# Patient Record
Sex: FEMALE | Race: WHITE | Employment: UNEMPLOYED | URBAN - METROPOLITAN AREA
[De-identification: names, ages, dates, MRNs, and addresses within clinical notes are randomized per-mention and may not be internally consistent; named-entity substitution may affect disease eponyms.]

---

## 2017-11-01 ENCOUNTER — TELEPHONE (OUTPATIENT)
Dept: ORTHOPEDIC SURGERY | Age: 52
End: 2017-11-01

## 2017-11-01 NOTE — TELEPHONE ENCOUNTER
Pt called stating that they were seen in the ED today      X rays were obtained:  Nondisplaced, slightly angulated fracture of the neck of the 5th metacarpal   of the right hand.          Please review and advise:      Phone #:[phone]

## 2017-11-06 ENCOUNTER — OFFICE VISIT (OUTPATIENT)
Dept: ORTHOPEDIC SURGERY | Age: 52
End: 2017-11-06

## 2017-11-06 VITALS — HEIGHT: 67 IN | WEIGHT: 145 LBS | BODY MASS INDEX: 22.76 KG/M2 | RESPIRATION RATE: 16 BRPM

## 2017-11-06 DIAGNOSIS — S62.339A: Primary | ICD-10-CM

## 2017-11-06 DIAGNOSIS — R52 PAIN: ICD-10-CM

## 2017-11-06 PROCEDURE — 99213 OFFICE O/P EST LOW 20 MIN: CPT | Performed by: ORTHOPAEDIC SURGERY

## 2017-11-06 PROCEDURE — 26600 TREAT METACARPAL FRACTURE: CPT | Performed by: ORTHOPAEDIC SURGERY

## 2017-11-06 PROCEDURE — 3014F SCREEN MAMMO DOC REV: CPT | Performed by: ORTHOPAEDIC SURGERY

## 2017-11-06 PROCEDURE — G8420 CALC BMI NORM PARAMETERS: HCPCS | Performed by: ORTHOPAEDIC SURGERY

## 2017-11-06 PROCEDURE — 4004F PT TOBACCO SCREEN RCVD TLK: CPT | Performed by: ORTHOPAEDIC SURGERY

## 2017-11-06 PROCEDURE — 73130 X-RAY EXAM OF HAND: CPT | Performed by: ORTHOPAEDIC SURGERY

## 2017-11-06 PROCEDURE — 3017F COLORECTAL CA SCREEN DOC REV: CPT | Performed by: ORTHOPAEDIC SURGERY

## 2017-11-06 PROCEDURE — G8428 CUR MEDS NOT DOCUMENT: HCPCS | Performed by: ORTHOPAEDIC SURGERY

## 2017-11-06 PROCEDURE — G8484 FLU IMMUNIZE NO ADMIN: HCPCS | Performed by: ORTHOPAEDIC SURGERY

## 2017-11-06 ASSESSMENT — ENCOUNTER SYMPTOMS
RESPIRATORY NEGATIVE: 1
GASTROINTESTINAL NEGATIVE: 1
EYES NEGATIVE: 1

## 2017-11-06 NOTE — PROGRESS NOTES
Negative. Genitourinary: Negative. Musculoskeletal: Positive for joint pain and myalgias. Skin: Negative. Neurological: Negative. Endo/Heme/Allergies: Negative. Psychiatric/Behavioral: Negative. PAST HISTORY:   Unless otherwise specified in this note, the past history is reviewed today with the patient and is as follows-    Allergies   Allergen Reactions    Adderall [Amphetamine-Dextroamphetamine] Itching    Morphine And Related     Tramadol     Ultram [Tramadol Hcl] Itching    Codeine Itching    Pcn [Penicillins] Dermatitis       Current Outpatient Prescriptions   Medication Sig Dispense Refill    naproxen (NAPROSYN) 500 MG tablet Take 1 tablet by mouth 2 times daily 15 tablet 0    HYDROcodone-acetaminophen (NORCO) 5-325 MG per tablet Take 1 tablet by mouth every 6 hours as needed for Pain . 12 tablet 0    Misc. Devices ([address]) MISC 1 each by Does not apply route once for 1 dose 1 each 0     No current facility-administered medications for this visit.         Past Medical History:   Diagnosis Date    Anxiety     Environmental allergies     Non-healing surgical wound     RLS (restless legs syndrome)        Past Surgical History:   Procedure Laterality Date    APPENDECTOMY       SECTION      CHOLECYSTECTOMY      KNEE SURGERY         Family History   Problem Relation Age of Onset    Depression Other        Social History     Social History    Marital status:      Spouse name: N/A    Number of children: N/A    Years of education: N/A     Social History Main Topics    Smoking status: Current Every Day Smoker     Packs/day: 1.50     Types: Cigarettes    Smokeless tobacco: Never Used    Alcohol use No    Drug use: No    Sexual activity: Not Asked     Other Topics Concern    None     Social History Narrative    None         ORTHOPEDIC CONSTITUTION EXAM:     Vital Signs:  Resp 16   Ht 5' 7\" (1.702 m)   Wt 145 lb (65.8 kg)   BMI 22.71 kg/m² Constitution:  Generally, the patient is [x] alert, [x] appears stated age, and [x] in no distress. General body habitus is:   []Cachectic  []Thin  [x]Normal  []Overweight  []Obese  []Morbidly Obese  Disheveled and unkempt appearance. Psychiatric:   Judgement and insight is:  [x]Good    []Poor  Oriented to:  [x]person, [x]place, [x]time. Mood for circumstances is:  [x]Appropriate   []Inappropriate    Gait and Station:   Gait is:  [x]Normal  []Antalgic   []Trendelenburg   []Wide   []Unsteady   []Other:  Assistive Device:  []Cane    []Crutches    []Walker    []Wheelchair    []Gurney  Weight bearing on injured extremity:  [x]Is able   []Is not able    ORTHOPEDIC HAND EXAM:     HAND EXAM:  [x]Right []Left  The patient is:  [x]Right Handed    []Left Handed    Circulation:  [x] The limb is warm and well perfused. [x] Capillary refill is intact. [] Edema:    Inspection:  [x] Skin intact without abrasion or lacerations. [x] Normal hand alignment:  [] Abnormal alignment:  [] Ecchymosis:  [] Abrasion:  [] Laceration:   [] Scar / Surgical incision:  [x] Orthopedic appliance: ED splint     Range of Motion:  [x] Normal Hand AROM     [x] Normal Hand PROM  [x]Can make a fist    [x]Thumb tip can touch pinky tip  [] Deferred due to fracture  Active Wrist Flexion:  []AROM = PROM   Active Wrist Extension:  []AROM = PROM   Active Radial Deviation:  []AROM = PROM   Active Ulnar Deviation:  []AROM = PROM   Passive Wrist Flexion:  []AROM = PROM   Passive Wrist Extension:  []AROM = PROM   Passive Radial Deviation:  []AROM = PROM   Passive Ulnar Deviation:  []AROM = PROM   Finger AROM:  []AROM = PROM   Finger PROM:  []AROM = PROM     Motor Function:   [x] No gross motor weakness of wrist  [x] No gross motor weakness of hand  [x]Thumbs Up    [x]Pointer finger    [x]OK sign    [x]Cross fingers    [x]Number 5  [] Motor weakness:     Neurologic:  [x] Sensation to light touch intact.   [] Impaired:  [] Decreased sensation to light touch over median nerve distribution. [] Decreased sensation to light touch over ulnar nerve distribution. [x] Deep tendon reflexes intact. [] Impaired:  [x] Coordination / proprioception intact. [] Impaired:     Palpation: (Not tested if not marked)     Normal Tenderness Swelling Crepitation Calor   Thumb: [x] [] [] [] []   Index: [x] [] [] [] []   Middle: [x] [] [] [] []   Ring: [x] [] [] [] []   Small: [x] [] [] [] []   A-1 Pulley: [x] [] [] [] []   MCP Joint: 5th [] [x] [] [] []   Metacarpal: 5th [] [x] [] [] []   Palmar Hand: [x] [] [] [] []   Snuff Box: [x] [] [] [] []   Other: [] [] [] [] []     Comment: Minimal swelling and no erythema or ecchymosis at fracture site. Provocative Tests: (Not tested if not marked)   Negative Positive Positive Findings   Finger instability: [x] []    Thumb instability: [x] []    Triggering: [x] []    Thumb CMC Grind Test: [] []    Finklestein's test: [] []    Froment's sign: [] []    Other: [] []        MEDICAL DATA:   Outside record review:  ER records    Imaging:  Narrative   EXAMINATION:   3 VIEWS OF THE RIGHT HAND       11/1/2017 8:37 am     Impression   Nondisplaced, slightly angulated fracture of the neck of the 5th metacarpal   of the right hand. Hand x-ray for Boxer's Fracture:  3 view(s) of the right hand were obtained and reviewed and show Boxer's fracture of the 5th metacarpal in a  skeletally mature patient. The fracture is 5 degree angulated and 0% displaced. This fracture is in acceptable alignment. (Acceptable alignment range is up to 30-40 degrees of volar angulation)        ASSESSMENT:     1. Fx boxers, closed, initial encounter      right   2. Pain  XR HAND RIGHT (MIN 3 VIEWS)         PLAN:   · Diagnostic and treatment options discussed. Diagnosis explained. Natural history discussed. Patient's questions answered. · Patient asked for narcotic pain medication. Narcotics not indicated at this time for this injury.   · Advised her to use OTC

## 2019-05-09 ENCOUNTER — APPOINTMENT (OUTPATIENT)
Dept: GENERAL RADIOLOGY | Age: 54
End: 2019-05-09
Payer: COMMERCIAL

## 2019-05-09 ENCOUNTER — HOSPITAL ENCOUNTER (EMERGENCY)
Age: 54
Discharge: HOME | End: 2019-05-09
Payer: COMMERCIAL

## 2019-05-09 VITALS
TEMPERATURE: 98.2 F | BODY MASS INDEX: 25.11 KG/M2 | WEIGHT: 160 LBS | HEART RATE: 92 BPM | SYSTOLIC BLOOD PRESSURE: 137 MMHG | RESPIRATION RATE: 18 BRPM | HEIGHT: 67 IN | OXYGEN SATURATION: 97 % | DIASTOLIC BLOOD PRESSURE: 85 MMHG

## 2019-05-09 DIAGNOSIS — M25.531 RIGHT WRIST PAIN: ICD-10-CM

## 2019-05-09 DIAGNOSIS — M79.641 PAIN OF RIGHT HAND: Primary | ICD-10-CM

## 2019-05-09 PROCEDURE — 73110 X-RAY EXAM OF WRIST: CPT

## 2019-05-09 PROCEDURE — 6370000000 HC RX 637 (ALT 250 FOR IP): Performed by: PHYSICIAN ASSISTANT

## 2019-05-09 PROCEDURE — 73130 X-RAY EXAM OF HAND: CPT

## 2019-05-09 PROCEDURE — 99283 EMERGENCY DEPT VISIT LOW MDM: CPT

## 2019-05-09 RX ORDER — NAPROXEN 500 MG/1
500 TABLET ORAL 2 TIMES DAILY
Qty: 60 TABLET | Refills: 0 | Status: ON HOLD | OUTPATIENT
Start: 2019-05-09 | End: 2020-12-18 | Stop reason: HOSPADM

## 2019-05-09 RX ORDER — HYDROCODONE BITARTRATE AND ACETAMINOPHEN 5; 325 MG/1; MG/1
1 TABLET ORAL ONCE
Status: COMPLETED | OUTPATIENT
Start: 2019-05-09 | End: 2019-05-09

## 2019-05-09 RX ADMIN — HYDROCODONE BITARTRATE AND ACETAMINOPHEN 1 TABLET: 5; 325 TABLET ORAL at 01:51

## 2019-05-09 ASSESSMENT — PAIN DESCRIPTION - LOCATION: LOCATION: HAND

## 2019-05-09 ASSESSMENT — PAIN DESCRIPTION - ORIENTATION: ORIENTATION: RIGHT

## 2019-05-09 ASSESSMENT — PAIN SCALES - GENERAL: PAINLEVEL_OUTOF10: 6

## 2019-05-09 ASSESSMENT — PAIN DESCRIPTION - PAIN TYPE: TYPE: ACUTE PAIN

## 2019-05-09 NOTE — ED TRIAGE NOTES
Patient to the ED with cc right hand injury that occurred after she fell and tried to stop her fall with the right hand

## 2019-05-09 NOTE — ED PROVIDER NOTES
eMERGENCY dEPARTMENT eNCOUnter      PCP: Arya Miner MD    [address] [facility]    Chief Complaint   Patient presents with    Hand Injury       HPI    Gina Gee is a 53 y.o. female who presents with Right wrist pain. Onset was yesterday. The context is patient tripped when her grandson was in front of her and suddenly stopped. She states that she fell down at least that she jammed her right middle finger. Since that time she's been having pain in the finger and she describes a burning pain in the ulnar aspect of her right wrist.  No head injury loss of consciousness or any other injuries. The pain is aggravated by wrist movement and direct palpation as well as movement and palpation of the middle finger. No other injuries. No distal numbness, tingling, weakness. REVIEW OF SYSTEMS    General: Denies fever or chills  Cardiac: Denies chest pain or chestwall pain. Pulmonary: Denies shortness of breath  GI: Denies abdominal pain, vomiting, or diarrhea  : No dysuria or hematuria    Denies any other muscles skeletal injuries, including elbow, shoulder,chest wall and back. All other review of systems are negative  See HPI and nursing notes for additional information     PAST MEDICAL & SURGICAL HISTORY    Past Medical History:   Diagnosis Date    Anxiety     Environmental allergies     Non-healing surgical wound     RLS (restless legs syndrome)      Past Surgical History:   Procedure Laterality Date    APPENDECTOMY       SECTION      CHOLECYSTECTOMY      KNEE SURGERY         CURRENT MEDICATIONS    Current Outpatient Rx   Medication Sig Dispense Refill    ibuprofen (ADVIL;MOTRIN) 800 MG tablet Take 800 mg by mouth every 6 hours as needed for Pain      naproxen (NAPROSYN) 500 MG tablet Take 1 tablet by mouth 2 times daily 15 tablet 0    Misc.  Devices ([address]) MISC 1 each by Does not apply route once for 1 dose 1 each 0       ALLERGIES    Allergies   Allergen Reactions    No snuffbox tenderness. Range of motion limited due to pain. Distal sensation and capillary refill intact. Reproducible tenderness along the shaft of the middle finger with mild swelling around the PIP joint full flexion. Mildly limited extension of the middle finger  Elbow, including radial head is non-tender. No swelling, discoloration, or tenderness to palpation of the ipsilateral elbow and hand/fingers. Distal motor, sensation, capillary refill intact. Integument:  Well hydrated, no rash. skin intact  Vascular: affected extremity distally neurovascularly intact - sensation and capillary refill intact. Neurologic:  Awake alert, normal flow ofspeech   Psychiatric: Cooperative, pleasant affect    RADIOLOGY/PROCEDURES    Right Wrist  Xrays:       XR HAND RIGHT (MIN 3 VIEWS) (Preliminary result)   Result time 05/09/19 01:26:16   Preliminary result by Zeyad Matthews MD (05/09/19 01:26:16)                Impression:    1. No acute osseous or soft tissue abnormality of the right hand and wrist.                      XR WRIST RIGHT (MIN 3 VIEWS) (Preliminary result)   Result time 05/09/19 01:26:16   Preliminary result by Zeyad Matthews MD (05/09/19 01:26:16)                Impression:    1. No acute osseous or soft tissue abnormality of the right hand and wrist.                      PROCEDURES   SPLINT PLACEMENT     A velcro wrist splint was applied by the emergency department technician. The splint is in good position. The patient's extremity is neurovascularly intact after the splint placement. ED COURSE & MEDICAL DECISION MAKING       Vital signs and nursing notes reviewed during ED course. I have independently evaluated this patient . Supervising MD present in the Emergency Department, available for consultation, throughout entirety of  patient care. All pertinent Lab data and radiographic results reviewed with patient at bedside.        The patient and/or the family were informed of the results of any tests/labs/imaging, the treatment plan, and time was allotted to answer questions. Differential diagnosis: includes but not limited to ligamentous injury, tendon injury, soft tissue contusion/hematoma, fracture, dislocation, Infection, Neurologic Deficit/Injury. Clinical  IMPRESSION    1. Pain of right hand    2. Right wrist pain           Patient was placed in a wrist  splint today. Pain medication provided. Followup with orthopedist-information provided today. Diagnosis and plan discussed in detail with patient who understands and agrees. Patient agrees to return emergency department if symptoms worsen or any new symptoms develop. Comment: Please note this report has been produced using speech recognition software and may contain errors related to that system including errors in grammar, punctuation, and spelling, as well as words and phrases that may be inappropriate. If there are any questions or concerns please feel free to contact the dictating provider for clarification.       Rosie Simeon PA-C  05/09/19 [id number]

## 2020-10-30 ENCOUNTER — HOSPITAL ENCOUNTER (EMERGENCY)
Age: 55
Discharge: HOME | End: 2020-10-30
Payer: COMMERCIAL

## 2020-10-30 VITALS
WEIGHT: 186 LBS | SYSTOLIC BLOOD PRESSURE: 137 MMHG | DIASTOLIC BLOOD PRESSURE: 97 MMHG | BODY MASS INDEX: 29.19 KG/M2 | RESPIRATION RATE: 16 BRPM | OXYGEN SATURATION: 97 % | TEMPERATURE: 98.1 F | HEART RATE: 86 BPM | HEIGHT: 67 IN

## 2020-10-30 LAB
BACTERIA: ABNORMAL /HPF
BILIRUBIN URINE: NEGATIVE MG/DL
BLOOD, URINE: ABNORMAL
CLARITY: CLEAR
COLOR: ABNORMAL
GLUCOSE, URINE: NEGATIVE MG/DL
KETONES, URINE: NEGATIVE MG/DL
LEUKOCYTE ESTERASE, URINE: ABNORMAL
MUCUS: ABNORMAL HPF
NITRITE URINE, QUANTITATIVE: NEGATIVE
PH, URINE: 6 (ref 5–8)
PROTEIN UA: NEGATIVE MG/DL
RBC URINE: 1 /HPF (ref 0–6)
SPECIFIC GRAVITY UA: 1.01 (ref 1–1.03)
SQUAMOUS EPITHELIAL: <1 /HPF
TRANSITIONAL EPITHELIAL: <1 /HPF
TRICHOMONAS: ABNORMAL /HPF
UROBILINOGEN, URINE: 1 MG/DL (ref 0.2–1)
WBC UA: 2 /HPF (ref 0–5)

## 2020-10-30 PROCEDURE — 99283 EMERGENCY DEPT VISIT LOW MDM: CPT

## 2020-10-30 PROCEDURE — 6370000000 HC RX 637 (ALT 250 FOR IP): Performed by: PHYSICIAN ASSISTANT

## 2020-10-30 PROCEDURE — 81001 URINALYSIS AUTO W/SCOPE: CPT

## 2020-10-30 RX ORDER — PHENAZOPYRIDINE HYDROCHLORIDE 100 MG/1
200 TABLET, FILM COATED ORAL ONCE
Status: COMPLETED | OUTPATIENT
Start: 2020-10-30 | End: 2020-10-30

## 2020-10-30 RX ORDER — CEPHALEXIN 500 MG/1
500 CAPSULE ORAL 2 TIMES DAILY
Qty: 14 CAPSULE | Refills: 0 | Status: SHIPPED | OUTPATIENT
Start: 2020-10-30 | End: 2020-11-06

## 2020-10-30 RX ORDER — IBUPROFEN 600 MG/1
600 TABLET ORAL EVERY 6 HOURS PRN
Qty: 20 TABLET | Refills: 0 | Status: ON HOLD | OUTPATIENT
Start: 2020-10-30 | End: 2020-12-18 | Stop reason: HOSPADM

## 2020-10-30 RX ORDER — PHENAZOPYRIDINE HYDROCHLORIDE 100 MG/1
100 TABLET, FILM COATED ORAL 3 TIMES DAILY PRN
Qty: 10 TABLET | Refills: 0 | Status: SHIPPED | OUTPATIENT
Start: 2020-10-30 | End: 2020-11-02

## 2020-10-30 RX ORDER — IBUPROFEN 600 MG/1
600 TABLET ORAL ONCE
Status: COMPLETED | OUTPATIENT
Start: 2020-10-30 | End: 2020-10-30

## 2020-10-30 RX ADMIN — IBUPROFEN 600 MG: 600 TABLET, FILM COATED ORAL at 06:34

## 2020-10-30 RX ADMIN — PHENAZOPYRIDINE 200 MG: 100 TABLET ORAL at 05:13

## 2020-10-30 ASSESSMENT — PAIN DESCRIPTION - PROGRESSION: CLINICAL_PROGRESSION: NOT CHANGED

## 2020-10-30 ASSESSMENT — PAIN SCALES - GENERAL
PAINLEVEL_OUTOF10: 8
PAINLEVEL_OUTOF10: 10

## 2020-10-30 ASSESSMENT — PAIN DESCRIPTION - ONSET: ONSET: ON-GOING

## 2020-10-30 ASSESSMENT — PAIN DESCRIPTION - DESCRIPTORS: DESCRIPTORS: ACHING

## 2020-10-30 ASSESSMENT — PAIN DESCRIPTION - PAIN TYPE: TYPE: ACUTE PAIN

## 2020-10-30 ASSESSMENT — PAIN DESCRIPTION - LOCATION: LOCATION: BACK

## 2020-10-30 ASSESSMENT — PAIN DESCRIPTION - FREQUENCY: FREQUENCY: CONTINUOUS

## 2020-10-30 NOTE — ED NOTES
Please call sister Yolanda Joel with updates at [phone]     Nehemias Shannon, MINE  10/30/20 7262

## 2020-10-30 NOTE — ED PROVIDER NOTES
EMERGENCY DEPARTMENT ENCOUNTER      PCP: Alec Jones MD    CHIEF COMPLAINT    Chief Complaint   Patient presents with    Urinary Retention     thinks she has a UTI     Dysuria    Flank Pain     left x4 days         This patient was not evaluated by the attending physician. I have independently evaluated this patient . HPI    Romaine Nam is a 63 y.o. female who presents with urinary discomfort, urgency, hesitancy. Has had mild left lower back pain as well, states it feels similar to her UTIs in the past.  Denies fevers or chills. No incontinence. No vaginal symptoms. REVIEW OF SYSTEMS    Constitutional:  Denies fever, chills, weight loss or weakness   HENT:  Denies sore throat or ear pain   Respiratory:  Denies cough or shortness of breath   Cardiovascular:  Denies chest pain, palpitations or swelling   GI:  See HPI. Denies abdominal pain, nausea, vomiting, or diarrhea   :  See HPI.   Musculoskeletal:  Denies pain or swelling of extremities  Skin:  Denies rash   Neurologic:  Denies headache, focal weakness or sensory changes     See HPI and nursing notes additional information  All other review of systems negative at this time      PAST MEDICAL HISTORY/SURGICAL HISTORY    Past Medical History:   Diagnosis Date    Anxiety     Environmental allergies     Non-healing surgical wound     RLS (restless legs syndrome)      Past Surgical History:   Procedure Laterality Date    APPENDECTOMY       SECTION      CHOLECYSTECTOMY      KNEE SURGERY         CURRENT MEDICATIONS    Current Outpatient Rx   Medication Sig Dispense Refill    ibuprofen (IBU) 600 MG tablet Take 1 tablet by mouth every 6 hours as needed for Pain 20 tablet 0    phenazopyridine (PYRIDIUM) 100 MG tablet Take 1 tablet by mouth 3 times daily as needed for Pain (dysuria) 10 tablet 0    cephALEXin (KEFLEX) 500 MG capsule Take 1 capsule by mouth 2 times daily for 7 days 14 capsule 0    naproxen (NAPROSYN) 500 MG tablet Take 1 tablet by mouth 2 times daily 60 tablet 0    Misc.  Devices ([address]) MISC 1 each by Does not apply route once for 1 dose 1 each 0       ALLERGIES    Allergies   Allergen Reactions    Adderall [Amphetamine-Dextroamphetamine] Itching    Morphine And Related     Tramadol     Ultram [Tramadol Hcl] Itching    Codeine Itching    Pcn [Penicillins] Swelling and Dermatitis       FAMILY HISTORY/SOCIAL HISTORY  Family History   Problem Relation Age of Onset    Depression Other      Social History     Socioeconomic History    Marital status:      Spouse name: Not on file    Number of children: Not on file    Years of education: Not on file    Highest education level: Not on file   Occupational History    Not on file   Social Needs    Financial resource strain: Not on file    Food insecurity     Worry: Not on file     Inability: Not on file    Transportation needs     Medical: Not on file     Non-medical: Not on file   Tobacco Use    Smoking status: Current Every Day Smoker     Packs/day: 0.50     Types: Cigarettes    Smokeless tobacco: Never Used   Substance and Sexual Activity    Alcohol use: No    Drug use: No    Sexual activity: Not on file   Lifestyle    Physical activity     Days per week: Not on file     Minutes per session: Not on file    Stress: Not on file   Relationships    Social connections     Talks on phone: Not on file     Gets together: Not on file     Attends Buddhist service: Not on file     Active member of club or organization: Not on file     Attends meetings of clubs or organizations: Not on file     Relationship status: Not on file    Intimate partner violence     Fear of current or ex partner: Not on file     Emotionally abused: Not on file     Physically abused: Not on file     Forced sexual activity: Not on file   Other Topics Concern    Not on file   Social History Narrative    Not on file       PHYSICAL EXAM    VITAL SIGNS: BP (!) 137/97   Pulse 86 Temp 98.1 °F (36.7 °C)   Resp 16   Ht 5' 7\" (1.702 m)   Wt 186 lb (84.4 kg)   SpO2 97%   BMI 29.13 kg/m²    Constitutional:  Well-developed, well-nourished, appears comfortable  Eyes:  Non-icteric sclera, no conjunctival injection   HENT:  Atraumatic, PERRL. NECK: Supple, no JVD   Respiratory:  No respiratory distress - nonlabored breathing  GI:  Abdomen soft, non-distended, no abdominal tenderness  :  No CVA tenderness  Integument:  Well hydrated,Nondiaphoretic Skin, no obvious rashes,  Lymphatics:  No swollen inguinal nodes. No streaks. LABS:  Results for orders placed or performed during the hospital encounter of 10/30/20   Urinalysis Reflex to Culture    Specimen: Urine   Result Value Ref Range    Color, UA ALEX (A) YELLOW    Clarity, UA CLEAR CLEAR    Glucose, Urine NEGATIVE NEGATIVE MG/DL    Bilirubin Urine NEGATIVE NEGATIVE MG/DL    Ketones, Urine NEGATIVE NEGATIVE MG/DL    Specific Gravity, UA 1.009 1.001 - 1.035    Blood, Urine SMALL (A) NEGATIVE    pH, Urine 6.0 5.0 - 8.0    Protein, UA NEGATIVE NEGATIVE MG/DL    Urobilinogen, Urine 1 0.2 - 1.0 MG/DL    Nitrite Urine, Quantitative NEGATIVE NEGATIVE    Leukocyte Esterase, Urine TRACE (A) NEGATIVE    RBC, UA 1 0 - 6 /HPF    WBC, UA 2 0 - 5 /HPF    Bacteria, UA RARE (A) NEGATIVE /HPF    Squam Epithel, UA <1 /HPF    Trans Epithel, UA <1 /HPF    Mucus, UA RARE (A) NEGATIVE HPF    Trichomonas, UA NONE SEEN NONE SEEN /HPF     ED COURSE & MEDICAL DECISION MAKING      History and exam is consistent with urinary tract infection. Patient did want to leave prior to urine because she had to take her children to school. Secondary her symptoms, we will treat her resumptive [place] Mckenna for a urinary tract infection. She did have a rare amount of bacteria in her urine. We we will treat her with Keflex and Pyridium.   There was no clinical evidence of pyelonephritis, kidney stone (addition, patient does not have history of kidney stones), urosepsis, or appendicitis. Patient agrees to return emergency department if symptoms worsen or any new symptoms develop. Clinical  IMPRESSION    1. Dysuria      Comment: Please note this report has been produced using speech recognition software and may contain errors related to that system including errors in grammar, punctuation, and spelling, as well as words and phrases that may be inappropriate. If there are any questions or concerns please feel free to contact the dictating provider for clarification.       GENO Montenegro  10/30/20 [id number]

## 2020-10-30 NOTE — ED TRIAGE NOTES
Patient to the ED complaining of urinary retention, left flank pain, and dysuria x4 days. Patient reports trying to manage symptoms at home, but they became unbearable this am. Patient rates pain 9/10. Denies hx of kidney stones.

## 2020-10-30 NOTE — ED NOTES
Reviewed discharge instructions and prescriptions. Pt V/U Pt left ED and currently waiting outside for ride.      Max Collet, RN  10/30/20 [id number]

## 2020-10-30 NOTE — ED PROVIDER NOTES
eMERGENCY dEPARTMENT eNCOUnter        [facility]    Chief Complaint   Patient presents with    Urinary Retention     thinks she has a UTI     Dysuria    Flank Pain     left x4 days       HPI    Jovany Conklin is a 63 y.o. female who presents with concern for a urinary tract infection. Symptoms began 4 days ago. Constant, worsening just this morning. She reports burning pain with urination, decreased urine output, and suprapubic/lower back pain. Denies any fever or chills. Had an episode of vomiting this morning, which is what triggered her to seek medical attention. She has been trying to increase her water intake at home without relief. REVIEW OF SYSTEMS    See HPI for further details. Review of systems otherwise negative. Constitutional:  Denies fever. Respiratory:  Denies any shortness of breath. Cardiovascular:  Denies chest pain. GI:  + suprapubic abdominal pain, + nausea, + vomiting, denies diarrhea. :  + dysuria, + frequency, + urgency, denies hematuria, + flank pain. Integument:  Denies skin changes. PAST MEDICAL HISTORY    Past Medical History:   Diagnosis Date    Anxiety     Environmental allergies     Non-healing surgical wound     RLS (restless legs syndrome)        SURGICAL HISTORY    Past Surgical History:   Procedure Laterality Date    APPENDECTOMY       SECTION      CHOLECYSTECTOMY      KNEE SURGERY         CURRENT MEDICATIONS    Current Outpatient Rx   Medication Sig Dispense Refill    naproxen (NAPROSYN) 500 MG tablet Take 1 tablet by mouth 2 times daily 60 tablet 0    ibuprofen (ADVIL;MOTRIN) 800 MG tablet Take 800 mg by mouth every 6 hours as needed for Pain      Misc.  Devices ([email or web address]) MISC 1 each by Does not apply route once for 1 dose 1 each 0       ALLERGIES    Allergies   Allergen Reactions    Adderall [Amphetamine-Dextroamphetamine] Itching    Morphine And Related     Tramadol     Ultram [Tramadol Hcl] Itching    Codeine Itching    Pcn [Penicillins] Swelling and Dermatitis       FAMILY HISTORY    Family History   Problem Relation Age of Onset    Depression Other        SOCIAL HISTORY    Social History     Socioeconomic History    Marital status:      Spouse name: Not on file    Number of children: Not on file    Years of education: Not on file    Highest education level: Not on file   Occupational History    Not on file   Social Needs    Financial resource strain: Not on file    Food insecurity     Worry: Not on file     Inability: Not on file    Transportation needs     Medical: Not on file     Non-medical: Not on file   Tobacco Use    Smoking status: Current Every Day Smoker     Packs/day: 0.50     Types: Cigarettes    Smokeless tobacco: Never Used   Substance and Sexual Activity    Alcohol use: No    Drug use: No    Sexual activity: Not on file   Lifestyle    Physical activity     Days per week: Not on file     Minutes per session: Not on file    Stress: Not on file   Relationships    Social connections     Talks on phone: Not on file     Gets together: Not on file     Attends Gnosticism service: Not on file     Active member of club or organization: Not on file     Attends meetings of clubs or organizations: Not on file     Relationship status: Not on file    Intimate partner violence     Fear of current or ex partner: Not on file     Emotionally abused: Not on file     Physically abused: Not on file     Forced sexual activity: Not on file   Other Topics Concern    Not on file   Social History Narrative    Not on file       PHYSICAL EXAM    VITAL SIGNS: BP (!) 137/97   Pulse 86   Temp 98.1 °F (36.7 °C)   Resp 16   Ht 5' 7\" (1.702 m)   Wt 186 lb (84.4 kg)   SpO2 97%   BMI 29.13 kg/m²   Constitutional:  Well developed, well nourished, appears uncomfortable but non-toxic  Eyes:  PERRL, EOMI. Conjunctiva normal.  HENT:  NC/AT. Ears, nose, mouth normal.  Respiratory:  Lungs CTAB. Cardiovascular:  RRR. GI:  Abdomen soft, non-distended, suprapubic tenderness. :  Left lower back tenderness, no CVA tenderness. Musculoskeletal:  No edema. Integument:  Well hydrated. RADIOLOGY/PROCEDURES    Labs Reviewed   URINE RT REFLEX TO CULTURE - Abnormal; Notable for the following components:       Result Value    Color, UA ALEX (*)     Blood, Urine SMALL (*)     Leukocyte Esterase, Urine TRACE (*)     Bacteria, UA RARE (*)     Mucus, UA RARE (*)     All other components within normal limits     ED COURSE & MEDICAL DECISION MAKING    Pertinent Labs & Imaging studies reviewed. (See chart for details)  -  Patient seen and evaluated in the emergency department. -  Triage and nursing notes reviewed and incorporated. -  Old chart records reviewed and incorporated. -  Supervising physician was Dr. Sandra Quezada. Patient was seen independently. -  Differential diagnosis includes: UTI, pyelonephritis, nephrolithiasis, STI, and others  -  Patient treated with Pyridium  -  Initial urine specimen was not large enough to run specimen. Nursing to straight cath to obtain a new sample. I signed out to Westley Bazan PA-C. Please see his note for further details. In light of current events, I did utilize appropriate PPE (including surgical face mask, safety glasses, and gloves, as recommended by the health facility/national standard best practice, during my bedside interactions with the patient. FINAL IMPRESSION    1.  Dysuria                 Kalee Price PA-C  10/30/20 [id number]

## 2020-11-26 ENCOUNTER — APPOINTMENT (OUTPATIENT)
Dept: ULTRASOUND IMAGING | Age: 55
DRG: 192 | End: 2020-11-26
Payer: COMMERCIAL

## 2020-11-26 ENCOUNTER — HOSPITAL ENCOUNTER (INPATIENT)
Age: 55
LOS: 2 days | Discharge: LEFT BEFORE DISCHARGE | DRG: 192 | End: 2020-11-28
Attending: EMERGENCY MEDICINE | Admitting: HOSPITALIST
Payer: COMMERCIAL

## 2020-11-26 ENCOUNTER — APPOINTMENT (OUTPATIENT)
Dept: GENERAL RADIOLOGY | Age: 55
DRG: 192 | End: 2020-11-26
Payer: COMMERCIAL

## 2020-11-26 PROBLEM — I50.9 NEW ONSET OF CONGESTIVE HEART FAILURE (HCC): Status: ACTIVE | Noted: 2020-11-26

## 2020-11-26 LAB
ALBUMIN SERPL-MCNC: 3.4 GM/DL (ref 3.4–5)
ALP BLD-CCNC: 66 IU/L (ref 40–129)
ALT SERPL-CCNC: 27 U/L (ref 10–40)
ANION GAP SERPL CALCULATED.3IONS-SCNC: 11 MMOL/L (ref 4–16)
AST SERPL-CCNC: 23 IU/L (ref 15–37)
BASE EXCESS: 2 (ref 0–2.4)
BASOPHILS ABSOLUTE: 0 K/CU MM
BASOPHILS RELATIVE PERCENT: 0.6 % (ref 0–1)
BILIRUB SERPL-MCNC: 0.6 MG/DL (ref 0–1)
BUN BLDV-MCNC: 11 MG/DL (ref 6–23)
CALCIUM SERPL-MCNC: 8.8 MG/DL (ref 8.3–10.6)
CHLORIDE BLD-SCNC: 100 MMOL/L (ref 99–110)
CO2: 23 MMOL/L (ref 21–32)
COMMENT: ABNORMAL
CREAT SERPL-MCNC: 1.1 MG/DL (ref 0.6–1.1)
D DIMER: 257 NG/ML(DDU)
DIFFERENTIAL TYPE: ABNORMAL
EKG ATRIAL RATE: 113 BPM
EKG ATRIAL RATE: 115 BPM
EKG ATRIAL RATE: 116 BPM
EKG DIAGNOSIS: NORMAL
EKG P AXIS: 72 DEGREES
EKG P AXIS: 73 DEGREES
EKG P AXIS: 77 DEGREES
EKG P-R INTERVAL: 158 MS
EKG P-R INTERVAL: 160 MS
EKG P-R INTERVAL: 170 MS
EKG Q-T INTERVAL: 322 MS
EKG Q-T INTERVAL: 352 MS
EKG Q-T INTERVAL: 358 MS
EKG QRS DURATION: 100 MS
EKG QRS DURATION: 100 MS
EKG QRS DURATION: 86 MS
EKG QTC CALCULATION (BAZETT): 447 MS
EKG QTC CALCULATION (BAZETT): 482 MS
EKG QTC CALCULATION (BAZETT): 495 MS
EKG R AXIS: 17 DEGREES
EKG R AXIS: 24 DEGREES
EKG R AXIS: 27 DEGREES
EKG T AXIS: 110 DEGREES
EKG T AXIS: 146 DEGREES
EKG T AXIS: 99 DEGREES
EKG VENTRICULAR RATE: 113 BPM
EKG VENTRICULAR RATE: 115 BPM
EKG VENTRICULAR RATE: 116 BPM
EOSINOPHILS ABSOLUTE: 0.1 K/CU MM
EOSINOPHILS RELATIVE PERCENT: 1.4 % (ref 0–3)
GFR AFRICAN AMERICAN: >60 ML/MIN/1.73M2
GFR NON-AFRICAN AMERICAN: 51 ML/MIN/1.73M2
GLUCOSE BLD-MCNC: 111 MG/DL (ref 70–99)
HCO3 VENOUS: 24.7 MMOL/L (ref 19–25)
HCT VFR BLD CALC: 42.7 % (ref 37–47)
HEMOGLOBIN: 13.1 GM/DL (ref 12.5–16)
IMMATURE NEUTROPHIL %: 0.3 % (ref 0–0.43)
LACTATE: 1.1 MMOL/L (ref 0.4–2)
LV EF: 18 %
LVEF MODALITY: NORMAL
LYMPHOCYTES ABSOLUTE: 1.5 K/CU MM
LYMPHOCYTES RELATIVE PERCENT: 23.2 % (ref 24–44)
MCH RBC QN AUTO: 29.7 PG (ref 27–31)
MCHC RBC AUTO-ENTMCNC: 30.7 % (ref 32–36)
MCV RBC AUTO: 96.8 FL (ref 78–100)
MONOCYTES ABSOLUTE: 0.5 K/CU MM
MONOCYTES RELATIVE PERCENT: 8.3 % (ref 0–4)
NUCLEATED RBC %: 0 %
O2 SAT, VEN: 87 % (ref 50–70)
PCO2, VEN: 48 MMHG (ref 38–52)
PDW BLD-RTO: 13.9 % (ref 11.7–14.9)
PH VENOUS: 7.32 (ref 7.32–7.42)
PLATELET # BLD: 311 K/CU MM (ref 140–440)
PMV BLD AUTO: 9.8 FL (ref 7.5–11.1)
PO2, VEN: 72 MMHG (ref 28–48)
POTASSIUM SERPL-SCNC: 4 MMOL/L (ref 3.5–5.1)
PRO-BNP: ABNORMAL PG/ML
RAPID INFLUENZA  B AGN: NEGATIVE
RAPID INFLUENZA A AGN: NEGATIVE
RBC # BLD: 4.41 M/CU MM (ref 4.2–5.4)
SARS-COV-2, NAAT: NOT DETECTED
SEGMENTED NEUTROPHILS ABSOLUTE COUNT: 4.3 K/CU MM
SEGMENTED NEUTROPHILS RELATIVE PERCENT: 66.2 % (ref 36–66)
SODIUM BLD-SCNC: 134 MMOL/L (ref 135–145)
SOURCE: NORMAL
TOTAL IMMATURE NEUTOROPHIL: 0.02 K/CU MM
TOTAL NUCLEATED RBC: 0 K/CU MM
TOTAL PROTEIN: 6.5 GM/DL (ref 6.4–8.2)
TROPONIN T: 0.01 NG/ML
TROPONIN T: <0.01 NG/ML
TROPONIN T: <0.01 NG/ML
TSH HIGH SENSITIVITY: 2.16 UIU/ML (ref 0.27–4.2)
WBC # BLD: 6.5 K/CU MM (ref 4–10.5)

## 2020-11-26 PROCEDURE — 99283 EMERGENCY DEPT VISIT LOW MDM: CPT

## 2020-11-26 PROCEDURE — 85379 FIBRIN DEGRADATION QUANT: CPT

## 2020-11-26 PROCEDURE — 93005 ELECTROCARDIOGRAM TRACING: CPT | Performed by: EMERGENCY MEDICINE

## 2020-11-26 PROCEDURE — 6370000000 HC RX 637 (ALT 250 FOR IP): Performed by: HOSPITALIST

## 2020-11-26 PROCEDURE — 2700000000 HC OXYGEN THERAPY PER DAY

## 2020-11-26 PROCEDURE — 71045 X-RAY EXAM CHEST 1 VIEW: CPT

## 2020-11-26 PROCEDURE — 6360000002 HC RX W HCPCS: Performed by: HOSPITALIST

## 2020-11-26 PROCEDURE — 2580000003 HC RX 258: Performed by: HOSPITALIST

## 2020-11-26 PROCEDURE — 6360000002 HC RX W HCPCS: Performed by: INTERNAL MEDICINE

## 2020-11-26 PROCEDURE — 6370000000 HC RX 637 (ALT 250 FOR IP): Performed by: EMERGENCY MEDICINE

## 2020-11-26 PROCEDURE — 83880 ASSAY OF NATRIURETIC PEPTIDE: CPT

## 2020-11-26 PROCEDURE — 93308 TTE F-UP OR LMTD: CPT

## 2020-11-26 PROCEDURE — U0002 COVID-19 LAB TEST NON-CDC: HCPCS

## 2020-11-26 PROCEDURE — 93970 EXTREMITY STUDY: CPT

## 2020-11-26 PROCEDURE — 2140000000 HC CCU INTERMEDIATE R&B

## 2020-11-26 PROCEDURE — 36415 COLL VENOUS BLD VENIPUNCTURE: CPT

## 2020-11-26 PROCEDURE — 80053 COMPREHEN METABOLIC PANEL: CPT

## 2020-11-26 PROCEDURE — 94640 AIRWAY INHALATION TREATMENT: CPT

## 2020-11-26 PROCEDURE — 99254 IP/OBS CNSLTJ NEW/EST MOD 60: CPT | Performed by: INTERNAL MEDICINE

## 2020-11-26 PROCEDURE — 6360000002 HC RX W HCPCS: Performed by: EMERGENCY MEDICINE

## 2020-11-26 PROCEDURE — 94761 N-INVAS EAR/PLS OXIMETRY MLT: CPT

## 2020-11-26 PROCEDURE — 84443 ASSAY THYROID STIM HORMONE: CPT

## 2020-11-26 PROCEDURE — 96375 TX/PRO/DX INJ NEW DRUG ADDON: CPT

## 2020-11-26 PROCEDURE — 93010 ELECTROCARDIOGRAM REPORT: CPT | Performed by: INTERNAL MEDICINE

## 2020-11-26 PROCEDURE — 96374 THER/PROPH/DIAG INJ IV PUSH: CPT

## 2020-11-26 PROCEDURE — 87804 INFLUENZA ASSAY W/OPTIC: CPT

## 2020-11-26 PROCEDURE — 83605 ASSAY OF LACTIC ACID: CPT

## 2020-11-26 PROCEDURE — 87040 BLOOD CULTURE FOR BACTERIA: CPT

## 2020-11-26 PROCEDURE — 82805 BLOOD GASES W/O2 SATURATION: CPT

## 2020-11-26 PROCEDURE — 84484 ASSAY OF TROPONIN QUANT: CPT

## 2020-11-26 PROCEDURE — 85025 COMPLETE CBC W/AUTO DIFF WBC: CPT

## 2020-11-26 PROCEDURE — 93005 ELECTROCARDIOGRAM TRACING: CPT | Performed by: INTERNAL MEDICINE

## 2020-11-26 RX ORDER — FUROSEMIDE 10 MG/ML
80 INJECTION INTRAMUSCULAR; INTRAVENOUS 2 TIMES DAILY
Status: DISCONTINUED | OUTPATIENT
Start: 2020-11-26 | End: 2020-11-26

## 2020-11-26 RX ORDER — DEXAMETHASONE SODIUM PHOSPHATE 10 MG/ML
10 INJECTION, SOLUTION INTRAMUSCULAR; INTRAVENOUS ONCE
Status: COMPLETED | OUTPATIENT
Start: 2020-11-26 | End: 2020-11-26

## 2020-11-26 RX ORDER — NITROGLYCERIN 0.4 MG/1
0.4 TABLET SUBLINGUAL EVERY 5 MIN PRN
Status: DISCONTINUED | OUTPATIENT
Start: 2020-11-26 | End: 2020-11-28 | Stop reason: HOSPADM

## 2020-11-26 RX ORDER — FUROSEMIDE 10 MG/ML
40 INJECTION INTRAMUSCULAR; INTRAVENOUS 2 TIMES DAILY
Status: DISCONTINUED | OUTPATIENT
Start: 2020-11-26 | End: 2020-11-26

## 2020-11-26 RX ORDER — POLYETHYLENE GLYCOL 3350 17 G/17G
17 POWDER, FOR SOLUTION ORAL DAILY PRN
Status: DISCONTINUED | OUTPATIENT
Start: 2020-11-26 | End: 2020-11-28 | Stop reason: HOSPADM

## 2020-11-26 RX ORDER — FUROSEMIDE 10 MG/ML
80 INJECTION INTRAMUSCULAR; INTRAVENOUS 2 TIMES DAILY
Status: DISCONTINUED | OUTPATIENT
Start: 2020-11-26 | End: 2020-11-28 | Stop reason: HOSPADM

## 2020-11-26 RX ORDER — ACETAMINOPHEN 650 MG/1
650 SUPPOSITORY RECTAL EVERY 6 HOURS PRN
Status: DISCONTINUED | OUTPATIENT
Start: 2020-11-26 | End: 2020-11-28 | Stop reason: HOSPADM

## 2020-11-26 RX ORDER — LISINOPRIL 5 MG/1
5 TABLET ORAL DAILY
Status: DISCONTINUED | OUTPATIENT
Start: 2020-11-26 | End: 2020-11-27

## 2020-11-26 RX ORDER — ASPIRIN 81 MG/1
324 TABLET, CHEWABLE ORAL ONCE
Status: COMPLETED | OUTPATIENT
Start: 2020-11-26 | End: 2020-11-26

## 2020-11-26 RX ORDER — PROMETHAZINE HYDROCHLORIDE 12.5 MG/1
12.5 TABLET ORAL EVERY 6 HOURS PRN
Status: DISCONTINUED | OUTPATIENT
Start: 2020-11-26 | End: 2020-11-28 | Stop reason: HOSPADM

## 2020-11-26 RX ORDER — SODIUM CHLORIDE 0.9 % (FLUSH) 0.9 %
10 SYRINGE (ML) INJECTION EVERY 12 HOURS SCHEDULED
Status: DISCONTINUED | OUTPATIENT
Start: 2020-11-26 | End: 2020-11-28 | Stop reason: HOSPADM

## 2020-11-26 RX ORDER — ALBUTEROL SULFATE 90 UG/1
2 AEROSOL, METERED RESPIRATORY (INHALATION) ONCE
Status: COMPLETED | OUTPATIENT
Start: 2020-11-26 | End: 2020-11-26

## 2020-11-26 RX ORDER — FUROSEMIDE 10 MG/ML
20 INJECTION INTRAMUSCULAR; INTRAVENOUS ONCE
Status: COMPLETED | OUTPATIENT
Start: 2020-11-26 | End: 2020-11-26

## 2020-11-26 RX ORDER — SODIUM CHLORIDE 0.9 % (FLUSH) 0.9 %
10 SYRINGE (ML) INJECTION PRN
Status: DISCONTINUED | OUTPATIENT
Start: 2020-11-26 | End: 2020-11-28 | Stop reason: HOSPADM

## 2020-11-26 RX ORDER — ONDANSETRON 2 MG/ML
4 INJECTION INTRAMUSCULAR; INTRAVENOUS EVERY 6 HOURS PRN
Status: DISCONTINUED | OUTPATIENT
Start: 2020-11-26 | End: 2020-11-28 | Stop reason: HOSPADM

## 2020-11-26 RX ORDER — ACETAMINOPHEN 325 MG/1
650 TABLET ORAL EVERY 6 HOURS PRN
Status: DISCONTINUED | OUTPATIENT
Start: 2020-11-26 | End: 2020-11-28 | Stop reason: HOSPADM

## 2020-11-26 RX ADMIN — FUROSEMIDE 20 MG: 10 INJECTION, SOLUTION INTRAVENOUS at 08:38

## 2020-11-26 RX ADMIN — NITROGLYCERIN 0.4 MG: 0.4 TABLET, ORALLY DISINTEGRATING SUBLINGUAL at 08:38

## 2020-11-26 RX ADMIN — ASPIRIN 81 MG CHEWABLE TABLET 324 MG: 81 TABLET CHEWABLE at 08:38

## 2020-11-26 RX ADMIN — FUROSEMIDE 80 MG: 10 INJECTION, SOLUTION INTRAMUSCULAR; INTRAVENOUS at 11:22

## 2020-11-26 RX ADMIN — ACETAMINOPHEN 650 MG: 325 TABLET ORAL at 21:07

## 2020-11-26 RX ADMIN — SODIUM CHLORIDE, PRESERVATIVE FREE 10 ML: 5 INJECTION INTRAVENOUS at 11:50

## 2020-11-26 RX ADMIN — HYDROMORPHONE HYDROCHLORIDE 0.5 MG: 1 INJECTION, SOLUTION INTRAMUSCULAR; INTRAVENOUS; SUBCUTANEOUS at 11:02

## 2020-11-26 RX ADMIN — ENOXAPARIN SODIUM 40 MG: 100 INJECTION SUBCUTANEOUS at 11:45

## 2020-11-26 RX ADMIN — LISINOPRIL 5 MG: 5 TABLET ORAL at 12:01

## 2020-11-26 RX ADMIN — SODIUM CHLORIDE, PRESERVATIVE FREE 10 ML: 5 INJECTION INTRAVENOUS at 21:07

## 2020-11-26 RX ADMIN — ALBUTEROL SULFATE 2 PUFF: 90 AEROSOL, METERED RESPIRATORY (INHALATION) at 08:06

## 2020-11-26 RX ADMIN — NITROGLYCERIN 0.4 MG: 0.4 TABLET, ORALLY DISINTEGRATING SUBLINGUAL at 10:41

## 2020-11-26 RX ADMIN — HYDROMORPHONE HYDROCHLORIDE 0.5 MG: 1 INJECTION, SOLUTION INTRAMUSCULAR; INTRAVENOUS; SUBCUTANEOUS at 15:50

## 2020-11-26 RX ADMIN — FUROSEMIDE 80 MG: 10 INJECTION, SOLUTION INTRAMUSCULAR; INTRAVENOUS at 18:27

## 2020-11-26 RX ADMIN — DEXAMETHASONE SODIUM PHOSPHATE 10 MG: 10 INJECTION, SOLUTION INTRAMUSCULAR; INTRAVENOUS at 07:15

## 2020-11-26 ASSESSMENT — PAIN DESCRIPTION - PAIN TYPE
TYPE: ACUTE PAIN
TYPE: ACUTE PAIN

## 2020-11-26 ASSESSMENT — PAIN DESCRIPTION - LOCATION
LOCATION: CHEST
LOCATION: CHEST

## 2020-11-26 ASSESSMENT — PAIN DESCRIPTION - DESCRIPTORS
DESCRIPTORS: PRESSURE
DESCRIPTORS: PRESSURE

## 2020-11-26 ASSESSMENT — PAIN SCALES - GENERAL
PAINLEVEL_OUTOF10: 8
PAINLEVEL_OUTOF10: 3
PAINLEVEL_OUTOF10: 7
PAINLEVEL_OUTOF10: 10
PAINLEVEL_OUTOF10: 6
PAINLEVEL_OUTOF10: 4

## 2020-11-26 ASSESSMENT — PAIN DESCRIPTION - PROGRESSION
CLINICAL_PROGRESSION: GRADUALLY WORSENING
CLINICAL_PROGRESSION: GRADUALLY WORSENING

## 2020-11-26 ASSESSMENT — PAIN DESCRIPTION - FREQUENCY
FREQUENCY: CONTINUOUS
FREQUENCY: CONTINUOUS

## 2020-11-26 ASSESSMENT — PAIN DESCRIPTION - ONSET
ONSET: ON-GOING
ONSET: ON-GOING

## 2020-11-26 NOTE — CONSULTS
Chart reviewed  Full note to follow  Getting echo                      Name:  Rasheeda Garsia /Age/Sex: 1965  (63 y.o. female)   MRN & CSN:  [id number] & [id number] Admission Date/Time: 2020  6:39 AM   Location:  [facility]6/4116-A PCP: Maddi Sam, [address] [place] [address] Day: 1          Referring physician:  Taylor Whittaker MD         Reason for consultation:  CP        Thanks for referral.    Information source: patient    CC;  CP      HPI:   Thank you for involving me in taking  care of Rasheeda Garsia who  is a 63 y. o.year  Old female  Presents with  No cardiac history admitted with recurrent mid sternal recurrent cp with moderate sob, getting worse, has markedly elevated BNP. Stat echo showed ef 20%. Past medical history:    has a past medical history of Anxiety, Environmental allergies, Non-healing surgical wound, and RLS (restless legs syndrome). Past surgical history:   has a past surgical history that includes Cholecystectomy; Appendectomy;  section; and knee surgery (). Social History:   reports that she has been smoking cigarettes. She has been smoking about 0.50 packs per day. She has never used smokeless tobacco. She reports that she does not drink alcohol or use drugs. Family history:  family history includes Depression in an other family member.     Allergies   Allergen Reactions    Adderall [Amphetamine-Dextroamphetamine] Itching    Morphine And Related     Tramadol     Ultram [Tramadol Hcl] Itching    Codeine Itching    Pcn [Penicillins] Swelling and Dermatitis       nitroGLYCERIN (NITROSTAT) SL tablet 0.4 mg, Q5 Min PRN  sodium chloride flush 0.9 % injection 10 mL, 2 times per day  sodium chloride flush 0.9 % injection 10 mL, PRN  acetaminophen (TYLENOL) tablet 650 mg, Q6H PRN    Or  acetaminophen (TYLENOL) suppository 650 mg, Q6H PRN  polyethylene glycol (GLYCOLAX) packet 17 g, Daily PRN  promethazine (PHENERGAN) tablet 12.5 mg, Q6H PRN    Or  ondansetron (ZOFRAN) injection 4 mg, Q6H PRN  lisinopril (PRINIVIL;ZESTRIL) tablet 5 mg, Daily  HYDROmorphone (DILAUDID) injection 0.5 mg, Q4H PRN  HYDROmorphone (DILAUDID) 1 MG/ML injection,   furosemide (LASIX) injection 80 mg, BID  enoxaparin (LOVENOX) injection 90 mg, BID      Current Facility-Administered Medications   Medication Dose Route Frequency Provider Last Rate Last Dose    nitroGLYCERIN (NITROSTAT) SL tablet 0.4 mg  0.4 mg Sublingual Q5 Min PRN Gabino Bullard MD   0.4 mg at 11/26/20 1041    sodium chloride flush 0.9 % injection 10 mL  10 mL Intravenous 2 times per day Mairo Shetty MD        sodium chloride flush 0.9 % injection 10 mL  10 mL Intravenous PRN Mario Shetty MD        acetaminophen (TYLENOL) tablet 650 mg  650 mg Oral Q6H PRN Gabino Bullard MD        Or    acetaminophen (TYLENOL) suppository 650 mg  650 mg Rectal Q6H PRN Mario Shetty MD        polyethylene glycol (GLYCOLAX) packet 17 g  17 g Oral Daily PRN Mario Shetty MD        promethazine (PHENERGAN) tablet 12.5 mg  12.5 mg Oral Q6H PRN Gabino Bullard MD        Or    ondansetron (ZOFRAN) injection 4 mg  4 mg Intravenous Q6H PRN Gabino Bullard MD        lisinopril (PRINIVIL;ZESTRIL) tablet 5 mg  5 mg Oral Daily Gabino Bullard MD        HYDROmorphone (DILAUDID) injection 0.5 mg  0.5 mg Intravenous Q4H PRN Mario Shetty MD   0.5 mg at 11/26/20 1102    HYDROmorphone (DILAUDID) 1 MG/ML injection             furosemide (LASIX) injection 80 mg  80 mg Intravenous BID Clark Salazar MD        enoxaparin (LOVENOX) injection 90 mg  1 mg/kg Subcutaneous BID Clark Salazar MD         Review of Systems:  All 14 systems reviewed, all negative except for  SOB, CP    Physical Examination:    BP (!) 131/100   Pulse 116   Temp 97.8 °F (36.6 °C) (Oral)   Resp 16   Ht 5' 7\" (1.702 m)   Wt 191 lb 2.2 oz (86.7 kg)   SpO2 97%   BMI 29.94 kg/m²      Wt Readings from Last 3 Encounters:   11/26/20 191 lb 2.2 oz (86.7 kg)   10/30/20 186 lb (84.4 kg) 05/09/19 160 lb (72.6 kg)     Body mass index is 29.94 kg/m². General Appearance:  faoir  Head: normocephalic     Eyes: normal, noninjected conjunctiva    ENT: normal mucosa, noninjected throat, normal     NECK: No JVP  No thyromegaly        Cardiovascular: No thrills palpated   Auscultation: Normal S1 and S2,  no murmur   carotid bruit no   Abdominal Aorta no bruit    Respiratory:    Breath sounds Diminshed bilaterally     Extremities:  Trace Edema clubbing ,   no cyanosis    SKIN: Warm and well perfused, no pallor or cyanosis    Vascular exam:  Pedal Pulses: palp  bilaterally        Abdomen:  No masses or tenderness. No organomegaly noted. Neurological:  Oriented to time, place, and person   No focal neurological deficit noted. Psychiatric:normal mood, no anxiety    Lab Review   Recent Labs     11/26/20  0738   WBC 6.5   HGB 13.1   HCT 42.7         Recent Labs     11/26/20  0738   *   K 4.0      CO2 23   BUN 11   CREATININE 1.1     Recent Labs     11/26/20  0738   AST 23   ALT 27   BILITOT 0.6   ALKPHOS 66     No results for input(s): TROPONINI in the last 72 hours. Lab Results   Component Value Date     (H) 08/18/2012     (H) 08/06/2012     Lab Results   Component Value Date    INR 1.05 06/12/2015    PROTIME 11.9 06/12/2015           Assessment/Recommendations:     - HFrEF  Diurese  - ACS  Asa, statin, lovenox  - risk factor modification  - ?  Saima Celeste MD, 11/26/2020 11:08 AM

## 2020-11-26 NOTE — ED NOTES
[id number] paged hospitalist     Alfonso Bray  11/26/20 0858 [phone] hospitalist returned call      Alfonso Bray  11/26/20 5834

## 2020-11-26 NOTE — ED PROVIDER NOTES
Triage Chief Complaint:   Shortness of Breath and Chest Pain      [place] IRA:  Colton Arreaga is a 63 y.o. female that presents to the emergency department with cough, shortness of breath for the last week. Patient denies being a smoker. No history of asthma or COPD. Denies any known Covid exposure. Has not been tested for Covid. She denies any leg edema. No abdominal pain. No nausea, vomiting, diarrhea. She states her lungs feel tight. States pain across chest is a 4 out of 10. Nothing makes it better or worse. No diaphoresis. No dizziness or near syncope. .    Past Medical History:   Diagnosis Date    Anxiety     Environmental allergies     Non-healing surgical wound     RLS (restless legs syndrome)      Past Surgical History:   Procedure Laterality Date    APPENDECTOMY       SECTION      CHOLECYSTECTOMY      KNEE SURGERY  2014     Family History   Problem Relation Age of Onset    Depression Other      Social History     Socioeconomic History    Marital status:      Spouse name: Not on file    Number of children: Not on file    Years of education: Not on file    Highest education level: Not on file   Occupational History    Not on file   Social Needs    Financial resource strain: Not on file    Food insecurity     Worry: Not on file     Inability: Not on file    Transportation needs     Medical: Not on file     Non-medical: Not on file   Tobacco Use    Smoking status: Current Every Day Smoker     Packs/day: 0.50     Types: Cigarettes    Smokeless tobacco: Never Used   Substance and Sexual Activity    Alcohol use: No    Drug use: No    Sexual activity: Not on file   Lifestyle    Physical activity     Days per week: Not on file     Minutes per session: Not on file    Stress: Not on file   Relationships    Social connections     Talks on phone: Not on file     Gets together: Not on file     Attends Religion service: Not on file     Active member of club or intact. Sclera anicteric. ENT: Mucous membranes are moist. Tolerates saliva. No trismus. NECK: Supple. No meningismus. Trachea midline. HEART: Sinus tachycardia in the 120s. Radial pulses 2+. LUNGS: Respirations mildly labored. Expiratory wheezing bilaterally. No stridor. ABDOMEN: Soft. Non-tender. No guarding or rebound. EXTREMITIES: No acute deformities. No pitting edema. No leg redness, swelling, bruising. No calf tenderness. SKIN: Warm and dry. NEUROLOGICAL: No gross facial drooping. Moves all 4 extremities spontaneously. PSYCHIATRIC: Normal mood.     I have reviewed and interpreted all of the currently available lab results from this visit (if applicable):  Results for orders placed or performed during the hospital encounter of 11/26/20   Rapid Flu Swab    Specimen: Nasopharyngeal   Result Value Ref Range    Rapid Influenza A Ag NEGATIVE NEGATIVE    Rapid Influenza B Ag NEGATIVE NEGATIVE   COVID-19    Specimen: Nasopharyngeal Swab   Result Value Ref Range    Source THROAT     SARS-CoV-2, NAAT NOT DETECTED    CBC with Auto Diff   Result Value Ref Range    WBC 6.5 4.0 - 10.5 K/CU MM    RBC 4.41 4.2 - 5.4 M/CU MM    Hemoglobin 13.1 12.5 - 16.0 GM/DL    Hematocrit 42.7 37 - 47 %    MCV 96.8 78 - 100 FL    MCH 29.7 27 - 31 PG    MCHC 30.7 (L) 32.0 - 36.0 %    RDW 13.9 11.7 - 14.9 %    Platelets 519 049 - 744 K/CU MM    MPV 9.8 7.5 - 11.1 FL    Differential Type AUTOMATED DIFFERENTIAL     Segs Relative 66.2 (H) 36 - 66 %    Lymphocytes % 23.2 (L) 24 - 44 %    Monocytes % 8.3 (H) 0 - 4 %    Eosinophils % 1.4 0 - 3 %    Basophils % 0.6 0 - 1 %    Segs Absolute 4.3 K/CU MM    Lymphocytes Absolute 1.5 K/CU MM    Monocytes Absolute 0.5 K/CU MM    Eosinophils Absolute 0.1 K/CU MM    Basophils Absolute 0.0 K/CU MM    Nucleated RBC % 0.0 %    Total Nucleated RBC 0.0 K/CU MM    Total Immature Neutrophil 0.02 K/CU MM    Immature Neutrophil % 0.3 0 - 0.43 %   CMP   Result Value Ref Range    Sodium 134 (L) 135 - 145 MMOL/L    Potassium 4.0 3.5 - 5.1 MMOL/L    Chloride 100 99 - 110 mMol/L    CO2 23 21 - 32 MMOL/L    BUN 11 6 - 23 MG/DL    CREATININE 1.1 0.6 - 1.1 MG/DL    Glucose 111 (H) 70 - 99 MG/DL    Calcium 8.8 8.3 - 10.6 MG/DL    Alb 3.4 3.4 - 5.0 GM/DL    Total Protein 6.5 6.4 - 8.2 GM/DL    Total Bilirubin 0.6 0.0 - 1.0 MG/DL    ALT 27 10 - 40 U/L    AST 23 15 - 37 IU/L    Alkaline Phosphatase 66 40 - 129 IU/L    GFR Non- 51 (L) >60 mL/min/1.73m2    GFR African American >60 >60 mL/min/1.73m2    Anion Gap 11 4 - 16   Brain Natriuretic Peptide   Result Value Ref Range    Pro-BNP 12,039 (H) <300 PG/ML   Troponin   Result Value Ref Range    Troponin T 0.011 (H) <0.01 NG/ML   Blood Gas, Venous   Result Value Ref Range    pH, Gurjit 7.32 7.32 - 7.42    pCO2, Gurjit 48 38 - 52 mmHG    pO2, Gurjit 72 (H) 28 - 48 mmHG    Base Excess 2 0 - 2.4    HCO3, Venous 24.7 19 - 25 MMOL/L    O2 Sat, Gurjit 87.0 (H) 50 - 70 %    Comment VG    Lactic Acid, Plasma   Result Value Ref Range    Lactate 1.1 0.4 - 2.0 mMOL/L   EKG 12 Lead   Result Value Ref Range    Ventricular Rate 113 BPM    Atrial Rate 113 BPM    P-R Interval 158 ms    QRS Duration 100 ms    Q-T Interval 352 ms    QTc Calculation (Bazett) 482 ms    P Axis 72 degrees    R Axis 17 degrees    T Axis 110 degrees    Diagnosis       Sinus tachycardia with occasional premature ventricular complexes  Possible Left atrial enlargement  Nonspecific T wave abnormality  Abnormal ECG  No previous ECGs available     EKG 12 Lead   Result Value Ref Range    Ventricular Rate 116 BPM    Atrial Rate 116 BPM    P-R Interval 160 ms    QRS Duration 86 ms    Q-T Interval 322 ms    QTc Calculation (Bazett) 447 ms    P Axis 73 degrees    R Axis 27 degrees    T Axis 99 degrees    Diagnosis       Sinus tachycardia  Possible Left atrial enlargement  Nonspecific T wave abnormality  Abnormal ECG  When compared with ECG of 26-NOV-2020 07:20,  premature ventricular complexes are no longer present Radiographs:  [] Radiologist's Wet Read Report Reviewed:      XR CHEST PORTABLE (Final result)   Result time 11/26/20 07:01:27   Final result by Brittany Martin MD (11/26/20 07:01:27)                 Impression:     Diffuse bilateral airspace opacities and suspected small left pleural   effusion.  Differential considerations include an atypical bronchopneumonia   or interstitial edema. Narrative:     EXAMINATION:   ONE XRAY VIEW OF THE CHEST     11/26/2020 6:44 am     COMPARISON:   06/12/2015     HISTORY:   ORDERING SYSTEM PROVIDED HISTORY: cough, SOB   TECHNOLOGIST PROVIDED HISTORY:   Reason for exam:->cough, SOB   Reason for Exam: cough, SOB   Acuity: Acute   Type of Exam: Initial   Additional signs and symptoms: n/a     FINDINGS:   The mediastinal and cardiac contours are stable.  There are diffuse bilateral   perihilar opacities extending into the upper and lower lobes. Dewitte Peto is a   suggestion of a small left pleural effusion. Dewitte Peto is no pneumothorax   identified. [] Discussed with Radiologist:     [] The following radiograph was interpreted by myself in the absence of a radiologist:     EKG: (All EKG's are interpreted by myself in the absence of a cardiologist)  The Ekg interpreted by me shows  sinus tachycardia, yoat=523 with a rate of 113 with PVC  Axis is   Normal  QTc is  normal  Intervals and Durations are unremarkable. ST Segments: T wave inversion in V6  No significant change from prior EKG dated 9-    Repeat EKG: The Ekg interpreted by me shows  sinus tachycardia, wgln=254 with a rate of 116  Axis is   Normal  QTc is  normal  Intervals and Durations are unremarkable. ST Segments:  T wave inversion in V6  No significant change from prior EKG         MDM:  Patient is normotensive. Afebrile. She is tachypneic in the 30s. Tachycardic in the 120s to 130s. Sats are 92 to 93% on room air. She does have expiratory wheezing.   Did order Decadron IV, albuterol inhaler treatment. EKG shows sinus tachycardia without acute changes. CXR shows diffuse bilateral airspace opacities and suspected small left pleural effusion. Differentials include bronchopneumonia or edema. . Venous blood gas shows a normal venous pH of 7.32 with a PCO2 of 48. CBC has a normal white count. Normal hemoglobin. Does have a left shift. . CMP normal.. BNP elevated at 12,000. Troponin detectable at 0.011. . Lactic acid 1.1, Covid negative. Rapid flu negative. Did order chewable aspirin, sublingual nitro and Lasix 20 mg IV. Patient placed on 2 L of oxygen for comfort. Sats did come up to 98%. Repeat EKG does not show any changes. Will call the hospitalist for admit. CRITICAL CARE NOTE:  There was a high probability of clinically significant life-threatening deterioration of the patient's condition requiring my urgent intervention due to dyspnea, CHF, chest pain. Oxygen, Decadron, albuterol treatment, Lasix, nitro, reeval, admit was performed to address this. Total critical care time is at least  35 minutes. This includes vital sign monitoring, pulse oximetry monitoring, telemetry monitoring, clinical response to the IV medications, reviewing the nursing notes, consultation time, dictation/documentation time, and interpretation of the lab work. This time excludes time spent performing procedures and separately billable procedures and family discussion time. Clinical Impression:  1. Chest pain, unspecified type    2. Elevated troponin    3. Dyspnea and respiratory abnormalities    4. Elevated brain natriuretic peptide (BNP) level    5. Acute pulmonary edema (HCC)        Disposition Vitals:  [unfilled], [unfilled], [unfilled], [unfilled]    Disposition referral (if applicable):  No follow-up provider specified.     Disposition medications (if applicable):  New Prescriptions    No medications on file         (Please note that portions of this note may have been completed with a voice recognition program. Efforts were made to edit the dictations but occasionally words are mis-transcribed.)    MD Micky Ji MD  11/26/20 0384

## 2020-11-26 NOTE — H&P
History and Physical      Name:  Colton Arreaga /Age/Sex: 1965  (63 y.o. female)   MRN & CSN:  [id number] & [id number] Admission Date/Time: 2020  6:39 AM   Location:  [facility]/ED-31 PCP: Nia Scott MD       Hospital Day: 1    Assessment and Plan:   Colton Arreaga is a 63 y.o.  female  who presents with Chest pain and dyspnea.     > Acute new Systolic CHF exacerbation  - Chest pain, Dyspnea, elevated BNP 10329, rapid covid neg  - admit , Cardiac tele, cycle troponin, IV lasix, consulted cardio   - aspirin, lisinopril,   - stat Echo showed low EF , slightly elevated D-Dimer and neg leg doppler very unlikely PE, most likely CHF and unstable angina.     > Leg pain   - check doppler r/o DVT    > Tobacco dependence  - cessation advised    Diet No diet orders on file   DVT Prophylaxis [] Lovenox   Code Status Prior   Disposition  pending clinical improvement     History of Present Illness:     Chief Complaint:  Chest pain and dyspnea. Colton Arreaga is a 63 y.o.  female  who presents with  Chest pain and dyspnea. Pt presented with 7 days h/o constant worsening across pressure like chest pain, associated with worsening dyspnea, worse with exertion, better with rest, no abd pain, no N/V, no F/C.     10-14 point ROS reviewed negative, unless as noted above     Objective:   No intake or output data in the 24 hours ending 20 0912   Vitals:   Vitals:    20 0900   BP: (!) 136/121   Pulse: 116   Resp:    Temp:    SpO2: 98%     Physical Exam:    GEN Awake female, sitting upright in bed , can not complete a sentence due to dyspnea. Appears given age. EYES Pupils are equally round. No scleral erythema, discharge, or conjunctivitis. HENT Mucous membranes are moist.   NECK No apparent thyromegaly or masses. RESP Decreased air sounds. Symmetric chest movement . CARDIO/VASC S1/S2 auscultated. tachy . No peripheral edema. GI Abdomen is soft without significant tenderness, or guarding.  Bowel organization: Not on file     Attends meetings of clubs or organizations: Not on file     Relationship status: Not on file    Intimate partner violence     Fear of current or ex partner: Not on file     Emotionally abused: Not on file     Physically abused: Not on file     Forced sexual activity: Not on file   Other Topics Concern    Not on file   Social History Narrative    Not on file       Medications:   Medications:   Prior to Admission medications    Medication Sig Start Date End Date Taking? Authorizing Provider   ibuprofen (IBU) 600 MG tablet Take 1 tablet by mouth every 6 hours as needed for Pain 10/30/20 11/29/20  GENO Mathis   naproxen (NAPROSYN) 500 MG tablet Take 1 tablet by mouth 2 times daily 5/9/19   Rometta Kocher, PA-C   Misc.  Devices ([address] [place] [address]) MISC 1 each by Does not apply route once for 1 dose 6/12/15 6/12/15  Karley Shea MD      Infusions:   PRN Meds: nitroGLYCERIN, 0.4 mg, Q5 Min PRN          Electronically signed by Elana Yang MD on 11/26/2020 at 9:12 AM

## 2020-11-26 NOTE — PLAN OF CARE
Problem: Pain:  Goal: Pain level will decrease  Description: Pain level will decrease  Outcome: Ongoing  Goal: Control of acute pain  Description: Control of acute pain  Outcome: Ongoing  Goal: Control of chronic pain  Description: Control of chronic pain  Outcome: Ongoing     Problem: Breathing Pattern - Ineffective:  Goal: Ability to achieve and maintain a regular respiratory rate will improve  Description: Ability to achieve and maintain a regular respiratory rate will improve  Outcome: Ongoing

## 2020-11-27 LAB
ANION GAP SERPL CALCULATED.3IONS-SCNC: 11 MMOL/L (ref 4–16)
BUN BLDV-MCNC: 19 MG/DL (ref 6–23)
CALCIUM SERPL-MCNC: 9.6 MG/DL (ref 8.3–10.6)
CHLORIDE BLD-SCNC: 96 MMOL/L (ref 99–110)
CHOLESTEROL: 175 MG/DL
CO2: 31 MMOL/L (ref 21–32)
CREAT SERPL-MCNC: 1.3 MG/DL (ref 0.6–1.1)
GFR AFRICAN AMERICAN: 51 ML/MIN/1.73M2
GFR NON-AFRICAN AMERICAN: 42 ML/MIN/1.73M2
GLUCOSE BLD-MCNC: 88 MG/DL (ref 70–99)
HDLC SERPL-MCNC: 41 MG/DL
LDL CHOLESTEROL DIRECT: 118 MG/DL
LV EF: 20 %
LVEF MODALITY: NORMAL
MAGNESIUM: 1.9 MG/DL (ref 1.8–2.4)
POTASSIUM SERPL-SCNC: 4.1 MMOL/L (ref 3.5–5.1)
REASON FOR REJECTION: NORMAL
REJECTED TEST: NORMAL
SODIUM BLD-SCNC: 138 MMOL/L (ref 135–145)
TRIGL SERPL-MCNC: 112 MG/DL

## 2020-11-27 PROCEDURE — 83735 ASSAY OF MAGNESIUM: CPT

## 2020-11-27 PROCEDURE — 36415 COLL VENOUS BLD VENIPUNCTURE: CPT

## 2020-11-27 PROCEDURE — 6370000000 HC RX 637 (ALT 250 FOR IP): Performed by: HOSPITALIST

## 2020-11-27 PROCEDURE — B2151ZZ FLUOROSCOPY OF LEFT HEART USING LOW OSMOLAR CONTRAST: ICD-10-PCS | Performed by: INTERNAL MEDICINE

## 2020-11-27 PROCEDURE — 2500000003 HC RX 250 WO HCPCS

## 2020-11-27 PROCEDURE — C1769 GUIDE WIRE: HCPCS

## 2020-11-27 PROCEDURE — 83721 ASSAY OF BLOOD LIPOPROTEIN: CPT

## 2020-11-27 PROCEDURE — B2111ZZ FLUOROSCOPY OF MULTIPLE CORONARY ARTERIES USING LOW OSMOLAR CONTRAST: ICD-10-PCS | Performed by: INTERNAL MEDICINE

## 2020-11-27 PROCEDURE — 6360000002 HC RX W HCPCS

## 2020-11-27 PROCEDURE — C1887 CATHETER, GUIDING: HCPCS

## 2020-11-27 PROCEDURE — C1894 INTRO/SHEATH, NON-LASER: HCPCS

## 2020-11-27 PROCEDURE — 6360000004 HC RX CONTRAST MEDICATION

## 2020-11-27 PROCEDURE — 2709999900 HC NON-CHARGEABLE SUPPLY

## 2020-11-27 PROCEDURE — 80048 BASIC METABOLIC PNL TOTAL CA: CPT

## 2020-11-27 PROCEDURE — 94761 N-INVAS EAR/PLS OXIMETRY MLT: CPT

## 2020-11-27 PROCEDURE — 6370000000 HC RX 637 (ALT 250 FOR IP): Performed by: INTERNAL MEDICINE

## 2020-11-27 PROCEDURE — 80061 LIPID PANEL: CPT

## 2020-11-27 PROCEDURE — 93458 L HRT ARTERY/VENTRICLE ANGIO: CPT

## 2020-11-27 PROCEDURE — 4A023N7 MEASUREMENT OF CARDIAC SAMPLING AND PRESSURE, LEFT HEART, PERCUTANEOUS APPROACH: ICD-10-PCS | Performed by: INTERNAL MEDICINE

## 2020-11-27 PROCEDURE — 2700000000 HC OXYGEN THERAPY PER DAY

## 2020-11-27 PROCEDURE — 93458 L HRT ARTERY/VENTRICLE ANGIO: CPT | Performed by: INTERNAL MEDICINE

## 2020-11-27 PROCEDURE — 99024 POSTOP FOLLOW-UP VISIT: CPT | Performed by: INTERNAL MEDICINE

## 2020-11-27 PROCEDURE — 6360000002 HC RX W HCPCS: Performed by: INTERNAL MEDICINE

## 2020-11-27 PROCEDURE — 2580000003 HC RX 258: Performed by: HOSPITALIST

## 2020-11-27 PROCEDURE — 2140000000 HC CCU INTERMEDIATE R&B

## 2020-11-27 RX ORDER — HYDRALAZINE HYDROCHLORIDE 20 MG/ML
10 INJECTION INTRAMUSCULAR; INTRAVENOUS EVERY 10 MIN PRN
Status: CANCELLED | OUTPATIENT
Start: 2020-11-27

## 2020-11-27 RX ORDER — LISINOPRIL 5 MG/1
2.5 TABLET ORAL DAILY
Status: DISCONTINUED | OUTPATIENT
Start: 2020-11-28 | End: 2020-11-28 | Stop reason: HOSPADM

## 2020-11-27 RX ORDER — ACETAMINOPHEN 325 MG/1
650 TABLET ORAL EVERY 4 HOURS PRN
Status: CANCELLED | OUTPATIENT
Start: 2020-11-27

## 2020-11-27 RX ORDER — SODIUM CHLORIDE 0.9 % (FLUSH) 0.9 %
10 SYRINGE (ML) INJECTION PRN
Status: CANCELLED | OUTPATIENT
Start: 2020-11-27

## 2020-11-27 RX ORDER — OXYCODONE HYDROCHLORIDE 5 MG/1
5 TABLET ORAL EVERY 6 HOURS PRN
Status: DISCONTINUED | OUTPATIENT
Start: 2020-11-27 | End: 2020-11-28 | Stop reason: HOSPADM

## 2020-11-27 RX ORDER — LIDOCAINE 4 G/G
1 PATCH TOPICAL DAILY
Status: DISCONTINUED | OUTPATIENT
Start: 2020-11-27 | End: 2020-11-28 | Stop reason: HOSPADM

## 2020-11-27 RX ORDER — ROPINIROLE 0.25 MG/1
0.5 TABLET, FILM COATED ORAL 3 TIMES DAILY
Status: DISCONTINUED | OUTPATIENT
Start: 2020-11-27 | End: 2020-11-28 | Stop reason: HOSPADM

## 2020-11-27 RX ORDER — 0.9 % SODIUM CHLORIDE 0.9 %
500 INTRAVENOUS SOLUTION INTRAVENOUS ONCE
Status: CANCELLED | OUTPATIENT
Start: 2020-11-27 | End: 2020-11-27

## 2020-11-27 RX ORDER — SODIUM CHLORIDE 0.9 % (FLUSH) 0.9 %
10 SYRINGE (ML) INJECTION EVERY 12 HOURS SCHEDULED
Status: CANCELLED | OUTPATIENT
Start: 2020-11-27

## 2020-11-27 RX ORDER — METOPROLOL SUCCINATE 25 MG/1
25 TABLET, EXTENDED RELEASE ORAL DAILY
Status: DISCONTINUED | OUTPATIENT
Start: 2020-11-27 | End: 2020-11-28 | Stop reason: HOSPADM

## 2020-11-27 RX ADMIN — OXYCODONE HYDROCHLORIDE 5 MG: 5 TABLET ORAL at 22:45

## 2020-11-27 RX ADMIN — OXYCODONE HYDROCHLORIDE 5 MG: 5 TABLET ORAL at 16:32

## 2020-11-27 RX ADMIN — SODIUM CHLORIDE, PRESERVATIVE FREE 10 ML: 5 INJECTION INTRAVENOUS at 21:17

## 2020-11-27 RX ADMIN — OXYCODONE HYDROCHLORIDE 5 MG: 5 TABLET ORAL at 10:19

## 2020-11-27 RX ADMIN — FUROSEMIDE 80 MG: 10 INJECTION, SOLUTION INTRAMUSCULAR; INTRAVENOUS at 16:32

## 2020-11-27 RX ADMIN — METOPROLOL SUCCINATE 25 MG: 25 TABLET, EXTENDED RELEASE ORAL at 10:19

## 2020-11-27 RX ADMIN — ROPINIROLE HYDROCHLORIDE 0.5 MG: 0.25 TABLET, FILM COATED ORAL at 14:54

## 2020-11-27 RX ADMIN — ROPINIROLE HYDROCHLORIDE 0.5 MG: 0.25 TABLET, FILM COATED ORAL at 21:14

## 2020-11-27 RX ADMIN — FUROSEMIDE 80 MG: 10 INJECTION, SOLUTION INTRAMUSCULAR; INTRAVENOUS at 05:46

## 2020-11-27 RX ADMIN — SODIUM CHLORIDE, PRESERVATIVE FREE 10 ML: 5 INJECTION INTRAVENOUS at 07:58

## 2020-11-27 ASSESSMENT — PAIN SCALES - GENERAL
PAINLEVEL_OUTOF10: 6
PAINLEVEL_OUTOF10: 5
PAINLEVEL_OUTOF10: 0
PAINLEVEL_OUTOF10: 6
PAINLEVEL_OUTOF10: 9
PAINLEVEL_OUTOF10: 5

## 2020-11-27 ASSESSMENT — PAIN DESCRIPTION - LOCATION: LOCATION: CHEST

## 2020-11-27 ASSESSMENT — PAIN DESCRIPTION - PAIN TYPE: TYPE: ACUTE PAIN

## 2020-11-27 NOTE — PROGRESS NOTES
Patient educate several times to leave blood pressure cuff on for monitoring after heart cath. Continuously removing blood pressure cuff despite education. Unable to get blood pressure readings every 15 minutes for the first hour per protocol. Blood pressure ok as of now. Will continue to monitor.

## 2020-11-27 NOTE — PLAN OF CARE
Patient returned to [phone] via bed from cath lab. RIght wrist assessed with TR band still in place by [facility] RN. No bleeding or hematoma noted. Report given at bedside to include 0.5mg of versed IV given for sedation.  Yobani Gomez RN 11/27/2020

## 2020-11-27 NOTE — PROGRESS NOTES
CARDIOLOGY  NOTE        Name:  Titus Oquendo /Age/Sex: 1965  (63 y.o. female)   MRN & CSN:  [id number] & [id number] Admission Date/Time: 2020  6:39 AM   Location:  [facility]/3992-K PCP: Mir Jo MD       Hospital Day: 2        PLAN FROM CARDIOLOGY FOR TODAY:   LHC done today      - cardiology consult is for:  CHF    -  Interval history:  Got LH    · ASSESSMENT/ PLAN:  1. Has NICM on [facility], medical treatment, BB , ACE I, diuresis  2. Decrease lovenox to PE prophylaxis          Subjective: Todays complain: Patient  Mild SOB    HPI:  Ashutosh Resendiz is a 63 y. o.year old who and presents with had concerns including Shortness of Breath and Chest Pain. Chief Complaint   Patient presents with    Shortness of Breath    Chest Pain           Objective: Temperature:  Current - Temp: 98.2 °F (36.8 °C); Max - Temp  Av.1 °F (36.7 °C)  Min: 97.9 °F (36.6 °C)  Max: 98.3 °F (36.8 °C)    Respiratory Rate : Resp  Av.3  Min: 14  Max: 18    Pulse Range: Pulse  Av  Min: 93  Max: 115    Blood Presuure Range:  Systolic (29KLF), TCJ:364 , Min:96 , SOB:791   ; Diastolic (88KCX), SPX:98, Min:76, Max:116      Pulse ox Range: SpO2  Av.7 %  Min: 92 %  Max: 97 %    24hr I & O:      Intake/Output Summary (Last 24 hours) at 2020 1033  Last data filed at 2020 1155  Gross per 24 hour   Intake --   Output 600 ml   Net -600 ml         BP 96/76   Pulse 98   Temp 98.2 °F (36.8 °C) (Oral)   Resp 16   Ht 5' 7\" (1.702 m)   Wt 175 lb 4.3 oz (79.5 kg)   SpO2 97%   BMI 27.45 kg/m²           Review of Systems:  All 14 systems reviewed, all negative except for  SOB      TELEMETRY: Sinus    has a past medical history of Anxiety, Environmental allergies, Non-healing surgical wound, and RLS (restless legs syndrome). has a past surgical history that includes Cholecystectomy; Appendectomy;  section; and knee surgery (2014).   Physical Exam:  General:  Awake, alert, NAD  Head:normal  Eye:normal  Neck:  No JVD   Chest:  Clear to auscultation, respiration easy  Cardiovascular:  RRR S1S2  Abdomen:   nontender  Extremities:  tr edema  Pulses; palpable  Neuro: grossly normal    Medications:    [START ON 11/28/2020] lisinopril  2.5 mg Oral Daily    metoprolol succinate  25 mg Oral Daily    lidocaine  1 patch Transdermal Daily    sodium chloride flush  10 mL Intravenous 2 times per day    furosemide  80 mg Intravenous BID    enoxaparin  40 mg Subcutaneous Daily       oxyCODONE, nitroGLYCERIN, sodium chloride flush, acetaminophen **OR** acetaminophen, polyethylene glycol, promethazine **OR** ondansetron, HYDROmorphone    Lab Data:  CBC:   Recent Labs     11/26/20  0738   WBC 6.5   HGB 13.1   HCT 42.7   MCV 96.8        BMP:   Recent Labs     11/26/20  0738   *   K 4.0      CO2 23   BUN 11   CREATININE 1.1     LIVER PROFILE:   Recent Labs     11/26/20  0738   AST 23   ALT 27   BILITOT 0.6   ALKPHOS 66     PT/INR: No results for input(s): PROTIME, INR in the last 72 hours. APTT: No results for input(s): APTT in the last 72 hours. BNP:  No results for input(s): BNP in the last 72 hours.   TROPONIN: @TROPONINI:3@  Labs, consult, tests reviewed    Assessment/ PLAN:    As above                  Faustino Hollins MD 11/27/2020 10:33 AM

## 2020-11-27 NOTE — PROCEDURES
LHC via Radial Approach      Procedure Summary   Normal Coronary Angiogram   Severe Non ischemic cardiomyopathy with LVEF 20% with global   hypokinesis   LVEDP    15 mm of Hg      Recommendations   GDMT - Heart Failure   Decrease dose for Lisinopril 2.5 mg daily   Start Toprol XL   Borderline BP   Cont Medical management   Risk Factor Modification

## 2020-11-27 NOTE — PROGRESS NOTES
Nutrition Education    Consult on admit for heart failure diet guidelines. NPO this morning for cardiac test, cardiac diet to resume. Brief discussion regarding diet. Will continue to follow up during stay to monitor diet ed needs. · Verbally reviewed information with patient  · Educated on cardiac diet. · Written educational materials provided from Nutrition Care Manual   · Contact name and number provided.     Electronically signed by Lucia Novoa RD, LD on 11/27/20 at 10:07 AM EST    Contact: [phone]

## 2020-11-27 NOTE — PLAN OF CARE
Problem: Pain:  Goal: Pain level will decrease  Description: Pain level will decrease  11/27/2020 0021 by Jose D Davis RN  Outcome: Ongoing     Problem: Pain:  Goal: Control of acute pain  Description: Control of acute pain  11/27/2020 0021 by Jose D Davis RN  Outcome: Ongoing     Problem: Pain:  Goal: Control of chronic pain  Description: Control of chronic pain  11/27/2020 0021 by Jose D Davis RN  Outcome: Ongoing     Problem: Breathing Pattern - Ineffective:  Goal: Ability to achieve and maintain a regular respiratory rate will improve  Description: Ability to achieve and maintain a regular respiratory rate will improve  11/27/2020 0021 by Jose D Davis RN  Outcome: Ongoing

## 2020-11-27 NOTE — PROGRESS NOTES
Hospitalist Progress Note      Name:  Suzan Baker /Age/Sex: 1965  (63 y.o. female)   MRN & CSN:  [id number] & [id number] Admission Date/Time: 2020  6:39 AM   Location:  Cath Lab/NONE PCP: Mardeen Litten, MD         Hospital Day: 2    Assessment and Plan:   Suzan Baker is a 63 y.o.  female  who presents with New onset of congestive heart failure ([address]    > Acute new Systolic CHF exacerbation  > non ischemic cardiomyopathy  - Chest pain, Dyspnea, elevated BNP 00085, rapid covid neg  - IV lasix, consulted cardio   - stat Echo showed low EF , slightly elevated D-Dimer and neg leg doppler very unlikely PE, most likely CHF and unstable angina.   - LHC normal coronaries but EF 20% with global hypokinesis  - Lasix, toprol, lisinopril     > Leg pain   -  doppler neg for DVT     > Tobacco dependence  - cessation advised    Diet Diet NPO Effective Now   DVT Prophylaxis [] Lovenox   Code Status Full Code   Disposition  pending clinical improvement     History of Present Illness:     Pt S&E. Improved dyspnea, reports chest pain reproducible on exam, no abd pain, no N/V, no f/C.     10-14 point ROS reviewed negative, unless as noted above    Objective: Intake/Output Summary (Last 24 hours) at 2020 0911  Last data filed at 2020 1155  Gross per 24 hour   Intake --   Output 600 ml   Net -600 ml      Vitals:   Vitals:    20 0745   BP: 96/76   Pulse: 98   Resp: 16   Temp: 98.2 °F (36.8 °C)   SpO2: 97%     Physical Exam:      GEN Awake female, cooperative,  RESP Decreased air sounds. Symmetric chest movement . CARDIO/VASC S1/S2 auscultated. Regular rate. GI Abdomen is soft without significant tenderness, Bowel sounds are normoactive. MSK No gross joint deformities. Spontaneous movement of all extremities  SKIN Normal coloration, warm, dry. NEURO normal speech, no lateralizing weakness. PSYCH Awake, alert, oriented x 4. Affect appropriate.     Medications:   Medications:   

## 2020-11-27 NOTE — CARE COORDINATION
CM called pt to discuss discharge plan. Pt is from home alone, states her mother will pick her up at discharge. Pt states she was independent PTA. Pt has no DME at home. Pt states she is currently on oxygen but does not wear O2 at home so pt will need a home O2 eval is unable to be weaned. Pt has insurance with affordable RX copays. Pt states she does not have a PCP, PCP list added to discharge instructions. Pt declined any other needs at discharge from .

## 2020-11-28 VITALS
RESPIRATION RATE: 16 BRPM | SYSTOLIC BLOOD PRESSURE: 108 MMHG | HEIGHT: 67 IN | HEART RATE: 75 BPM | DIASTOLIC BLOOD PRESSURE: 73 MMHG | WEIGHT: 175.27 LBS | TEMPERATURE: 97.8 F | OXYGEN SATURATION: 89 % | BODY MASS INDEX: 27.51 KG/M2

## 2020-11-28 LAB
ANION GAP SERPL CALCULATED.3IONS-SCNC: 9 MMOL/L (ref 4–16)
BUN BLDV-MCNC: 22 MG/DL (ref 6–23)
CALCIUM SERPL-MCNC: 9.1 MG/DL (ref 8.3–10.6)
CHLORIDE BLD-SCNC: 97 MMOL/L (ref 99–110)
CO2: 31 MMOL/L (ref 21–32)
CREAT SERPL-MCNC: 1.3 MG/DL (ref 0.6–1.1)
GFR AFRICAN AMERICAN: 51 ML/MIN/1.73M2
GFR NON-AFRICAN AMERICAN: 42 ML/MIN/1.73M2
GLUCOSE BLD-MCNC: 106 MG/DL (ref 70–99)
MAGNESIUM: 1.9 MG/DL (ref 1.8–2.4)
POTASSIUM SERPL-SCNC: 4.4 MMOL/L (ref 3.5–5.1)
SODIUM BLD-SCNC: 137 MMOL/L (ref 135–145)

## 2020-11-28 PROCEDURE — 6370000000 HC RX 637 (ALT 250 FOR IP): Performed by: HOSPITALIST

## 2020-11-28 PROCEDURE — 36415 COLL VENOUS BLD VENIPUNCTURE: CPT

## 2020-11-28 PROCEDURE — 80048 BASIC METABOLIC PNL TOTAL CA: CPT

## 2020-11-28 PROCEDURE — 83735 ASSAY OF MAGNESIUM: CPT

## 2020-11-28 PROCEDURE — 6360000002 HC RX W HCPCS: Performed by: HOSPITALIST

## 2020-11-28 PROCEDURE — 6360000002 HC RX W HCPCS: Performed by: INTERNAL MEDICINE

## 2020-11-28 RX ADMIN — OXYCODONE HYDROCHLORIDE 5 MG: 5 TABLET ORAL at 04:39

## 2020-11-28 RX ADMIN — HYDROMORPHONE HYDROCHLORIDE 0.5 MG: 1 INJECTION, SOLUTION INTRAMUSCULAR; INTRAVENOUS; SUBCUTANEOUS at 00:38

## 2020-11-28 RX ADMIN — FUROSEMIDE 80 MG: 10 INJECTION, SOLUTION INTRAMUSCULAR; INTRAVENOUS at 04:37

## 2020-11-28 ASSESSMENT — PAIN DESCRIPTION - PAIN TYPE: TYPE: ACUTE PAIN

## 2020-11-28 ASSESSMENT — PAIN SCALES - GENERAL
PAINLEVEL_OUTOF10: 10

## 2020-11-28 ASSESSMENT — PAIN DESCRIPTION - LOCATION: LOCATION: BACK

## 2020-11-28 ASSESSMENT — PAIN DESCRIPTION - FREQUENCY: FREQUENCY: CONTINUOUS

## 2020-11-28 ASSESSMENT — PAIN DESCRIPTION - ONSET: ONSET: ON-GOING

## 2020-11-28 NOTE — PLAN OF CARE
Problem: Pain:  Goal: Pain level will decrease  Description: Pain level will decrease  Outcome: Ongoing  Goal: Control of acute pain  Description: Control of acute pain  Outcome: Ongoing  Goal: Control of chronic pain  Description: Control of chronic pain  Outcome: Ongoing     Problem: Breathing Pattern - Ineffective:  Goal: Ability to achieve and maintain a regular respiratory rate will improve  Description: Ability to achieve and maintain a regular respiratory rate will improve  Outcome: Ongoing     Problem: Falls - Risk of:  Goal: Will remain free from falls  Description: Will remain free from falls  Outcome: Ongoing  Goal: Absence of physical injury  Description: Absence of physical injury  Outcome: Ongoing

## 2020-11-28 NOTE — DISCHARGE SUMMARY
Discharge Summary / Left AMA    Name:  Jovany Conklin /Age/Sex: 1965  (63 y.o. female)   MRN & CSN:  [id number] & [id number] Admission Date/Time: 2020  6:39 AM   Attending:  No att. providers found Discharging Physician: Haleigh Corcoran MD     HPI:     Pt presented with 7 days h/o constant worsening across pressure like chest pain, associated with worsening dyspnea, worse with exertion, better with rest, no abd pain, no N/V, no F/C.      Hospital Course:   Jovany Conklin is a 63 y.o.  female  who presents with New onset of congestive heart failure ([address]    > Acute new Systolic CHF exacerbation  > non ischemic cardiomyopathy  - Chest pain, Dyspnea, elevated BNP 56179, rapid covid neg  - IV lasix, consulted cardio   - stat Echo showed low EF , slightly elevated D-Dimer and neg leg doppler very unlikely PE, most likely CHF and unstable angina.   - C normal coronaries but EF 20% with global hypokinesis  - Lasix, toprol, lisinopril  -  pt left AMA before I can see or talk to her.      > Leg pain   -  doppler neg for DVT     > Tobacco dependence  - cessation advised    Consults this admission:  IP CONSULT TO HOSPITALIST  IP CONSULT TO DIETITIAN  IP CONSULT TO CARDIOLOGY    Discharge Instruction:     Left AMA    Discharge Medications:         Objective Findings at Discharge:   /73   Pulse 75   Temp 97.8 °F (36.6 °C) (Oral)   Resp 16   Ht 5' 7\" (1.702 m)   Wt 175 lb 4.3 oz (79.5 kg)   SpO2 (!) 89%   BMI 27.45 kg/m²              BMP/CBC  Recent Labs     20  0738 20  1114 20  0350   * 138 137   K 4.0 4.1  --     96* 97*   CO2 23 31 31   BUN 11 19 22   CREATININE 1.1 1.3* 1.3*   WBC 6.5  --   --    HCT 42.7  --   --      --   --        IMAGING:  Xr Chest Portable    Result Date: 2020  EXAMINATION: ONE XRAY VIEW OF THE CHEST 2020 6:44 am COMPARISON: 2015 HISTORY: ORDERING SYSTEM PROVIDED HISTORY: cough, SOB TECHNOLOGIST PROVIDED

## 2020-11-28 NOTE — PROGRESS NOTES
Pt became upset stating staff was not answering her call light. RN and NA had both responded to patient multiple times throughout the night. Throughout night patient continuously asked for pain medication. Pt's BP was consistently low. RN gave medications as ordered. Comforted patient with helping her to change positions to the chair to the couch and RN have her a K-Pad as well. RN in with other patient and NA responded to pt's call light and simultaneously pt's daughter Leonard Ashford called RN and stated her mom called her saying the staff had not been responding to her call lights and not giving her pain medication. RN explained to daughter the situation and the daughter understood and stated \"I know how difficult my mom can be\" and that she would call her mother and try calm her down. RN still on phone with daughter while pulling pain medication out for her mother and as RN walking to room, floor was called stating that a patient had left the hospital.  It turned out to be patient. Charge RN called security and house supervisor. RN phoned daughter to explain what was happening and again she said she would call her daughter and try to calm her down. RN awaiting results of this situation. RN will update daughter prior to end of shift.

## 2020-12-01 LAB
CULTURE: NORMAL
CULTURE: NORMAL
Lab: NORMAL
Lab: NORMAL
SPECIMEN: NORMAL
SPECIMEN: NORMAL

## 2020-12-02 ENCOUNTER — APPOINTMENT (OUTPATIENT)
Dept: GENERAL RADIOLOGY | Age: 55
DRG: 194 | End: 2020-12-02
Payer: COMMERCIAL

## 2020-12-02 ENCOUNTER — HOSPITAL ENCOUNTER (EMERGENCY)
Age: 55
Discharge: LEFT BEFORE DISCHARGE | DRG: 194 | End: 2020-12-02
Attending: EMERGENCY MEDICINE
Payer: COMMERCIAL

## 2020-12-02 VITALS
DIASTOLIC BLOOD PRESSURE: 73 MMHG | RESPIRATION RATE: 27 BRPM | WEIGHT: 170 LBS | TEMPERATURE: 98.1 F | HEIGHT: 67 IN | BODY MASS INDEX: 26.68 KG/M2 | SYSTOLIC BLOOD PRESSURE: 115 MMHG | HEART RATE: 96 BPM | OXYGEN SATURATION: 97 %

## 2020-12-02 LAB
ACETAMINOPHEN LEVEL: <5 UG/ML (ref 15–30)
ALBUMIN SERPL-MCNC: 3.4 GM/DL (ref 3.4–5)
ALCOHOL SCREEN SERUM: <0.01 %WT/VOL
ALP BLD-CCNC: 64 IU/L (ref 40–129)
ALT SERPL-CCNC: 17 U/L (ref 10–40)
ANION GAP SERPL CALCULATED.3IONS-SCNC: 12 MMOL/L (ref 4–16)
AST SERPL-CCNC: 17 IU/L (ref 15–37)
BASE EXCESS MIXED: 2.4 (ref 0–2.3)
BASOPHILS ABSOLUTE: 0.1 K/CU MM
BASOPHILS RELATIVE PERCENT: 0.8 % (ref 0–1)
BILIRUB SERPL-MCNC: 0.3 MG/DL (ref 0–1)
BUN BLDV-MCNC: 18 MG/DL (ref 6–23)
CALCIUM SERPL-MCNC: 8.6 MG/DL (ref 8.3–10.6)
CHLORIDE BLD-SCNC: 103 MMOL/L (ref 99–110)
CO2: 24 MMOL/L (ref 21–32)
CREAT SERPL-MCNC: 1.2 MG/DL (ref 0.6–1.1)
DIFFERENTIAL TYPE: ABNORMAL
DOSE AMOUNT: ABNORMAL
DOSE AMOUNT: ABNORMAL
DOSE TIME: ABNORMAL
DOSE TIME: ABNORMAL
EOSINOPHILS ABSOLUTE: 0.2 K/CU MM
EOSINOPHILS RELATIVE PERCENT: 3.4 % (ref 0–3)
GFR AFRICAN AMERICAN: 56 ML/MIN/1.73M2
GFR NON-AFRICAN AMERICAN: 46 ML/MIN/1.73M2
GLUCOSE BLD-MCNC: 110 MG/DL
GLUCOSE BLD-MCNC: 110 MG/DL (ref 70–99)
HCO3 VENOUS: 28.8 MMOL/L (ref 19–25)
HCT VFR BLD CALC: 43.5 % (ref 37–47)
HEMOGLOBIN: 12.9 GM/DL (ref 12.5–16)
IMMATURE NEUTROPHIL %: 0.3 % (ref 0–0.43)
LACTATE: 1.8 MMOL/L (ref 0.4–2)
LIPASE: 32 IU/L (ref 13–60)
LYMPHOCYTES ABSOLUTE: 1.6 K/CU MM
LYMPHOCYTES RELATIVE PERCENT: 27.4 % (ref 24–44)
MAGNESIUM: 2.3 MG/DL (ref 1.8–2.4)
MCH RBC QN AUTO: 28.9 PG (ref 27–31)
MCHC RBC AUTO-ENTMCNC: 29.7 % (ref 32–36)
MCV RBC AUTO: 97.5 FL (ref 78–100)
MONOCYTES ABSOLUTE: 0.7 K/CU MM
MONOCYTES RELATIVE PERCENT: 11.6 % (ref 0–4)
NUCLEATED RBC %: 0 %
O2 SAT, VEN: 89.5 % (ref 50–70)
PCO2, VEN: 51 MMHG (ref 38–52)
PDW BLD-RTO: 13.2 % (ref 11.7–14.9)
PH VENOUS: 7.36 (ref 7.32–7.42)
PLATELET # BLD: 283 K/CU MM (ref 140–440)
PMV BLD AUTO: 9 FL (ref 7.5–11.1)
PO2, VEN: 157 MMHG (ref 28–48)
POTASSIUM SERPL-SCNC: 3.9 MMOL/L (ref 3.5–5.1)
PRO-BNP: 6454 PG/ML
RBC # BLD: 4.46 M/CU MM (ref 4.2–5.4)
SALICYLATE LEVEL: <0.3 MG/DL (ref 15–30)
SARS-COV-2, NAAT: NOT DETECTED
SEGMENTED NEUTROPHILS ABSOLUTE COUNT: 3.4 K/CU MM
SEGMENTED NEUTROPHILS RELATIVE PERCENT: 56.5 % (ref 36–66)
SODIUM BLD-SCNC: 139 MMOL/L (ref 135–145)
SOURCE: NORMAL
TOTAL CK: 78 IU/L (ref 26–140)
TOTAL IMMATURE NEUTOROPHIL: 0.02 K/CU MM
TOTAL NUCLEATED RBC: 0 K/CU MM
TOTAL PROTEIN: 6 GM/DL (ref 6.4–8.2)
TROPONIN T: <0.01 NG/ML
WBC # BLD: 5.9 K/CU MM (ref 4–10.5)

## 2020-12-02 PROCEDURE — G0480 DRUG TEST DEF 1-7 CLASSES: HCPCS

## 2020-12-02 PROCEDURE — 94761 N-INVAS EAR/PLS OXIMETRY MLT: CPT

## 2020-12-02 PROCEDURE — 83735 ASSAY OF MAGNESIUM: CPT

## 2020-12-02 PROCEDURE — 82805 BLOOD GASES W/O2 SATURATION: CPT

## 2020-12-02 PROCEDURE — 2580000003 HC RX 258: Performed by: EMERGENCY MEDICINE

## 2020-12-02 PROCEDURE — 6370000000 HC RX 637 (ALT 250 FOR IP): Performed by: EMERGENCY MEDICINE

## 2020-12-02 PROCEDURE — U0002 COVID-19 LAB TEST NON-CDC: HCPCS

## 2020-12-02 PROCEDURE — 83880 ASSAY OF NATRIURETIC PEPTIDE: CPT

## 2020-12-02 PROCEDURE — 96375 TX/PRO/DX INJ NEW DRUG ADDON: CPT

## 2020-12-02 PROCEDURE — 84484 ASSAY OF TROPONIN QUANT: CPT

## 2020-12-02 PROCEDURE — 85025 COMPLETE CBC W/AUTO DIFF WBC: CPT

## 2020-12-02 PROCEDURE — 6360000002 HC RX W HCPCS: Performed by: EMERGENCY MEDICINE

## 2020-12-02 PROCEDURE — 99285 EMERGENCY DEPT VISIT HI MDM: CPT

## 2020-12-02 PROCEDURE — 36415 COLL VENOUS BLD VENIPUNCTURE: CPT

## 2020-12-02 PROCEDURE — 96365 THER/PROPH/DIAG IV INF INIT: CPT

## 2020-12-02 PROCEDURE — 83605 ASSAY OF LACTIC ACID: CPT

## 2020-12-02 PROCEDURE — 82550 ASSAY OF CK (CPK): CPT

## 2020-12-02 PROCEDURE — 71045 X-RAY EXAM CHEST 1 VIEW: CPT

## 2020-12-02 PROCEDURE — 93005 ELECTROCARDIOGRAM TRACING: CPT | Performed by: EMERGENCY MEDICINE

## 2020-12-02 PROCEDURE — 80053 COMPREHEN METABOLIC PANEL: CPT

## 2020-12-02 PROCEDURE — 83690 ASSAY OF LIPASE: CPT

## 2020-12-02 PROCEDURE — 94640 AIRWAY INHALATION TREATMENT: CPT

## 2020-12-02 RX ORDER — ALBUTEROL SULFATE 90 UG/1
2 AEROSOL, METERED RESPIRATORY (INHALATION) ONCE
Status: COMPLETED | OUTPATIENT
Start: 2020-12-02 | End: 2020-12-02

## 2020-12-02 RX ORDER — 0.9 % SODIUM CHLORIDE 0.9 %
1000 INTRAVENOUS SOLUTION INTRAVENOUS ONCE
Status: COMPLETED | OUTPATIENT
Start: 2020-12-02 | End: 2020-12-02

## 2020-12-02 RX ORDER — MAGNESIUM SULFATE 1 G/100ML
1 INJECTION INTRAVENOUS ONCE
Status: COMPLETED | OUTPATIENT
Start: 2020-12-02 | End: 2020-12-02

## 2020-12-02 RX ORDER — NALOXONE HYDROCHLORIDE 0.4 MG/ML
0.4 INJECTION, SOLUTION INTRAMUSCULAR; INTRAVENOUS; SUBCUTANEOUS ONCE
Status: COMPLETED | OUTPATIENT
Start: 2020-12-02 | End: 2020-12-02

## 2020-12-02 RX ORDER — ONDANSETRON 2 MG/ML
4 INJECTION INTRAMUSCULAR; INTRAVENOUS EVERY 30 MIN PRN
Status: DISCONTINUED | OUTPATIENT
Start: 2020-12-02 | End: 2020-12-02 | Stop reason: HOSPADM

## 2020-12-02 RX ORDER — FUROSEMIDE 10 MG/ML
20 INJECTION INTRAMUSCULAR; INTRAVENOUS ONCE
Status: COMPLETED | OUTPATIENT
Start: 2020-12-02 | End: 2020-12-02

## 2020-12-02 RX ADMIN — SODIUM CHLORIDE 1000 ML: 9 INJECTION, SOLUTION INTRAVENOUS at 14:14

## 2020-12-02 RX ADMIN — FUROSEMIDE 20 MG: 10 INJECTION, SOLUTION INTRAVENOUS at 15:02

## 2020-12-02 RX ADMIN — ALBUTEROL SULFATE 2 PUFF: 90 AEROSOL, METERED RESPIRATORY (INHALATION) at 14:24

## 2020-12-02 RX ADMIN — MAGNESIUM SULFATE HEPTAHYDRATE 1 G: 1 INJECTION, SOLUTION INTRAVENOUS at 14:52

## 2020-12-02 RX ADMIN — ONDANSETRON 4 MG: 2 INJECTION INTRAMUSCULAR; INTRAVENOUS at 15:03

## 2020-12-02 RX ADMIN — NALOXONE HYDROCHLORIDE 0.4 MG: 0.4 INJECTION, SOLUTION INTRAMUSCULAR; INTRAVENOUS; SUBCUTANEOUS at 14:14

## 2020-12-02 ASSESSMENT — ENCOUNTER SYMPTOMS
ALLERGIC/IMMUNOLOGIC NEGATIVE: 1
COUGH: 1
SHORTNESS OF BREATH: 1
EYES NEGATIVE: 1
VOMITING: 1
NAUSEA: 1

## 2020-12-02 ASSESSMENT — PAIN SCALES - GENERAL: PAINLEVEL_OUTOF10: 10

## 2020-12-02 NOTE — ED NOTES
Pt refusing to get into a gown. Pt states she is having chest pain but falls asleep. Pt refusing to cooperate as this RN attempts to get a bp and on tele. Pt continues to fall asleep. Apparently pts mom called because she was nauseous and vomiting and flailing all around at home. Pt is rude and states she wants to go home now. This RN asked pt is she wants to be seen today and she does not answer.       Ely Corcoran, MINE  12/02/20 1206
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
## 2020-12-02 NOTE — ED NOTES
Bed: ED-28  Expected date:   Expected time:   Means of arrival:   Comments:  spencer Brenner RN  12/02/20 4283

## 2020-12-02 NOTE — CARE COORDINATION
Pt identified as potential readmission. Last admission 11/26 - 11/28 for Pt presented with 7 days h/o constant worsening across pressure like chest pain, associated with worsening dyspnea, worse with exertion, better with rest, no abd pain, no N/V, no F/C.     Pt here today for complaint of continued chest pain shortness of breath nausea vomiting. Patient states she was in the hospital recently and diagnosed with new onset CHF. No Acute Findings. Readmission was avoided and pt was able to be d/c'd home.

## 2020-12-02 NOTE — PROGRESS NOTES
Medication History  [facility]    Patient Name: Lydia Guillen 1965     Medication history has been completed by: Jessica Marino CPhT    Source(s) of information:      Primary Care Physician: Broderick Spurling, MD     Pharmacy:     Allergies as of 12/02/2020 - Review Complete 12/02/2020   Allergen Reaction Noted    Adderall [amphetamine-dextroamphetamine] Itching 06/04/2013    Morphine and related  02/12/2014    Tramadol  02/12/2014    Ultram [tramadol hcl] Itching 08/31/2012    Codeine Itching 01/13/2012    Pcn [penicillins] Swelling and Dermatitis 01/13/2012        Prior to Admission medications    Medication Sig Start Date End Date Taking? Authorizing Provider   ibuprofen (IBU) 600 MG tablet Take 1 tablet by mouth every 6 hours as needed for Pain 10/30/20 11/29/20  GENO Jaimes   naproxen (NAPROSYN) 500 MG tablet Take 1 tablet by mouth 2 times daily 5/9/19   Carmen Carrillo PA-C     Medications removed from list (include reason, ex. noncompliance, medication cost, therapy complete etc.):   Pete clean up list    Comments:  Unable to assess patient.   No recent prescription fills noted     To my knowledge the above medication history is accurate as of 12/2/2020 3:11 PM.   Jessica Marino CPhT   12/2/2020 3:11 PM

## 2020-12-03 PROCEDURE — 93010 ELECTROCARDIOGRAM REPORT: CPT | Performed by: INTERNAL MEDICINE

## 2020-12-04 ENCOUNTER — HOSPITAL ENCOUNTER (INPATIENT)
Age: 55
LOS: 1 days | Discharge: LEFT BEFORE DISCHARGE | DRG: 194 | End: 2020-12-04
Attending: EMERGENCY MEDICINE | Admitting: INTERNAL MEDICINE
Payer: COMMERCIAL

## 2020-12-04 ENCOUNTER — APPOINTMENT (OUTPATIENT)
Dept: GENERAL RADIOLOGY | Age: 55
DRG: 194 | End: 2020-12-04
Payer: COMMERCIAL

## 2020-12-04 VITALS
TEMPERATURE: 98.1 F | OXYGEN SATURATION: 96 % | RESPIRATION RATE: 18 BRPM | HEIGHT: 67 IN | BODY MASS INDEX: 28.25 KG/M2 | SYSTOLIC BLOOD PRESSURE: 111 MMHG | WEIGHT: 180 LBS | DIASTOLIC BLOOD PRESSURE: 58 MMHG | HEART RATE: 89 BPM

## 2020-12-04 PROBLEM — I50.9 HEART FAILURE (HCC): Status: ACTIVE | Noted: 2020-12-04

## 2020-12-04 LAB
ALBUMIN SERPL-MCNC: 3.5 GM/DL (ref 3.4–5)
ALP BLD-CCNC: 60 IU/L (ref 40–128)
ALT SERPL-CCNC: 22 U/L (ref 10–40)
ANION GAP SERPL CALCULATED.3IONS-SCNC: 9 MMOL/L (ref 4–16)
AST SERPL-CCNC: 33 IU/L (ref 15–37)
BACTERIA: NEGATIVE /HPF
BASOPHILS ABSOLUTE: 0 K/CU MM
BASOPHILS RELATIVE PERCENT: 0.4 % (ref 0–1)
BILIRUB SERPL-MCNC: 0.2 MG/DL (ref 0–1)
BILIRUBIN URINE: NEGATIVE MG/DL
BLOOD, URINE: NEGATIVE
BUN BLDV-MCNC: 25 MG/DL (ref 6–23)
CALCIUM SERPL-MCNC: 8.9 MG/DL (ref 8.3–10.6)
CHLORIDE BLD-SCNC: 102 MMOL/L (ref 99–110)
CLARITY: ABNORMAL
CO2: 21 MMOL/L (ref 21–32)
COLOR: YELLOW
COMMENT UA: ABNORMAL
CREAT SERPL-MCNC: 1.2 MG/DL (ref 0.6–1.1)
DIFFERENTIAL TYPE: ABNORMAL
EKG ATRIAL RATE: 104 BPM
EKG ATRIAL RATE: 96 BPM
EKG DIAGNOSIS: NORMAL
EKG DIAGNOSIS: NORMAL
EKG P AXIS: 65 DEGREES
EKG P AXIS: 76 DEGREES
EKG P-R INTERVAL: 164 MS
EKG P-R INTERVAL: 168 MS
EKG Q-T INTERVAL: 368 MS
EKG Q-T INTERVAL: 418 MS
EKG QRS DURATION: 84 MS
EKG QRS DURATION: 90 MS
EKG QTC CALCULATION (BAZETT): 483 MS
EKG QTC CALCULATION (BAZETT): 528 MS
EKG R AXIS: -7 DEGREES
EKG R AXIS: 17 DEGREES
EKG T AXIS: 68 DEGREES
EKG T AXIS: 78 DEGREES
EKG VENTRICULAR RATE: 104 BPM
EKG VENTRICULAR RATE: 96 BPM
EOSINOPHILS ABSOLUTE: 0.2 K/CU MM
EOSINOPHILS RELATIVE PERCENT: 2.2 % (ref 0–3)
GFR AFRICAN AMERICAN: 56 ML/MIN/1.73M2
GFR NON-AFRICAN AMERICAN: 46 ML/MIN/1.73M2
GLUCOSE BLD-MCNC: 118 MG/DL (ref 70–99)
GLUCOSE, URINE: NEGATIVE MG/DL
HCT VFR BLD CALC: 39.7 % (ref 37–47)
HEMOGLOBIN: 12.6 GM/DL (ref 12.5–16)
IMMATURE NEUTROPHIL %: 0.3 % (ref 0–0.43)
KETONES, URINE: NEGATIVE MG/DL
LACTATE: 1.2 MMOL/L (ref 0.4–2)
LEUKOCYTE ESTERASE, URINE: NEGATIVE
LYMPHOCYTES ABSOLUTE: 1.5 K/CU MM
LYMPHOCYTES RELATIVE PERCENT: 20.2 % (ref 24–44)
MCH RBC QN AUTO: 30.6 PG (ref 27–31)
MCHC RBC AUTO-ENTMCNC: 31.7 % (ref 32–36)
MCV RBC AUTO: 96.4 FL (ref 78–100)
MONOCYTES ABSOLUTE: 0.7 K/CU MM
MONOCYTES RELATIVE PERCENT: 8.8 % (ref 0–4)
NITRITE URINE, QUANTITATIVE: NEGATIVE
NUCLEATED RBC %: 0 %
PDW BLD-RTO: 13.4 % (ref 11.7–14.9)
PH, URINE: 5 (ref 5–8)
PLATELET # BLD: 291 K/CU MM (ref 140–440)
PMV BLD AUTO: 9.3 FL (ref 7.5–11.1)
POTASSIUM SERPL-SCNC: 4.7 MMOL/L (ref 3.5–5.1)
PRO-BNP: 8725 PG/ML
PROTEIN UA: NEGATIVE MG/DL
RBC # BLD: 4.12 M/CU MM (ref 4.2–5.4)
RBC URINE: 3 /HPF (ref 0–6)
SEGMENTED NEUTROPHILS ABSOLUTE COUNT: 5.2 K/CU MM
SEGMENTED NEUTROPHILS RELATIVE PERCENT: 68.1 % (ref 36–66)
SODIUM BLD-SCNC: 132 MMOL/L (ref 135–145)
SPECIFIC GRAVITY UA: 1.02 (ref 1–1.03)
SQUAMOUS EPITHELIAL: <1 /HPF
TOTAL CK: 152 IU/L (ref 26–140)
TOTAL IMMATURE NEUTOROPHIL: 0.02 K/CU MM
TOTAL NUCLEATED RBC: 0 K/CU MM
TOTAL PROTEIN: 6.3 GM/DL (ref 6.4–8.2)
TRICHOMONAS: ABNORMAL /HPF
TROPONIN T: <0.01 NG/ML
TROPONIN T: <0.01 NG/ML
TSH HIGH SENSITIVITY: 5.48 UIU/ML (ref 0.27–4.2)
UROBILINOGEN, URINE: NORMAL MG/DL (ref 0.2–1)
WBC # BLD: 7.6 K/CU MM (ref 4–10.5)
WBC UA: <1 /HPF (ref 0–5)

## 2020-12-04 PROCEDURE — 6370000000 HC RX 637 (ALT 250 FOR IP): Performed by: INTERNAL MEDICINE

## 2020-12-04 PROCEDURE — 93010 ELECTROCARDIOGRAM REPORT: CPT | Performed by: INTERNAL MEDICINE

## 2020-12-04 PROCEDURE — 83880 ASSAY OF NATRIURETIC PEPTIDE: CPT

## 2020-12-04 PROCEDURE — 1200000000 HC SEMI PRIVATE

## 2020-12-04 PROCEDURE — 94761 N-INVAS EAR/PLS OXIMETRY MLT: CPT

## 2020-12-04 PROCEDURE — 6360000002 HC RX W HCPCS: Performed by: INTERNAL MEDICINE

## 2020-12-04 PROCEDURE — 85025 COMPLETE CBC W/AUTO DIFF WBC: CPT

## 2020-12-04 PROCEDURE — 6370000000 HC RX 637 (ALT 250 FOR IP): Performed by: EMERGENCY MEDICINE

## 2020-12-04 PROCEDURE — 81001 URINALYSIS AUTO W/SCOPE: CPT

## 2020-12-04 PROCEDURE — 93005 ELECTROCARDIOGRAM TRACING: CPT | Performed by: PHYSICIAN ASSISTANT

## 2020-12-04 PROCEDURE — 87077 CULTURE AEROBIC IDENTIFY: CPT

## 2020-12-04 PROCEDURE — 84443 ASSAY THYROID STIM HORMONE: CPT

## 2020-12-04 PROCEDURE — 99285 EMERGENCY DEPT VISIT HI MDM: CPT

## 2020-12-04 PROCEDURE — 87086 URINE CULTURE/COLONY COUNT: CPT

## 2020-12-04 PROCEDURE — 94640 AIRWAY INHALATION TREATMENT: CPT

## 2020-12-04 PROCEDURE — 96374 THER/PROPH/DIAG INJ IV PUSH: CPT

## 2020-12-04 PROCEDURE — 80053 COMPREHEN METABOLIC PANEL: CPT

## 2020-12-04 PROCEDURE — 71045 X-RAY EXAM CHEST 1 VIEW: CPT

## 2020-12-04 PROCEDURE — 36415 COLL VENOUS BLD VENIPUNCTURE: CPT

## 2020-12-04 PROCEDURE — 6360000002 HC RX W HCPCS: Performed by: EMERGENCY MEDICINE

## 2020-12-04 PROCEDURE — 2580000003 HC RX 258: Performed by: INTERNAL MEDICINE

## 2020-12-04 PROCEDURE — 82550 ASSAY OF CK (CPK): CPT

## 2020-12-04 PROCEDURE — 83605 ASSAY OF LACTIC ACID: CPT

## 2020-12-04 PROCEDURE — 84484 ASSAY OF TROPONIN QUANT: CPT

## 2020-12-04 RX ORDER — FUROSEMIDE 10 MG/ML
20 INJECTION INTRAMUSCULAR; INTRAVENOUS 2 TIMES DAILY
Status: DISCONTINUED | OUTPATIENT
Start: 2020-12-04 | End: 2020-12-05 | Stop reason: HOSPADM

## 2020-12-04 RX ORDER — ONDANSETRON 2 MG/ML
4 INJECTION INTRAMUSCULAR; INTRAVENOUS EVERY 6 HOURS PRN
Status: DISCONTINUED | OUTPATIENT
Start: 2020-12-04 | End: 2020-12-05 | Stop reason: HOSPADM

## 2020-12-04 RX ORDER — ATORVASTATIN CALCIUM 40 MG/1
40 TABLET, FILM COATED ORAL NIGHTLY
Status: DISCONTINUED | OUTPATIENT
Start: 2020-12-04 | End: 2020-12-05 | Stop reason: HOSPADM

## 2020-12-04 RX ORDER — POLYETHYLENE GLYCOL 3350 17 G/17G
17 POWDER, FOR SOLUTION ORAL DAILY PRN
Status: DISCONTINUED | OUTPATIENT
Start: 2020-12-04 | End: 2020-12-05 | Stop reason: HOSPADM

## 2020-12-04 RX ORDER — METOPROLOL SUCCINATE 25 MG/1
25 TABLET, EXTENDED RELEASE ORAL DAILY
Status: DISCONTINUED | OUTPATIENT
Start: 2020-12-04 | End: 2020-12-05 | Stop reason: HOSPADM

## 2020-12-04 RX ORDER — FUROSEMIDE 10 MG/ML
40 INJECTION INTRAMUSCULAR; INTRAVENOUS ONCE
Status: COMPLETED | OUTPATIENT
Start: 2020-12-04 | End: 2020-12-04

## 2020-12-04 RX ORDER — ACETAMINOPHEN 650 MG/1
650 SUPPOSITORY RECTAL EVERY 6 HOURS PRN
Status: DISCONTINUED | OUTPATIENT
Start: 2020-12-04 | End: 2020-12-05 | Stop reason: HOSPADM

## 2020-12-04 RX ORDER — SODIUM CHLORIDE 0.9 % (FLUSH) 0.9 %
10 SYRINGE (ML) INJECTION EVERY 12 HOURS SCHEDULED
Status: DISCONTINUED | OUTPATIENT
Start: 2020-12-04 | End: 2020-12-05 | Stop reason: HOSPADM

## 2020-12-04 RX ORDER — ALBUTEROL SULFATE 90 UG/1
2 AEROSOL, METERED RESPIRATORY (INHALATION) ONCE
Status: COMPLETED | OUTPATIENT
Start: 2020-12-04 | End: 2020-12-04

## 2020-12-04 RX ORDER — ASPIRIN 81 MG/1
81 TABLET, CHEWABLE ORAL DAILY
Status: DISCONTINUED | OUTPATIENT
Start: 2020-12-04 | End: 2020-12-05 | Stop reason: HOSPADM

## 2020-12-04 RX ORDER — ACETAMINOPHEN 325 MG/1
650 TABLET ORAL EVERY 6 HOURS PRN
Status: DISCONTINUED | OUTPATIENT
Start: 2020-12-04 | End: 2020-12-05 | Stop reason: HOSPADM

## 2020-12-04 RX ORDER — SODIUM CHLORIDE 0.9 % (FLUSH) 0.9 %
10 SYRINGE (ML) INJECTION PRN
Status: DISCONTINUED | OUTPATIENT
Start: 2020-12-04 | End: 2020-12-05 | Stop reason: HOSPADM

## 2020-12-04 RX ORDER — PROMETHAZINE HYDROCHLORIDE 25 MG/1
12.5 TABLET ORAL EVERY 6 HOURS PRN
Status: DISCONTINUED | OUTPATIENT
Start: 2020-12-04 | End: 2020-12-05 | Stop reason: HOSPADM

## 2020-12-04 RX ADMIN — FUROSEMIDE 20 MG: 10 INJECTION, SOLUTION INTRAVENOUS at 12:13

## 2020-12-04 RX ADMIN — FUROSEMIDE 40 MG: 10 INJECTION, SOLUTION INTRAVENOUS at 06:24

## 2020-12-04 RX ADMIN — ENOXAPARIN SODIUM 40 MG: 100 INJECTION SUBCUTANEOUS at 12:14

## 2020-12-04 RX ADMIN — SODIUM CHLORIDE, PRESERVATIVE FREE 10 ML: 5 INJECTION INTRAVENOUS at 12:18

## 2020-12-04 RX ADMIN — METOPROLOL SUCCINATE 25 MG: 25 TABLET, EXTENDED RELEASE ORAL at 12:13

## 2020-12-04 RX ADMIN — ASPIRIN 81 MG CHEWABLE TABLET 81 MG: 81 TABLET CHEWABLE at 12:13

## 2020-12-04 RX ADMIN — ALBUTEROL SULFATE 2 PUFF: 90 AEROSOL, METERED RESPIRATORY (INHALATION) at 06:47

## 2020-12-04 ASSESSMENT — PAIN DESCRIPTION - PAIN TYPE: TYPE: ACUTE PAIN

## 2020-12-04 ASSESSMENT — PAIN DESCRIPTION - LOCATION: LOCATION: CHEST;BACK

## 2020-12-04 ASSESSMENT — ENCOUNTER SYMPTOMS
NAUSEA: 1
SHORTNESS OF BREATH: 1
EYES NEGATIVE: 1
ALLERGIC/IMMUNOLOGIC NEGATIVE: 1

## 2020-12-04 ASSESSMENT — PAIN DESCRIPTION - FREQUENCY: FREQUENCY: CONTINUOUS

## 2020-12-04 ASSESSMENT — PAIN SCALES - GENERAL: PAINLEVEL_OUTOF10: 7

## 2020-12-04 NOTE — H&P
worsen yesterday. Difficult to lay down without becoming SOB and does endorse some chest pains. No nausea or vomiting. No fevers or chills. No sick contacts. 10 point review of systems completed and negative unless otherwise noted. In ED patient was afebrile, mildly tachycardic to 100 when seen at bedside. Patient was saturating 95-96% on room air. Labs significant for BNP of 8725, Cr 1.2, TSH 5.4. CXR demonstrating congestion. EKG with no acute ischemic changes. Objective:   No intake or output data in the 24 hours ending 20 1225   Vitals:   Vitals:    20 1059   BP: 121/84   Pulse: 106   Resp: 25   Temp:    SpO2:      Physical Exam:   GEN Awake female, sitting upright in bed in no apparent distress. Appears given age. EYES Pupils are equally round. No scleral erythema, discharge, or conjunctivitis. HENT Mucous membranes are moist.   NECK Supple, no apparent thyromegaly or masses. RESP Clear bilaterally. CARDIO/VASC S1/S2 auscultated. Regular rate without appreciable murmurs, rubs, or gallops. Mild edema bilaterallyl  GI Abdomen is soft without significant tenderness, masses, or guarding. Bowel sounds are normoactive. Rectal exam deferred.  No costovertebral angle tenderness. Normal appearing external genitalia. Lamb catheter is not present. HEME/LYMPH No palpable cervical lymphadenopathy and no hepatosplenomegaly. No petechiae or ecchymoses. MSK No gross joint deformities. SKIN Normal coloration, warm, dry. NEURO Cranial nerves appear grossly intact, normal speech, no lateralizing weakness. PSYCH Awake, alert, oriented x 4. Affect appropriate. Past Medical History:      Past Medical History:   Diagnosis Date    Anxiety     Environmental allergies     Non-healing surgical wound     RLS (restless legs syndrome)      PSHX:  has a past surgical history that includes Cholecystectomy; Appendectomy;  section; and knee surgery (). Allergies:    Allergies Allergen Reactions    Adderall [Amphetamine-Dextroamphetamine] Itching    Morphine And Related     Tramadol     Ultram [Tramadol Hcl] Itching    Codeine Itching    Pcn [Penicillins] Swelling and Dermatitis       FAM HX: family history includes Depression in an other family member.   Soc HX:   Social History     Socioeconomic History    Marital status:      Spouse name: Not on file    Number of children: Not on file    Years of education: Not on file    Highest education level: Not on file   Occupational History    Not on file   Social Needs    Financial resource strain: Not on file    Food insecurity     Worry: Not on file     Inability: Not on file    Transportation needs     Medical: Not on file     Non-medical: Not on file   Tobacco Use    Smoking status: Current Every Day Smoker     Packs/day: 0.50     Types: Cigarettes    Smokeless tobacco: Never Used   Substance and Sexual Activity    Alcohol use: No    Drug use: No    Sexual activity: Not on file   Lifestyle    Physical activity     Days per week: Not on file     Minutes per session: Not on file    Stress: Not on file   Relationships    Social connections     Talks on phone: Not on file     Gets together: Not on file     Attends Jehovah's witness service: Not on file     Active member of club or organization: Not on file     Attends meetings of clubs or organizations: Not on file     Relationship status: Not on file    Intimate partner violence     Fear of current or ex partner: Not on file     Emotionally abused: Not on file     Physically abused: Not on file     Forced sexual activity: Not on file   Other Topics Concern    Not on file   Social History Narrative    Not on file       Medications:   Medications:    sodium chloride flush  10 mL Intravenous 2 times per day    enoxaparin  40 mg Subcutaneous Daily    metoprolol succinate  25 mg Oral Daily    furosemide  20 mg Intravenous BID    aspirin  81 mg Oral Daily    atorvastatin  40 mg Oral Nightly      Infusions:   PRN Meds: sodium chloride flush, 10 mL, PRN  acetaminophen, 650 mg, Q6H PRN    Or  acetaminophen, 650 mg, Q6H PRN  polyethylene glycol, 17 g, Daily PRN  promethazine, 12.5 mg, Q6H PRN    Or  ondansetron, 4 mg, Q6H PRN          Electronically signed by Tiny Garg MD on 12/4/2020 at 12:25 PM

## 2020-12-04 NOTE — ED PROVIDER NOTES
[facility]      TRIAGE CHIEF COMPLAINT:   Shortness of Breath (Chest pain, reports CHF and needs admitted)      Robinson:  Eliz Ford is a 63 y.o. female that presents with complaint of chest pain shortness of breath nausea. Patient was here recently she was admitted she left AMA I saw her 2 days later for the same complaint she left AMA at that time as well. Patient is a known drug user apparently. She has had constant chest pain since being in the hospital she is resting comfortably in bed she is slightly tachycardic but currently sleeping is arousable she denies any fevers Covid abdominal pain no vomiting. She does have peripheral edema and shortness of breath she states she has CHF. No other questions or concerns. REVIEW OF SYSTEMS:  At least 10 systems reviewed and otherwise acutely negative except as in the [address] Review of Systems   Constitutional: Positive for fatigue. HENT: Negative. Eyes: Negative. Respiratory: Positive for shortness of breath. Cardiovascular: Positive for chest pain and leg swelling. Gastrointestinal: Positive for nausea. Endocrine: Negative. Genitourinary: Negative. Musculoskeletal: Negative. Skin: Negative. Allergic/Immunologic: Negative. Neurological: Negative. Hematological: Negative. Psychiatric/Behavioral: Negative. All other systems reviewed and are negative.       Past Medical History:   Diagnosis Date    Anxiety     Environmental allergies     Non-healing surgical wound     RLS (restless legs syndrome)      Past Surgical History:   Procedure Laterality Date    APPENDECTOMY       SECTION      CHOLECYSTECTOMY      KNEE SURGERY  2014     Family History   Problem Relation Age of Onset    Depression Other      Social History     Socioeconomic History    Marital status:      Spouse name: Not on file    Number of children: Not on file    Years of education: Not on file    Highest education level: Not on file   Occupational History    Not on file   Social Needs    Financial resource strain: Not on file    Food insecurity     Worry: Not on file     Inability: Not on file    Transportation needs     Medical: Not on file     Non-medical: Not on file   Tobacco Use    Smoking status: Current Every Day Smoker     Packs/day: 0.50     Types: Cigarettes    Smokeless tobacco: Never Used   Substance and Sexual Activity    Alcohol use: No    Drug use: No    Sexual activity: Not on file   Lifestyle    Physical activity     Days per week: Not on file     Minutes per session: Not on file    Stress: Not on file   Relationships    Social connections     Talks on phone: Not on file     Gets together: Not on file     Attends Catholic service: Not on file     Active member of club or organization: Not on file     Attends meetings of clubs or organizations: Not on file     Relationship status: Not on file    Intimate partner violence     Fear of current or ex partner: Not on file     Emotionally abused: Not on file     Physically abused: Not on file     Forced sexual activity: Not on file   Other Topics Concern    Not on file   Social History Narrative    Not on file     No current facility-administered medications for this encounter. Current Outpatient Medications   Medication Sig Dispense Refill    ibuprofen (IBU) 600 MG tablet Take 1 tablet by mouth every 6 hours as needed for Pain 20 tablet 0    naproxen (NAPROSYN) 500 MG tablet Take 1 tablet by mouth 2 times daily 60 tablet 0      Allergies   Allergen Reactions    Adderall [Amphetamine-Dextroamphetamine] Itching    Morphine And Related     Tramadol     Ultram [Tramadol Hcl] Itching    Codeine Itching    Pcn [Penicillins] Swelling and Dermatitis     No current facility-administered medications for this encounter.       Current Outpatient Medications   Medication Sig Dispense Refill    ibuprofen (IBU) 600 MG tablet Take 1 tablet by mouth every 6 hours as needed for Pain 20 tablet 0    naproxen (NAPROSYN) 500 MG tablet Take 1 tablet by mouth 2 times daily 60 tablet 0       Nursing Notes Reviewed    VITAL SIGNS:  ED Triage Vitals   Enc Vitals Group      BP 12/04/20 0453 (!) 137/101      Pulse 12/04/20 0453 108      Resp 12/04/20 0453 28      Temp 12/04/20 0453 98.2 °F (36.8 °C)      Temp Source 12/04/20 0453 Oral      SpO2 12/04/20 0453 94 %      Weight 12/04/20 0512 180 lb (81.6 kg)      Height 12/04/20 0512 5' 7\" (1.702 m)      Head Circumference --       Peak Flow --       Pain Score --       Pain Loc --       Pain Edu? --       Excl. in [address]? --        PHYSICAL EXAM:  Physical Exam  Vitals signs and nursing note reviewed. Constitutional:       General: She is not in acute distress. Appearance: Normal appearance. She is well-developed and well-groomed. She is not ill-appearing, toxic-appearing or diaphoretic. HENT:      Head: Normocephalic and atraumatic. Right Ear: External ear normal.      Left Ear: External ear normal.      Nose: Congestion present. No rhinorrhea. Eyes:      General: No scleral icterus. Right eye: No discharge. Left eye: No discharge. Extraocular Movements: Extraocular movements intact. Conjunctiva/sclera: Conjunctivae normal.   Neck:      Musculoskeletal: Full passive range of motion without pain and normal range of motion. Normal range of motion. No edema, erythema, neck rigidity, crepitus, injury, pain with movement or torticollis. Vascular: No JVD. Trachea: Phonation normal.   Cardiovascular:      Rate and Rhythm: Regular rhythm. Tachycardia present. Pulses: Normal pulses. Heart sounds: Normal heart sounds. No murmur. No friction rub. No gallop. Pulmonary:      Effort: Pulmonary effort is normal. No respiratory distress. Breath sounds: No stridor. Rales present. No wheezing or rhonchi. Chest:      Chest wall: Tenderness present.    Abdominal: General: Bowel sounds are normal. There is no distension. Palpations: Abdomen is soft. There is no mass. Tenderness: There is no abdominal tenderness. There is no guarding or rebound. Negative signs include Reynodls's sign, Rovsing's sign and McBurney's sign. Hernia: No hernia is present. Musculoskeletal: Normal range of motion. General: Swelling and tenderness present. No deformity or signs of injury. Right lower leg: Edema present. Left lower leg: Edema present. Skin:     General: Skin is warm. Coloration: Skin is not jaundiced or pale. Findings: No bruising, erythema, lesion or rash. Neurological:      General: No focal deficit present. Mental Status: She is alert and oriented to person, place, and time. GCS: GCS eye subscore is 4. GCS verbal subscore is 5. GCS motor subscore is 6. Cranial Nerves: Cranial nerves are intact. No cranial nerve deficit, dysarthria or facial asymmetry. Sensory: Sensation is intact. No sensory deficit. Motor: Motor function is intact. No weakness, tremor, atrophy, abnormal muscle tone or seizure activity. Coordination: Coordination is intact. Coordination normal.   Psychiatric:         Attention and Perception: Attention and perception normal.         Mood and Affect: Mood and affect normal.         Speech: Speech normal.         Behavior: Behavior normal. Behavior is cooperative. Thought Content:  Thought content normal.         Cognition and Memory: Cognition and memory normal.         Judgment: Judgment normal.           I have reviewed andinterpreted all of the currently available lab results from this visit (if applicable):    Results for orders placed or performed during the hospital encounter of 12/04/20   CBC Auto Differential   Result Value Ref Range    WBC 7.6 4.0 - 10.5 K/CU MM    RBC 4.12 (L) 4.2 - 5.4 M/CU MM    Hemoglobin 12.6 12.5 - 16.0 GM/DL    Hematocrit 39.7 37 - 47 %    MCV 96.4 78 - 100 FL    MCH 30.6 27 - 31 PG    MCHC 31.7 (L) 32.0 - 36.0 %    RDW 13.4 11.7 - 14.9 %    Platelets 276 156 - 228 K/CU MM    MPV 9.3 7.5 - 11.1 FL    Differential Type AUTOMATED DIFFERENTIAL     Segs Relative 68.1 (H) 36 - 66 %    Lymphocytes % 20.2 (L) 24 - 44 %    Monocytes % 8.8 (H) 0 - 4 %    Eosinophils % 2.2 0 - 3 %    Basophils % 0.4 0 - 1 %    Segs Absolute 5.2 K/CU MM    Lymphocytes Absolute 1.5 K/CU MM    Monocytes Absolute 0.7 K/CU MM    Eosinophils Absolute 0.2 K/CU MM    Basophils Absolute 0.0 K/CU MM    Nucleated RBC % 0.0 %    Total Nucleated RBC 0.0 K/CU MM    Total Immature Neutrophil 0.02 K/CU MM    Immature Neutrophil % 0.3 0 - 0.43 %   Comprehensive Metabolic Panel w/ Reflex to MG   Result Value Ref Range    Sodium 132 (L) 135 - 145 MMOL/L    Potassium 4.7 3.5 - 5.1 MMOL/L    Chloride 102 99 - 110 mMol/L    CO2 21 21 - 32 MMOL/L    BUN 25 (H) 6 - 23 MG/DL    CREATININE 1.2 (H) 0.6 - 1.1 MG/DL    Glucose 118 (H) 70 - 99 MG/DL    Calcium 8.9 8.3 - 10.6 MG/DL    Alb 3.5 3.4 - 5.0 GM/DL    Total Protein 6.3 (L) 6.4 - 8.2 GM/DL    Total Bilirubin 0.2 0.0 - 1.0 MG/DL    ALT 22 10 - 40 U/L    AST 33 15 - 37 IU/L    Alkaline Phosphatase 60 40 - 128 IU/L    GFR Non- 46 (L) >60 mL/min/1.73m2    GFR  56 (L) >60 mL/min/1.73m2    Anion Gap 9 4 - 16   Troponin   Result Value Ref Range    Troponin T <0.010 <0.01 NG/ML   Brain Natriuretic Peptide   Result Value Ref Range    Pro-BNP 8,725 (H) <300 PG/ML        Radiographs (if obtained):  [] The following radiograph was interpreted by myself in the absence of a radiologist:  [x] Radiologist's Report Reviewed:    CXR    Xr Chest Portable    Result Date: 12/2/2020  EXAMINATION: ONE XRAY VIEW OF THE CHEST 12/2/2020 12:34 pm COMPARISON: 11/26/2020 HISTORY: ORDERING SYSTEM PROVIDED HISTORY: dyspnea TECHNOLOGIST PROVIDED HISTORY: Reason for exam:->dyspnea Reason for Exam: soraya zhang Acuity: Unknown Type of Exam: Unknown FINDINGS: Cardiomegaly was noted with mild central pulmonary vascular congestion and subtle interstitial edema. Trace effusions were noted. Mild peribronchial cuffing was identified. The findings are compatible with congestive heart failure which has significantly improved from 11/26/2020. The regional skeleton was unremarkable. Congestive heart failure with significant improvement from 11/26/2020. Xr Chest Portable    Result Date: 11/26/2020  EXAMINATION: ONE XRAY VIEW OF THE CHEST 11/26/2020 6:44 am COMPARISON: 06/12/2015 HISTORY: ORDERING SYSTEM PROVIDED HISTORY: cough, SOB TECHNOLOGIST PROVIDED HISTORY: Reason for exam:->cough, SOB Reason for Exam: cough, SOB Acuity: Acute Type of Exam: Initial Additional signs and symptoms: n/a FINDINGS: The mediastinal and cardiac contours are stable. There are diffuse bilateral perihilar opacities extending into the upper and lower lobes. There is a suggestion of a small left pleural effusion. There is no pneumothorax identified. Diffuse bilateral airspace opacities and suspected small left pleural effusion. Differential considerations include an atypical bronchopneumonia or interstitial edema. Vl Dup Lower Extremity Venous Bilateral    Result Date: 11/26/2020  EXAMINATION: DUPLEX VENOUS ULTRASOUND OF THE BILATERAL LOWER EXTREMITIES, 11/26/2020 1:27 pm TECHNIQUE: Duplex ultrasound and Doppler images were obtained of the bilateral lower extremities COMPARISON: 01/11/2014 HISTORY: ORDERING SYSTEM PROVIDED HISTORY: leg pain, r/o DVT TECHNOLOGIST PROVIDED HISTORY: Reason for exam:->leg pain, r/o DVT Reason for Exam: leg pain Acuity: Acute Type of Exam: Initial FINDINGS: The visualized veins of the bilateral lower extremities are patent and free of echogenic thrombus. The veins are normally compressible and have normal phasic flow. Negative study for deep venous thrombosis in the bilateral lower extremities.        EKG (if obtained): (All EKG's are interpreted by myself in the absence of a cardiologist)    12 lead EKG per my interpretation:  Sinus Tachycardia 104  Axis is   Normal  QTc is  483  There is no specific T wave changes appreciated. There is no specific ST wave changes appreciated. Prior EKG to compare with was not available       MDM:    Patient here with shortness of breath chest pain history of CHF. Again patient was admitted here recently for CHF she left AMA she was seen by me a couple days later for the same complaint also left AMA at that time as well. Patient presents today with her suitcase and hand wants to be admitted for chest pain. She appears otherwise well she is resting comfortably in bed vital signs are stable. Will check labs imaging. Patient given Lasix EKG has slight tachycardia otherwise normal.  Patient rechecked doing well so far work-up is negative set for pulmonary edema, BNP elevation. Patient given Lasix. I did talk to hospital medicine who graciously admitted patient for observation given continued chest pain shortness of breath CHF and patient's third visit, left AMA twice. Otherwise patient stable admitted. CLINICAL IMPRESSION:  Final diagnoses:   Dyspnea, unspecified type   Congestive heart failure, unspecified HF chronicity, unspecified heart failure type (HCC)   Elevated brain natriuretic peptide (BNP) level   Chest pain, unspecified type       (Please note that portions of this note may have been completed with a voice recognition program. Efforts were made to edit the dictations but occasionally words aremis-transcribed.)    DISPOSITION REFERRAL (if applicable):  No follow-up provider specified.     DISPOSITION MEDICATIONS (if applicable):  New Prescriptions    No medications on file          Philip Pina, [facility],   12/04/20 3498

## 2020-12-04 NOTE — PROGRESS NOTES
Pt up to unit. Refusing to wear tele at this time. Lost IV access, pt hesitant to have new one placed. Education provided. Pt moving about independently in room, but very anxious, can't sit still. Education provided about falls.   Jasmine Nguyen RN   6:32 PM  12/4/2020

## 2020-12-05 LAB
CULTURE: ABNORMAL
CULTURE: ABNORMAL
Lab: ABNORMAL
SPECIMEN: ABNORMAL

## 2020-12-05 NOTE — PROGRESS NOTES
Patient found smoking in her bathroom. Flushed the cigarette and told nurse it was her last one. Supervisor [employer] notified. Patient allowed nurse to place IV. Supervisor was at bedside to discuss the patient smoking and the patient jumped out of bed which ripped the IV out that had not yet been secured. Patient states she wants to leave. House supervisor at bedside to discuss with patient as well as . Patient gathered belongings and left with security. Patient refused to sign AMA form.

## 2020-12-07 NOTE — DISCHARGE SUMMARY
Discharge Summary    Name:  Edgar Cunningham /Age/Sex:   (63 y.o. female)   MRN & CSN:  [id number] & [id number] Admission Date/Time: 2020  5:11 AM   Attending:  No att. providers found Discharging Physician: Irena Schlatter, MD     Hospital Course:   62 y/o F that presented with SOB secondary to heart failure. Patient ended up leaving AMA the night of admission. I was not present when patient left AMA at reportedly 9:45PM.  Per nursing patient refused to sign AMA forms and left AMA with security. The patient expressed appropriate understanding of and agreement with the discharge recommendations, medications, and plan. Consults this admission:  IP CONSULT TO HOSPITALIST  IP CONSULT TO HEART FAILURE NURSE/COORDINATOR  IP CONSULT TO DIETITIAN  IP CONSULT TO CARDIOLOGY    Discharge Instruction:   Patient left AMA    Discharge Medications:      Butch Calle, [address] [place] [address] Drive Medication Instructions AUDRA:[id number]    Printed on:20 9902   Medication Information                      ibuprofen (IBU) 600 MG tablet  Take 1 tablet by mouth every 6 hours as needed for Pain             naproxen (NAPROSYN) 500 MG tablet  Take 1 tablet by mouth 2 times daily                 Objective Findings at Discharge:   BP (!) 111/58   Pulse 89   Temp 98.1 °F (36.7 °C) (Oral)   Resp 18   Ht 5' 7\" (1.702 m)   Wt 180 lb (81.6 kg)   SpO2 96%   BMI 28.19 kg/m²       Physical Exam (from when seen by me on admission)         GEN    Awake female, sitting upright in bed in no apparent distress. Appears given age. EYES   Pupils are equally round. No scleral erythema, discharge, or conjunctivitis. HENT  Mucous membranes are moist.   NECK  Supple, no apparent thyromegaly or masses. RESP  Clear bilaterally. CARDIO/VASC           S1/S2 auscultated. Regular rate without appreciable murmurs, rubs, or gallops. Mild edema bilaterallyl  GI        Abdomen is soft without significant tenderness, masses, or guarding. Bowel sounds are normoactive. Rectal exam deferred.        No costovertebral angle tenderness. Normal appearing external genitalia. Lamb catheter is not present. HEME/LYMPH            No palpable cervical lymphadenopathy and no hepatosplenomegaly. No petechiae or ecchymoses. MSK    No gross joint deformities. SKIN    Normal coloration, warm, dry. NEURO           Cranial nerves appear grossly intact, normal speech, no lateralizing weakness. PSYCH            Awake, alert, oriented x 4. Affect appropriate. BMP/CBC  No results for input(s): NA, K, CL, CO2, BUN, CREATININE, WBC, HEMOGLOBIN, HCT, PLT in the last 72 hours. Invalid input(s): GLU    IMAGING:  Cardiomegaly with pulmonary vascular congestion. Patient left AMA overnight. I was not present at time patient left AMA.     Electronically signed by Roman Dennison MD on 12/7/2020 at 6:59 AM

## 2020-12-15 ENCOUNTER — APPOINTMENT (OUTPATIENT)
Dept: GENERAL RADIOLOGY | Age: 55
DRG: 194 | End: 2020-12-15
Payer: COMMERCIAL

## 2020-12-15 ENCOUNTER — HOSPITAL ENCOUNTER (INPATIENT)
Age: 55
LOS: 2 days | Discharge: HOME | DRG: 194 | End: 2020-12-18
Attending: EMERGENCY MEDICINE | Admitting: INTERNAL MEDICINE
Payer: COMMERCIAL

## 2020-12-15 ENCOUNTER — APPOINTMENT (OUTPATIENT)
Dept: CT IMAGING | Age: 55
DRG: 194 | End: 2020-12-15
Payer: COMMERCIAL

## 2020-12-15 PROBLEM — I50.23 ACUTE ON CHRONIC SYSTOLIC (CONGESTIVE) HEART FAILURE (HCC): Status: ACTIVE | Noted: 2020-12-15

## 2020-12-15 LAB
ALBUMIN SERPL-MCNC: 3.6 GM/DL (ref 3.4–5)
ALP BLD-CCNC: 72 IU/L (ref 40–129)
ALT SERPL-CCNC: 22 U/L (ref 10–40)
ANION GAP SERPL CALCULATED.3IONS-SCNC: 9 MMOL/L (ref 4–16)
AST SERPL-CCNC: 24 IU/L (ref 15–37)
BASOPHILS ABSOLUTE: 0 K/CU MM
BASOPHILS RELATIVE PERCENT: 0.5 % (ref 0–1)
BILIRUB SERPL-MCNC: 0.8 MG/DL (ref 0–1)
BUN BLDV-MCNC: 13 MG/DL (ref 6–23)
CALCIUM SERPL-MCNC: 9 MG/DL (ref 8.3–10.6)
CHLORIDE BLD-SCNC: 106 MMOL/L (ref 99–110)
CO2: 21 MMOL/L (ref 21–32)
CREAT SERPL-MCNC: 1.2 MG/DL (ref 0.6–1.1)
D DIMER: 269 NG/ML(DDU)
DIFFERENTIAL TYPE: ABNORMAL
EOSINOPHILS ABSOLUTE: 0.1 K/CU MM
EOSINOPHILS RELATIVE PERCENT: 0.9 % (ref 0–3)
GFR AFRICAN AMERICAN: 56 ML/MIN/1.73M2
GFR NON-AFRICAN AMERICAN: 46 ML/MIN/1.73M2
GLUCOSE BLD-MCNC: 104 MG/DL (ref 70–99)
GLUCOSE BLD-MCNC: 112 MG/DL (ref 70–99)
HCT VFR BLD CALC: 42.9 % (ref 37–47)
HEMOGLOBIN: 14 GM/DL (ref 12.5–16)
IMMATURE NEUTROPHIL %: 0.3 % (ref 0–0.43)
LYMPHOCYTES ABSOLUTE: 2.3 K/CU MM
LYMPHOCYTES RELATIVE PERCENT: 34.1 % (ref 24–44)
MCH RBC QN AUTO: 29.9 PG (ref 27–31)
MCHC RBC AUTO-ENTMCNC: 32.6 % (ref 32–36)
MCV RBC AUTO: 91.7 FL (ref 78–100)
MONOCYTES ABSOLUTE: 0.5 K/CU MM
MONOCYTES RELATIVE PERCENT: 7.5 % (ref 0–4)
NUCLEATED RBC %: 0 %
PDW BLD-RTO: 13.3 % (ref 11.7–14.9)
PLATELET # BLD: 342 K/CU MM (ref 140–440)
PMV BLD AUTO: 9.5 FL (ref 7.5–11.1)
POTASSIUM SERPL-SCNC: 4.2 MMOL/L (ref 3.5–5.1)
PRO-BNP: ABNORMAL PG/ML
RBC # BLD: 4.68 M/CU MM (ref 4.2–5.4)
SEGMENTED NEUTROPHILS ABSOLUTE COUNT: 3.8 K/CU MM
SEGMENTED NEUTROPHILS RELATIVE PERCENT: 56.7 % (ref 36–66)
SODIUM BLD-SCNC: 136 MMOL/L (ref 135–145)
TOTAL IMMATURE NEUTOROPHIL: 0.02 K/CU MM
TOTAL NUCLEATED RBC: 0 K/CU MM
TOTAL PROTEIN: 6.9 GM/DL (ref 6.4–8.2)
TROPONIN T: <0.01 NG/ML
WBC # BLD: 6.7 K/CU MM (ref 4–10.5)

## 2020-12-15 PROCEDURE — 85379 FIBRIN DEGRADATION QUANT: CPT

## 2020-12-15 PROCEDURE — 6360000004 HC RX CONTRAST MEDICATION: Performed by: EMERGENCY MEDICINE

## 2020-12-15 PROCEDURE — 83880 ASSAY OF NATRIURETIC PEPTIDE: CPT

## 2020-12-15 PROCEDURE — 71275 CT ANGIOGRAPHY CHEST: CPT

## 2020-12-15 PROCEDURE — 2580000003 HC RX 258: Performed by: EMERGENCY MEDICINE

## 2020-12-15 PROCEDURE — 82962 GLUCOSE BLOOD TEST: CPT

## 2020-12-15 PROCEDURE — 71046 X-RAY EXAM CHEST 2 VIEWS: CPT

## 2020-12-15 PROCEDURE — 94761 N-INVAS EAR/PLS OXIMETRY MLT: CPT

## 2020-12-15 PROCEDURE — 84484 ASSAY OF TROPONIN QUANT: CPT

## 2020-12-15 PROCEDURE — 93005 ELECTROCARDIOGRAM TRACING: CPT | Performed by: EMERGENCY MEDICINE

## 2020-12-15 PROCEDURE — 80053 COMPREHEN METABOLIC PANEL: CPT

## 2020-12-15 PROCEDURE — 99284 EMERGENCY DEPT VISIT MOD MDM: CPT

## 2020-12-15 PROCEDURE — 2140000000 HC CCU INTERMEDIATE R&B

## 2020-12-15 PROCEDURE — 85025 COMPLETE CBC W/AUTO DIFF WBC: CPT

## 2020-12-15 RX ORDER — SODIUM CHLORIDE 0.9 % (FLUSH) 0.9 %
10 SYRINGE (ML) INJECTION 2 TIMES DAILY
Status: DISCONTINUED | OUTPATIENT
Start: 2020-12-15 | End: 2020-12-18 | Stop reason: HOSPADM

## 2020-12-15 RX ADMIN — SODIUM CHLORIDE, PRESERVATIVE FREE 10 ML: 5 INJECTION INTRAVENOUS at 20:41

## 2020-12-15 RX ADMIN — IOPAMIDOL 90 ML: 755 INJECTION, SOLUTION INTRAVENOUS at 20:40

## 2020-12-15 ASSESSMENT — HEART SCORE: ECG: 1

## 2020-12-15 ASSESSMENT — PAIN DESCRIPTION - ORIENTATION: ORIENTATION: RIGHT

## 2020-12-15 ASSESSMENT — PAIN SCALES - GENERAL: PAINLEVEL_OUTOF10: 6

## 2020-12-15 ASSESSMENT — PAIN DESCRIPTION - LOCATION: LOCATION: CHEST

## 2020-12-15 NOTE — ED TRIAGE NOTES
Pt arrives via EMS with complains of chest pain. She states its been going on for awhile now. 324mg of asprin was given in the squad. Pt has a history of CHF.

## 2020-12-16 LAB
ADENOVIRUS DETECTION BY PCR: NOT DETECTED
AMPHETAMINES: NEGATIVE
ANION GAP SERPL CALCULATED.3IONS-SCNC: 9 MMOL/L (ref 4–16)
BARBITURATE SCREEN URINE: NEGATIVE
BENZODIAZEPINE SCREEN, URINE: NEGATIVE
BORDETELLA PARAPERTUSSIS BY PCR: NOT DETECTED
BORDETELLA PERTUSSIS PCR: NOT DETECTED
BUN BLDV-MCNC: 14 MG/DL (ref 6–23)
CALCIUM SERPL-MCNC: 8.7 MG/DL (ref 8.3–10.6)
CANNABINOID SCREEN URINE: NEGATIVE
CHLAMYDOPHILA PNEUMONIA PCR: NOT DETECTED
CHLORIDE BLD-SCNC: 101 MMOL/L (ref 99–110)
CHOLESTEROL: 147 MG/DL
CO2: 22 MMOL/L (ref 21–32)
COCAINE METABOLITE: ABNORMAL
CORONAVIRUS 229E PCR: NOT DETECTED
CORONAVIRUS HKU1 PCR: NOT DETECTED
CORONAVIRUS NL63 PCR: NOT DETECTED
CORONAVIRUS OC43 PCR: NOT DETECTED
CREAT SERPL-MCNC: 1.2 MG/DL (ref 0.6–1.1)
GFR AFRICAN AMERICAN: 56 ML/MIN/1.73M2
GFR NON-AFRICAN AMERICAN: 46 ML/MIN/1.73M2
GLUCOSE BLD-MCNC: 116 MG/DL (ref 70–99)
HCT VFR BLD CALC: 43.6 % (ref 37–47)
HDLC SERPL-MCNC: 36 MG/DL
HEMOGLOBIN: 14.2 GM/DL (ref 12.5–16)
HUMAN METAPNEUMOVIRUS PCR: NOT DETECTED
INFLUENZA A BY PCR: NOT DETECTED
INFLUENZA A H1 (2009) PCR: NOT DETECTED
INFLUENZA A H1 PANDEMIC PCR: NOT DETECTED
INFLUENZA A H3 PCR: NOT DETECTED
INFLUENZA B BY PCR: NOT DETECTED
LDL CHOLESTEROL DIRECT: 96 MG/DL
MAGNESIUM: 2.1 MG/DL (ref 1.8–2.4)
MCH RBC QN AUTO: 30.2 PG (ref 27–31)
MCHC RBC AUTO-ENTMCNC: 32.6 % (ref 32–36)
MCV RBC AUTO: 92.8 FL (ref 78–100)
MYCOPLASMA PNEUMONIAE PCR: NOT DETECTED
OPIATES, URINE: NEGATIVE
OXYCODONE: NEGATIVE
PARAINFLUENZA 1 PCR: NOT DETECTED
PARAINFLUENZA 2 PCR: NOT DETECTED
PARAINFLUENZA 3 PCR: NOT DETECTED
PARAINFLUENZA 4 PCR: NOT DETECTED
PDW BLD-RTO: 13.4 % (ref 11.7–14.9)
PHENCYCLIDINE, URINE: NEGATIVE
PLATELET # BLD: 342 K/CU MM (ref 140–440)
PMV BLD AUTO: 9.9 FL (ref 7.5–11.1)
POTASSIUM SERPL-SCNC: 4.4 MMOL/L (ref 3.5–5.1)
PROCALCITONIN: 0.07
RBC # BLD: 4.7 M/CU MM (ref 4.2–5.4)
RHINOVIRUS ENTEROVIRUS PCR: NOT DETECTED
RSV PCR: NOT DETECTED
SARS-COV-2: NOT DETECTED
SODIUM BLD-SCNC: 132 MMOL/L (ref 135–145)
TRIGL SERPL-MCNC: 104 MG/DL
TROPONIN T: <0.01 NG/ML
TROPONIN T: <0.01 NG/ML
WBC # BLD: 6.4 K/CU MM (ref 4–10.5)

## 2020-12-16 PROCEDURE — 2580000003 HC RX 258: Performed by: INTERNAL MEDICINE

## 2020-12-16 PROCEDURE — 80061 LIPID PANEL: CPT

## 2020-12-16 PROCEDURE — 1200000000 HC SEMI PRIVATE

## 2020-12-16 PROCEDURE — 83721 ASSAY OF BLOOD LIPOPROTEIN: CPT

## 2020-12-16 PROCEDURE — 6360000002 HC RX W HCPCS: Performed by: HOSPITALIST

## 2020-12-16 PROCEDURE — 84484 ASSAY OF TROPONIN QUANT: CPT

## 2020-12-16 PROCEDURE — 84145 PROCALCITONIN (PCT): CPT

## 2020-12-16 PROCEDURE — 80048 BASIC METABOLIC PNL TOTAL CA: CPT

## 2020-12-16 PROCEDURE — 83735 ASSAY OF MAGNESIUM: CPT

## 2020-12-16 PROCEDURE — C9113 INJ PANTOPRAZOLE SODIUM, VIA: HCPCS | Performed by: INTERNAL MEDICINE

## 2020-12-16 PROCEDURE — 94761 N-INVAS EAR/PLS OXIMETRY MLT: CPT

## 2020-12-16 PROCEDURE — 6370000000 HC RX 637 (ALT 250 FOR IP): Performed by: HOSPITALIST

## 2020-12-16 PROCEDURE — 99253 IP/OBS CNSLTJ NEW/EST LOW 45: CPT | Performed by: INTERNAL MEDICINE

## 2020-12-16 PROCEDURE — 80307 DRUG TEST PRSMV CHEM ANLYZR: CPT

## 2020-12-16 PROCEDURE — 6360000002 HC RX W HCPCS: Performed by: INTERNAL MEDICINE

## 2020-12-16 PROCEDURE — 0202U NFCT DS 22 TRGT SARS-COV-2: CPT

## 2020-12-16 PROCEDURE — 85027 COMPLETE CBC AUTOMATED: CPT

## 2020-12-16 RX ORDER — METHYLPREDNISOLONE SODIUM SUCCINATE 40 MG/ML
40 INJECTION, POWDER, LYOPHILIZED, FOR SOLUTION INTRAMUSCULAR; INTRAVENOUS EVERY 12 HOURS
Status: DISCONTINUED | OUTPATIENT
Start: 2020-12-16 | End: 2020-12-17

## 2020-12-16 RX ORDER — LISINOPRIL 2.5 MG/1
2.5 TABLET ORAL DAILY
Status: DISCONTINUED | OUTPATIENT
Start: 2020-12-16 | End: 2020-12-18 | Stop reason: HOSPADM

## 2020-12-16 RX ORDER — POLYETHYLENE GLYCOL 3350 17 G/17G
17 POWDER, FOR SOLUTION ORAL DAILY PRN
Status: DISCONTINUED | OUTPATIENT
Start: 2020-12-16 | End: 2020-12-18 | Stop reason: HOSPADM

## 2020-12-16 RX ORDER — FUROSEMIDE 10 MG/ML
40 INJECTION INTRAMUSCULAR; INTRAVENOUS 2 TIMES DAILY
Status: DISCONTINUED | OUTPATIENT
Start: 2020-12-16 | End: 2020-12-18 | Stop reason: HOSPADM

## 2020-12-16 RX ORDER — NITROGLYCERIN 0.4 MG/1
0.4 TABLET SUBLINGUAL EVERY 5 MIN PRN
Status: DISCONTINUED | OUTPATIENT
Start: 2020-12-16 | End: 2020-12-18 | Stop reason: HOSPADM

## 2020-12-16 RX ORDER — DIPHENHYDRAMINE HYDROCHLORIDE 50 MG/ML
10 INJECTION INTRAMUSCULAR; INTRAVENOUS EVERY 6 HOURS PRN
Status: DISPENSED | OUTPATIENT
Start: 2020-12-16 | End: 2020-12-17

## 2020-12-16 RX ORDER — ALBUTEROL SULFATE 90 UG/1
2 AEROSOL, METERED RESPIRATORY (INHALATION) EVERY 6 HOURS PRN
Status: DISCONTINUED | OUTPATIENT
Start: 2020-12-16 | End: 2020-12-18 | Stop reason: HOSPADM

## 2020-12-16 RX ORDER — METOPROLOL SUCCINATE 25 MG/1
25 TABLET, EXTENDED RELEASE ORAL DAILY
Status: DISCONTINUED | OUTPATIENT
Start: 2020-12-16 | End: 2020-12-18 | Stop reason: HOSPADM

## 2020-12-16 RX ORDER — SODIUM CHLORIDE 0.9 % (FLUSH) 0.9 %
10 SYRINGE (ML) INJECTION EVERY 12 HOURS SCHEDULED
Status: DISCONTINUED | OUTPATIENT
Start: 2020-12-16 | End: 2020-12-18 | Stop reason: HOSPADM

## 2020-12-16 RX ORDER — ACETAMINOPHEN 650 MG/1
650 SUPPOSITORY RECTAL EVERY 6 HOURS PRN
Status: DISCONTINUED | OUTPATIENT
Start: 2020-12-16 | End: 2020-12-18 | Stop reason: HOSPADM

## 2020-12-16 RX ORDER — ONDANSETRON 2 MG/ML
4 INJECTION INTRAMUSCULAR; INTRAVENOUS EVERY 6 HOURS PRN
Status: DISCONTINUED | OUTPATIENT
Start: 2020-12-16 | End: 2020-12-18 | Stop reason: HOSPADM

## 2020-12-16 RX ORDER — ACETAMINOPHEN 325 MG/1
650 TABLET ORAL EVERY 6 HOURS PRN
Status: DISCONTINUED | OUTPATIENT
Start: 2020-12-16 | End: 2020-12-18 | Stop reason: HOSPADM

## 2020-12-16 RX ORDER — PROMETHAZINE HYDROCHLORIDE 25 MG/1
12.5 TABLET ORAL EVERY 6 HOURS PRN
Status: DISCONTINUED | OUTPATIENT
Start: 2020-12-16 | End: 2020-12-18 | Stop reason: HOSPADM

## 2020-12-16 RX ORDER — PANTOPRAZOLE SODIUM 40 MG/10ML
40 INJECTION, POWDER, LYOPHILIZED, FOR SOLUTION INTRAVENOUS DAILY
Status: DISCONTINUED | OUTPATIENT
Start: 2020-12-16 | End: 2020-12-18 | Stop reason: HOSPADM

## 2020-12-16 RX ORDER — SODIUM CHLORIDE 0.9 % (FLUSH) 0.9 %
10 SYRINGE (ML) INJECTION PRN
Status: DISCONTINUED | OUTPATIENT
Start: 2020-12-16 | End: 2020-12-18 | Stop reason: HOSPADM

## 2020-12-16 RX ORDER — MORPHINE SULFATE 2 MG/ML
2 INJECTION, SOLUTION INTRAMUSCULAR; INTRAVENOUS EVERY 4 HOURS PRN
Status: DISPENSED | OUTPATIENT
Start: 2020-12-16 | End: 2020-12-17

## 2020-12-16 RX ORDER — DOXYCYCLINE HYCLATE 100 MG
100 TABLET ORAL EVERY 12 HOURS SCHEDULED
Status: DISCONTINUED | OUTPATIENT
Start: 2020-12-16 | End: 2020-12-17

## 2020-12-16 RX ADMIN — METHYLPREDNISOLONE SODIUM SUCCINATE 40 MG: 40 INJECTION, POWDER, LYOPHILIZED, FOR SOLUTION INTRAMUSCULAR; INTRAVENOUS at 12:54

## 2020-12-16 RX ADMIN — ENOXAPARIN SODIUM 40 MG: 100 INJECTION SUBCUTANEOUS at 12:20

## 2020-12-16 RX ADMIN — FUROSEMIDE 40 MG: 10 INJECTION, SOLUTION INTRAMUSCULAR; INTRAVENOUS at 18:13

## 2020-12-16 RX ADMIN — DOXYCYCLINE HYCLATE 100 MG: 100 TABLET, COATED ORAL at 21:02

## 2020-12-16 RX ADMIN — LISINOPRIL 2.5 MG: 2.5 TABLET ORAL at 15:14

## 2020-12-16 RX ADMIN — SODIUM CHLORIDE, PRESERVATIVE FREE 10 ML: 5 INJECTION INTRAVENOUS at 21:03

## 2020-12-16 RX ADMIN — FUROSEMIDE 40 MG: 10 INJECTION, SOLUTION INTRAMUSCULAR; INTRAVENOUS at 12:19

## 2020-12-16 RX ADMIN — METOPROLOL SUCCINATE 25 MG: 25 TABLET, EXTENDED RELEASE ORAL at 15:14

## 2020-12-16 RX ADMIN — DIPHENHYDRAMINE HYDROCHLORIDE 10 MG: 50 INJECTION INTRAMUSCULAR; INTRAVENOUS at 11:18

## 2020-12-16 RX ADMIN — SODIUM CHLORIDE, PRESERVATIVE FREE 10 ML: 5 INJECTION INTRAVENOUS at 12:20

## 2020-12-16 RX ADMIN — MORPHINE SULFATE 2 MG: 2 INJECTION, SOLUTION INTRAMUSCULAR; INTRAVENOUS at 11:18

## 2020-12-16 RX ADMIN — PANTOPRAZOLE SODIUM 40 MG: 40 INJECTION, POWDER, FOR SOLUTION INTRAVENOUS at 12:19

## 2020-12-16 ASSESSMENT — PAIN DESCRIPTION - PAIN TYPE: TYPE: ACUTE PAIN

## 2020-12-16 ASSESSMENT — PAIN DESCRIPTION - LOCATION: LOCATION: CHEST

## 2020-12-16 ASSESSMENT — PAIN DESCRIPTION - ONSET: ONSET: ON-GOING

## 2020-12-16 ASSESSMENT — PAIN DESCRIPTION - DESCRIPTORS: DESCRIPTORS: CONSTANT;ACHING

## 2020-12-16 ASSESSMENT — PAIN DESCRIPTION - ORIENTATION: ORIENTATION: MID

## 2020-12-16 ASSESSMENT — PAIN SCALES - GENERAL: PAINLEVEL_OUTOF10: 7

## 2020-12-16 NOTE — H&P
History and Physical      Name:  Altagracia Dailey /Age/Sex: 1965  (63 y.o. female)   MRN & CSN:  [id number] & [id number] Admission Date/Time: 12/15/2020  4:10 PM   Location:  ED[facility] PCP: No primary care provider on file. Hospital Day: 2    Assessment and Plan:   Altagracia Dailey is a 63 y.o.  female  who presents with Acute on chronic systolic (congestive) heart failure (HCC)    Acute on Chronic Congestive Heart Failure: Systolic due to noncompliance. has nonischemic cardiomyopathy with ejection fraction 20%. · Echo with 2020 with ejection fraction 50 to 25%, moderate to severe mitral regurgitation  · Elevated BNP, CT with pulmonary congestion  · Start Lasix IV. Fluid restriction  · Strict I and O. Daily weight. · Cardiology on consult    Chest pain: Could be from congestive heart failure exacerbation, cocaine abuse. Troponin negative will trend. Check drug screen. Avoid beta-blocker for now    Acute Kidney Injury   Likely from dehydration   Avoid nephrotoxic agents    Tobacco abuse: Refused nicotine patch. Substance abuse: Admitted to snorting cocaine. Will check drug screen. Diet No diet orders on file   DVT Prophylaxis [x] Lovenox, []  Heparin, [] SCDs, [] Warfarin  [] NOAC     GI Prophylaxis [] PPI,  [] H2 Blocker,  [] Carafate,  [] Diet/Tube Feeds   Code Status Prior   MDM [] Low, [] Moderate,[x]  High     History of Present Illness:     Chief Complaint: Acute on chronic systolic (congestive) heart failure ([address]  Altagracia Dailey is a 63 y.o.  female, with past medical history significant for congestive heart failure, substance abuse, smoker who presented to the ED from home due chest pain. The present condition started few hours prior to admission severe sternal chest pain radiating to the right breast, pressure-like, with associated exertional shortness of breath. Denied lower extremity swelling, orthopnea or paroxysmal nocturnal dyspnea.   Denied palpitations. Of note, she was admitted on 2020 for heart failure exacerbation but left AGAINST MEDICAL ADVICE. She has not taken any medications. Ten point ROS reviewed negative, unless as noted above    Objective:   No intake or output data in the 24 hours ending 20 0007   Vitals:   Vitals:    12/15/20 1834   BP:    Pulse: 119   Resp: 22   Temp:    SpO2: 96%     Physical Exam:   GEN Awake female, and pain  EYES Pupils are equally round. No scleral erythema, discharge, or conjunctivitis. HENT Mucous membranes are moist. Oral pharynx without exudates, no evidence of thrush. NECK Supple, no apparent thyromegaly or masses. RESP Clear to auscultation, no wheezes, rales or rhonchi. Symmetric chest movement while on room air. CARDIO/VASC S1/S2 auscultated. Regular rate and rhythm, No JVD. Peripheral pulses equal bilaterally and palpable. GI Abdomen is soft, no tenderness, masses, or guarding. Bowel sounds present. SKIN Normal coloration, warm, dry. NEURO Cranial nerves appear grossly intact, normal speech, no lateralizing weakness. PSYCH Awake, alert, oriented x 4. Past Medical History:      Past Medical History:   Diagnosis Date    Anxiety     Environmental allergies     Non-healing surgical wound     RLS (restless legs syndrome)      PSHX:  has a past surgical history that includes Cholecystectomy; Appendectomy;  section; and knee surgery (). Allergies: Allergies   Allergen Reactions    Adderall [Amphetamine-Dextroamphetamine] Itching    Morphine And Related     Tramadol     Ultram [Tramadol Hcl] Itching    Codeine Itching    Pcn [Penicillins] Swelling and Dermatitis       FAM HX: family history includes Depression in an other family member.   Soc HX:   Social History     Socioeconomic History    Marital status:      Spouse name: None    Number of children: None    Years of education: None    Highest education level: None   Occupational History    None   Social Needs    Financial resource strain: None    Food insecurity     Worry: None     Inability: None    Transportation needs     Medical: None     Non-medical: None   Tobacco Use    Smoking status: Current Every Day Smoker     Packs/day: 0.50     Types: Cigarettes    Smokeless tobacco: Never Used   Substance and Sexual Activity    Alcohol use: No    Drug use: No    Sexual activity: None   Lifestyle    Physical activity     Days per week: None     Minutes per session: None    Stress: None   Relationships    Social connections     Talks on phone: None     Gets together: None     Attends Confucianism service: None     Active member of club or organization: None     Attends meetings of clubs or organizations: None     Relationship status: None    Intimate partner violence     Fear of current or ex partner: None     Emotionally abused: None     Physically abused: None     Forced sexual activity: None   Other Topics Concern    None   Social History Narrative    None       Medications:     Home Medication   Prior to Admission medications    Medication Sig Start Date End Date Taking? Authorizing Provider   ibuprofen (IBU) 600 MG tablet Take 1 tablet by mouth every 6 hours as needed for Pain 10/30/20 11/29/20  GENO Duarte   naproxen (NAPROSYN) 500 MG tablet Take 1 tablet by mouth 2 times daily 5/9/19   Debora Aguilar PA-C     Medications:    sodium chloride flush  10 mL Intravenous BID      Infusions:   PRN Meds:      Recent Labs     12/15/20  1718   WBC 6.7   HGB 14.0   HCT 42.9         Recent Labs     12/15/20  1718      K 4.2      CO2 21   BUN 13   CREATININE 1.2*     Recent Labs     12/15/20  1718   AST 24   ALT 22   BILITOT 0.8   ALKPHOS 72     No results for input(s): INR in the last 72 hours.   Recent Labs     12/15/20  1718   TROPONINT <0.010      Imaging reviewed    Electronically signed by Lorrie Lazaro MD on 12/16/2020 at 12:07 AM

## 2020-12-16 NOTE — PROGRESS NOTES
[address] [place] [address]  HOSPITALIST PROGRESS NOTE                       Name:  Silvia Gudino /Age/Sex: 1965  (63 y.o. female)   MRN & CSN:  [id number] & [id number] Admission Date/Time: 12/15/2020  4:10 PM   Location:  [address] [place] [address] Attending:  Margaret Cruz MD                                                  HPI  Silvia Gudino is a 63 y.o. female who presents with shortness of breath/chest pain    SUBJECTIVE  -complains of shortness of breath, pleuritic chest pains. On NC oxygen. No fever/chillss    10 point review of systems reviewed and negative unless noted above. ALLERGIES:   Allergies   Allergen Reactions    Adderall [Amphetamine-Dextroamphetamine] Itching    Morphine And Related     Tramadol     Ultram [Tramadol Hcl] Itching    Codeine Itching    Pcn [Penicillins] Swelling and Dermatitis       PCP: No primary care provider on file. PAST MEDICAL HISTORY, SURGICAL HISTORY, SOCIAL HISTORY and  HOME MEDICATIONS all reviewed. OBJECTIVE  Vitals:    20 0432 20 0500 20 0600 20 0930   BP: (!) 137/104 (!) 138/104  (!) 141/101   Pulse: 119 117  119   Resp: 30 23  23   Temp:    97.5 °F (36.4 °C)   TempSrc:    Oral   SpO2: 92% 93%  92%   Weight:   178 lb 2 oz (80.8 kg)    Height:           PHYSICAL EXAM   GEN Awake female, sitting upright in bed in no apparent distress. EYES Pupils are equally round. No scleral erythema, discharge, or conjunctivitis. HENT Mucous membranes are moist. Oral pharynx without exudates, no evidence of thrush. NECK Supple, no apparent thyromegaly or masses. RESP Unlabored  Respiration, bilateral rhonchi  CARDIO/VASC S1/S2 auscultated. Regular rate without appreciable murmurs, rubs, or gallops. No JVD or carotid bruits. Peripheral pulses equal bilaterally and palpable. No peripheral edema. GI Abdomen is soft without significant tenderness, masses, or guarding. Bowel sounds are normoactive. R   No costovertebral angle tenderness.  Normal appearing external genitalia. HEME/LYMPH No palpable cervical lymphadenopathy and no hepatosplenomegaly. No petechiae or ecchymoses. MSK Spontaneous movement of all extremities. No gross joint deformities. SKIN Normal coloration, warm, dry. NEURO Cranial nerves appear grossly intact, normal speech, no lateralizing weakness. PSYCH Awake, alert, oriented x 4. Affect appropriate. INTAKE: No intake/output data recorded. OUTPUT: No intake/output data recorded. LABS  Recent Labs     12/15/20  1718 12/16/20  0552   WBC 6.7 6.4   HGB 14.0 14.2   HCT 42.9 43.6    342      Recent Labs     12/15/20  1718 12/16/20  0552    132*   K 4.2 4.4    101   CO2 21 22   BUN 13 14   CREATININE 1.2* 1.2*     Recent Labs     12/15/20  1718   AST 24   ALT 22   BILITOT 0.8   ALKPHOS 72     No results for input(s): INR in the last 72 hours.   Recent Labs     12/15/20  1718 12/16/20  0230 12/16/20  0552   TROPONINT <0.010 <0.010 <0.010          Abnormal labs for today noted      Imaging:     ECHO:    Microbiology:  Blood culture:    Urine culture:    Sputum culture:    Procedures done this admission:    MEDS  Scheduled Meds:   sodium chloride flush  10 mL Intravenous 2 times per day    enoxaparin  40 mg Subcutaneous Daily    furosemide  40 mg Intravenous BID    pantoprazole  40 mg Intravenous Daily    methylPREDNISolone  40 mg Intravenous Q12H    doxycycline hyclate  100 mg Oral 2 times per day    sodium chloride flush  10 mL Intravenous BID     Continuous Infusions:  PRN Meds:sodium chloride flush, promethazine **OR** ondansetron, polyethylene glycol, acetaminophen **OR** acetaminophen, nitroGLYCERIN, morphine, diphenhydrAMINE, albuterol sulfate HFA        ASSESSMENT and PLAN  Hospital Day: 2    1-Probable COPD exacerbation vs acute on chronic systolic HF or both- due to bronchitis given pleuritic component- CTA with no PE or pneumonic process- add solumedrol, inhalers and lasix.   -check covid, cardio already

## 2020-12-16 NOTE — CONSULTS
Nutrition Education    · Verbally reviewed information with Patient. · Educated on Heart Failure Nutrition Therapy (Nutrition Care Manual). · Written educational materials provided. · Contact name and number provided. · Refer to Patient Education activity for more details.     Electronically signed by Yi Nielsen RD, LD on 12/16/20 at 11:27 AM EST    Contact: [phone]

## 2020-12-16 NOTE — CONSULTS
CARDIOLOGY CONSULT NOTE   Reason for consultation:  Shortness of breath, chest pain    Referring physician:  Ruma Keith MD     Primary care physician: No primary care provider on file. Dear Dr. Blanche Sam  Thanks for the consult. History of present illness:Courtney is a 63 y. o.year old who  presents with for shortness of breath which is moderate to severe for few months,intermittent, self limiting, not associated with cough or fever, gets worse with activity and better with rest,  She is very non complaint, has left hospital twice with AMA in past few weeks, she has severe NICM, LVEF 10-15% AND does not take any cardiac medications, still smokes, has atypical chest pain too. Blood pressure, cholesterol, blood glucose and weight are well controlled. Past medical history:    has a past medical history of Anxiety, Environmental allergies, Non-healing surgical wound, and RLS (restless legs syndrome). Past surgical history:   has a past surgical history that includes Cholecystectomy; Appendectomy;  section; and knee surgery (). Social History:   reports that she has been smoking cigarettes. She has been smoking about 0.50 packs per day. She has never used smokeless tobacco. She reports that she does not drink alcohol or use drugs.   Family history:   no family history of CAD, STROKE of DM    Allergies   Allergen Reactions    Adderall [Amphetamine-Dextroamphetamine] Itching    Morphine And Related     Tramadol     Ultram [Tramadol Hcl] Itching    Codeine Itching    Pcn [Penicillins] Swelling and Dermatitis           sodium chloride flush 0.9 % injection 10 mL, 2 times per day      sodium chloride flush 0.9 % injection 10 mL, PRN      promethazine (PHENERGAN) tablet 12.5 mg, Q6H PRN    Or      ondansetron (ZOFRAN) injection 4 mg, Q6H PRN      polyethylene glycol (GLYCOLAX) packet 17 g, Daily PRN      acetaminophen (TYLENOL) tablet 650 mg, Q6H PRN    Or      acetaminophen (TYLENOL) suppository 650 mg, Q6H PRN      enoxaparin (LOVENOX) injection 40 mg, Daily      furosemide (LASIX) injection 40 mg, BID      nitroGLYCERIN (NITROSTAT) SL tablet 0.4 mg, Q5 Min PRN      pantoprazole (PROTONIX) injection 40 mg, Daily      morphine (PF) injection 2 mg, Q4H PRN      diphenhydrAMINE (BENADRYL) injection 10 mg, Q6H PRN      methylPREDNISolone sodium (SOLU-MEDROL) injection 40 mg, Q12H      doxycycline hyclate (VIBRA-TABS) tablet 100 mg, 2 times per day      albuterol sulfate  (90 Base) MCG/ACT inhaler 2 puff, Q6H PRN      metoprolol succinate (TOPROL XL) extended release tablet 25 mg, Daily      lisinopril (PRINIVIL;ZESTRIL) tablet 2.5 mg, Daily      sodium chloride flush 0.9 % injection 10 mL, BID      Current Facility-Administered Medications   Medication Dose Route Frequency Provider Last Rate Last Admin    sodium chloride flush 0.9 % injection 10 mL  10 mL Intravenous 2 times per day Chalino Em MD        sodium chloride flush 0.9 % injection 10 mL  10 mL Intravenous PRN Chalino Em MD   10 mL at 12/16/20 1220    promethazine (PHENERGAN) tablet 12.5 mg  12.5 mg Oral Q6H PRN Chalino Em MD        Or    [facility]ans[facility]ron [facility] injection 4 mg  4 mg Intravenous Q6H PRN Chalino Em MD        polyethylene glycol [facility]) packet 17 g  17 g Oral Daily PRN Chalino Em MD        acetaminophen (TYLENOL) tablet 650 mg  650 mg Oral Q6H PRN Chalino Em MD        Or    acetaminophen (TYLENOL) suppository 650 mg  650 mg Rectal Q6H PRN Chalino Em MD        enoxaparin (LOVENOX) injection 40 mg  40 mg Subcutaneous Daily Chalino Em MD   40 mg at 12/16/20 1220    furosemide (LASIX) injection 40 mg  40 mg Intravenous BID Chalino Em MD   40 mg at 12/16/20 1219    nitroGLYCERIN (NITROSTAT) SL tablet 0.4 mg  0.4 mg Sublingual Q5 Min PRN Chalino Em MD        pantoprazole (PROTONIX) injection 40 mg  40 mg Intravenous Daily [facility] John Martinez MD   40 mg at 12/16/20 1219    morphine (PF) injection 2 mg  2 mg Intravenous Q4H PRN Katheryn Bowman MD   2 mg at 12/16/20 1118    diphenhydrAMINE (BENADRYL) injection 10 mg  10 mg Intravenous Q6H PRN River Conklin MD   10 mg at 12/16/20 1118    methylPREDNISolone sodium (SOLU-MEDROL) injection 40 mg  40 mg Intravenous Q12H River Conklin MD   40 mg at 12/16/20 1254    doxycycline hyclate (VIBRA-TABS) tablet 100 mg  100 mg Oral 2 times per day Katheryn Bowman MD        albuterol sulfate  (90 Base) MCG/ACT inhaler 2 puff  2 puff Inhalation Q6H PRN Katheryn Bowman MD        metoprolol succinate (TOPROL XL) extended release tablet 25 mg  25 mg Oral Daily River Conklin MD        lisinopril (PRINIVIL;ZESTRIL) tablet 2.5 mg  2.5 mg Oral Daily River Wilson MD        sodium chloride flush 0.9 % injection 10 mL  10 mL Intravenous BID Mc Guan DO   10 mL at 12/15/20 2041     Review of Systems:   · Constitutional: No Fever or Weight Loss   · Eyes: No Decreased Vision  · ENT: No Headaches, Hearing Loss or Vertigo  · Cardiovascular: + chest pain, dyspnea on exertion, palpitations or loss of consciousness  · Respiratory: No cough or wheezing    · Gastrointestinal: No abdominal pain, appetite loss, blood in stools, constipation, diarrhea or heartburn  · Genitourinary: No dysuria, trouble voiding, or hematuria  · Musculoskeletal:  No gait disturbance, weakness or joint complaints  · Integumentary: No rash or pruritis  · Neurological: No TIA or stroke symptoms  · Psychiatric: No anxiety or depression  · Endocrine: No malaise, fatigue or temperature intolerance  · Hematologic/Lymphatic: No bleeding problems, blood clots or swollen lymph nodes  · Allergic/Immunologic: No nasal congestion or hives  All systems negative except as marked.      ·   ·      Physical Examination:    Vitals:    12/16/20 0930   BP: (!) 141/101   Pulse: 119   Resp: 23   Temp: 97.5 °F (36.4 °C)   SpO2: 92%      Wt Readings from Last 3 Encounters:   12/16/20 178 lb 2 oz (80.8 kg)   12/04/20 180 lb (81.6 kg)   12/02/20 170 lb (77.1 kg)     Body mass index is 27.9 kg/m². General Appearance:  No distress, conversant    Constitutional:  Well developed, Well nourished, No acute distress, Non-toxic appearance. HENT:  Normocephalic, Atraumatic, Bilateral external ears normal, Oropharynx moist, No oral exudates, Nose normal. Neck- Normal range of motion, No tenderness, Supple, No stridor,no apical-carotid delay, no carotid bruit  Eyes:  PERRL, EOMI, Conjunctiva normal, No discharge. Respiratory:  Normal breath sounds, No respiratory distress, No wheezing, No chest tenderness. ,no use of accessory muscles, diaphragm movement is normal  Cardiovascular: (PMI) apex non displaced,no lifts no thrills, no s3,no s4, Normal heart rate, Normal rhythm, No murmurs, No rubs, No gallops. Carotid arteries pulse and amplitude are normal no bruit, no abdominal bruit noted ( normal abdominal aorta ausculation), femoral arteries pulse and amplitude are normal no bruit, pedal pulses are normal  GI:  Bowel sounds normal, Soft, No tenderness, No masses, No pulsatile masses, no hepatosplenomegally, no bruits  : External genitalia appear normal, No masses or lesions. No discharge. No CVA tenderness. Musculoskeletal:  Intact distal pulses, No edema, No tenderness, No cyanosis, No clubbing. Good range of motion in all major joints. No tenderness to palpation or major deformities noted. Back- No tenderness. Integument:  Warm, Dry, No erythema, No rash. Skin: no rash, no ulcers  Lymphatic:  No lymphadenopathy noted. Neurologic:  Alert & oriented x 3, Normal motor function, Normal sensory function, No focal deficits noted.    Psychiatric:  Affect normal, Judgment normal, Mood normal.   Lab Review   Recent Labs     12/16/20  0552   WBC 6.4   HGB 14.2   HCT 43.6         Recent Labs     12/16/20  0552   *   K 4.4      CO2 22   BUN 14   CREATININE 1.2*     Recent Labs     12/15/20  1718   AST 24   ALT 22   BILITOT 0.8   ALKPHOS 72     No results for input(s): TROPONINI in the last 72 hours. Lab Results   Component Value Date     (H) 08/18/2012     (H) 08/06/2012     Lab Results   Component Value Date    INR 1.05 06/12/2015    PROTIME 11.9 06/12/2015         EKG:nsr    Chest Xray:chronic interstial changes    ECHO:LVEF 15-20%  Labs, echo, meds reviewed  Assessment: 63 y. o.year old with PMH of  has a past medical history of Anxiety, Environmental allergies, Non-healing surgical wound, and RLS (restless legs syndrome). Recommendations:    1. SHORNTESS of breath: from severe LV dysfunction and COPD, she is very non complaint, always signs out AMA without any prescriptions, continue lisinopril, lasix, BB, ICD evaluation in 3 months if she continues optimal medical therapy  2. Chest pain: atypical  3. Anxiety: as per medicine  4. COPD: recommend to stop smoking  5. Health maintenance: exerise and diet  All labs, medications and tests reviewed, continue all other medications of all above medical condition listed as is.          Louisa Rodriguez MD, 12/16/2020 2:36 PM

## 2020-12-16 NOTE — ED PROVIDER NOTES
Emergency Department Encounter    Patient: Consuelo King  MRN: [id number]  : 1965  Date of Evaluation: 12/15/2020  ED Provider:  [facility]    Triage Chief Complaint:   Chest Pain      [place]:  Consuelo King is a 63 y.o. female that presents to the emergency department for evaluation of chest pain. Patient notes that symptoms started prior to arrival and have gradually gotten worse. She describes dull, achy pain located throughout her chest.  She is unable to localize the pain. Denies any sharp, piercing, ripping, tearing sensation through to the back or into the abdomen. Does admit to associated shortness of breath, which is worse with exertion. Denies orthopnea or paroxysmal nocturnal dyspnea. Denies history of DVT or PE. No recent surgery, prolonged immobilization, recent travel. Denies cough or hemoptysis. Denies lower extremity edema. Denies syncope, dizziness, lightheadedness. Denies abdominal pain, nausea, running, diarrhea, constipation, medicated, melena. Denies fevers, chills, diaphoresis, night sweats. Denies additional precipitating, modifying, alleviating factors. ROS - see HPI, below listed is current ROS at time of my eval:  A complete 10 point review of systems was performed and is as dictated above, otherwise negative.      Past Medical History:   Diagnosis Date    Anxiety     Environmental allergies     Non-healing surgical wound     RLS (restless legs syndrome)        Past Surgical History:   Procedure Laterality Date    APPENDECTOMY       SECTION      CHOLECYSTECTOMY      KNEE SURGERY         Family History   Problem Relation Age of Onset    Depression Other        Social History     Socioeconomic History    Marital status:      Spouse name: Not on file    Number of children: Not on file    Years of education: Not on file    Highest education level: Not on file   Occupational History    Not on file   Social Needs    Financial resource strain: Not on file    Food insecurity     Worry: Not on file     Inability: Not on file    Transportation needs     Medical: Not on file     Non-medical: Not on file   Tobacco Use    Smoking status: Current Every Day Smoker     Packs/day: 0.50     Types: Cigarettes    Smokeless tobacco: Never Used   Substance and Sexual Activity    Alcohol use: No    Drug use: No    Sexual activity: Not on file   Lifestyle    Physical activity     Days per week: Not on file     Minutes per session: Not on file    Stress: Not on file   Relationships    Social connections     Talks on phone: Not on file     Gets together: Not on file     Attends Caodaism service: Not on file     Active member of club or organization: Not on file     Attends meetings of clubs or organizations: Not on file     Relationship status: Not on file    Intimate partner violence     Fear of current or ex partner: Not on file     Emotionally abused: Not on file     Physically abused: Not on file     Forced sexual activity: Not on file   Other Topics Concern    Not on file   Social History Narrative    Not on file       Current Facility-Administered Medications   Medication Dose Route Frequency Provider Last Rate Last Admin    sodium chloride flush 0.9 % injection 10 mL  10 mL Intravenous BID Mc Guan, DO   10 mL at 12/15/20 2041     Current Outpatient Medications   Medication Sig Dispense Refill    ibuprofen (IBU) 600 MG tablet Take 1 tablet by mouth every 6 hours as needed for Pain 20 tablet 0    naproxen (NAPROSYN) 500 MG tablet Take 1 tablet by mouth 2 times daily 60 tablet 0       Allergies   Allergen Reactions    Adderall [Amphetamine-Dextroamphetamine] Itching    Morphine And Related     Tramadol     Ultram [Tramadol Hcl] Itching    Codeine Itching    Pcn [Penicillins] Swelling and Dermatitis       Nursing Notes Reviewed    Physical Exam:  Triage VS:    ED Triage Vitals [12/15/20 1611]   Enc Vitals Group      BP (!) 128/96 Pulse 101      Resp (!) 33      Temp 98.4 °F (36.9 °C)      Temp Source Oral      SpO2 98 %      Weight 180 lb (81.6 kg)      Height 5' 7\" (1.702 m)     My pulse ox interpretation is - normal    General appearance:  Patient is awake, alert, oriented. Following commands and answering questions. GCS is 15. Non-toxic in appearance. Skin:  Warm. Dry. Intact. No rashes, petechiae, purpura. No herpes zoster lesions. Eyes:  Pupils are equal, round, reactive. Extraocular movements intact. No nystagmus. No gaze deviation. Head, ears, nose, mouth and throat:  Head is normocephalic, atraumatic. No external masses or lesions. No nasal drainage. Pharynx is clear, non-erythematous. Airway is patent. Mucous membranes are moist.  Neck:  Supple. No nuchal rigidity. Trachea midline. No masses, thyromegaly or lymphadenopathy. No JVD. No carotid thrills or bruits. Extremity:  No clubbing, cyanosis, or edema. No joint swelling. Normal muscle tone. Full range of motion in extremities. Negative quentin's sign bilateral lower extremities. Heart:  Tachycardic rate and sinus rhythm. Audible S1 & S2. No audible murmurs, rubs, or gallops. Perfusion:  Symmetric peripheral pulses. Brisk capillary refill. Respiratory:  Lungs clear to auscultation bilaterally. No rales, rhonchi or wheezes. No retractions. No accessory muscle use. Respirations nonlabored. No chest wall trauma, bruising, or ecchymosis. Abdomen:  Soft. Nontender. Non distended. Normal bowel sounds in all quadrants. No midline pulsatile abdominal masses, thrills or bruits. Back:  No CVA tenderness to palpation. No midline tenderness to palpation. Neurological:  Alert and oriented times 3. No focal or lateralizing neurological deficits.              Psychiatric:  Cooperative    *I have reviewed and interpreted all of the currently available results from this visit*    Labs  Results for orders placed or performed during the hospital encounter of 12/15/20   CBC ms    QTc Calculation (Bazett) 482 ms    P Axis 70 degrees    R Axis 20 degrees    T Axis 104 degrees    Diagnosis       Sinus tachycardia with premature atrial complexes  Possible Left atrial enlargement  Nonspecific T wave abnormality  Abnormal ECG  When compared with ECG of 15-DEC-2020 16:19,  premature atrial complexes are now present  Nonspecific T wave abnormality, improved in Inferior leads          Radiographs:  Radiologist's Report Reviewed:  Xr Chest (2 Vw)    Result Date: 12/15/2020  EXAMINATION: TWO XRAY VIEWS OF THE CHEST 12/15/2020 6:01 pm COMPARISON: Chest radiograph 12/04/2020. HISTORY: ORDERING SYSTEM PROVIDED HISTORY: CP TECHNOLOGIST PROVIDED HISTORY: Reason for exam:->CP Reason for Exam: chest pain Acuity: Acute Type of Exam: Initial FINDINGS: The cardiac silhouette is enlarged but stable. Mild vascular congestion. No pneumothorax, consolidation, or pleural effusion. No acute osseous abnormality. Enlarged cardiac silhouette and mild pulmonary vascular congestion. Cta Pulmonary W Contrast    Result Date: 12/15/2020  EXAMINATION: CTA OF THE CHEST 12/15/2020 8:41 pm TECHNIQUE: CTA of the chest was performed after the administration of intravenous contrast.  Multiplanar reformatted images are provided for review. MIP images are provided for review. Dose modulation, iterative reconstruction, and/or weight based adjustment of the mA/kV was utilized to reduce the radiation dose to as low as reasonably achievable. COMPARISON: None. HISTORY: ORDERING SYSTEM PROVIDED HISTORY: eval PE TECHNOLOGIST PROVIDED HISTORY: Reason for exam:->eval PE Reason for Exam: trouble breathing. can't hold her breath. Acuity: Acute Type of Exam: Initial Additional signs and symptoms: no Relevant Medical/Surgical History: 85 ml isovue 370 used. FINDINGS: Pulmonary Arteries: Pulmonary arteries are adequately opacified for evaluation. No evidence of intraluminal filling defect to suggest pulmonary embolism.   Main updated bedside. Remains tachycardic with a heart rate above 115. No signs of infection, Sirs, sepsis. Patient is currently in the emergency department, hemodynamically stable condition, awaiting admission and bed placement. Clinical Impressions:  1. Acute respiratory insufficiency    2. Acute chest pain    3. Acute on chronic congestive heart failure, unspecified heart failure type ([facility] [address]    4. DOROTHY (acute kidney injury) [facility])        ED Provider Disposition  DISPOSITION Admitted 12/15/2020 11:29:07 PM      Comment: Please note this report has been produced using speech recognition software and may contain errors related to that system including errors in grammar, punctuation, and spelling, as well as words and phrases that may be inappropriate. Efforts were made to edit the dictations.        Josafat Romo,   12/16/20 0013

## 2020-12-17 LAB
ANION GAP SERPL CALCULATED.3IONS-SCNC: 12 MMOL/L (ref 4–16)
BUN BLDV-MCNC: 21 MG/DL (ref 6–23)
CALCIUM SERPL-MCNC: 9 MG/DL (ref 8.3–10.6)
CHLORIDE BLD-SCNC: 101 MMOL/L (ref 99–110)
CO2: 24 MMOL/L (ref 21–32)
CREAT SERPL-MCNC: 1.4 MG/DL (ref 0.6–1.1)
EKG ATRIAL RATE: 101 BPM
EKG ATRIAL RATE: 117 BPM
EKG DIAGNOSIS: NORMAL
EKG DIAGNOSIS: NORMAL
EKG P AXIS: 69 DEGREES
EKG P AXIS: 70 DEGREES
EKG P-R INTERVAL: 154 MS
EKG P-R INTERVAL: 160 MS
EKG Q-T INTERVAL: 330 MS
EKG Q-T INTERVAL: 346 MS
EKG QRS DURATION: 84 MS
EKG QRS DURATION: 84 MS
EKG QTC CALCULATION (BAZETT): 427 MS
EKG QTC CALCULATION (BAZETT): 482 MS
EKG R AXIS: 20 DEGREES
EKG R AXIS: 34 DEGREES
EKG T AXIS: 104 DEGREES
EKG T AXIS: 94 DEGREES
EKG VENTRICULAR RATE: 101 BPM
EKG VENTRICULAR RATE: 117 BPM
GFR AFRICAN AMERICAN: 47 ML/MIN/1.73M2
GFR NON-AFRICAN AMERICAN: 39 ML/MIN/1.73M2
GLUCOSE BLD-MCNC: 132 MG/DL (ref 70–99)
MAGNESIUM: 1.9 MG/DL (ref 1.8–2.4)
POTASSIUM SERPL-SCNC: 4.6 MMOL/L (ref 3.5–5.1)
SODIUM BLD-SCNC: 137 MMOL/L (ref 135–145)
TROPONIN T: <0.01 NG/ML
TROPONIN T: <0.01 NG/ML

## 2020-12-17 PROCEDURE — C9113 INJ PANTOPRAZOLE SODIUM, VIA: HCPCS | Performed by: INTERNAL MEDICINE

## 2020-12-17 PROCEDURE — 99232 SBSQ HOSP IP/OBS MODERATE 35: CPT | Performed by: INTERNAL MEDICINE

## 2020-12-17 PROCEDURE — 93010 ELECTROCARDIOGRAM REPORT: CPT | Performed by: INTERNAL MEDICINE

## 2020-12-17 PROCEDURE — 84484 ASSAY OF TROPONIN QUANT: CPT

## 2020-12-17 PROCEDURE — 80048 BASIC METABOLIC PNL TOTAL CA: CPT

## 2020-12-17 PROCEDURE — 6360000002 HC RX W HCPCS: Performed by: HOSPITALIST

## 2020-12-17 PROCEDURE — 1200000000 HC SEMI PRIVATE

## 2020-12-17 PROCEDURE — 36415 COLL VENOUS BLD VENIPUNCTURE: CPT

## 2020-12-17 PROCEDURE — 6360000002 HC RX W HCPCS: Performed by: INTERNAL MEDICINE

## 2020-12-17 PROCEDURE — 94761 N-INVAS EAR/PLS OXIMETRY MLT: CPT

## 2020-12-17 PROCEDURE — 93005 ELECTROCARDIOGRAM TRACING: CPT | Performed by: INTERNAL MEDICINE

## 2020-12-17 PROCEDURE — 83735 ASSAY OF MAGNESIUM: CPT

## 2020-12-17 PROCEDURE — 2580000003 HC RX 258: Performed by: EMERGENCY MEDICINE

## 2020-12-17 PROCEDURE — 6370000000 HC RX 637 (ALT 250 FOR IP): Performed by: HOSPITALIST

## 2020-12-17 PROCEDURE — 2580000003 HC RX 258: Performed by: INTERNAL MEDICINE

## 2020-12-17 RX ADMIN — ENOXAPARIN SODIUM 40 MG: 100 INJECTION SUBCUTANEOUS at 09:55

## 2020-12-17 RX ADMIN — SODIUM CHLORIDE, PRESERVATIVE FREE 10 ML: 5 INJECTION INTRAVENOUS at 09:56

## 2020-12-17 RX ADMIN — DOXYCYCLINE HYCLATE 100 MG: 100 TABLET, COATED ORAL at 09:55

## 2020-12-17 RX ADMIN — FUROSEMIDE 40 MG: 10 INJECTION, SOLUTION INTRAMUSCULAR; INTRAVENOUS at 09:54

## 2020-12-17 RX ADMIN — LISINOPRIL 2.5 MG: 2.5 TABLET ORAL at 09:55

## 2020-12-17 RX ADMIN — PANTOPRAZOLE SODIUM 40 MG: 40 INJECTION, POWDER, FOR SOLUTION INTRAVENOUS at 09:55

## 2020-12-17 RX ADMIN — SODIUM CHLORIDE, PRESERVATIVE FREE 10 ML: 5 INJECTION INTRAVENOUS at 22:13

## 2020-12-17 RX ADMIN — SODIUM CHLORIDE, PRESERVATIVE FREE 10 ML: 5 INJECTION INTRAVENOUS at 09:55

## 2020-12-17 RX ADMIN — METHYLPREDNISOLONE SODIUM SUCCINATE 40 MG: 40 INJECTION, POWDER, LYOPHILIZED, FOR SOLUTION INTRAMUSCULAR; INTRAVENOUS at 01:00

## 2020-12-17 RX ADMIN — METOPROLOL SUCCINATE 25 MG: 25 TABLET, EXTENDED RELEASE ORAL at 09:55

## 2020-12-17 RX ADMIN — FUROSEMIDE 40 MG: 10 INJECTION, SOLUTION INTRAMUSCULAR; INTRAVENOUS at 17:23

## 2020-12-17 ASSESSMENT — PAIN SCALES - GENERAL
PAINLEVEL_OUTOF10: 0

## 2020-12-17 NOTE — PROGRESS NOTES
Today's plan: continue current medications, out patient follow up for ICD in 3 months      Admit Date:  12/15/2020    Subjective: ok      Chief complaints on admission  Chief Complaint   Patient presents with    Chest Pain         History of present illness:Courtney is a 63 y. o.year old who  presents with had concerns including Chest Pain. Past medical history:    has a past medical history of Anxiety, Environmental allergies, Non-healing surgical wound, and RLS (restless legs syndrome). Past surgical history:   has a past surgical history that includes Cholecystectomy; Appendectomy;  section; and knee surgery (). Social History:   reports that she has been smoking cigarettes. She has been smoking about 0.50 packs per day. She has never used smokeless tobacco. She reports that she does not drink alcohol or use drugs. Family history:  family history includes Depression in an other family member. Allergies   Allergen Reactions    Adderall [Amphetamine-Dextroamphetamine] Itching    Morphine And Related     Tramadol     Ultram [Tramadol Hcl] Itching    Codeine Itching    Pcn [Penicillins] Swelling and Dermatitis         Objective:   /70   Pulse 70   Temp 97.5 °F (36.4 °C) (Axillary)   Resp 27   Ht 5' 7\" (1.702 m)   Wt 172 lb 1 oz (78 kg)   SpO2 98%   BMI 26.95 kg/m²       Intake/Output Summary (Last 24 hours) at 2020 1515  Last data filed at 2020 0953  Gross per 24 hour   Intake 20 ml   Output --   Net 20 ml       TELEMETRY: Sinus     Physical Exam:  Constitutional:  Well developed, Well nourished, No acute distress, Non-toxic appearance. HENT:  Normocephalic, Atraumatic, Bilateral external ears normal, Oropharynx moist, No oral exudates, Nose normal. Neck- Normal range of motion, No tenderness, Supple, No stridor. Eyes:  PERRL, EOMI, Conjunctiva normal, No discharge.    Respiratory:  Normal breath sounds, No respiratory distress, No wheezing, No chest tenderness. ,no use of accessory muscles, diaphragm movement is normal  Cardiovascular: (PMI) apex non displaced,no lifts no thrills, no s3,no s4, Normal heart rate, Normal rhythm, No murmurs, No rubs, No gallops. Carotid arteries pulse and amplitude are normal no bruit, no abdominal bruit noted ( normal abdominal aorta ausculation), femoral arteries pulse and amplitude are normal no bruit, pedal pulses are normal  GI:  Bowel sounds normal, Soft, No tenderness, No masses, No pulsatile masses. : External genitalia appear normal, No masses or lesions. No discharge. No CVA tenderness. Musculoskeletal:  Intact distal pulses, No edema, No tenderness, No cyanosis, No clubbing. Good range of motion in all major joints. No tenderness to palpation or major deformities noted. Back- No tenderness. Integument:  Warm, Dry, No erythema, No rash. Lymphatic:  No lymphadenopathy noted. Neurologic:  Alert & oriented x 3, Normal motor function, Normal sensory function, No focal deficits noted.    Psychiatric:  Affect normal, Judgment normal, Mood normal.     Medications:    sodium chloride flush  10 mL Intravenous 2 times per day    enoxaparin  40 mg Subcutaneous Daily    furosemide  40 mg Intravenous BID    pantoprazole  40 mg Intravenous Daily    metoprolol succinate  25 mg Oral Daily    lisinopril  2.5 mg Oral Daily    sodium chloride flush  10 mL Intravenous BID       sodium chloride flush, promethazine **OR** ondansetron, polyethylene glycol, acetaminophen **OR** acetaminophen, nitroGLYCERIN, albuterol sulfate HFA    Lab Data:  CBC:   Recent Labs     12/15/20  1718 12/16/20  0552   WBC 6.7 6.4   HGB 14.0 14.2   HCT 42.9 43.6   MCV 91.7 92.8    342     BMP:   Recent Labs     12/15/20  1718 12/16/20  0552 12/17/20  0357    132* 137   K 4.2 4.4 4.6    101 101   CO2 21 22 24   BUN 13 14 21   CREATININE 1.2* 1.2* 1.4*     LIVER PROFILE:   Recent Labs     12/15/20  1718   AST 24   ALT 22   BILITOT

## 2020-12-17 NOTE — PROGRESS NOTES
Hospitalist Progress Note      Name:  Colton Arreaga /Age/Sex: 1965  (63 y.o. female)   MRN & CSN:  [id number] & [id number] Admission Date/Time: 12/15/2020  4:10 PM   Location:  [address] [place] [address]- PCP: No primary care provider on file. Hospital Day: 3    Assessment and Plan:   Colton Arreaga is a 63 y.o.  female  who presents with Acute on chronic systolic (congestive) heart failure (HCC)     1) Acute on Chronic Congestive Heart Failure; due to NICM  -Non compliant with meds  -BNP elevated  -CTA chest: No PE. Cardiomegaly with mild CHF  -TTE : EF of 15-20% with moderate to severe MR  -Continue Lasix IV. -Cardiology on board; continue anti-failure regimen     2) Acute Kidney Injury  -Likely CRS  -Monitor closely  -Avoid nephrotoxic agents     3) Polysubstance use disorder  -UDS positive for cocaine; counseled  -Tobacco abuse:  Counseled to quit; NRT offered but declined    Diet DIET LOW SODIUM 2 GM;   DVT Prophylaxis [] Lovenox, []  Heparin, [] SCDs, [] Ambulation   GI Prophylaxis [] PPI,  [] H2 Blocker,  [] Carafate,  [] Diet/Tube Feeds   Code Status Full Code   Disposition Home   MDM      History of Present Illness:     Patient was seen and examined  Denied any worsening chest pain or SOB  No dizziness, palpitations  No fever, chills, N/V/D       Ten point ROS reviewed negative, unless as noted above    Objective:   No intake or output data in the 24 hours ending 20 0950   Vitals:   Vitals:    20 0515   BP: 113/72   Pulse:    Resp:    Temp: 97.6 °F (36.4 °C)   SpO2:      Physical Exam:   GEN Awake female, sitting upright in bed in no apparent distress. Appears given age. EYES Pupils are equally round. No scleral erythema, discharge, or conjunctivitis. HENT Mucous membranes are moist. Oral pharynx without exudates, no evidence of thrush. NECK Supple, no apparent thyromegaly or masses. RESP Clear to auscultation, no wheezes, rales or rhonchi.   Symmetric chest movement while on room air. CARDIO/VASC S1/S2 auscultated. Regular rate without appreciable murmurs, rubs, or gallops. No JVD or carotid bruits. Peripheral pulses equal bilaterally and palpable. No peripheral edema. GI Abdomen is soft without significant tenderness, masses, or guarding. Bowel sounds are normoactive. Rectal exam deferred.  No costovertebral angle tenderness. Normal appearing external genitalia. Lamb catheter is not present. HEME/LYMPH No palpable cervical lymphadenopathy and no hepatosplenomegaly. No petechiae or ecchymoses. MSK No gross joint deformities. SKIN Normal coloration, warm, dry. NEURO Cranial nerves appear grossly intact, normal speech, no lateralizing weakness. PSYCH Awake, alert, oriented x 4. Affect appropriate.     Medications:   Medications:    sodium chloride flush  10 mL Intravenous 2 times per day    enoxaparin  40 mg Subcutaneous Daily    furosemide  40 mg Intravenous BID    pantoprazole  40 mg Intravenous Daily    methylPREDNISolone  40 mg Intravenous Q12H    doxycycline hyclate  100 mg Oral 2 times per day    metoprolol succinate  25 mg Oral Daily    lisinopril  2.5 mg Oral Daily    sodium chloride flush  10 mL Intravenous BID      Infusions:   PRN Meds:     sodium chloride flush, 10 mL, PRN      promethazine, 12.5 mg, Q6H PRN    Or      ondansetron, 4 mg, Q6H PRN      polyethylene glycol, 17 g, Daily PRN      acetaminophen, 650 mg, Q6H PRN    Or      acetaminophen, 650 mg, Q6H PRN      nitroGLYCERIN, 0.4 mg, Q5 Min PRN      morphine, 2 mg, Q4H PRN      diphenhydrAMINE, 10 mg, Q6H PRN      albuterol sulfate HFA, 2 puff, Q6H PRN          Electronically signed by Deon Merrill MD on 12/17/2020 at 9:50 AM

## 2020-12-17 NOTE — PLAN OF CARE
Problem: Falls - Risk of:  Goal: Will remain free from falls  Description: Will remain free from falls  12/16/2020 2346 by Ashley Sanders  Outcome: Ongoing  12/16/2020 1231 by Simba Humphrey RN  Outcome: Met This Shift  Goal: Absence of physical injury  Description: Absence of physical injury  12/16/2020 2346 by Ashley Sanders  Outcome: Ongoing  12/16/2020 1231 by Simba Humphrey RN  Outcome: Met This Shift     Problem: Infection:  Goal: Will remain free from infection  Description: Will remain free from infection  Outcome: Ongoing     Problem: Safety:  Goal: Free from accidental physical injury  Description: Free from accidental physical injury  Outcome: Ongoing  Goal: Free from intentional harm  Description: Free from intentional harm  Outcome: Ongoing     Problem: Daily Care:  Goal: Daily care needs are met  Description: Daily care needs are met  Outcome: Ongoing     Problem: Discharge Planning:  Goal: Patients continuum of care needs are met  Description: Patients continuum of care needs are met  Outcome: Ongoing

## 2020-12-18 VITALS
DIASTOLIC BLOOD PRESSURE: 61 MMHG | BODY MASS INDEX: 27.29 KG/M2 | HEIGHT: 67 IN | RESPIRATION RATE: 24 BRPM | HEART RATE: 75 BPM | SYSTOLIC BLOOD PRESSURE: 92 MMHG | TEMPERATURE: 98 F | WEIGHT: 173.9 LBS | OXYGEN SATURATION: 92 %

## 2020-12-18 LAB
ANION GAP SERPL CALCULATED.3IONS-SCNC: 13 MMOL/L (ref 4–16)
BUN BLDV-MCNC: 32 MG/DL (ref 6–23)
CALCIUM SERPL-MCNC: 8.8 MG/DL (ref 8.3–10.6)
CHLORIDE BLD-SCNC: 100 MMOL/L (ref 99–110)
CO2: 27 MMOL/L (ref 21–32)
CREAT SERPL-MCNC: 1.5 MG/DL (ref 0.6–1.1)
EKG ATRIAL RATE: 63 BPM
EKG DIAGNOSIS: NORMAL
EKG P AXIS: 69 DEGREES
EKG P-R INTERVAL: 150 MS
EKG Q-T INTERVAL: 480 MS
EKG QRS DURATION: 94 MS
EKG QTC CALCULATION (BAZETT): 491 MS
EKG R AXIS: 22 DEGREES
EKG T AXIS: 89 DEGREES
EKG VENTRICULAR RATE: 63 BPM
GFR AFRICAN AMERICAN: 43 ML/MIN/1.73M2
GFR NON-AFRICAN AMERICAN: 36 ML/MIN/1.73M2
GLUCOSE BLD-MCNC: 89 MG/DL (ref 70–99)
MAGNESIUM: 1.9 MG/DL (ref 1.8–2.4)
POTASSIUM SERPL-SCNC: 3.8 MMOL/L (ref 3.5–5.1)
PRO-BNP: 4116 PG/ML
SODIUM BLD-SCNC: 140 MMOL/L (ref 135–145)
TROPONIN T: 0.01 NG/ML
TROPONIN T: <0.01 NG/ML

## 2020-12-18 PROCEDURE — 93010 ELECTROCARDIOGRAM REPORT: CPT | Performed by: INTERNAL MEDICINE

## 2020-12-18 PROCEDURE — 84484 ASSAY OF TROPONIN QUANT: CPT

## 2020-12-18 PROCEDURE — 6370000000 HC RX 637 (ALT 250 FOR IP): Performed by: HOSPITALIST

## 2020-12-18 PROCEDURE — 83735 ASSAY OF MAGNESIUM: CPT

## 2020-12-18 PROCEDURE — 6360000002 HC RX W HCPCS: Performed by: INTERNAL MEDICINE

## 2020-12-18 PROCEDURE — 83880 ASSAY OF NATRIURETIC PEPTIDE: CPT

## 2020-12-18 PROCEDURE — 36415 COLL VENOUS BLD VENIPUNCTURE: CPT

## 2020-12-18 PROCEDURE — 2580000003 HC RX 258: Performed by: EMERGENCY MEDICINE

## 2020-12-18 PROCEDURE — 80048 BASIC METABOLIC PNL TOTAL CA: CPT

## 2020-12-18 RX ORDER — SPIRONOLACTONE 25 MG/1
25 TABLET ORAL DAILY
Qty: 30 TABLET | Refills: 3 | Status: SHIPPED | OUTPATIENT
Start: 2020-12-18

## 2020-12-18 RX ORDER — METOPROLOL SUCCINATE 25 MG/1
25 TABLET, EXTENDED RELEASE ORAL DAILY
Qty: 30 TABLET | Refills: 3 | Status: SHIPPED | OUTPATIENT
Start: 2020-12-18 | End: 2022-06-08

## 2020-12-18 RX ORDER — NITROGLYCERIN 0.4 MG/1
TABLET SUBLINGUAL
Qty: 25 TABLET | Refills: 3 | Status: SHIPPED | OUTPATIENT
Start: 2020-12-18

## 2020-12-18 RX ORDER — FUROSEMIDE 40 MG/1
40 TABLET ORAL DAILY
Qty: 60 TABLET | Refills: 3 | Status: SHIPPED | OUTPATIENT
Start: 2020-12-18 | End: 2022-06-08

## 2020-12-18 RX ORDER — ALBUTEROL SULFATE 90 UG/1
2 AEROSOL, METERED RESPIRATORY (INHALATION) EVERY 6 HOURS PRN
Qty: 1 INHALER | Refills: 3 | Status: SHIPPED | OUTPATIENT
Start: 2020-12-18

## 2020-12-18 RX ORDER — LISINOPRIL 2.5 MG/1
2.5 TABLET ORAL DAILY
Qty: 30 TABLET | Refills: 3 | Status: ON HOLD | OUTPATIENT
Start: 2020-12-18 | End: 2022-03-26 | Stop reason: HOSPADM

## 2020-12-18 RX ADMIN — METOPROLOL SUCCINATE 25 MG: 25 TABLET, EXTENDED RELEASE ORAL at 09:34

## 2020-12-18 RX ADMIN — LISINOPRIL 2.5 MG: 2.5 TABLET ORAL at 09:34

## 2020-12-18 RX ADMIN — FUROSEMIDE 40 MG: 10 INJECTION, SOLUTION INTRAMUSCULAR; INTRAVENOUS at 09:34

## 2020-12-18 RX ADMIN — SODIUM CHLORIDE, PRESERVATIVE FREE 10 ML: 5 INJECTION INTRAVENOUS at 09:34

## 2020-12-18 ASSESSMENT — PAIN SCALES - GENERAL: PAINLEVEL_OUTOF10: 0

## 2020-12-18 ASSESSMENT — PAIN SCALES - WONG BAKER: WONGBAKER_NUMERICALRESPONSE: 0

## 2020-12-18 NOTE — DISCHARGE SUMMARY
Discharge Summary    Name:  Tamra Cowan /Age/Sex:   (63 y.o. female)   MRN & CSN:  [id number] & [id number] Admission Date/Time: 12/15/2020  4:10 PM   Attending:  Lillian Dc MD Discharging Physician: Wojciech Wells MD     Hospital Course:   Tamra Cowan is a 63 y.o.  female  who presents with Acute on chronic systolic (congestive) heart failure ([address]    Patient was seen and examined  Denied any worsening chest pain or SOB  No dizziness, palpitations  No fever, chills    Assessment and Plan   1) Acute on Chronic Congestive Heart Failure; due to NICM  -Non compliant with meds  -BNP elevated  -CTA chest: No PE. Cardiomegaly with mild CHF  -TTE : EF of 15-20% with moderate to severe MR  -Continue po lasix  -Cardiology on board; continue BB, ACEI, Aldactone  -Repeat Echo in 3 months to re-assess EF (Patient counseled to be adherent with her medications)  -Follow up cardiology in 2 weeks      2) Acute Kidney Injury  -Likely CRS  -Monitor closely  -Avoid nephrotoxic agents     3) Polysubstance use disorder  -UDS positive for cocaine; counseled  -Tobacco abuse:  Counseled to quit; NRT offered but declined    The patient expressed appropriate understanding of and agreement with the discharge recommendations, medications, and plan.      Consults this admission:  IP CONSULT TO HOSPITALIST  IP CONSULT TO HEART FAILURE NURSE/COORDINATOR  IP CONSULT TO DIETITIAN  IP CONSULT TO CARDIOLOGY    Discharge Instruction:   Follow up appointments: Cardiology in 2 weeks  Primary care physician:  within 2 weeks    Diet:  cardiac diet   Activity: activity as tolerated  Disposition: Discharged to:   [x]Home, []HHC, []SNF, []Acute Rehab, []Hospice   Condition on discharge: Stable    Discharge Medications:      Izaiah Quanling   Home Medication Instructions GEMA:[id number]    Printed on:20 0759   Medication Information                      albuterol sulfate  (90 Base) MCG/ACT inhaler  Inhale 2 puffs into the lungs every 6 hours as needed for Wheezing             furosemide (LASIX) 40 MG tablet  Take 1 tablet by mouth daily             lisinopril (PRINIVIL;ZESTRIL) 2.5 MG tablet  Take 1 tablet by mouth daily             metoprolol succinate (TOPROL XL) 25 MG extended release tablet  Take 1 tablet by mouth daily             nitroGLYCERIN (NITROSTAT) 0.4 MG SL tablet  up to max of 3 total doses. If no relief after 1 dose, call 911.             spironolactone (ALDACTONE) 25 MG tablet  Take 1 tablet by mouth daily                 Objective Findings at Discharge:   /67   Pulse 73   Temp 97.8 °F (36.6 °C) (Oral)   Resp 26   Ht 5' 7\" (1.702 m)   Wt 173 lb 14.4 oz (78.9 kg)   SpO2 94%   BMI 27.24 kg/m²            PHYSICAL EXAM   GEN Awake female, sitting upright in bed in no apparent distress. Appears given age. EYES Pupils are equally round. No scleral erythema, discharge, or conjunctivitis. HENT Mucous membranes are moist. Oral pharynx without exudates, no evidence of thrush. NECK Supple, no apparent thyromegaly or masses. RESP Clear to auscultation, no wheezes, rales or rhonchi. Symmetric chest movement while on room air. CARDIO/VASC S1/S2 auscultated. Regular rate without appreciable murmurs, rubs, or gallops. No JVD or carotid bruits. Peripheral pulses equal bilaterally and palpable. No peripheral edema. GI Abdomen is soft without significant tenderness, masses, or guarding. Bowel sounds are normoactive. Rectal exam deferred.  No costovertebral angle tenderness. Normal appearing external genitalia. Lamb catheter is not present. HEME/LYMPH No palpable cervical lymphadenopathy and no hepatosplenomegaly. No petechiae or ecchymoses. MSK No gross joint deformities. SKIN Normal coloration, warm, dry. NEURO Cranial nerves appear grossly intact, normal speech, no lateralizing weakness. PSYCH Awake, alert, oriented x 4. Affect appropriate.     BMP/CBC  Recent Labs     12/15/20  0850 12/16/20  0552 12/17/20  0357 12/18/20  0132    132* 137 140   K 4.2 4.4 4.6 3.8    101 101 100   CO2 21 22 24 27   BUN 13 14 21 32*   CREATININE 1.2* 1.2* 1.4* 1.5*   WBC 6.7 6.4  --   --    HCT 42.9 43.6  --   --     342  --   --        IMAGING:  As above    Discharge Time of 35 minutes    Electronically signed by Castillo Huber MD on 12/18/2020 at 7:59 AM

## 2020-12-18 NOTE — PROGRESS NOTES
Patient left hospital without discharge paperwork after being informed she was going to be discharged. Patient had already received prescriptions from outpatient pharmacy and peripheral IV had already been removed by RN.

## 2020-12-18 NOTE — PROGRESS NOTES
[facility] CLINICAL PHARMACY NOTE: MEDS TO [address] Select Patient?: No  Total # of Prescriptions Filled: 6   The following medications were delivered to the patient:     albuterol sulfate  (90 Base) MCG/ACT inhaler     furosemide 40 MG tablet     lisinopril 2.5 MG tablet     metoprolol succinate 25 MG extended release tablet     nitroGLYCERIN 0.4 MG SL tablet     spironolactone 25 MG tablet  Total # of Interventions Completed: 1  Time Spent (min): 15    Additional Documentation:

## 2021-08-29 ENCOUNTER — APPOINTMENT (OUTPATIENT)
Dept: GENERAL RADIOLOGY | Age: 56
DRG: 194 | End: 2021-08-29
Payer: COMMERCIAL

## 2021-08-29 ENCOUNTER — HOSPITAL ENCOUNTER (INPATIENT)
Age: 56
LOS: 2 days | Discharge: LEFT BEFORE DISCHARGE | DRG: 194 | End: 2021-08-31
Attending: EMERGENCY MEDICINE | Admitting: HOSPITALIST
Payer: COMMERCIAL

## 2021-08-29 DIAGNOSIS — I50.23 ACUTE ON CHRONIC SYSTOLIC HEART FAILURE (HCC): Primary | ICD-10-CM

## 2021-08-29 PROBLEM — I50.41 ACUTE COMBINED SYSTOLIC AND DIASTOLIC CHF, NYHA CLASS 1 (HCC): Status: ACTIVE | Noted: 2021-08-29

## 2021-08-29 LAB
ALBUMIN SERPL-MCNC: 3.9 GM/DL (ref 3.4–5)
ALP BLD-CCNC: 80 IU/L (ref 40–129)
ALT SERPL-CCNC: 21 U/L (ref 10–40)
ANION GAP SERPL CALCULATED.3IONS-SCNC: 9 MMOL/L (ref 4–16)
AST SERPL-CCNC: 23 IU/L (ref 15–37)
BASOPHILS ABSOLUTE: 0 K/CU MM
BASOPHILS RELATIVE PERCENT: 0.4 % (ref 0–1)
BILIRUB SERPL-MCNC: 0.6 MG/DL (ref 0–1)
BUN BLDV-MCNC: 12 MG/DL (ref 6–23)
CALCIUM SERPL-MCNC: 9.2 MG/DL (ref 8.3–10.6)
CHLORIDE BLD-SCNC: 105 MMOL/L (ref 99–110)
CO2: 24 MMOL/L (ref 21–32)
CREAT SERPL-MCNC: 1 MG/DL (ref 0.6–1.1)
DIFFERENTIAL TYPE: ABNORMAL
EKG ATRIAL RATE: 114 BPM
EKG DIAGNOSIS: NORMAL
EKG P AXIS: 72 DEGREES
EKG P-R INTERVAL: 148 MS
EKG Q-T INTERVAL: 366 MS
EKG QRS DURATION: 122 MS
EKG QTC CALCULATION (BAZETT): 504 MS
EKG R AXIS: -1 DEGREES
EKG T AXIS: 65 DEGREES
EKG VENTRICULAR RATE: 114 BPM
EOSINOPHILS ABSOLUTE: 0.2 K/CU MM
EOSINOPHILS RELATIVE PERCENT: 1.7 % (ref 0–3)
GFR AFRICAN AMERICAN: >60 ML/MIN/1.73M2
GFR NON-AFRICAN AMERICAN: 57 ML/MIN/1.73M2
GLUCOSE BLD-MCNC: 105 MG/DL (ref 70–99)
HCT VFR BLD CALC: 42.9 % (ref 37–47)
HEMOGLOBIN: 13.7 GM/DL (ref 12.5–16)
IMMATURE NEUTROPHIL %: 0.2 % (ref 0–0.43)
LYMPHOCYTES ABSOLUTE: 1.5 K/CU MM
LYMPHOCYTES RELATIVE PERCENT: 16.5 % (ref 24–44)
MCH RBC QN AUTO: 30.9 PG (ref 27–31)
MCHC RBC AUTO-ENTMCNC: 31.9 % (ref 32–36)
MCV RBC AUTO: 96.6 FL (ref 78–100)
MONOCYTES ABSOLUTE: 0.6 K/CU MM
MONOCYTES RELATIVE PERCENT: 7 % (ref 0–4)
NUCLEATED RBC %: 0 %
PDW BLD-RTO: 13.1 % (ref 11.7–14.9)
PLATELET # BLD: 299 K/CU MM (ref 140–440)
PMV BLD AUTO: 9.9 FL (ref 7.5–11.1)
POTASSIUM SERPL-SCNC: 4.4 MMOL/L (ref 3.5–5.1)
PRO-BNP: 9703 PG/ML
RBC # BLD: 4.44 M/CU MM (ref 4.2–5.4)
SARS-COV-2, NAAT: NOT DETECTED
SEGMENTED NEUTROPHILS ABSOLUTE COUNT: 6.6 K/CU MM
SEGMENTED NEUTROPHILS RELATIVE PERCENT: 74.2 % (ref 36–66)
SODIUM BLD-SCNC: 138 MMOL/L (ref 135–145)
SOURCE: NORMAL
TOTAL IMMATURE NEUTOROPHIL: 0.02 K/CU MM
TOTAL NUCLEATED RBC: 0 K/CU MM
TOTAL PROTEIN: 6.9 GM/DL (ref 6.4–8.2)
TROPONIN T: 0.01 NG/ML
WBC # BLD: 9 K/CU MM (ref 4–10.5)

## 2021-08-29 PROCEDURE — 6360000002 HC RX W HCPCS: Performed by: EMERGENCY MEDICINE

## 2021-08-29 PROCEDURE — 96372 THER/PROPH/DIAG INJ SC/IM: CPT

## 2021-08-29 PROCEDURE — 2580000003 HC RX 258: Performed by: NURSE PRACTITIONER

## 2021-08-29 PROCEDURE — 6370000000 HC RX 637 (ALT 250 FOR IP): Performed by: EMERGENCY MEDICINE

## 2021-08-29 PROCEDURE — 99285 EMERGENCY DEPT VISIT HI MDM: CPT

## 2021-08-29 PROCEDURE — 93005 ELECTROCARDIOGRAM TRACING: CPT | Performed by: EMERGENCY MEDICINE

## 2021-08-29 PROCEDURE — 71045 X-RAY EXAM CHEST 1 VIEW: CPT

## 2021-08-29 PROCEDURE — 1200000000 HC SEMI PRIVATE

## 2021-08-29 PROCEDURE — 6370000000 HC RX 637 (ALT 250 FOR IP): Performed by: NURSE PRACTITIONER

## 2021-08-29 PROCEDURE — 93010 ELECTROCARDIOGRAM REPORT: CPT | Performed by: INTERNAL MEDICINE

## 2021-08-29 PROCEDURE — 94640 AIRWAY INHALATION TREATMENT: CPT

## 2021-08-29 PROCEDURE — 84484 ASSAY OF TROPONIN QUANT: CPT

## 2021-08-29 PROCEDURE — 85025 COMPLETE CBC W/AUTO DIFF WBC: CPT

## 2021-08-29 PROCEDURE — 96375 TX/PRO/DX INJ NEW DRUG ADDON: CPT

## 2021-08-29 PROCEDURE — 99254 IP/OBS CNSLTJ NEW/EST MOD 60: CPT | Performed by: INTERNAL MEDICINE

## 2021-08-29 PROCEDURE — 83880 ASSAY OF NATRIURETIC PEPTIDE: CPT

## 2021-08-29 PROCEDURE — 87635 SARS-COV-2 COVID-19 AMP PRB: CPT

## 2021-08-29 PROCEDURE — G0378 HOSPITAL OBSERVATION PER HR: HCPCS

## 2021-08-29 PROCEDURE — 80053 COMPREHEN METABOLIC PANEL: CPT

## 2021-08-29 PROCEDURE — 96374 THER/PROPH/DIAG INJ IV PUSH: CPT

## 2021-08-29 PROCEDURE — 6360000002 HC RX W HCPCS: Performed by: NURSE PRACTITIONER

## 2021-08-29 RX ORDER — FUROSEMIDE 10 MG/ML
40 INJECTION INTRAMUSCULAR; INTRAVENOUS ONCE
Status: COMPLETED | OUTPATIENT
Start: 2021-08-29 | End: 2021-08-29

## 2021-08-29 RX ORDER — SODIUM CHLORIDE 0.9 % (FLUSH) 0.9 %
5-40 SYRINGE (ML) INJECTION EVERY 12 HOURS SCHEDULED
Status: DISCONTINUED | OUTPATIENT
Start: 2021-08-29 | End: 2021-08-31 | Stop reason: HOSPADM

## 2021-08-29 RX ORDER — ACETAMINOPHEN 325 MG/1
650 TABLET ORAL EVERY 6 HOURS PRN
Status: DISCONTINUED | OUTPATIENT
Start: 2021-08-29 | End: 2021-08-31 | Stop reason: HOSPADM

## 2021-08-29 RX ORDER — FUROSEMIDE 10 MG/ML
40 INJECTION INTRAMUSCULAR; INTRAVENOUS 2 TIMES DAILY
Status: DISCONTINUED | OUTPATIENT
Start: 2021-08-29 | End: 2021-08-31

## 2021-08-29 RX ORDER — NITROGLYCERIN 0.4 MG/1
0.4 TABLET SUBLINGUAL EVERY 5 MIN PRN
Status: DISCONTINUED | OUTPATIENT
Start: 2021-08-29 | End: 2021-08-31 | Stop reason: HOSPADM

## 2021-08-29 RX ORDER — METOPROLOL SUCCINATE 25 MG/1
25 TABLET, EXTENDED RELEASE ORAL DAILY
Status: DISCONTINUED | OUTPATIENT
Start: 2021-08-29 | End: 2021-08-31 | Stop reason: HOSPADM

## 2021-08-29 RX ORDER — ALBUTEROL SULFATE 90 UG/1
2 AEROSOL, METERED RESPIRATORY (INHALATION) EVERY 6 HOURS PRN
Status: DISCONTINUED | OUTPATIENT
Start: 2021-08-29 | End: 2021-08-31 | Stop reason: HOSPADM

## 2021-08-29 RX ORDER — LISINOPRIL 5 MG/1
2.5 TABLET ORAL DAILY
Status: DISCONTINUED | OUTPATIENT
Start: 2021-08-29 | End: 2021-08-31 | Stop reason: HOSPADM

## 2021-08-29 RX ORDER — SPIRONOLACTONE 25 MG/1
25 TABLET ORAL DAILY
Status: DISCONTINUED | OUTPATIENT
Start: 2021-08-29 | End: 2021-08-31 | Stop reason: HOSPADM

## 2021-08-29 RX ORDER — SODIUM CHLORIDE 0.9 % (FLUSH) 0.9 %
5-40 SYRINGE (ML) INJECTION PRN
Status: DISCONTINUED | OUTPATIENT
Start: 2021-08-29 | End: 2021-08-31 | Stop reason: HOSPADM

## 2021-08-29 RX ORDER — METHYLPREDNISOLONE SODIUM SUCCINATE 125 MG/2ML
125 INJECTION, POWDER, LYOPHILIZED, FOR SOLUTION INTRAMUSCULAR; INTRAVENOUS ONCE
Status: COMPLETED | OUTPATIENT
Start: 2021-08-29 | End: 2021-08-29

## 2021-08-29 RX ORDER — ALBUTEROL SULFATE 90 UG/1
2 AEROSOL, METERED RESPIRATORY (INHALATION) ONCE
Status: COMPLETED | OUTPATIENT
Start: 2021-08-29 | End: 2021-08-29

## 2021-08-29 RX ORDER — POLYETHYLENE GLYCOL 3350 17 G/17G
17 POWDER, FOR SOLUTION ORAL DAILY PRN
Status: DISCONTINUED | OUTPATIENT
Start: 2021-08-29 | End: 2021-08-31 | Stop reason: HOSPADM

## 2021-08-29 RX ORDER — ACETAMINOPHEN 650 MG/1
650 SUPPOSITORY RECTAL EVERY 6 HOURS PRN
Status: DISCONTINUED | OUTPATIENT
Start: 2021-08-29 | End: 2021-08-31 | Stop reason: HOSPADM

## 2021-08-29 RX ORDER — SODIUM CHLORIDE 9 MG/ML
25 INJECTION, SOLUTION INTRAVENOUS PRN
Status: DISCONTINUED | OUTPATIENT
Start: 2021-08-29 | End: 2021-08-31 | Stop reason: HOSPADM

## 2021-08-29 RX ORDER — ONDANSETRON 4 MG/1
4 TABLET, ORALLY DISINTEGRATING ORAL EVERY 8 HOURS PRN
Status: DISCONTINUED | OUTPATIENT
Start: 2021-08-29 | End: 2021-08-31 | Stop reason: HOSPADM

## 2021-08-29 RX ORDER — ONDANSETRON 2 MG/ML
4 INJECTION INTRAMUSCULAR; INTRAVENOUS EVERY 6 HOURS PRN
Status: DISCONTINUED | OUTPATIENT
Start: 2021-08-29 | End: 2021-08-31 | Stop reason: HOSPADM

## 2021-08-29 RX ADMIN — SODIUM CHLORIDE, PRESERVATIVE FREE 10 ML: 5 INJECTION INTRAVENOUS at 22:17

## 2021-08-29 RX ADMIN — METHYLPREDNISOLONE SODIUM SUCCINATE 125 MG: 125 INJECTION, POWDER, FOR SOLUTION INTRAMUSCULAR; INTRAVENOUS at 13:20

## 2021-08-29 RX ADMIN — METOPROLOL SUCCINATE 25 MG: 25 TABLET, EXTENDED RELEASE ORAL at 18:49

## 2021-08-29 RX ADMIN — ENOXAPARIN SODIUM 40 MG: 40 INJECTION SUBCUTANEOUS at 18:51

## 2021-08-29 RX ADMIN — SPIRONOLACTONE 25 MG: 25 TABLET ORAL at 18:53

## 2021-08-29 RX ADMIN — Medication 2 PUFF: at 13:26

## 2021-08-29 RX ADMIN — ALBUTEROL SULFATE 2 PUFF: 90 AEROSOL, METERED RESPIRATORY (INHALATION) at 13:26

## 2021-08-29 RX ADMIN — FUROSEMIDE 40 MG: 10 INJECTION, SOLUTION INTRAMUSCULAR; INTRAVENOUS at 18:51

## 2021-08-29 RX ADMIN — FUROSEMIDE 40 MG: 10 INJECTION, SOLUTION INTRAVENOUS at 14:13

## 2021-08-29 NOTE — ED NOTES
Attempted to get patient up after eating, HR increasing to ST at 120's and RR increased to 25-30 times a minute. Physician notified. Pt then laid down in bed and placed oxygen back on dt/ levels decreasing during sleep.       Tangela Manzo RN  08/29/21 8321

## 2021-08-29 NOTE — ED PROVIDER NOTES
SECTION      CHOLECYSTECTOMY      KNEE SURGERY         CURRENT MEDICATIONS    Current Outpatient Rx   Medication Sig Dispense Refill    nitroGLYCERIN (NITROSTAT) 0.4 MG SL tablet up to max of 3 total doses.  If no relief after 1 dose, call 911. 25 tablet 3    albuterol sulfate  (90 Base) MCG/ACT inhaler Inhale 2 puffs into the lungs every 6 hours as needed for Wheezing 1 Inhaler 3    lisinopril (PRINIVIL;ZESTRIL) 2.5 MG tablet Take 1 tablet by mouth daily 30 tablet 3    metoprolol succinate (TOPROL XL) 25 MG extended release tablet Take 1 tablet by mouth daily 30 tablet 3    spironolactone (ALDACTONE) 25 MG tablet Take 1 tablet by mouth daily 30 tablet 3    furosemide (LASIX) 40 MG tablet Take 1 tablet by mouth daily 60 tablet 3       ALLERGIES    Allergies   Allergen Reactions    Adderall [Amphetamine-Dextroamphetamine] Itching    Morphine And Related     Tramadol     Ultram [Tramadol Hcl] Itching    Codeine Itching    Pcn [Penicillins] Swelling and Dermatitis       SOCIAL AND FAMILY HISTORY    Social History     Socioeconomic History    Marital status:      Spouse name: Not on file    Number of children: Not on file    Years of education: Not on file    Highest education level: Not on file   Occupational History    Not on file   Tobacco Use    Smoking status: Current Every Day Smoker     Packs/day: 0.50     Types: Cigarettes    Smokeless tobacco: Never Used   Vaping Use    Vaping Use: Never used   Substance and Sexual Activity    Alcohol use: No    Drug use: No    Sexual activity: Not on file   Other Topics Concern    Not on file   Social History Narrative    Not on file     Social Determinants of Health     Financial Resource Strain:     Difficulty of Paying Living Expenses:    Food Insecurity:     Worried About Running Out of Food in the Last Year:     Ran Out of Food in the Last Year:    Transportation Needs:     Lack of Transportation (Medical):    Manolo Pacheco Lack of Transportation (Non-Medical):    Physical Activity:     Days of Exercise per Week:     Minutes of Exercise per Session:    Stress:     Feeling of Stress :    Social Connections:     Frequency of Communication with Friends and Family:     Frequency of Social Gatherings with Friends and Family:     Attends Muslim Services:     Active Member of Clubs or Organizations:     Attends Club or Organization Meetings:     Marital Status:    Intimate Partner Violence:     Fear of Current or Ex-Partner:     Emotionally Abused:     Physically Abused:     Sexually Abused:      Family History   Problem Relation Age of Onset    Depression Other          PHYSICAL EXAM    VITAL SIGNS: BP (!) 152/113   Pulse 125   Temp 98.1 °F (36.7 °C) (Oral)   Resp 25   Ht 5' 7\" (1.702 m)   Wt 180 lb (81.6 kg)   SpO2 91%   BMI 28.19 kg/m²    Constitutional:  Well developed, appears short of breath with conversation  HENT:  Normocephalic, Atraumatic, PERRL. EOMI. Sclera injected. Neck: supple without ridigity  Cardiovascular: Tachycardic rate, regular rhythm  Respiratory:  Nonlabored breathing. Wheezing noted throughout  Abdomen: Soft, Non tender, bowel sounds present. Musculoskeletal: +1 pitting ankle edema, No tenderness  Integument:  Warm, Dry, no rash  Neurologic:  Alert & oriented , No focal deficits noted.   Speech normal.  Psychiatric:  Affect normal, Mood normal.       Labs:  Labs Reviewed   CBC WITH AUTO DIFFERENTIAL - Abnormal; Notable for the following components:       Result Value    MCHC 31.9 (*)     Segs Relative 74.2 (*)     Lymphocytes % 16.5 (*)     Monocytes % 7.0 (*)     All other components within normal limits   COMPREHENSIVE METABOLIC PANEL - Abnormal; Notable for the following components:    Glucose 105 (*)     GFR Non- 57 (*)     All other components within normal limits   TROPONIN - Abnormal; Notable for the following components:    Troponin T 0.014 (*)     All other components within normal limits   BRAIN NATRIURETIC PEPTIDE - Abnormal; Notable for the following components:    Pro-BNP 9,703 (*)     All other components within normal limits   COVID-19, RAPID         EKG    This EKG was interpreted by me. Rate is 114, rhythm is sinus. NE and QT intervals are within normal limits. QTC is 504. There is no ST segment or T wave changes. This EKG was compared to previous EKG from date 12/15/2020. RADIOLOGY    XR CHEST PORTABLE    Result Date: 8/29/2021  EXAMINATION: ONE XRAY VIEW OF THE CHEST 8/29/2021 1:33 pm COMPARISON: 12/15/2020 HISTORY: ORDERING SYSTEM PROVIDED HISTORY: shortness of breath TECHNOLOGIST PROVIDED HISTORY: Reason for exam:->shortness of breath Reason for Exam: shortness of breath FINDINGS: The heart appears mildly enlarged but is similar to the prior study. No pleural effusion or pneumothorax is seen. There is prominence of the pulmonary vasculature with interstitial opacities bilaterally. Similar cardiomegaly with findings suggesting pulmonary edema. ED COURSE & MEDICAL DECISION MAKING       Vital signs and nursing notes reviewed during ED course. All pertinent Lab data and radiographic results reviewed with patient at bedside. The patient and/or the family were informed of the results of any tests/labs/imaging, the treatment plan, and time was allotted to answer questions. This is a 62-year-old female who presented with shortness of breath. She was tachycardic on arrival.  She had tachypnea with increased work of breathing. She was started on supplemental oxygen. This seemed to improve symptoms. Chest x-ray showed evidence of pulmonary edema, BNP was 9000. She was admitting that she was noncompliant with her diuretic. I did give her 40 mg of Lasix in the emergency department IV. This did seem to help her symptoms, she had about 1 L of urine output. Covid test was negative. Troponin is barely detectable at 0.014.   After diuresis during reevaluation she did have hypoxemia with increasing tachycardia with any movement, just standing at the bedside. Given this we will plan for admission to the hospital for continued diuresis, continued observation and care. I spoke with the hospitalist who agrees to admit at this time. Due to the immediate potential for life-threatening deterioration due to hypoxia, increased work of breathing, I spent 30 minutes providing critical care. This time is excluding time spent performing procedures.       Clinical  IMPRESSION    Acute on chronic congestive heart failure, hypoxia          (Please note the MDM and HPI sections of this note were completed with a voice recognition program.  Efforts were made to edit the dictations but occasionally words are mis-transcribed.)      Karley Dickinson,   08/29/21 7017

## 2021-08-29 NOTE — ED NOTES
Pt resting comfortably at this time. Will wake soon for Night meds.       Shirley Trent RN  08/29/21 1264

## 2021-08-29 NOTE — H&P
History and Physical      Name:  Duane Gifford /Age/Sex: 1965  (61 y.o. female)   MRN & CSN:  [id number] & [id number] Admission Date/Time: 2021 11:40 AM   Location:  ED27/ED-27 PCP: No primary care provider on file. Hospital Day: 1    Assessment and Plan:   Duane Gifford is a 61 y.o.  female  who presents with <principal problem not specified>    CHF exacerbation,  Patient had a severe systolic CHF with LVEF 84% per echo in 2020. She was placed on Lasix, Aldactone, lisinopril, and metoprolol at home, not compliant with medications. Takes Lasix 40 mg daily at home. Presented with shortness of breath. BNP 8000, above baseline 4000. Chest x-ray showed pulmonary edema. Received 1 dose of IV Lasix in the ED with good diuresis. Increase Lasix dose to 40 mg twice daily IV. Educated about medication compliance. Fluid restriction, low-sodium diet. Cardiology consult, appreciate help. COPD  No evidence of exacerbation. Continue home medication. Case discussed with the ED provider. Diet No diet orders on file   DVT Prophylaxis [x] Lovenox, []  Heparin, [] SCDs, [] Ambulation   GI Prophylaxis [] PPI,  [] H2 Blocker,  [] Carafate,  [x] Diet/Tube Feeds   Code Status Prior   Disposition Patient requires continued admission due to CHF   MDM [] Low, [x] Moderate,[]  High  Patient's risk as above due to CHF     History of Present Illness:     Chief Complaint: <principal problem not specified>     Duane Gifford is a 61 y.o. female who presents with shortness of breath. States gradually worsening shortness of breath over the course of 3 days. States cough which is been nonproductive. Reports feels congestion, tight in her chest.  Reports pressure-like constant chest pain over the entire upper chest.  States it feels similar to when she had CHF back in December and had to be admitted.   She states she is supposed to take diuretics, has not been taking them because they gave her cramps in her legs when she takes her water pills. States she has not had them for a few days. States she is a previous smoker, states she quit about 1 month ago. He does have history of COPD, does not wear oxygen at home. She denies fever or chills. States she has received a Covid vaccine. No known sick contacts. No recent travel, recent hospitalization or recent surgery. No history of PE.     Ten point ROS reviewed negative, unless as noted above    Objective:   No intake or output data in the 24 hours ending 08/29/21 1605   Vitals:   Vitals:    08/29/21 1522   BP:    Pulse: 97   Resp:    Temp:    SpO2: 96%     Physical Exam:   GEN Awake female, sitting upright in bed in no apparent distress. Appears given age. EYES Pupils are equally round. No scleral erythema, discharge, or conjunctivitis. HENT Mucous membranes are moist. Oral pharynx without exudates, no evidence of thrush. NECK Supple, no apparent thyromegaly or masses. RESP Clear to auscultation, no wheezes, rales or rhonchi. Symmetric chest movement while on room air. CARDIO/VASC S1/S2 auscultated. Regular rate without appreciable murmurs, rubs, or gallops. No JVD or carotid bruits. Peripheral pulses equal bilaterally and palpable. No peripheral edema. GI Abdomen is soft without significant tenderness, masses, or guarding. Bowel sounds are normoactive. Rectal exam deferred.  No costovertebral angle tenderness. Normal appearing external genitalia. Lamb catheter is not present. HEME/LYMPH No palpable cervical lymphadenopathy and no hepatosplenomegaly. No petechiae or ecchymoses. MSK No gross joint deformities. SKIN Normal coloration, warm, dry. NEURO Cranial nerves appear grossly intact, normal speech, no lateralizing weakness. PSYCH Awake, alert, oriented x 4. Affect appropriate.     Past Medical History:      Past Medical History:   Diagnosis Date    Anxiety     Environmental allergies     Non-healing surgical wound     RLS (restless legs syndrome)      PSHX:  has a past surgical history that includes Cholecystectomy; Appendectomy;  section; and knee surgery (2014). Allergies: Allergies   Allergen Reactions    Adderall [Amphetamine-Dextroamphetamine] Itching    Morphine And Related     Tramadol     Ultram [Tramadol Hcl] Itching    Codeine Itching    Pcn [Penicillins] Swelling and Dermatitis       FAM HX: family history includes Depression in an other family member. Soc HX:   Social History     Socioeconomic History    Marital status:      Spouse name: Not on file    Number of children: Not on file    Years of education: Not on file    Highest education level: Not on file   Occupational History    Not on file   Tobacco Use    Smoking status: Current Every Day Smoker     Packs/day: 0.50     Types: Cigarettes    Smokeless tobacco: Never Used   Vaping Use    Vaping Use: Never used   Substance and Sexual Activity    Alcohol use: No    Drug use: No    Sexual activity: Not on file   Other Topics Concern    Not on file   Social History Narrative    Not on file     Social Determinants of Health     Financial Resource Strain:     Difficulty of Paying Living Expenses:    Food Insecurity:     Worried About Running Out of Food in the Last Year:     [address] in the Last Year:    Transportation Needs:     Lack of Transportation (Medical):      Lack of Transportation (Non-Medical):    Physical Activity:     Days of Exercise per Week:     Minutes of Exercise per Session:    Stress:     Feeling of Stress :    Social Connections:     Frequency of Communication with Friends and Family:     Frequency of Social Gatherings with Friends and Family:     Attends [employer] Services:     Active Member of Clubs or Organizations:     Attends Club or Organization Meetings:     Marital Status:    Intimate Partner Violence:     Fear of Current or Ex-Partner:     Emotionally Abused:     Physically Abused:

## 2021-08-30 LAB
ANION GAP SERPL CALCULATED.3IONS-SCNC: 9 MMOL/L (ref 4–16)
BUN BLDV-MCNC: 20 MG/DL (ref 6–23)
CALCIUM SERPL-MCNC: 9.2 MG/DL (ref 8.3–10.6)
CHLORIDE BLD-SCNC: 104 MMOL/L (ref 99–110)
CO2: 29 MMOL/L (ref 21–32)
CREAT SERPL-MCNC: 1.1 MG/DL (ref 0.6–1.1)
GFR AFRICAN AMERICAN: >60 ML/MIN/1.73M2
GFR NON-AFRICAN AMERICAN: 51 ML/MIN/1.73M2
GLUCOSE BLD-MCNC: 142 MG/DL (ref 70–99)
HCT VFR BLD CALC: 43.6 % (ref 37–47)
HEMOGLOBIN: 13.8 GM/DL (ref 12.5–16)
MAGNESIUM: 1.8 MG/DL (ref 1.8–2.4)
MCH RBC QN AUTO: 30.3 PG (ref 27–31)
MCHC RBC AUTO-ENTMCNC: 31.7 % (ref 32–36)
MCV RBC AUTO: 95.8 FL (ref 78–100)
PDW BLD-RTO: 12.7 % (ref 11.7–14.9)
PLATELET # BLD: 278 K/CU MM (ref 140–440)
PMV BLD AUTO: 9.9 FL (ref 7.5–11.1)
POTASSIUM SERPL-SCNC: 3.9 MMOL/L (ref 3.5–5.1)
RBC # BLD: 4.55 M/CU MM (ref 4.2–5.4)
SODIUM BLD-SCNC: 142 MMOL/L (ref 135–145)
TROPONIN T: <0.01 NG/ML
WBC # BLD: 10.4 K/CU MM (ref 4–10.5)

## 2021-08-30 PROCEDURE — G0378 HOSPITAL OBSERVATION PER HR: HCPCS

## 2021-08-30 PROCEDURE — 96376 TX/PRO/DX INJ SAME DRUG ADON: CPT

## 2021-08-30 PROCEDURE — APPNB60 APP NON BILLABLE TIME 46-60 MINS: Performed by: NURSE PRACTITIONER

## 2021-08-30 PROCEDURE — 36415 COLL VENOUS BLD VENIPUNCTURE: CPT

## 2021-08-30 PROCEDURE — 6360000002 HC RX W HCPCS: Performed by: NURSE PRACTITIONER

## 2021-08-30 PROCEDURE — 93308 TTE F-UP OR LMTD: CPT

## 2021-08-30 PROCEDURE — 83735 ASSAY OF MAGNESIUM: CPT

## 2021-08-30 PROCEDURE — 6370000000 HC RX 637 (ALT 250 FOR IP): Performed by: NURSE PRACTITIONER

## 2021-08-30 PROCEDURE — APPSS45 APP SPLIT SHARED TIME 31-45 MINUTES: Performed by: NURSE PRACTITIONER

## 2021-08-30 PROCEDURE — 2580000003 HC RX 258: Performed by: NURSE PRACTITIONER

## 2021-08-30 PROCEDURE — 85027 COMPLETE CBC AUTOMATED: CPT

## 2021-08-30 PROCEDURE — 99253 IP/OBS CNSLTJ NEW/EST LOW 45: CPT | Performed by: INTERNAL MEDICINE

## 2021-08-30 PROCEDURE — 80048 BASIC METABOLIC PNL TOTAL CA: CPT

## 2021-08-30 PROCEDURE — 96372 THER/PROPH/DIAG INJ SC/IM: CPT

## 2021-08-30 PROCEDURE — 84484 ASSAY OF TROPONIN QUANT: CPT

## 2021-08-30 PROCEDURE — 1200000000 HC SEMI PRIVATE

## 2021-08-30 PROCEDURE — 99232 SBSQ HOSP IP/OBS MODERATE 35: CPT | Performed by: INTERNAL MEDICINE

## 2021-08-30 RX ADMIN — ENOXAPARIN SODIUM 40 MG: 40 INJECTION SUBCUTANEOUS at 08:46

## 2021-08-30 RX ADMIN — SPIRONOLACTONE 25 MG: 25 TABLET ORAL at 08:45

## 2021-08-30 RX ADMIN — SODIUM CHLORIDE, PRESERVATIVE FREE 10 ML: 5 INJECTION INTRAVENOUS at 08:47

## 2021-08-30 RX ADMIN — FUROSEMIDE 40 MG: 10 INJECTION, SOLUTION INTRAMUSCULAR; INTRAVENOUS at 17:05

## 2021-08-30 RX ADMIN — METOPROLOL SUCCINATE 25 MG: 25 TABLET, EXTENDED RELEASE ORAL at 08:45

## 2021-08-30 RX ADMIN — FUROSEMIDE 40 MG: 10 INJECTION, SOLUTION INTRAMUSCULAR; INTRAVENOUS at 08:47

## 2021-08-30 RX ADMIN — LISINOPRIL 2.5 MG: 5 TABLET ORAL at 08:45

## 2021-08-30 RX ADMIN — ACETAMINOPHEN 650 MG: 325 TABLET ORAL at 20:37

## 2021-08-30 RX ADMIN — SODIUM CHLORIDE, PRESERVATIVE FREE 10 ML: 5 INJECTION INTRAVENOUS at 20:37

## 2021-08-30 ASSESSMENT — ENCOUNTER SYMPTOMS
ABDOMINAL PAIN: 0
CHEST TIGHTNESS: 1
COUGH: 0
PHOTOPHOBIA: 0
COLOR CHANGE: 0
WHEEZING: 0
CONSTIPATION: 0
NAUSEA: 0
VOMITING: 0
BLOOD IN STOOL: 0
EYE PAIN: 0
DIARRHEA: 0
SHORTNESS OF BREATH: 1
BACK PAIN: 0

## 2021-08-30 ASSESSMENT — PAIN SCALES - GENERAL: PAINLEVEL_OUTOF10: 2

## 2021-08-30 NOTE — PROGRESS NOTES
HOSPITALIST PROGRESS NOTE  Date: 8/30/2021   Name: Steve Mccann   MRN: [id number]   YOB: 1965  Chief Complaint   Patient presents with    Shortness of Breath        Subjective/Interval Hx:     States that her swelling has improved, she was taken off oxygen this morning    ROS: negative unless mentioned above  Objective:   Physical Exam:   /86   Pulse 86   Temp 98 °F (36.7 °C) (Oral)   Resp 18   Ht 5' 7\" (1.702 m)   Wt 180 lb (81.6 kg)   SpO2 96%   BMI 28.19 kg/m²   CONSTITUTIONAL:  No acute distress  EYES:  No discharge  HENT:  NCAT  LUNGS:  cta b/l bs+  CARDIOVASCULAR:  s1s2 rrr  ABDOMEN:  Soft nt nd  MUSCULOSKELETAL/Extremities:  No edema in LE  NEUROLOGIC:  No gross deficits, aao  SKIN:  No gross lesions    Assessment/Plan:     1. Acute on chronic systolic CHF exacerbation, nonischemic cardiomyopathy  LVEF 20% per echo in 12/2020. On Lasix, Aldactone, lisinopril, and metoprolol at home, not compliant with medications. Chest x-ray showed pulmonary edema. Educated about medication compliance. Fluid restriction, low-sodium diet.  -On IV Lasix. Cardiology following. EP recommends optimization and compliance of medical therapy for 3 months before considering ICD recommendation. Echo pending. Daily weights and I's and O's.     COPD  No evidence of exacerbation. Continue home medication. DVT Prophylaxis: lovenox  Diet: ADULT DIET; Regular; Low Fat/Low Chol/High Fiber/2 gm Na; Low Sodium (2 gm); 1500 ml  Home O2: none  Code Status: Full Code      Dispo:  -ambulate, wean oxygen. PT/OT. If breathing improves then possible dc in 1-2 days.      Valeriano Medrano MD  8/30/2021    Meds:   Meds:    lisinopril  2.5 mg Oral Daily    metoprolol succinate  25 mg Oral Daily    spironolactone  25 mg Oral Daily    sodium chloride flush  5-40 mL IntraVENous 2 times per day    enoxaparin  40 mg Subcutaneous Daily    furosemide  40 mg IntraVENous BID      Infusions:    sodium chloride PRN Meds: albuterol sulfate HFA, 2 puff, Q6H PRN  nitroGLYCERIN, 0.4 mg, Q5 Min PRN  sodium chloride flush, 5-40 mL, PRN  sodium chloride, 25 mL, PRN  ondansetron, 4 mg, Q8H PRN   Or  ondansetron, 4 mg, Q6H PRN  polyethylene glycol, 17 g, Daily PRN  acetaminophen, 650 mg, Q6H PRN   Or  acetaminophen, 650 mg, Q6H PRN        Data/Labs:     Recent Labs     08/29/21  1220 08/30/21  0448   WBC 9.0 10.4   HGB 13.7 13.8   HCT 42.9 43.6    278      Recent Labs     08/29/21  1220 08/30/21  0448    142   K 4.4 3.9    104   CO2 24 29   BUN 12 20   CREATININE 1.0 1.1     Recent Labs     08/29/21  1220   AST 23   ALT 21   BILITOT 0.6   ALKPHOS 80     No results for input(s): INR in the last 72 hours.   Recent Labs     08/29/21  1220   TROPONINT 0.014*     No results found for: LABA1C  CALCIUM:  9.2/29 (08/30 0448)  Lab Results   Component Value Date    MG 1.8 08/30/2021

## 2021-08-30 NOTE — PROGRESS NOTES
Cardiology Progress Note     Today's Plan continue with IV diuretic   Check limited echo    Admit Date:  2021    Consult reason/ Seen today for: CHF    Subjective and  Overnight Events:  Sleeping- awakens easily - able to lay flat-shortness of breath has improved     Telemetry  SR    Assessment / Plan / Recommendation:   1. Elevated troponin type 2 demand leak in the setting of acute HF  2. HFrEF- acute on chronic - EF 15-20% - NYHA class III-with shortness of breath/ CXR with pulmonary edema/stopped diuretics at home d/t leg cramps -continue with IV diuretics for today - shortness of breath is improving- now able to lay flat - continue toprol xl and lisinopril concern for non adherence- strict I/O and daily weights-fluid restriction- check limited echo -If EF low consult EP for possible ICD   3. non ischemic cardiomyopathy- [facility] in 2021 normal coronaries- sever non ischemic cardiomyopathy  Ef 20% with global hypokineses- continue with GDMT including ACEi/BB/ROMINA-   4. VHD - moderate MR - continue ACEi           History of Presenting Illness:    Chief complain on admission : 61 y. o.year old who is admitted for  Chief Complaint   Patient presents with    Shortness of Breath        Past medical history:    has a past medical history of Anxiety, Environmental allergies, Non-healing surgical wound, and RLS (restless legs syndrome). Past surgical history:   has a past surgical history that includes Cholecystectomy; Appendectomy;  section; and knee surgery (). Social History:   reports that she has been smoking cigarettes. She has been smoking about 0.50 packs per day. She has never used smokeless tobacco. She reports that she does not drink alcohol and does not use drugs. Family history:  family history includes Depression in an other family member.     Allergies   Allergen Reactions    Adderall [Amphetamine-Dextroamphetamine] Itching    Morphine And Related     Tramadol     Ultram [Tramadol Hcl] Itching    Codeine Itching    Pcn [Penicillins] Swelling and Dermatitis       Review of Systems:   All 14 systems were reviewed and are negative  Except for the positive findings which are documented     /86   Pulse 86   Temp 98 °F (36.7 °C) (Oral)   Resp 18   Ht 5' 7\" (1.702 m)   Wt 180 lb (81.6 kg)   SpO2 96%   BMI 28.19 kg/m²       Intake/Output Summary (Last 24 hours) at 8/30/2021 0957  Last data filed at 8/30/2021 0228  Gross per 24 hour   Intake 800 ml   Output 700 ml   Net 100 ml       Physical Exam:  Physical Exam  Constitutional:       Appearance: Normal appearance. HENT:      Mouth/Throat:      Mouth: Mucous membranes are moist.   Cardiovascular:      Rate and Rhythm: Normal rate and regular rhythm. Pulses: Normal pulses. Heart sounds: Normal heart sounds. Pulmonary:      Effort: Pulmonary effort is normal.      Breath sounds: Normal breath sounds. Abdominal:      Palpations: Abdomen is soft. Musculoskeletal:      Right lower leg: No edema. Left lower leg: No edema. Skin:     General: Skin is warm and dry. Neurological:      General: No focal deficit present. Mental Status: She is alert and oriented to person, place, and time.           Medications:    lisinopril  2.5 mg Oral Daily    metoprolol succinate  25 mg Oral Daily    spironolactone  25 mg Oral Daily    sodium chloride flush  5-40 mL IntraVENous 2 times per day    enoxaparin  40 mg SubCUTAneous Daily    furosemide  40 mg IntraVENous BID      sodium chloride       albuterol sulfate HFA, nitroGLYCERIN, sodium chloride flush, sodium chloride, ondansetron **OR** ondansetron, polyethylene glycol, acetaminophen **OR** acetaminophen    Lab Data:  CBC:   Recent Labs     08/29/21  1220 08/30/21  0448   WBC 9.0 10.4   HGB 13.7 13.8   HCT 42.9 43.6   MCV 96.6 95.8    278     BMP:   Recent Labs 08/29/21  1220 08/30/21  0448    142   K 4.4 3.9    104   CO2 24 29   BUN 12 20   CREATININE 1.0 1.1     PT/INR: No results for input(s): PROTIME, INR in the last 72 hours. BNP:    Recent Labs     08/29/21  1220   PROBNP 9,703*     TROPONIN:   Recent Labs     08/29/21  1220   TROPONINT 0.014*              Impression:  Active Problems:    Acute combined systolic and diastolic CHF, NYHA class 1 (HCC)  Resolved Problems:    * No resolved hospital problems. *       All labs, medications and tests reviewed by myself, continue all other medications of all above medical condition listed as is except for changes mentioned above. Thank you   Please call with questions. Electronically signed by SHAQUILLE Raphael CNP on 8/30/2021 at 9:57 AM  I have seen ,spoken to  and examined this patient personally, independently of the nurse practitioner. I have reviewed the hospital care given to date and reviewed all pertinent labs and imaging. The plan was developed mutually at the time of the visit with the patient,  NP  and myself. I have spoken with patient, nursing staff and provided written and verbal instructions . The above note has been reviewed and I agree with the assessment, diagnosis, and treatment plan with changes made by me as follows     CARDIOLOGY ATTENDING ADDENDUM    HPI:  I have reviewed the above HPI  And agree with above   Yolanda Tirado is a 61 y. o.year old who and presents with had concerns including Shortness of Breath. Chief Complaint   Patient presents with    Shortness of Breath     Interval history:  Mild SOB    Physical Exam:  General:  Awake, alert, NAD  Head:normal  Eye:normal  Neck:  No JVD   Chest:  Clear to auscultation, respiration easy  Cardiovascular:  RRR S1S2  Abdomen:   nontender  Extremities:  tr edema  Pulses; palpable  Neuro: grossly normal      MEDICAL DECISION MAKING;    I agree with the above plan, which was planned by myself and discussed with NP.  EP to see for ? ICD        Amparo Reynolds MD [facility] [place]

## 2021-08-30 NOTE — CONSULTS
Electrophysiology Consult Note      Reason for consultation:  Need for ICD    Chief complaint : Shortness of breath, lower leg swelling, weight gain    Referring physician:       Primary care physician: No primary care provider on file. History of Present Illness:     62-year-old female with a history of nonischemic cardiomyopathy presents with complaints of shortness of breath and cough at rest and with exertion. Patient also had lower leg swelling and chest tightness and weight gain. She additionally complains of orthopnea however states she has never been able to lay flat in the bed. She reports that the symptoms started about 1 week ago and progressively became worse. She states that the symptoms are very similar to when she was hospitalized in November with heart failure. She states she is supposed to take Lasix every day however is not compliant as it causes her leg cramps    She states she has started on cardiac medications in November    On discussing about compliance she is only taking medication 3 times a week and sometimes she forgets that too. Echocardiogram on  LVEF is 15 to 20%. Left heart cath in 2020 was normal coronaries. Patient has not followed in the office        Pastmedical history:   Past Medical History:   Diagnosis Date    Anxiety     Environmental allergies     Non-healing surgical wound     RLS (restless legs syndrome)        Surgical history :   Past Surgical History:   Procedure Laterality Date    APPENDECTOMY       SECTION      CHOLECYSTECTOMY      KNEE SURGERY         Family history:   Family History   Problem Relation Age of Onset    Depression Other        Social history :  reports that she has been smoking cigarettes. She has been smoking about 0.50 packs per day.  She has never used smokeless tobacco. She reports that she does not drink alcohol and does not use drugs. Allergies   Allergen Reactions    Adderall [Amphetamine-Dextroamphetamine] Itching    Morphine And Related     Tramadol     Ultram [Tramadol Hcl] Itching    Codeine Itching    Pcn [Penicillins] Swelling and Dermatitis       No current facility-administered medications on file prior to encounter. Current Outpatient Medications on File Prior to Encounter   Medication Sig Dispense Refill    nitroGLYCERIN (NITROSTAT) 0.4 MG SL tablet up to max of 3 total doses. If no relief after 1 dose, call 911. 25 tablet 3    albuterol sulfate  (90 Base) MCG/ACT inhaler Inhale 2 puffs into the lungs every 6 hours as needed for Wheezing 1 Inhaler 3    lisinopril (PRINIVIL;ZESTRIL) 2.5 MG tablet Take 1 tablet by mouth daily 30 tablet 3    metoprolol succinate (TOPROL XL) 25 MG extended release tablet Take 1 tablet by mouth daily 30 tablet 3    spironolactone (ALDACTONE) 25 MG tablet Take 1 tablet by mouth daily 30 tablet 3    furosemide (LASIX) 40 MG tablet Take 1 tablet by mouth daily 60 tablet 3       Review of Systems:   Review of Systems   Constitutional: Positive for fatigue. Negative for activity change, chills and fever. HENT: Negative for congestion, ear pain and tinnitus. Eyes: Negative for photophobia, pain and visual disturbance. Respiratory: Positive for chest tightness and shortness of breath. Negative for cough and wheezing. Cardiovascular: Positive for leg swelling. Negative for chest pain and palpitations. Gastrointestinal: Negative for abdominal pain, blood in stool, constipation, diarrhea, nausea and vomiting. Endocrine: Negative for cold intolerance and heat intolerance. Genitourinary: Negative for dysuria, flank pain and hematuria. Musculoskeletal: Positive for arthralgias. Negative for back pain, myalgias and neck stiffness. Skin: Negative for color change and rash. Allergic/Immunologic: Negative for food allergies.    Neurological: Negative for dizziness, light-headedness, numbness and headaches. Hematological: Does not bruise/bleed easily. Psychiatric/Behavioral: Negative for agitation, behavioral problems and confusion. Examination:      Vitals:    08/30/21 0709 08/30/21 0730 08/30/21 0845 08/30/21 1400   BP:  132/89 121/86 109/65   Pulse: 60 70 86 67   Resp:  18  17   Temp:  98 °F (36.7 °C)  98.9 °F (37.2 °C)   TempSrc:  Oral  Oral   SpO2: 98% 96%  98%   Weight:       Height:            Body mass index is 28.19 kg/m². Physical Exam  Constitutional:       General: She is not in acute distress. Appearance: She is not ill-appearing or diaphoretic. HENT:      Head: Normocephalic and atraumatic. Right Ear: External ear normal.      Left Ear: External ear normal.      Nose: No congestion. Mouth/Throat:      Mouth: Mucous membranes are moist.   Eyes:      Extraocular Movements: Extraocular movements intact. Conjunctiva/sclera: Conjunctivae normal.      Pupils: Pupils are equal, round, and reactive to light. Cardiovascular:      Rate and Rhythm: Normal rate and regular rhythm. Heart sounds: Murmur (grade 2/6 systolic murmur) heard. Pulmonary:      Effort: Pulmonary effort is normal. No respiratory distress. Breath sounds: Rales present. No wheezing or rhonchi. Abdominal:      General: Abdomen is flat. Bowel sounds are normal. There is no distension. Palpations: Abdomen is soft. Tenderness: There is no abdominal tenderness. Musculoskeletal:         General: No tenderness. Cervical back: Normal range of motion. No tenderness. Right lower leg: Edema present. Left lower leg: Edema present. Skin:     General: Skin is warm. Neurological:      General: No focal deficit present. Mental Status: She is alert and oriented to person, place, and time. Cranial Nerves: No cranial nerve deficit.    Psychiatric:         Mood and Affect: Mood normal.             CBC:   Lab Results   Component Value Date    WBC 10.4 08/30/2021    HGB 13.8 08/30/2021    HCT 43.6 08/30/2021     08/30/2021     Lipids:  Lab Results   Component Value Date    CHOL 147 12/16/2020    TRIG 104 12/16/2020    HDL 36 (L) 12/16/2020    LDLDIRECT 96 12/16/2020     PT/INR:   Lab Results   Component Value Date    INR 1.05 06/12/2015        BMP:    Lab Results   Component Value Date     08/30/2021    K 3.9 08/30/2021     08/30/2021    CO2 29 08/30/2021    BUN 20 08/30/2021     CMP:   Lab Results   Component Value Date    AST 23 08/29/2021    PROT 6.9 08/29/2021    BILITOT 0.6 08/29/2021    ALKPHOS 80 08/29/2021     TSH:  No results found for: TSH    EKGINTERPRETATION - EKG Interpretation:        IMPRESSION / RECOMMENDATIONS:     Acute on chronic left ventricular systolic dysfunction - nonischemic cardiomyopathy  Non compliance    Patient is admitted with acute exacerbation of severe systolic left ventricular dysfunction. Based on the history patient appears to be noncompliant with medications. Patient needs to be compliant and optimized on medical therapy. Discussed with the patient that medication compliance is very essential for heart failure management. If LVEF does not improve despite medical management and optimization of medical therapy over the 3 months and still less than 35% then the ICD is recommended at that time. Patient needs to have regular heart failure clinic follow-ups to optimize medical therapy. Patient currently on Toprol-XL, lisinopril, Aldactone and Lasix. She is on low dose of lisinopril and low-dose of metoprolol these need to be uptitrated over the next 3 months. Discussed with the patient and agrees with the plan      Thanks again for allowing me to participate in care of this patient. Please call me if you have any questions. With best regards.       Gary Joe MD, 8/30/2021 3:04 PM     Please note this report has been partially produced using speech recognition software and may contain errors related to that system including errors in grammar, punctuation, and spelling, as well as words and phrases that may be inappropriate. If there are any questions or concerns please feel free to contact the dictating provider for clarification.

## 2021-08-30 NOTE — ED NOTES
Bed: ED-05  Expected date:   Expected time:   Means of arrival:   Comments:  Leonardo Gauthier [id number], RN  08/30/21 8198

## 2021-08-31 VITALS
BODY MASS INDEX: 28.25 KG/M2 | RESPIRATION RATE: 18 BRPM | OXYGEN SATURATION: 96 % | TEMPERATURE: 97.8 F | HEART RATE: 78 BPM | DIASTOLIC BLOOD PRESSURE: 71 MMHG | HEIGHT: 67 IN | WEIGHT: 180 LBS | SYSTOLIC BLOOD PRESSURE: 98 MMHG

## 2021-08-31 PROCEDURE — 97530 THERAPEUTIC ACTIVITIES: CPT

## 2021-08-31 PROCEDURE — 2580000003 HC RX 258: Performed by: NURSE PRACTITIONER

## 2021-08-31 PROCEDURE — 96372 THER/PROPH/DIAG INJ SC/IM: CPT

## 2021-08-31 PROCEDURE — 97165 OT EVAL LOW COMPLEX 30 MIN: CPT

## 2021-08-31 PROCEDURE — 94761 N-INVAS EAR/PLS OXIMETRY MLT: CPT

## 2021-08-31 PROCEDURE — 97116 GAIT TRAINING THERAPY: CPT

## 2021-08-31 PROCEDURE — APPSS45 APP SPLIT SHARED TIME 31-45 MINUTES: Performed by: NURSE PRACTITIONER

## 2021-08-31 PROCEDURE — 96376 TX/PRO/DX INJ SAME DRUG ADON: CPT

## 2021-08-31 PROCEDURE — 99232 SBSQ HOSP IP/OBS MODERATE 35: CPT | Performed by: INTERNAL MEDICINE

## 2021-08-31 PROCEDURE — G0378 HOSPITAL OBSERVATION PER HR: HCPCS

## 2021-08-31 PROCEDURE — 97161 PT EVAL LOW COMPLEX 20 MIN: CPT

## 2021-08-31 PROCEDURE — 6360000002 HC RX W HCPCS: Performed by: NURSE PRACTITIONER

## 2021-08-31 PROCEDURE — 6370000000 HC RX 637 (ALT 250 FOR IP): Performed by: NURSE PRACTITIONER

## 2021-08-31 RX ORDER — FUROSEMIDE 40 MG/1
40 TABLET ORAL DAILY
Status: DISCONTINUED | OUTPATIENT
Start: 2021-08-31 | End: 2021-08-31 | Stop reason: HOSPADM

## 2021-08-31 RX ADMIN — METOPROLOL SUCCINATE 25 MG: 25 TABLET, EXTENDED RELEASE ORAL at 09:33

## 2021-08-31 RX ADMIN — SODIUM CHLORIDE, PRESERVATIVE FREE 10 ML: 5 INJECTION INTRAVENOUS at 09:36

## 2021-08-31 RX ADMIN — SPIRONOLACTONE 25 MG: 25 TABLET ORAL at 09:33

## 2021-08-31 RX ADMIN — FUROSEMIDE 40 MG: 10 INJECTION, SOLUTION INTRAMUSCULAR; INTRAVENOUS at 09:33

## 2021-08-31 RX ADMIN — ENOXAPARIN SODIUM 40 MG: 40 INJECTION SUBCUTANEOUS at 09:33

## 2021-08-31 NOTE — PROGRESS NOTES
[address] [facility] PHYSICAL THERAPY EVALUATION  Lydia Guillen, 1965, [id number], 2021    History  [place]:  The encounter diagnosis was Acute on chronic systolic heart failure ([address] Patient  has a past medical history of Anxiety, Environmental allergies, Non-healing surgical wound, and RLS (restless legs syndrome). Patient  has a past surgical history that includes Cholecystectomy; Appendectomy;  section; and knee surgery (). Subjective:  Patient states: \"Am I good to go home? \". Pain:  Denies pain this date. Communication with other providers:  Handoff to RN, OT  Restrictions: General precautions    Home Setup/Prior level of function  Social/Functional History  Lives With: Alone  Type of Home: Apartment  Home Layout: One level  Home Access: Stairs to enter with rails  Entrance Stairs - Number of Steps: 3  Bathroom Shower/Tub: Tub/Shower unit  Bathroom Toilet: Standard  ADL Assistance: Independent  Homemaking Assistance: Independent  Homemaking Responsibilities: Yes  Ambulation Assistance: Independent  Transfer Assistance: Independent  Active : Yes  Mode of Transportation: Car  Occupation: On disability    Examination of body systems (includes body structures/functions, activity/participation limitations):  · Observation:  Pt sitting in bed upon arrival and agreeable to therapy  · Vision: WNL  · Hearing: WNL  · Cardiopulmonary:  No O2 needs  · Cognition: WNL, see OT/SLP note for further evaluation. Musculoskeletal  · ROM R/L:  WFL. · Strength R/L knee extension and DF:  5/5, WNL in function and endurance. · Neuro: WNL      Mobility:  · Supine to sit: Independent; pt was sitting EOB upon arrival  · Transfers: Supervision; pt required no assistance with transfers  · Sitting balance:  Good, pt displayed sway upon initial entry to room but no LOB. · Standing balance:  Good, pt displayed no LOB.     · Gait: Pt ambulated 400 feet supervision with no

## 2021-08-31 NOTE — PROGRESS NOTES
Psychosocial Functioning:  · Overall cognitive status: Pt oriented to time, date, person, place, city  · Affect: Normal     Balance:   · Sitting: SUP (pt sat EOB demo good dynamic sitting balance). · Standing: SBA (pt stood without use of AD. Demo good dynamic standing balance). Functional Mobility:   · Bed Mobility: SUP (pt performed supine to seated bed mobility with VC for sequencing throughout). · Transfers: SBA  (pt performed STS from EOB without use of AD. Required VC throughout for sequencing and increased time). · Ambulation: SBA (pt ambulated approx 400ft+ without use of AD. Demo good pace throughout and 0 LOB). AM-PAC 6 click short form for inpatient daily activity:   How much help from another person does the patient currently need. .. Unable  Dep A Lot  Max A A Lot   Mod A A Little  Min A A Little   CGA  SBA None   Mod I  Indep  Sup   1. Putting on and taking off regular lower body clothing? [] 1    [] 2   [] 2   [] 3   [] 3   [x] 4      2. Bathing (including washing, rinsing, drying)? [] 1   [] 2   [] 2 [] 3 [] 3 [x] 4   3. Toileting, which includes using toilet, bedpan, or urinal? [] 1    [] 2   [] 2   [] 3   [] 3   [x] 4     4. Putting on and taking off regular upper body clothing? [] 1   [] 2   [] 2   [] 3   [] 3    [x] 4      5. Taking care of personal grooming such as brushing teeth? [] 1   [] 2    [] 2 [] 3    [] 3   [x] 4      6. Eating meals? [] 1   [] 2   [] 2   [] 3   [] 3   [x] 4      Raw Score:  24     [24=0% impaired(CH), 23=1-19%(CI), 20-22=20-39%(CJ), 15-19=40-59%(CK), 10-14=60-79%(CL), 7-9=80-99%(CM), 6=100%(CN)]    Treatment:  Therapeutic Activity Training:   Therapeutic activity training was instructed today. Cues were given for safety, sequence, UE/LE placement, awareness, and balance. Activities performed today included bed mobility training, sup-sit, sit-stand, ambulation.     Safety Measures: Gait belt used, Left in chair, Alarm in place    Assessment:  Assessment  Performance deficits / Impairments: Decreased balance  Treatment Diagnosis: acute combined systolic and diastolic CHF  Prognosis: Good  Decision Making: Low Complexity  REQUIRES OT FOLLOW UP: No  Discharge Recommendations: Home with assist PRN, 24 hour supervision or assist    Pt is a 61year old F admitted for acute combined systolic and diastolic CHF. Pt reports that prior to admission she was IND in ADLs, IADLs, and functional mobility. This date, pt is functioning near or at baseline. Pt ambulated without AD with SBA for over a household distance and required 0 breaks throughout. Pt demo safe ambulation throughout with 0 LOB. Pt reports SOB and decreased endurance has improved to normal. Recommend pt utilize transfer tub bench and complete bathing in seated to improve safety. Recommend pt discharge home with initial 24/7 SUP/assist, assist PRN. Re-order if symptoms change. Time:   Time in: 1145  Time out: 1205  Timed treatment minutes: 10  Total time: 20    Electronically signed by:      Robyn Alvarez S/OT    Electronically signed by:       LIDYA Ramirez/L, [address] [facility], .[id number]

## 2021-08-31 NOTE — PLAN OF CARE
Problem: Falls - Risk of:  Goal: Will remain free from falls  Description: Will remain free from falls  8/30/2021 2327 by Juan Francisco Beth RN  Outcome: Met This Shift  8/30/2021 1502 by Landon Tolliver RN  Outcome: Ongoing     Problem: OXYGENATION/RESPIRATORY FUNCTION  Goal: Patient will maintain patent airway  Outcome: Met This Shift  Goal: Patient will achieve/maintain normal respiratory rate/effort  Description: Respiratory rate and effort will be within normal limits for the patient  8/30/2021 2327 by Juan Francisco Beth RN  Outcome: Met This Shift  8/30/2021 1502 by Landon Tolliver RN  Outcome: Ongoing     Problem: HEMODYNAMIC STATUS  Goal: Patient has stable vital signs and fluid balance  8/30/2021 2327 by Juan Francisco Beth RN  Outcome: Met This Shift  8/30/2021 1502 by Landon Tolliver RN  Outcome: Ongoing     Problem: FLUID AND ELECTROLYTE IMBALANCE  Goal: Fluid and electrolyte balance are achieved/maintained  8/30/2021 2327 by Juan Francisco Beth RN  Outcome: Met This Shift  8/30/2021 1502 by Landon Tolliver RN  Outcome: Ongoing     Problem: ACTIVITY INTOLERANCE/IMPAIRED MOBILITY  Goal: Mobility/activity is maintained at optimum level for patient  8/30/2021 2327 by Juan Francisco Beth RN  Outcome: Met This Shift  8/30/2021 1502 by Landon Tolliver RN  Outcome: Ongoing

## 2021-08-31 NOTE — PROGRESS NOTES
Hospitalist Progress Note       8/31/2021 3:12 PM  Admit Date: 8/29/2021    PCP: No primary care provider on file. Assessment and Plan:   1. Acute on chronic systolic heart failure: Due to poor compliance to medication. Status post IV Lasix. Resume medication-Lasix, Aldactone, lisinopril and metoprolol. Echo-EF 20%. EPS consulted-no need to place ICD for now. If after medical treatment for 3 months and the EF has not improved, she be a candidate for ICD. Compliance addressed. 2.  Elevated troponin: Cardiology consult appreciated. Likely related to cardiomyopathy. Left heart catheterization in 11/20-no significant CAD. 3.  COPD: Not in exacerbation. Continue bronchodilators. Advised not to resume smoking (quit a month ago). Chronic problems include restless leg syndrome    DVT prophylaxis:  Disposition: She lives alone. Patient Active Problem List:     Non-healing surgical wound     Fx boxers, closed, initial encounter     New onset of congestive heart failure (HCC)     Chest pain     Dyspnea and respiratory abnormalities     Elevated brain natriuretic peptide (BNP) level     Heart failure (HCC)     Acute on chronic systolic (congestive) heart failure (HCC)     Acute combined systolic and diastolic CHF, NYHA class 1 ([address]     Non-ischemic cardiomyopathy ([address]      Subjective:     Chief Complaint   Patient presents with    Shortness of Breath       F/U:  Interval History: Discussed with her nurse. Shortness of breath is better. No chest pain this morning. Objective: Intake/Output Summary (Last 24 hours) at 8/31/2021 1512  Last data filed at 8/30/2021 2041  Gross per 24 hour   Intake 240 ml   Output 1100 ml   Net -860 ml      Vitals:   Vitals:    08/31/21 0930   BP: 98/71   Pulse: 78   Resp: 18   Temp: 97.8 °F (36.6 °C)   SpO2: 96%     Physical Exam:  General: No apparent respiratory distress  Eyes: Not pale. Anicteric.   Neck: No neck mass or thyromegaly  Cardiovascular:

## 2021-08-31 NOTE — DISCHARGE SUMMARY
Eliza Welch 1965 [id number]  PCP:  No primary care provider on file. Admit date: 8/29/2021  Admitting Physician: Norita Mortimer, MD    Discharge date: 8/31/2021 Discharge Physician: Kareem Walton MD      Reason for admission:   Chief Complaint   Patient presents with    Shortness of Breath     Present on Admission:   Acute combined systolic and diastolic CHF, NYHA class 1 ([facility]   Non-ischemic cardiomyopathy ([facility] [address]       Discharge Diagnoses Include:  1. Acute on chronic systolic heart failure  2. Elevated troponin    Hospital Course:  Radha Larsen a 61 y. o. female who presents with shortness of breath.  States gradually worsening shortness of breath over the course of 3 days.  States cough which is been nonproductive.  Reports feels congestion, tight in her chest.  Reports pressure-like constant chest pain over the entire upper chest.  States it feels similar to when she had CHF back in December and had to be admitted.  She states she is supposed to take diuretics, has not been taking them because they gave her cramps in her legs when she takes her water pills.  States she has not had them for a few days.  States she is a previous smoker, states she quit about 1 month ago. [facility] does have history of COPD, does not wear oxygen at home.  She denies fever or chills.  States she has received a Covid vaccine.  No known sick contacts.  No recent travel, recent hospitalization or recent surgery.  No history of PE. Admitted as:  1. Acute on chronic systolic heart failure: Due to poor compliance to medication. Status post IV Lasix. Resume medication-Lasix, Aldactone, lisinopril and metoprolol. Echo-EF 20%. EPS consulted-no need to place ICD for now. If after medical treatment for 3 months and the EF has not improved, she be a candidate for ICD. Compliance addressed.     2.  Elevated troponin: Cardiology consult appreciated. Likely related to cardiomyopathy.   Left heart catheterization in 11/20-no significant CAD.     3. COPD: Not in exacerbation. Continue bronchodilators. Advised not to resume smoking (quit a month ago).    Chronic problems include restless leg syndrome    Patient left AMA without informing the nursing staff. Pt was personally examined by me on the day of discharge with the following findings: Please, see my progress note on the day of discharge. Procedures: None    Significant Diagnostic Studies at discharge:   CBC:   Lab Results   Component Value Date    WBC 10.4 08/30/2021    RBC 4.55 08/30/2021    HGB 13.8 08/30/2021    HCT 43.6 08/30/2021    MCV 95.8 08/30/2021    MCH 30.3 08/30/2021    MCHC 31.7 08/30/2021    RDW 12.7 08/30/2021     08/30/2021    MPV 9.9 08/30/2021     BMP:    Lab Results   Component Value Date     08/30/2021    K 3.9 08/30/2021     08/30/2021    CO2 29 08/30/2021    BUN 20 08/30/2021    LABALBU 3.9 08/29/2021    CREATININE 1.1 08/30/2021    CALCIUM 9.2 08/30/2021    GFRAA >60 08/30/2021    LABGLOM 51 08/30/2021    GLUCOSE 142 08/30/2021       Patient Instructions:   Dixie Castaneda   Home Medication Instructions ZLE:[id number]    Printed on:08/31/21 3799   Medication Information                      albuterol sulfate  (90 Base) MCG/ACT inhaler  Inhale 2 puffs into the lungs every 6 hours as needed for Wheezing             furosemide (LASIX) 40 MG tablet  Take 1 tablet by mouth daily             lisinopril (PRINIVIL;ZESTRIL) 2.5 MG tablet  Take 1 tablet by mouth daily             metoprolol succinate (TOPROL XL) 25 MG extended release tablet  Take 1 tablet by mouth daily             nitroGLYCERIN (NITROSTAT) 0.4 MG SL tablet  up to max of 3 total doses.  If no relief after 1 dose, call 911.             spironolactone (ALDACTONE) 25 MG tablet  Take 1 tablet by mouth daily                  Code Status: Full Code     Consults:   IP CONSULT TO HOSPITALIST  IP CONSULT TO CARDIOLOGY  IP CONSULT TO ELECTROPHYSIOLOGY  IP CONSULT TO

## 2021-08-31 NOTE — PROGRESS NOTES
Cardiology Progress Note     Today's Plan change lasix to po     Admit Date:  2021    Consult reason/ Seen today for: CHF    Subjective and  Overnight Events:  Reports shortness of breath is resolved- asking to go home-    Telemetry  SR    Assessment / Plan / Recommendation:   1. Elevated troponin type 2 demand leak in the setting of acute HF  2. HFrEF- acute on chronic - EF 15-20% - NYHA class III-with shortness of breath/ CXR with pulmonary edema/stopped diuretics at home d/t leg cramps -change lasix to po shortness of breath has resolved. Continue toprol xl and lisinopril: advised compliance with medications- limited echo completed EF 20% - strict I/O and daily weights-fluid restriction- EP consulted   3. Non ischemic cardiomyopathy- [facility] in 2021 normal coronaries- severe non ischemic cardiomyopathy  ER 20% with global hypokineses- was on GDMT however admits to not taking medications consistently- Limited Echo completed- EF 20% - will  continue with GDMT including ACEi/BB/ROMINA- advised to follow up as out patient for optimization of medications   4. VHD - moderate MR - continue ACEi           History of Presenting Illness:    Chief complain on admission : 61 y. o.year old who is admitted for  Chief Complaint   Patient presents with    Shortness of Breath        Past medical history:    has a past medical history of Anxiety, Environmental allergies, Non-healing surgical wound, and RLS (restless legs syndrome). Past surgical history:   has a past surgical history that includes Cholecystectomy; Appendectomy;  section; and knee surgery (). Social History:   reports that she has been smoking cigarettes. She has been smoking about 0.50 packs per day. She has never used smokeless tobacco. She reports that she does not drink alcohol and does not use drugs.   Family history:  family history includes Depression in an other family member. Allergies   Allergen Reactions    Adderall [Amphetamine-Dextroamphetamine] Itching    Morphine And Related     Tramadol     Ultram [Tramadol Hcl] Itching    Codeine Itching    Pcn [Penicillins] Swelling and Dermatitis       Review of Systems:   All 14 systems were reviewed and are negative  Except for the positive findings which are documented     BP 98/71   Pulse 78   Temp 97.8 °F (36.6 °C) (Oral)   Resp 18   Ht 5' 7\" (1.702 m)   Wt 180 lb (81.6 kg)   SpO2 96%   BMI 28.19 kg/m²       Intake/Output Summary (Last 24 hours) at 8/31/2021 0949  Last data filed at 8/30/2021 2041  Gross per 24 hour   Intake 480 ml   Output 2300 ml   Net -1820 ml       Physical Exam:  Physical Exam  Constitutional:       Appearance: Normal appearance. HENT:      Head: Normocephalic and atraumatic. Mouth/Throat:      Mouth: Mucous membranes are moist.   Cardiovascular:      Rate and Rhythm: Normal rate and regular rhythm. Pulses: Normal pulses. Heart sounds: Normal heart sounds. Pulmonary:      Effort: Pulmonary effort is normal.      Breath sounds: Normal breath sounds. Abdominal:      Palpations: Abdomen is soft. Musculoskeletal:      Right lower leg: No edema. Left lower leg: No edema. Skin:     General: Skin is warm and dry. Neurological:      General: No focal deficit present. Mental Status: She is alert, oriented to person, place, and time and easily aroused. Psychiatric:         Behavior: Behavior is withdrawn.           Medications:    lisinopril  2.5 mg Oral Daily    metoprolol succinate  25 mg Oral Daily    spironolactone  25 mg Oral Daily    sodium chloride flush  5-40 mL IntraVENous 2 times per day    enoxaparin  40 mg SubCUTAneous Daily    furosemide  40 mg IntraVENous BID      sodium chloride       albuterol sulfate HFA, nitroGLYCERIN, sodium chloride flush, sodium chloride, ondansetron **OR** ondansetron, polyethylene glycol, acetaminophen **OR** acetaminophen    Lab Data:  CBC:   Recent Labs     08/29/21  1220 08/30/21  0448   WBC 9.0 10.4   HGB 13.7 13.8   HCT 42.9 43.6   MCV 96.6 95.8    278     BMP:   Recent Labs     08/29/21  1220 08/30/21  0448    142   K 4.4 3.9    104   CO2 24 29   BUN 12 20   CREATININE 1.0 1.1     PT/INR: No results for input(s): PROTIME, INR in the last 72 hours. BNP:    Recent Labs     08/29/21  1220   PROBNP 9,703*     TROPONIN:   Recent Labs     08/29/21  1220 08/30/21  0448   TROPONINT 0.014* <0.010              Impression:  Active Problems:    Acute combined systolic and diastolic CHF, NYHA class 1 (HCC)    Non-ischemic cardiomyopathy ([facility] [address]  Resolved Problems:    * No resolved hospital problems. *       All labs, medications and tests reviewed by myself, continue all other medications of all above medical condition listed as is except for changes mentioned above. Thank you   Please call with questions. Electronically signed by SHAQUILLE Hartman CNP on 8/31/2021 at 9:49 AM  I have seen ,spoken to  and examined this patient personally, independently of the nurse practitioner. I have reviewed the hospital care given to date and reviewed all pertinent labs and imaging. The plan was developed mutually at the time of the visit with the patient,  NP  and myself. I have spoken with patient, nursing staff and provided written and verbal instructions . The above note has been reviewed and I agree with the assessment, diagnosis, and treatment plan with changes made by me as follows     CARDIOLOGY ATTENDING ADDENDUM    HPI:  I have reviewed the above HPI  And agree with above   Twila Boyce is a 61 y. o.year old who and presents with had concerns including Shortness of Breath.   Chief Complaint   Patient presents with    Shortness of Breath     Interval history:  Mild SOB    Physical Exam:  General:  Awake, alert, NAD  Head:normal  Eye:normal  Neck:  No JVD   Chest:  Clear to auscultation, respiration easy  Cardiovascular:  RRR S1S2  Abdomen:   nontender  Extremities:  tr edema  Pulses; palpable  Neuro: grossly normal      MEDICAL DECISION MAKING;    I agree with the above plan, which was planned by myself and discussed with NP.  CPM  ICD per EP        Maribel Lux MD [facility]

## 2021-08-31 NOTE — PROGRESS NOTES
Pt hit call light and when this nurse answered she stated she was ready to go home. This nurse went immediately into the room and explained to the pt there were no discharge orders as of yet. Pt stated \"I don't care I am going home! \" This nurse called family who attempted to speak with pt but pt refused. This nurse tried to explain the risks of leaving AMA and pt stated \"I have the medication at home can take it there. \" Pt refused to listen to anything else this nurse had to say. Pt also refused to sign AMA paperwork and walked off the unit after removing IV.

## 2022-03-24 ENCOUNTER — HOSPITAL ENCOUNTER (INPATIENT)
Age: 57
LOS: 2 days | Discharge: HOME | DRG: 194 | End: 2022-03-26
Attending: EMERGENCY MEDICINE | Admitting: INTERNAL MEDICINE
Payer: COMMERCIAL

## 2022-03-24 ENCOUNTER — APPOINTMENT (OUTPATIENT)
Dept: GENERAL RADIOLOGY | Age: 57
DRG: 194 | End: 2022-03-24
Payer: COMMERCIAL

## 2022-03-24 ENCOUNTER — APPOINTMENT (OUTPATIENT)
Dept: CT IMAGING | Age: 57
DRG: 194 | End: 2022-03-24
Payer: COMMERCIAL

## 2022-03-24 DIAGNOSIS — N28.9 ACUTE RENAL INSUFFICIENCY: ICD-10-CM

## 2022-03-24 DIAGNOSIS — R22.0 FACIAL SWELLING: ICD-10-CM

## 2022-03-24 DIAGNOSIS — R06.03 ACUTE RESPIRATORY DISTRESS: Primary | ICD-10-CM

## 2022-03-24 PROBLEM — R06.02 SOB (SHORTNESS OF BREATH): Status: ACTIVE | Noted: 2022-03-24

## 2022-03-24 LAB
ALBUMIN SERPL-MCNC: 3.9 GM/DL (ref 3.4–5)
ALP BLD-CCNC: 79 IU/L (ref 40–128)
ALT SERPL-CCNC: 32 U/L (ref 10–40)
AMPHETAMINES: NEGATIVE
ANION GAP SERPL CALCULATED.3IONS-SCNC: 14 MMOL/L (ref 4–16)
AST SERPL-CCNC: 32 IU/L (ref 15–37)
BACTERIA: NEGATIVE /HPF
BARBITURATE SCREEN URINE: NEGATIVE
BASE EXCESS: 2 (ref 0–2.4)
BASOPHILS ABSOLUTE: 0 K/CU MM
BASOPHILS RELATIVE PERCENT: 0.4 % (ref 0–1)
BENZODIAZEPINE SCREEN, URINE: NEGATIVE
BILIRUB SERPL-MCNC: 0.5 MG/DL (ref 0–1)
BILIRUBIN URINE: NEGATIVE MG/DL
BLOOD, URINE: ABNORMAL
BUN BLDV-MCNC: 23 MG/DL (ref 6–23)
CALCIUM SERPL-MCNC: 9.1 MG/DL (ref 8.3–10.6)
CANNABINOID SCREEN URINE: NEGATIVE
CHLORIDE BLD-SCNC: 95 MMOL/L (ref 99–110)
CLARITY: CLEAR
CO2: 22 MMOL/L (ref 21–32)
COCAINE METABOLITE: ABNORMAL
COLOR: YELLOW
COMMENT: ABNORMAL
CREAT SERPL-MCNC: 1.6 MG/DL (ref 0.6–1.1)
CREATININE URINE: 79.3 MG/DL (ref 28–217)
DIFFERENTIAL TYPE: ABNORMAL
EKG ATRIAL RATE: 92 BPM
EKG DIAGNOSIS: NORMAL
EKG P AXIS: 78 DEGREES
EKG P-R INTERVAL: 170 MS
EKG Q-T INTERVAL: 414 MS
EKG QRS DURATION: 138 MS
EKG QTC CALCULATION (BAZETT): 511 MS
EKG R AXIS: 34 DEGREES
EKG T AXIS: 72 DEGREES
EKG VENTRICULAR RATE: 92 BPM
EOSINOPHILS ABSOLUTE: 0.2 K/CU MM
EOSINOPHILS RELATIVE PERCENT: 2.1 % (ref 0–3)
GFR AFRICAN AMERICAN: 40 ML/MIN/1.73M2
GFR NON-AFRICAN AMERICAN: 33 ML/MIN/1.73M2
GLUCOSE BLD-MCNC: 124 MG/DL (ref 70–99)
GLUCOSE, URINE: NEGATIVE MG/DL
HCO3 VENOUS: 25.8 MMOL/L (ref 19–25)
HCT VFR BLD CALC: 45.5 % (ref 37–47)
HEMOGLOBIN: 14.9 GM/DL (ref 12.5–16)
IMMATURE NEUTROPHIL %: 0.3 % (ref 0–0.43)
KETONES, URINE: ABNORMAL MG/DL
LEUKOCYTE ESTERASE, URINE: ABNORMAL
LV EF: 15 %
LVEF MODALITY: NORMAL
LYMPHOCYTES ABSOLUTE: 2.2 K/CU MM
LYMPHOCYTES RELATIVE PERCENT: 20.9 % (ref 24–44)
MAGNESIUM: 2.2 MG/DL (ref 1.8–2.4)
MCH RBC QN AUTO: 30 PG (ref 27–31)
MCHC RBC AUTO-ENTMCNC: 32.7 % (ref 32–36)
MCV RBC AUTO: 91.5 FL (ref 78–100)
MONOCYTES ABSOLUTE: 0.8 K/CU MM
MONOCYTES RELATIVE PERCENT: 7.7 % (ref 0–4)
NITRITE URINE, QUANTITATIVE: NEGATIVE
NUCLEATED RBC %: 0 %
O2 SAT, VEN: 91.4 % (ref 50–70)
OPIATES, URINE: NEGATIVE
OXYCODONE: NEGATIVE
PCO2, VEN: 55 MMHG (ref 38–52)
PDW BLD-RTO: 12.9 % (ref 11.7–14.9)
PH VENOUS: 7.28 (ref 7.32–7.42)
PH, URINE: 6 (ref 5–8)
PHENCYCLIDINE, URINE: NEGATIVE
PLATELET # BLD: 342 K/CU MM (ref 140–440)
PMV BLD AUTO: 8.9 FL (ref 7.5–11.1)
PO2, VEN: 106 MMHG (ref 28–48)
POTASSIUM SERPL-SCNC: 3.4 MMOL/L (ref 3.5–5.1)
PRO-BNP: 7829 PG/ML
PROT/CREAT RATIO, UR: 0.3
PROTEIN UA: NEGATIVE MG/DL
RBC # BLD: 4.97 M/CU MM (ref 4.2–5.4)
RBC URINE: 1 /HPF (ref 0–6)
SARS-COV-2, NAAT: NOT DETECTED
SEGMENTED NEUTROPHILS ABSOLUTE COUNT: 7.3 K/CU MM
SEGMENTED NEUTROPHILS RELATIVE PERCENT: 68.6 % (ref 36–66)
SODIUM BLD-SCNC: 131 MMOL/L (ref 135–145)
SODIUM URINE: 22 MMOL/L (ref 35–167)
SOURCE: NORMAL
SPECIFIC GRAVITY UA: 1.01 (ref 1–1.03)
SQUAMOUS EPITHELIAL: <1 /HPF
TOTAL IMMATURE NEUTOROPHIL: 0.03 K/CU MM
TOTAL NUCLEATED RBC: 0 K/CU MM
TOTAL PROTEIN: 7.2 GM/DL (ref 6.4–8.2)
TRICHOMONAS: ABNORMAL /HPF
TROPONIN T: 0.02 NG/ML
TROPONIN T: <0.01 NG/ML
TROPONIN T: <0.01 NG/ML
URINE TOTAL PROTEIN: 20.2 MG/DL
UROBILINOGEN, URINE: 0.2 MG/DL (ref 0.2–1)
WBC # BLD: 10.6 K/CU MM (ref 4–10.5)
WBC UA: 4 /HPF (ref 0–5)

## 2022-03-24 PROCEDURE — 2580000003 HC RX 258: Performed by: EMERGENCY MEDICINE

## 2022-03-24 PROCEDURE — 93306 TTE W/DOPPLER COMPLETE: CPT

## 2022-03-24 PROCEDURE — 36415 COLL VENOUS BLD VENIPUNCTURE: CPT

## 2022-03-24 PROCEDURE — 2140000000 HC CCU INTERMEDIATE R&B

## 2022-03-24 PROCEDURE — 83735 ASSAY OF MAGNESIUM: CPT

## 2022-03-24 PROCEDURE — 93010 ELECTROCARDIOGRAM REPORT: CPT | Performed by: INTERNAL MEDICINE

## 2022-03-24 PROCEDURE — 2580000003 HC RX 258: Performed by: INTERNAL MEDICINE

## 2022-03-24 PROCEDURE — APPNB45 APP NON BILLABLE 31-45 MINUTES: Performed by: NURSE PRACTITIONER

## 2022-03-24 PROCEDURE — 96372 THER/PROPH/DIAG INJ SC/IM: CPT

## 2022-03-24 PROCEDURE — 82805 BLOOD GASES W/O2 SATURATION: CPT

## 2022-03-24 PROCEDURE — C9113 INJ PANTOPRAZOLE SODIUM, VIA: HCPCS | Performed by: NURSE PRACTITIONER

## 2022-03-24 PROCEDURE — 93005 ELECTROCARDIOGRAM TRACING: CPT | Performed by: EMERGENCY MEDICINE

## 2022-03-24 PROCEDURE — 6360000004 HC RX CONTRAST MEDICATION: Performed by: EMERGENCY MEDICINE

## 2022-03-24 PROCEDURE — A4216 STERILE WATER/SALINE, 10 ML: HCPCS | Performed by: EMERGENCY MEDICINE

## 2022-03-24 PROCEDURE — 70491 CT SOFT TISSUE NECK W/DYE: CPT

## 2022-03-24 PROCEDURE — 6370000000 HC RX 637 (ALT 250 FOR IP): Performed by: INTERNAL MEDICINE

## 2022-03-24 PROCEDURE — 6370000000 HC RX 637 (ALT 250 FOR IP): Performed by: NURSE PRACTITIONER

## 2022-03-24 PROCEDURE — 99254 IP/OBS CNSLTJ NEW/EST MOD 60: CPT | Performed by: INTERNAL MEDICINE

## 2022-03-24 PROCEDURE — 96375 TX/PRO/DX INJ NEW DRUG ADDON: CPT

## 2022-03-24 PROCEDURE — 84156 ASSAY OF PROTEIN URINE: CPT

## 2022-03-24 PROCEDURE — 82570 ASSAY OF URINE CREATININE: CPT

## 2022-03-24 PROCEDURE — 6360000002 HC RX W HCPCS: Performed by: INTERNAL MEDICINE

## 2022-03-24 PROCEDURE — 6360000002 HC RX W HCPCS: Performed by: EMERGENCY MEDICINE

## 2022-03-24 PROCEDURE — 6360000002 HC RX W HCPCS: Performed by: NURSE PRACTITIONER

## 2022-03-24 PROCEDURE — 83880 ASSAY OF NATRIURETIC PEPTIDE: CPT

## 2022-03-24 PROCEDURE — 80053 COMPREHEN METABOLIC PANEL: CPT

## 2022-03-24 PROCEDURE — 85025 COMPLETE CBC W/AUTO DIFF WBC: CPT

## 2022-03-24 PROCEDURE — 71045 X-RAY EXAM CHEST 1 VIEW: CPT

## 2022-03-24 PROCEDURE — 71275 CT ANGIOGRAPHY CHEST: CPT

## 2022-03-24 PROCEDURE — 2500000003 HC RX 250 WO HCPCS: Performed by: EMERGENCY MEDICINE

## 2022-03-24 PROCEDURE — 81001 URINALYSIS AUTO W/SCOPE: CPT

## 2022-03-24 PROCEDURE — 84300 ASSAY OF URINE SODIUM: CPT

## 2022-03-24 PROCEDURE — 84484 ASSAY OF TROPONIN QUANT: CPT

## 2022-03-24 PROCEDURE — 99284 EMERGENCY DEPT VISIT MOD MDM: CPT

## 2022-03-24 PROCEDURE — 2700000000 HC OXYGEN THERAPY PER DAY

## 2022-03-24 PROCEDURE — 96374 THER/PROPH/DIAG INJ IV PUSH: CPT

## 2022-03-24 PROCEDURE — 80307 DRUG TEST PRSMV CHEM ANLYZR: CPT

## 2022-03-24 PROCEDURE — 87635 SARS-COV-2 COVID-19 AMP PRB: CPT

## 2022-03-24 RX ORDER — SPIRONOLACTONE 25 MG/1
25 TABLET ORAL DAILY
Status: DISCONTINUED | OUTPATIENT
Start: 2022-03-24 | End: 2022-03-24

## 2022-03-24 RX ORDER — POTASSIUM CHLORIDE 20 MEQ/1
10 TABLET, EXTENDED RELEASE ORAL ONCE
Status: COMPLETED | OUTPATIENT
Start: 2022-03-24 | End: 2022-03-24

## 2022-03-24 RX ORDER — ACETAMINOPHEN 650 MG/1
650 SUPPOSITORY RECTAL EVERY 6 HOURS PRN
Status: DISCONTINUED | OUTPATIENT
Start: 2022-03-24 | End: 2022-03-26 | Stop reason: HOSPADM

## 2022-03-24 RX ORDER — ONDANSETRON 4 MG/1
4 TABLET, ORALLY DISINTEGRATING ORAL EVERY 8 HOURS PRN
Status: DISCONTINUED | OUTPATIENT
Start: 2022-03-24 | End: 2022-03-24

## 2022-03-24 RX ORDER — PROMETHAZINE HYDROCHLORIDE 25 MG/ML
6.25 INJECTION, SOLUTION INTRAMUSCULAR; INTRAVENOUS EVERY 6 HOURS PRN
Status: DISCONTINUED | OUTPATIENT
Start: 2022-03-24 | End: 2022-03-24 | Stop reason: ALTCHOICE

## 2022-03-24 RX ORDER — DIPHENHYDRAMINE HCL 25 MG
50 TABLET ORAL EVERY 6 HOURS PRN
Status: DISCONTINUED | OUTPATIENT
Start: 2022-03-24 | End: 2022-03-26 | Stop reason: HOSPADM

## 2022-03-24 RX ORDER — EPINEPHRINE 1 MG/ML
0.3 INJECTION, SOLUTION, CONCENTRATE INTRAVENOUS ONCE
Status: COMPLETED | OUTPATIENT
Start: 2022-03-24 | End: 2022-03-24

## 2022-03-24 RX ORDER — ACETAMINOPHEN 325 MG/1
650 TABLET ORAL EVERY 6 HOURS PRN
Status: DISCONTINUED | OUTPATIENT
Start: 2022-03-24 | End: 2022-03-26 | Stop reason: HOSPADM

## 2022-03-24 RX ORDER — PREDNISONE 20 MG/1
20 TABLET ORAL DAILY
Status: DISCONTINUED | OUTPATIENT
Start: 2022-03-26 | End: 2022-03-26 | Stop reason: HOSPADM

## 2022-03-24 RX ORDER — HALOPERIDOL 5 MG/ML
5 INJECTION INTRAMUSCULAR ONCE
Status: COMPLETED | OUTPATIENT
Start: 2022-03-24 | End: 2022-03-24

## 2022-03-24 RX ORDER — METHYLPREDNISOLONE SODIUM SUCCINATE 40 MG/ML
40 INJECTION, POWDER, LYOPHILIZED, FOR SOLUTION INTRAMUSCULAR; INTRAVENOUS EVERY 12 HOURS
Status: COMPLETED | OUTPATIENT
Start: 2022-03-25 | End: 2022-03-25

## 2022-03-24 RX ORDER — ONDANSETRON 2 MG/ML
4 INJECTION INTRAMUSCULAR; INTRAVENOUS EVERY 6 HOURS PRN
Status: DISCONTINUED | OUTPATIENT
Start: 2022-03-24 | End: 2022-03-24

## 2022-03-24 RX ORDER — SODIUM CHLORIDE 0.9 % (FLUSH) 0.9 %
10 SYRINGE (ML) INJECTION PRN
Status: DISCONTINUED | OUTPATIENT
Start: 2022-03-24 | End: 2022-03-26 | Stop reason: HOSPADM

## 2022-03-24 RX ORDER — METOPROLOL SUCCINATE 25 MG/1
25 TABLET, EXTENDED RELEASE ORAL DAILY
Status: DISCONTINUED | OUTPATIENT
Start: 2022-03-24 | End: 2022-03-24

## 2022-03-24 RX ORDER — METHYLPREDNISOLONE SODIUM SUCCINATE 125 MG/2ML
125 INJECTION, POWDER, LYOPHILIZED, FOR SOLUTION INTRAMUSCULAR; INTRAVENOUS ONCE
Status: COMPLETED | OUTPATIENT
Start: 2022-03-24 | End: 2022-03-24

## 2022-03-24 RX ORDER — ASPIRIN 81 MG/1
81 TABLET, CHEWABLE ORAL DAILY
Status: DISCONTINUED | OUTPATIENT
Start: 2022-03-24 | End: 2022-03-26 | Stop reason: HOSPADM

## 2022-03-24 RX ORDER — LORAZEPAM 2 MG/ML
2 INJECTION INTRAMUSCULAR ONCE
Status: COMPLETED | OUTPATIENT
Start: 2022-03-24 | End: 2022-03-24

## 2022-03-24 RX ORDER — DIPHENHYDRAMINE HYDROCHLORIDE 50 MG/ML
50 INJECTION INTRAMUSCULAR; INTRAVENOUS ONCE
Status: COMPLETED | OUTPATIENT
Start: 2022-03-24 | End: 2022-03-24

## 2022-03-24 RX ORDER — ALBUTEROL SULFATE 90 UG/1
2 AEROSOL, METERED RESPIRATORY (INHALATION) EVERY 6 HOURS PRN
Status: DISCONTINUED | OUTPATIENT
Start: 2022-03-24 | End: 2022-03-26 | Stop reason: HOSPADM

## 2022-03-24 RX ORDER — SODIUM CHLORIDE 0.9 % (FLUSH) 0.9 %
5-40 SYRINGE (ML) INJECTION 2 TIMES DAILY
Status: DISCONTINUED | OUTPATIENT
Start: 2022-03-24 | End: 2022-03-26 | Stop reason: HOSPADM

## 2022-03-24 RX ORDER — SODIUM CHLORIDE 9 MG/ML
25 INJECTION, SOLUTION INTRAVENOUS PRN
Status: DISCONTINUED | OUTPATIENT
Start: 2022-03-24 | End: 2022-03-26 | Stop reason: HOSPADM

## 2022-03-24 RX ORDER — PREDNISONE 20 MG/1
20 TABLET ORAL DAILY
Status: DISCONTINUED | OUTPATIENT
Start: 2022-03-24 | End: 2022-03-24

## 2022-03-24 RX ORDER — PROCHLORPERAZINE EDISYLATE 5 MG/ML
10 INJECTION INTRAMUSCULAR; INTRAVENOUS EVERY 6 HOURS PRN
Status: DISCONTINUED | OUTPATIENT
Start: 2022-03-24 | End: 2022-03-26 | Stop reason: HOSPADM

## 2022-03-24 RX ORDER — FUROSEMIDE 40 MG/1
40 TABLET ORAL DAILY
Status: DISCONTINUED | OUTPATIENT
Start: 2022-03-24 | End: 2022-03-24

## 2022-03-24 RX ORDER — FUROSEMIDE 10 MG/ML
40 INJECTION INTRAMUSCULAR; INTRAVENOUS ONCE
Status: COMPLETED | OUTPATIENT
Start: 2022-03-24 | End: 2022-03-24

## 2022-03-24 RX ORDER — PANTOPRAZOLE SODIUM 40 MG/10ML
40 INJECTION, POWDER, LYOPHILIZED, FOR SOLUTION INTRAVENOUS DAILY
Status: DISCONTINUED | OUTPATIENT
Start: 2022-03-24 | End: 2022-03-26 | Stop reason: HOSPADM

## 2022-03-24 RX ORDER — SODIUM CHLORIDE 0.9 % (FLUSH) 0.9 %
10 SYRINGE (ML) INJECTION EVERY 12 HOURS SCHEDULED
Status: DISCONTINUED | OUTPATIENT
Start: 2022-03-24 | End: 2022-03-26 | Stop reason: HOSPADM

## 2022-03-24 RX ADMIN — HALOPERIDOL LACTATE 5 MG: 5 INJECTION, SOLUTION INTRAMUSCULAR at 03:47

## 2022-03-24 RX ADMIN — LORAZEPAM 2 MG: 2 INJECTION, SOLUTION INTRAMUSCULAR; INTRAVENOUS at 03:40

## 2022-03-24 RX ADMIN — METHYLPREDNISOLONE SODIUM SUCCINATE 125 MG: 125 INJECTION, POWDER, LYOPHILIZED, FOR SOLUTION INTRAMUSCULAR; INTRAVENOUS at 02:48

## 2022-03-24 RX ADMIN — METHYLPREDNISOLONE SODIUM SUCCINATE 40 MG: 40 INJECTION, POWDER, FOR SOLUTION INTRAMUSCULAR; INTRAVENOUS at 23:15

## 2022-03-24 RX ADMIN — EPINEPHRINE 0.3 MG: 1 INJECTION, SOLUTION, CONCENTRATE INTRAVENOUS at 02:47

## 2022-03-24 RX ADMIN — ASPIRIN 81 MG 81 MG: 81 TABLET ORAL at 10:41

## 2022-03-24 RX ADMIN — SODIUM CHLORIDE, PRESERVATIVE FREE 10 ML: 5 INJECTION INTRAVENOUS at 20:14

## 2022-03-24 RX ADMIN — PREDNISONE 20 MG: 20 TABLET ORAL at 15:04

## 2022-03-24 RX ADMIN — IOPAMIDOL 75 ML: 755 INJECTION, SOLUTION INTRAVENOUS at 04:15

## 2022-03-24 RX ADMIN — POTASSIUM CHLORIDE 10 MEQ: 20 TABLET, EXTENDED RELEASE ORAL at 10:41

## 2022-03-24 RX ADMIN — FAMOTIDINE 20 MG: 10 INJECTION, SOLUTION INTRAVENOUS at 02:48

## 2022-03-24 RX ADMIN — FUROSEMIDE 40 MG: 10 INJECTION, SOLUTION INTRAMUSCULAR; INTRAVENOUS at 05:50

## 2022-03-24 RX ADMIN — SODIUM CHLORIDE, PRESERVATIVE FREE 10 ML: 5 INJECTION INTRAVENOUS at 10:47

## 2022-03-24 RX ADMIN — DIPHENHYDRAMINE HYDROCHLORIDE 50 MG: 50 INJECTION INTRAMUSCULAR; INTRAVENOUS at 02:48

## 2022-03-24 RX ADMIN — SODIUM CHLORIDE, PRESERVATIVE FREE 10 ML: 5 INJECTION INTRAVENOUS at 20:15

## 2022-03-24 RX ADMIN — ENOXAPARIN SODIUM 40 MG: 40 INJECTION SUBCUTANEOUS at 10:40

## 2022-03-24 RX ADMIN — PANTOPRAZOLE SODIUM 40 MG: 40 INJECTION, POWDER, FOR SOLUTION INTRAVENOUS at 10:41

## 2022-03-24 RX ADMIN — DIPHENHYDRAMINE HYDROCHLORIDE 50 MG: 25 TABLET ORAL at 15:04

## 2022-03-24 ASSESSMENT — PAIN SCALES - GENERAL: PAINLEVEL_OUTOF10: 0

## 2022-03-24 NOTE — ED NOTES
Pt continues to \"bounce\" all over the bed, in/ou, refuses to leave oxygen on, provider @ bedside.       Earl Kim RN  03/24/22 6011

## 2022-03-24 NOTE — ED TRIAGE NOTES
Pt presents to the ED with complaints of shortness of breath, nausea, vomiting, diarrhea, and cough. Pt O2 sat is 97% on room air. Pt presents restless during triage. PT complaining of facial swelling, stating \" I don't ever look like this\".      Dr. Sharon Naqvi at bedside during triage

## 2022-03-24 NOTE — PROGRESS NOTES
Patient requested that her mother is updated about her care. The phone number in the chart for her mother is not updated and she is unable to provide with a phone number at this time. Will reattempt at a later time when she wakes up more. Patient states that she does not wear o2 at home and is requiring 4L here, especially when sleeping.

## 2022-03-24 NOTE — PLAN OF CARE
Problem: Falls - Risk of:  Goal: Will remain free from falls  Description: Will remain free from falls  Outcome: Ongoing  Goal: Absence of physical injury  Description: Absence of physical injury  Outcome: Ongoing     Problem: Fluid Volume:  Goal: Ability to achieve a balanced intake and output will improve  Description: Ability to achieve a balanced intake and output will improve  Outcome: Ongoing     Problem: Physical Regulation:  Goal: Ability to maintain clinical measurements within normal limits will improve  Description: Ability to maintain clinical measurements within normal limits will improve  Outcome: Ongoing  Goal: Will show no signs and symptoms of electrolyte imbalance  Description: Will show no signs and symptoms of electrolyte imbalance  Outcome: Ongoing     Problem: Breathing Pattern - Ineffective:  Goal: Ability to achieve and maintain a regular respiratory rate will improve  Description: Ability to achieve and maintain a regular respiratory rate will improve  Outcome: Ongoing     Problem: Nausea/Vomiting:  Goal: Absence of nausea/vomiting  Description: Absence of nausea/vomiting  Outcome: Ongoing  Goal: Able to drink  Description: Able to drink  Outcome: Ongoing  Goal: Able to eat  Description: Able to eat  Outcome: Ongoing  Goal: Ability to achieve adequate nutritional intake will improve  Description: Ability to achieve adequate nutritional intake will improve  Outcome: Ongoing     Problem: Anxiety:  Goal: Level of anxiety will decrease  Description: Level of anxiety will decrease  Outcome: Ongoing

## 2022-03-24 NOTE — ED NOTES
Attempted another EKG without success, pt becomes agitated with contact, will make another attempts after she calms down.       Lacho Chavez RN  03/24/22 3281

## 2022-03-24 NOTE — CONSULTS
CARDIOLOGY CONSULT NOTE       Reason for consultation: CHF, elevated troponin    Referring physician:  Maribel Coto MD     Primary care physician: No primary care provider on file. Dear  Dr. Maribel Coto MD   Thanks for the consult. Chief Complaints :  Chief Complaint   Patient presents with    Facial Swelling    Shortness of Breath    Nausea    Emesis    Cough    Diarrhea        History of present illness:Courtney is a 61 y. o.year old who has past medical history of nonischemic cardiomyopathy, hypertension, CKD, anxiety, and substance abuse. Patient presents emergency department with complaints of shortness of breath, nausea, vomiting, and diarrhea. Patient has been combative requiring sedation, history obtained through clinical documentation. Patient has been having N/V/D and fatigue over the last 3 days. Reported facial swelling and burning sensation and mouth. Blood pressure elevated on arrival oxygen saturation stable on room air. CTA of chest was negative for PE, chest x-ray did show interstitial edema. Patient was treated for possible allergic reaction with Solu-Medrol, Benadryl, and epi with improvement in symptoms. Cardiology consulted for elevated troponin and acute CHF. Patient is currently off all medications due to possible medication reaction. Patient has been noncompliant in the past and has a history of substance abuse, current UA is positive for cocaine. Patient had heart cath in 2020 which showed nonischemic cardiomyopathy. Patient has not followed up with cardiology. Past medical history:    has a past medical history of Anxiety, Environmental allergies, Non-healing surgical wound, and RLS (restless legs syndrome). Past surgical history:   has a past surgical history that includes Cholecystectomy; Appendectomy;  section; and knee surgery (). Social History:   reports that she has been smoking cigarettes.  She has been smoking about 0.50 packs per day. She has never used smokeless tobacco. She reports that she does not drink alcohol and does not use drugs.   Family history:   no family history of CAD, STROKE of DM at early age    Allergies   Allergen Reactions    Adderall [Amphetamine-Dextroamphetamine] Itching    Morphine And Related     Tramadol     Ultram [Tramadol Hcl] Itching    Codeine Itching    Pcn [Penicillins] Swelling and Dermatitis       sodium chloride flush 0.9 % injection 5-40 mL, BID  albuterol sulfate  (90 Base) MCG/ACT inhaler 2 puff, Q6H PRN  sodium chloride flush 0.9 % injection 10 mL, 2 times per day  sodium chloride flush 0.9 % injection 10 mL, PRN  0.9 % sodium chloride infusion, PRN  bisacodyl (DULCOLAX) EC tablet 5 mg, Daily PRN  acetaminophen (TYLENOL) tablet 650 mg, Q6H PRN   Or  acetaminophen (TYLENOL) suppository 650 mg, Q6H PRN  enoxaparin (LOVENOX) injection 40 mg, Daily  aspirin chewable tablet 81 mg, Daily  predniSONE (DELTASONE) tablet 20 mg, Daily  pantoprazole (PROTONIX) injection 40 mg, Daily  diphenhydrAMINE (BENADRYL) tablet 50 mg, Q6H PRN  prochlorperazine (COMPAZINE) injection 10 mg, Q6H PRN      Current Facility-Administered Medications   Medication Dose Route Frequency Provider Last Rate Last Admin    sodium chloride flush 0.9 % injection 5-40 mL  5-40 mL IntraVENous BID Ann Wood MD   10 mL at 03/24/22 1047    albuterol sulfate  (90 Base) MCG/ACT inhaler 2 puff  2 puff Inhalation Q6H PRN Benson Wallace Montoya MD        sodium chloride flush 0.9 % injection 10 mL  10 mL IntraVENous 2 times per day Theresa BARBER MD   10 mL at 03/24/22 1047    sodium chloride flush 0.9 % injection 10 mL  10 mL IntraVENous PRN Benson Wallace Montoya MD        0.9 % sodium chloride infusion  25 mL IntraVENous PRN Benson Wallace Montoya MD        bisacodyl (DULCOLAX) EC tablet 5 mg  5 mg Oral Daily PRN Benson Wallace Montoya MD        acetaminophen (TYLENOL) tablet 650 mg  650 mg Oral Q6H PRN Theresa BARBER MD        Or   [employer] acetaminophen (TYLENOL) suppository 650 mg  650 mg Rectal Q6H PRN Benson Barone MD        enoxaparin (LOVENOX) injection 40 mg  40 mg SubCUTAneous Daily Benson Barone MD   40 mg at 03/24/22 1040    aspirin chewable tablet 81 mg  81 mg Oral Daily SHAQUILLE Nichole CNP   81 mg at 03/24/22 1041    predniSONE (DELTASONE) tablet 20 mg  20 mg Oral Daily SHAQUILLE Sood CNP        pantoprazole (PROTONIX) injection 40 mg  40 mg IntraVENous Daily SHAQUILLE Nichole CNP   40 mg at 03/24/22 1041    diphenhydrAMINE (BENADRYL) tablet 50 mg  50 mg Oral Q6H PRN SHAQUILLE Nichole CNP        prochlorperazine (COMPAZINE) injection 10 mg  10 mg IntraVENous Q6H PRN SHAQUILLE Nichole CNP         Review of Systems:   Review of Systems   Unable to perform ROS: Mental status change          Physical Examination:    Physical Exam  Constitutional:       Appearance: She is well-developed. Cardiovascular:      Rate and Rhythm: Normal rate and regular rhythm. Pulses: Intact distal pulses. Dorsalis pedis pulses are 2+ on the right side and 2+ on the left side. Posterior tibial pulses are 2+ on the right side and 2+ on the left side. Heart sounds: Normal heart sounds, S1 normal and S2 normal.   Pulmonary:      Effort: Pulmonary effort is normal.      Breath sounds: Normal breath sounds. Musculoskeletal:         General: Normal range of motion. Skin:     General: Skin is warm and dry. Neurological:      Mental Status: She is alert. Psychiatric:         Mood and Affect: Affect is angry. Behavior: Behavior is combative. Judgment: Judgment is impulsive.            Medical decision making and Data review:  Vitals:    03/24/22 0615 03/24/22 0630 03/24/22 0745 03/24/22 1224   BP:  107/66 134/83 117/68   Pulse: 89 91 92 74   Resp: 22 22 24 18   Temp:   98.2 °F (36.8 °C) 96.6 °F (35.9 °C)   TempSrc:   Axillary Axillary   SpO2:       Weight:       Height: Lab Review   Recent Labs     03/24/22  0230   WBC 10.6*   HGB 14.9   HCT 45.5         Recent Labs     03/24/22  0230   *   K 3.4*   CL 95*   CO2 22   BUN 23   CREATININE 1.6*     Recent Labs     03/24/22  0230   AST 32   ALT 32   BILITOT 0.5   ALKPHOS 79     Recent Labs     03/24/22  0230 03/24/22  1130 03/24/22  1326   TROPONINT 0.016* <0.010 <0.010       Recent Labs     03/24/22  0230   PROBNP 4,986*     Lab Results   Component Value Date    INR 1.05 06/12/2015    PROTIME 11.9 06/12/2015       EKG: (reviewed by myself)    ECHO:(reviewed by myself)    Chest Xray:(reviewed by myself)  Echocardiogram complete 2D with doppler with color    Result Date: 3/24/2022  Transthoracic Echocardiography Report (TTE)  Demographics   Patient Name       Lane Dobbins     Date of Study       03/24/2022   Date of Birth      1965         Gender              Female   Age                61 year(s)         Race                   Patient Number     [id number]         Room Number         3126   Visit Number       [id number]   Corporate ID       [id number]   Accession Number   [id number]         [address] RVLYNETTE   Ordering Physician Annalisa Perla                     CNP                Physician           MD  Procedure Type of Study   TTE procedure:ECHOCARDIOGRAM COMPLETE 2D W DOPPLER W COLOR. Procedure Date Date: 03/24/2022 Start: 11:11 AM Study Location: Portable Technical Quality: Adequate visualization Indications:Dyspnea/SOB. Patient Status: Routine Height: 67 inches Weight: 190 pounds BSA: 1.98 m2 BMI: 29.76 kg/m2 HR: 78 bpm BP: 117/68 mmHg  Conclusions   Summary  Technically difficult study due to lack of patient cooperation. Left ventricular systolic function is abnormal.  Ejection fraction is visually estimated at 15%. Mild left ventricular hypertrophy.   The left ventricle is dilated. Grade II diastolic dysfunction. Moderately dilated left atrium. Trivial aortic regurgitation. No evidence of any pericardial effusion. Signature   ------------------------------------------------------------------  Electronically signed by Jossue Velazquez MD (Interpreting  physician) on 03/24/2022 at 01:09 PM  ------------------------------------------------------------------   Findings   Left Ventricle  Left ventricular systolic function is abnormal.  Ejection fraction is visually estimated at 15%. Mild left ventricular hypertrophy. The left ventricle is dilated. Grade II diastolic dysfunction. No regional wall motion abnormalities; global hypokinesis. Left Atrium  Moderately dilated left atrium. Right Atrium  Essentially normal right atrium. Right Ventricle  Essentially normal right ventricle. Aortic Valve  Trivial aortic regurgitation. Mitral Valve  Mild mitral regurgitation. Tricuspid Valve  Structurally normal tricuspid valve. Pulmonic Valve  The pulmonic valve was not well visualized. Pericardial Effusion  No evidence of any pericardial effusion. Pleural Effusion  No evidence of pleural effusion. Miscellaneous  Inferior vena cava is dilated, measuring at 2.2 cm, but does collapse with  respiration. The aorta is within normal limits.   M-Mode/2D Measurements & Calculations   LV Diastolic Dimension:  LV Systolic Dimension:  LA Dimension: 4.6 cmAO Root  6.74 cm                  6.31 cm                 Dimension: 3.6 cmLA Area:  LV FS:6.4 %              LV Volume Diastolic:    46.5 cm2  LV PW Diastolic: 5.09 cm 973 ml  LV PW Systolic: 4.29 cm  LV Volume Systolic: 151  Septum Diastolic: 8.34   ml  cm                       LV EDV/LV EDV Index:    RV Diastolic Dimension: 3.5  Septum Systolic: 1.6 cm  088 DZ/02 m2LV ESV/LV   cm  CO: 6.67 l/min           ESV Index: 137 ml/69 m2  CI: 3.37 l/m*m2          EF Calculated (A4C):    LA/Aorta: 1.28 15.4 %  LV Area Diastolic: 37.3  EF Calculated (2D):     LA volume/Index: 88 ml  cm2                      13.7 %                  /61B5  LV Area Systolic: 66.3  cm2                      LV Length: 8.22 cm                            LVOT: 2.3 cm  Doppler Measurements & Calculations   MV Peak E-Wave: 107     AV Peak Velocity: 182 cm/s   LVOT Peak Velocity: 100  cm/s                    AV Peak Gradient: 13.25 mmHg cm/s  MV Peak A-Wave: 81.9    AV Mean Velocity: 127 cm/s   LVOT Mean Velocity:  cm/s                    AV Mean Gradient: 7 mmHg     68.8 cm/s  MV E/A Ratio: 1.31      AV VTI: 34.7 cm              LVOT Peak Gradient: 4  MV Peak Gradient: 4.58  AV Area (Continuity):2.47    mmHgLVOT Mean Gradient:  mmHg                    cm2                          2 mmHg   MV P1/2t: 53 msec       LVOT VTI: 20.6 cm  MVA by PHT:4.15 cm2   MV E' Septal Velocity:  3.51 cm/s  MV E' Lateral Velocity:  9.54 cm/s  MV E/E' septal: 30.48  MV E/E' lateral: 11.22      CT SOFT TISSUE NECK W CONTRAST    Result Date: 3/24/2022  EXAMINATION: CT OF THE NECK SOFT TISSUE WITH CONTRAST  3/24/2022 TECHNIQUE: CT of the neck was performed with the administration of intravenous contrast. Multiplanar reformatted images are provided for review. Dose modulation, iterative reconstruction, and/or weight based adjustment of the mA/kV was utilized to reduce the radiation dose to as low as reasonably achievable. COMPARISON: Noncontrast cervical spine and head CTs dated 06/12/2015. HISTORY: ORDERING SYSTEM PROVIDED HISTORY: facial swelling and difficulty swallowing TECHNOLOGIST PROVIDED HISTORY: Reason for exam:->facial swelling and difficulty swallowing Decision Support Exception - unselect if not a suspected or confirmed emergency medical condition->Emergency Medical Condition (MA) Reason for Exam: facial swelling and difficulty swallowing FINDINGS: PHARYNX/LARYNX:  The palatine tonsils are normal in appearance. The tongue is normal in appearance.   The valleculae, epiglottis, aryepiglottic folds and pyriform sinuses appear unremarkable. The true and false vocal cords are normal in appearance. No mass or abscess is seen. SALIVARY GLANDS/THYROID:  The parotid and submandibular glands appear unremarkable. The thyroid gland appears unremarkable. LYMPH NODES:  No cervical or supraclavicular lymphadenopathy is seen. SOFT TISSUES:  Mild edema of the bilateral mid to posterior buccal fatty tissues is noted. No evidence of abscess or mass. BRAIN/ORBITS/SINUSES:  The visualized portion of the intracranial contents appear unremarkable. The visualized portion of the orbits, paranasal sinuses and mastoid air cells demonstrate no acute abnormality. There is an old healed medial right orbital wall blowout fracture. LUNG APICES/SUPERIOR MEDIASTINUM:  No focal consolidation is seen within the visualized lung apices. No superior mediastinal lymphadenopathy or mass. The visualized portion of the trachea appears unremarkable. The main pulmonary artery is dilated measuring 4.4 cm diameter. BONES:  No aggressive appearing lytic or blastic bony lesion. There is moderate reversal of the normal cervical lordosis which may be due to muscle spasm or patient positioning. Motion artifact gives the appearance of a dens fracture. No fracture is present. Mild edema of the bilateral mid to posterior buccal fatty tissues possibly representing mild cellulitis. No evidence of abscess or mass. Dilated minor pulmonary artery at 4.4 cm which may be due to pulmonary arterial hypertension. Old healed right medial orbital wall blowout fracture. Moderate reversal of the normal cervical lordosis which may be due to muscle spasm or patient positioning.      XR CHEST PORTABLE    Result Date: 3/24/2022  EXAMINATION: ONE X-RAY VIEW OF THE CHEST 3/24/2022 2:21 am COMPARISON: August 29, 2021 HISTORY: ORDERING SYSTEM PROVIDED HISTORY: dyspnea TECHNOLOGIST PROVIDED HISTORY: Reason for exam:->dyspnea Reason for Exam: dyspnea Additional signs and symptoms: cough , sob FINDINGS: No lines or tubes. Cardiomediastinal silhouette is enlarged. Ground-glass and reticular opacities are identified bilaterally. No significant pleural effusions. No pneumothorax. No acute or aggressive osseous lesion. 1. Cardiomegaly with atypical infection versus mild interstitial edema. CTA CHEST W CONTRAST    Result Date: 3/24/2022  EXAMINATION: CTA OF THE CHEST 3/24/2022 4:15 am TECHNIQUE: CTA of the chest was performed after the administration of intravenous contrast.  Multiplanar reformatted images are provided for review. MIP images are provided for review. Dose modulation, iterative reconstruction, and/or weight based adjustment of the mA/kV was utilized to reduce the radiation dose to as low as reasonably achievable. COMPARISON: Chest radiograph today. Chest CT 12/15/2020. HISTORY: ORDERING SYSTEM PROVIDED HISTORY: dyspnea TECHNOLOGIST PROVIDED HISTORY: Reason for exam:->dyspnea Reason for Exam: dyspnea FINDINGS: Pulmonary Arteries: Suboptimal contrast timing, limiting evaluation of the segmental branches. Motion artifact is also noted. No evidence for central pulmonary embolism. The main pulmonary artery is enlarged measuring 40 mm. Mediastinum: No evidence of mediastinal lymphadenopathy. The heart and pericardium demonstrate no acute abnormality. Cardiomegaly. There is no acute abnormality of the thoracic aorta. Lungs/pleura: Mild respiratory motion artifact. Findings compatible subsegmental atelectasis in the lung bases. No consolidation, evidence for edema or effusion. Mild centrilobular emphysematous disease. The central airway is patent. Upper Abdomen: Cholecystectomy. No acute findings. Soft Tissues/Bones: Osseous detail degraded by motion artifact, notably involving the sternum and ribs laterally. No convincing evidence for acute fracture. No acute soft tissue abnormality identified.      1. Suboptimal exam.  No evidence for central pulmonary embolism. 2.  No acute airspace disease identified. Findings compatible with subsegmental atelectasis. Mild emphysematous disease. 3.  Cardiomegaly. Enlarged main pulmonary artery again demonstrated, which may indicate underlying portal hypertension. All labs, medications and tests reviewed by myself including data  from outside source , patient and available family . Continue all other medications of all above medical condition listed as is. Impression:  Principal Problem:    SOB (shortness of breath)  Resolved Problems:    * No resolved hospital problems. *      Assessment: 61 y. o.year old with PMH of  has a past medical history of Anxiety, Environmental allergies, Non-healing surgical wound, and RLS (restless legs syndrome). Plan and Recommendations:    1. Elevated Troponin: trivial elevation of 0.0016, now normal.  No further work up at this time. 2. HFrEF: Appears compensated. Echo shows EF-15 % moderately dilated LA. All medications on hold due to possible allergic reaction. Soft B/P at this time. Will hold off on restarting medications at this time. Close monitoring for fluid overload. CTA -for PE, no pulmonary edema. Elevated BNP possibly chronic given no pulmonary edema on CT. Patient does not appear to be in fluid overload or respiratory distress. Strict Is and Os and Daily weights. 3. NICM: [facility] 11/27/20 LEF 20 % no CAD. Substance abuse + cocaine on urine toxicity. Patient has not followed up with cardiology unsure if she is getting home medications else where. She reports taking medications when she \"feels like it\"  4. Prolonged QTc-511  5. HTN: stable, continue present medications   6. Possible allergic reaction: SOB, facial swelling, N/V/D. Medications on hold, was on ACE but patient non-complaint with medications. 7. DOROTHY: nephrology consulted. Hyponatremia and hypokalemia. 8. Substance abuse: UA positive for cocaine. Thank you  much for consult and giving us the opportunity in contributing in the care of this patient. Please feel free to call me for any questions. Janes No, SHAQUILLE - CNP, 3/24/2022 3:01 PM         CARDIOLOGY ATTENDING ADDENDUM    I have seen, spoken to and examined this patient personally, independent of the NP/PAC. I have reviewed the hospital care given to date and reviewed all pertinent labs and imaging. I have spoken with patient, nursing staff and provided written and verbal instructions . The above note has been reviewed. I have spent substantive amount of time in formulating patient care. HPI:       23-year-old female with prior history of established nonischemic cardiomyopathy history of drug abuse, noncompliance CKD hypertension who presented to the hospital with chief complaint of shortness of breath nausea vomiting and diarrhea  Detailed history could not be obtained from the patient as she is combative and poorly reliable with history      Since arrival to the hospital patient is noted to have hypertensive urgency, CT of the chest is negative for PE chest x-ray does not reveal pulmonary interstitial edema. There was a question about allergic reaction, patient was treated with Solu-Medrol Benadryl and epinephrine with some improvement in her symptoms    Cardiology consulted for elevated troponin and possible CHF    Patient left heart cardiac catheterization in 2020 which showed nonischemic cardiomyopathy, she does not follow-up with cardiology as routine.           Physical Exam:    General:   alert  Head:normal  Eye:normal  Chest:   Clear     Cardiovascular:  S1S2   Abdomen: soft   Extremities:   0 edema  Pulses; palpable      MEDICAL DECISION MAKING :       Severe nonischemic cardiomyopathy  History of noncompliance  Acute renal failure with underlying CKD  Questionable angioedema on admission  Drug abuse, positive for cocaine  Essential hypertension        Suggest

## 2022-03-24 NOTE — ED NOTES
Report called to [facility], RN, all questions addressed.       Violet Eastman RN  03/24/22 [id number]

## 2022-03-24 NOTE — ED PROVIDER NOTES
Triage Chief Complaint:   Facial Swelling, Shortness of Breath, Nausea, Emesis, Cough, and Diarrhea    [place]:  Miller Sanchez is a 61 y.o. female that presents with main complaint of facial swelling, difficulty breathing and swallowing. The patient states that over the past 3 days she has been in bed feeling ill with nausea, vomiting, diarrhea and fatigue. States that she has not been able to take her medications because of her symptoms. States that about an hour prior to presentation she woke up because she was having some difficulty breathing, coughing, difficulty swallowing and noticed facial swelling. States the inside of her mouth is burning in her face is never looked like this before. Denies any recent changes in medications. Denies any fevers, chest pain, abdominal pain, urinary symptoms. ROS:  At least 10 systems reviewed and otherwise acutely negative except as in the [address]     Past Medical History:   Diagnosis Date    Anxiety     Environmental allergies     Non-healing surgical wound     RLS (restless legs syndrome)      Past Surgical History:   Procedure Laterality Date    APPENDECTOMY       SECTION      CHOLECYSTECTOMY      KNEE SURGERY  2014     Family History   Problem Relation Age of Onset    Depression Other      Social History     Socioeconomic History    Marital status:      Spouse name: Not on file    Number of children: Not on file    Years of education: Not on file    Highest education level: Not on file   Occupational History    Not on file   Tobacco Use    Smoking status: Current Every Day Smoker     Packs/day: 0.50     Types: Cigarettes    Smokeless tobacco: Never Used   Vaping Use    Vaping Use: Never used   Substance and Sexual Activity    Alcohol use: No    Drug use: No    Sexual activity: Not Currently   Other Topics Concern    Not on file   Social History Narrative    Not on file     Social Determinants of Health     Financial Resource Strain:     Difficulty of Paying Living Expenses: Not on file   Food Insecurity:     Worried About Running Out of Food in the Last Year: Not on file    Travis of Food in the Last Year: Not on file   Transportation Needs:     Lack of Transportation (Medical): Not on file    Lack of Transportation (Non-Medical): Not on file   Physical Activity:     Days of Exercise per Week: Not on file    Minutes of Exercise per Session: Not on file   Stress:     Feeling of Stress : Not on file   Social Connections:     Frequency of Communication with Friends and Family: Not on file    Frequency of Social Gatherings with Friends and Family: Not on file    Attends Latter-day Services: Not on file    Active Member of [address] [place] [address] [employer] or Organizations: Not on file    Attends Club or Organization Meetings: Not on file    Marital Status: Not on file   Intimate Partner Violence:     Fear of Current or Ex-Partner: Not on file    Emotionally Abused: Not on file    Physically Abused: Not on file    Sexually Abused: Not on file   Housing Stability:     Unable to Pay for Housing in the Last Year: Not on file    Number of [place] in the Last Year: Not on file    Unstable Housing in the Last Year: Not on file     Current Facility-Administered Medications   Medication Dose Route Frequency Provider Last Rate Last Admin    furosemide (LASIX) injection 40 mg  40 mg IntraVENous Once Andrea De León MD        sodium chloride flush 0.9 % injection 5-40 mL  5-40 mL IntraVENous BID Andrea De León MD         Current Outpatient Medications   Medication Sig Dispense Refill    nitroGLYCERIN (NITROSTAT) 0.4 MG SL tablet up to max of 3 total doses.  If no relief after 1 dose, call 911. 25 tablet 3    albuterol sulfate  (90 Base) MCG/ACT inhaler Inhale 2 puffs into the lungs every 6 hours as needed for Wheezing 1 Inhaler 3    lisinopril (PRINIVIL;ZESTRIL) 2.5 MG tablet Take 1 tablet by mouth daily 30 tablet 3    metoprolol succinate (TOPROL XL) 25 MG extended release tablet Take 1 tablet by mouth daily 30 tablet 3    spironolactone (ALDACTONE) 25 MG tablet Take 1 tablet by mouth daily 30 tablet 3    furosemide (LASIX) 40 MG tablet Take 1 tablet by mouth daily 60 tablet 3     Allergies   Allergen Reactions    Adderall [Amphetamine-Dextroamphetamine] Itching    Morphine And Related     Tramadol     Ultram [Tramadol Hcl] Itching    Codeine Itching    Pcn [Penicillins] Swelling and Dermatitis       Nursing Notes Reviewed    Physical Exam:  ED Triage Vitals   Enc Vitals Group      BP 03/24/22 0217 (!) 130/103      Pulse 03/24/22 0212 93      Resp 03/24/22 0212 18      Temp 03/24/22 0212 98 °F (36.7 °C)      Temp Source 03/24/22 0212 Oral      SpO2 03/24/22 0212 96 %      Weight 03/24/22 0212 190 lb (86.2 kg)      Height 03/24/22 0212 5' 7\" (1.702 m)      Head Circumference --       Peak Flow --       Pain Score --       Pain Loc --       Pain Edu? --       Excl. in [address]? --      GENERAL APPEARANCE: Awake and alert. Cooperative. No acute distress. Appears anxious. HEAD: Normocephalic. Atraumatic. EYES: EOM's grossly intact. Sclera anicteric. ENT: Mucous membranes are moist. Tolerates saliva. No trismus. No stridor or drooling. No trismus. Edentulous. Bilateral facial edema with fullness to the buccal regions. No obvious glossal swelling. No posterior pharyngeal erythema, exudate or asymmetry. No voice hoarseness. NECK: Supple. No meningismus. Trachea midline. HEART: RRR. Radial pulses 2+. LUNGS: Respirations unlabored. Expiratory wheezing bilaterally  ABDOMEN: Soft. Non-tender. No guarding or rebound. EXTREMITIES: No acute deformities. No edema  SKIN: Warm and dry. NEUROLOGICAL: No gross facial drooping. Moves all 4 extremities spontaneously. PSYCHIATRIC: Normal mood.     I have reviewed and interpreted all of the currently available lab results from this visit (if applicable):  Results for orders placed or performed during the hospital encounter of 03/24/22   CBC with Auto Differential   Result Value Ref Range    WBC 10.6 (H) 4.0 - 10.5 K/CU MM    RBC 4.97 4.2 - 5.4 M/CU MM    Hemoglobin 14.9 12.5 - 16.0 GM/DL    Hematocrit 45.5 37 - 47 %    MCV 91.5 78 - 100 FL    MCH 30.0 27 - 31 PG    MCHC 32.7 32.0 - 36.0 %    RDW 12.9 11.7 - 14.9 %    Platelets 869 655 - 306 K/CU MM    MPV 8.9 7.5 - 11.1 FL    Differential Type AUTOMATED DIFFERENTIAL     Segs Relative 68.6 (H) 36 - 66 %    Lymphocytes % 20.9 (L) 24 - 44 %    Monocytes % 7.7 (H) 0 - 4 %    Eosinophils % 2.1 0 - 3 %    Basophils % 0.4 0 - 1 %    Segs Absolute 7.3 K/CU MM    Lymphocytes Absolute 2.2 K/CU MM    Monocytes Absolute 0.8 K/CU MM    Eosinophils Absolute 0.2 K/CU MM    Basophils Absolute 0.0 K/CU MM    Nucleated RBC % 0.0 %    Total Nucleated RBC 0.0 K/CU MM    Total Immature Neutrophil 0.03 K/CU MM    Immature Neutrophil % 0.3 0 - 0.43 %   Comprehensive Metabolic Panel w/ Reflex to MG   Result Value Ref Range    Sodium 131 (L) 135 - 145 MMOL/L    Potassium 3.4 (L) 3.5 - 5.1 MMOL/L    Chloride 95 (L) 99 - 110 mMol/L    CO2 22 21 - 32 MMOL/L    BUN 23 6 - 23 MG/DL    CREATININE 1.6 (H) 0.6 - 1.1 MG/DL    Glucose 124 (H) 70 - 99 MG/DL    Calcium 9.1 8.3 - 10.6 MG/DL    Albumin 3.9 3.4 - 5.0 GM/DL    Total Protein 7.2 6.4 - 8.2 GM/DL    Total Bilirubin 0.5 0.0 - 1.0 MG/DL    ALT 32 10 - 40 U/L    AST 32 15 - 37 IU/L    Alkaline Phosphatase 79 40 - 128 IU/L    GFR Non- 33 (L) >60 mL/min/1.73m2    GFR  40 (L) >60 mL/min/1.73m2    Anion Gap 14 4 - 16   Troponin   Result Value Ref Range    Troponin T 0.016 (H) <0.01 NG/ML   Brain Natriuretic Peptide   Result Value Ref Range    Pro-BNP 7,829 (H) <300 PG/ML   Magnesium   Result Value Ref Range    Magnesium 2.2 1.8 - 2.4 mg/dl      Radiographs (if obtained):  [] The following radiograph was interpreted by myself in the absence of a radiologist:  [x] Radiologist's Report Reviewed:    EKG (if obtained): (All EKG's are interpreted by myself in the absence of a cardiologist)  Sinus rhythm with normal axis. PACs. LVH with repolarization abnormality. No ST elevation. QTc slightly prolonged. MDM:  Plan of care is discussed thoroughly with the patient and family if present. If performed, all imaging and lab work also discussed with patient. All relevant prior results and chart reviewed if available. NIH Stroke Scale  Interval: Baseline  Level of Consciousness (1a): Alert  LOC Questions (1b): Answers both correctly  LOC Commands (1c): Performs both tasks correctly  Best Gaze (2): Normal  Visual (3): No visual loss  Facial Palsy (4): Normal symmetrical movement  Motor Arm, Left (5a): No drift  Motor Arm, Right (5b): No drift  Motor Leg, Left (6a): No drift  Motor Leg, Right (6b): No drift  Limb Ataxia (7): Absent  Sensory (8): Normal  Best Language (9): No aphasia  Dysarthria (10): Normal  Extinction and Inattention (11): No abnormality  Total: 0    Presents as above. She is not in acute distress. Vital signs are normal.  She does not have any stridor, voice hoarseness, trismus. She is tolerating secretions. She does have some bilateral facial edema and buccal edema on exam.  No obvious glossal swelling or posterior pharyngeal erythema or edema. Suspect possible angioedema. The patient is on lisinopril. She is given epinephrine, Solu-Medrol, Pepcid and Benadryl here. While being observed, the patient has intractable coughing and is becoming more agitated. States that she is unable to breathe. Respiratory rate is unchanged. Oxygenation saturation is normal.  She continues to move good air bilaterally without any stridor. She does not have any increase in oral swelling. Due to agitation, patient repeatedly getting out of bed saying that she is hot. Patient was given Ativan and some Haldol so that we can get CT scans to further evaluate the patient's current condition.   Patient is becoming somewhat diaphoretic. Unknown etiology at this time and I have lower suspicion for allergic reaction or angioedema based on evolving symptoms. X-ray showing mild interstitial edema which may be contributing to her cough. She is given dose of Lasix here. CTs do not show any obvious acute abnormality. No evidence of PE or obvious pulmonary process. She does have some fatty tissue swelling of the buccal areas bilaterally but no obvious abscess or other areas concerning for developing angioedema. She has not had progression of swelling during her emergency department stay. She does appear much more comfortable after she was medicated here. Labs significant for some acute renal insufficiency and a slightly detectable troponin which will need to be trended. Unknown etiology of the patient's symptoms at this time and she does require admission for further observation and management. Clinical Impression:  1. Acute respiratory distress    2. Facial swelling    3.  Acute renal insufficiency      (Please note that portions of this note may have been completed with a voice recognition program. Efforts were made to edit the dictations but occasionally words are mis-transcribed.)    MD Bronson Zimmerman MD  03/24/22 Erum Norton MD  03/24/22 1258

## 2022-03-24 NOTE — CONSULTS
Patient:   Chang Us    Date:  22  :  1965, 61 y.o. Nephrologist: Ignacio Shone, MD  Provider: No primary care provider on file. Reason for Consult: Acute kidney injury with underlying CKD stage III    Consult requested by : Dr Gisell Parson, APRN - MANAV    Chief Complaint:   Shortness of breath, perceived facial edema, nausea vomiting and diarrhea for several days    HISTORY OF PRESENT ILLNESS:   Ms. Scott James is an unfortunate 50-year-old female, who presented to the emergency department with above-mentioned symptoms. Unfortunately patient is sedated with combination of Haldol and Ativan now, I was not able to wake her up to get any history from her. Hence, I am mainly relying on the data ,that is available in the epic system. Upon arrival in our emergency department, she was afebrile and hemodynamic stable, and her oxygen saturation was at mid 90s on ambient air. She was given empirically epinephrine, Solu-Medrol and Benadryl for fear of angioedema ,as she was taking ACE inhibitors, she was very restless and had to be sedated with Haldol and Ativan. She then, underwent several diagnostic tests which include imaging and biochemical.    Imaging studies which include chest x-ray, CT soft tissue neck with contrast, and CT angiogram chest with contrast, showed-(radiologist interpretation) mild bilateral posterior buccal fatty tissue edema, dilated pulmonary artery to 4.4 cm, no overt pulmonary edema, i.e. groundglass appearance, cardiomegaly, and evidence of mild emphysema. Biochemical testing showed increased creatinine of 1.6, which is much higher than her creatinine in 2021 of 1.1. Other pertinent abnormal lab include low sodium of 131, low potassium of 3.4. Slight leukocytosis with white count more than 10,000 and lymphopenia, high proBNP level almost close to 8000, unconfirmed positive cocaine in  urine drug screen.     She was subsequently admitted to medical floor for further management and evaluation. I was able to review her prior renal data. Her creatinine was 0.9 mg/dL back in , she had one episode  acute kidney injury by creatinine criteria in the past,  in 2020 with peak creatinine of 1.5,      PMH :   1.  Severe cardiomyopathy with most recent echocardiogram showed left ventricular ejection fraction of 20%-according to cardiologist note, thought to be nonischemic cardiomyopathy-as her heart cath in 2021 showed normal coronaries. 2.  Significant valvular disease mainly moderate mitral regurgitation. 3.  Probable substance abuse history        PSH :  1.   Apparently underwent appendectomy, , cholecystectomy knee surgery      OB GYN Hx:  Unknown at this time kidney    Habits :   Her urine drug screen showed unconfirmed positive cocaine  According to epic report she smokes half pack per day  Unsure about alcohol consumption        Soc Hx:  Apparently she is   Other social history is unknown to me    [address]   Epic report showed family history of depression          REVIEW OF SYSTEMS:     All pertinent ROS neg except   Nausea, vomiting, diarrhea, sensation of facial swelling, also shortness of breath    Current Facility-Administered Medications   Medication Dose Route Frequency Provider Last Rate Last Admin    sodium chloride flush 0.9 % injection 5-40 mL  5-40 mL IntraVENous BID John Gomez MD        albuterol sulfate  (90 Base) MCG/ACT inhaler 2 puff  2 puff Inhalation Q6H PRN Benson Wilkes MD        sodium chloride flush 0.9 % injection 10 mL  10 mL IntraVENous 2 times per day Nabila BARBER MD        sodium chloride flush 0.9 % injection 10 mL  10 mL IntraVENous PRN Benson Wilkes MD        0.9 % sodium chloride infusion  25 mL IntraVENous PRN Benson Wilkes MD        bisacodyl (DULCOLAX) EC tablet 5 mg  5 mg Oral Daily PRN Bensonbert Wilkes MD        acetaminophen (TYLENOL) tablet 650 mg  650 mg Oral Q6H PRN Benson Daley MD        Or    acetaminophen (TYLENOL) suppository 650 mg  650 mg Rectal Q6H PRN Benson Daley MD        enoxaparin (LOVENOX) injection 40 mg  40 mg SubCUTAneous Daily Benson Daley MD        aspirin chewable tablet 81 mg  81 mg Oral Daily SHAQUILLE Silva CNP        predniSONE (DELTASONE) tablet 20 mg  20 mg Oral Daily SHAQUILLE Sood CNP        promethazine (PHENERGAN) injection 6.25 mg  6.25 mg IntraMUSCular Q6H PRN SHAQUILLE Silva - SHAWN        pantoprazole (PROTONIX) injection 40 mg  40 mg IntraVENous Daily SHAQUILLE Silva - CNP        potassium chloride (KLOR-CON M) extended release tablet 10 mEq  10 mEq Oral Once Saumya Rojo, SHAQUILLE - CNP        diphenhydrAMINE (BENADRYL) tablet 50 mg  50 mg Oral Q6H PRN SHAQUILLE Sood CNP           /83   Pulse 92   Temp 98.2 °F (36.8 °C) (Axillary)   Resp 24   Ht 5' 7\" (1.702 m)   Wt 190 lb (86.2 kg)   SpO2 94%   BMI 29.76 kg/m²     PHYSICAL EXAM:  General appearance: She was sleeping and I was unable to have any conversation with her, she was snoring  HEENT: Mild conjunctival pallor-unable to adequately assess for facial edema but she has no respiratory distress  Neck: Seems  Heart: Regular rate and rhythm  LUNGS: Very poor airflow and limited exam  Abdomen: Soft, nontender, well-healed scar from previous surgery  Extremities: No overt edema  Also well-healed scar from previous surgery in her knee    LABS:  CBC:   Lab Results   Component Value Date    WBC 10.6 03/24/2022    WBC 10.4 08/30/2021    WBC 9.0 08/29/2021    HGB 14.9 03/24/2022    HGB 13.8 08/30/2021    HGB 13.7 08/29/2021     03/24/2022     08/30/2021     08/29/2021     Renal Panel:   Lab Results   Component Value Date     03/24/2022     08/30/2021     08/29/2021    K 3.4 03/24/2022    K 3.9 08/30/2021    K 4.4 08/29/2021    CL 95 03/24/2022     08/30/2021     08/29/2021    CO2 22 03/24/2022    CO2 29 08/30/2021    CO2 24 08/29/2021    BUN 23 03/24/2022    BUN 20 08/30/2021    BUN 12 08/29/2021    CREATININE 1.6 03/24/2022    CREATININE 1.1 08/30/2021    CREATININE 1.0 08/29/2021    GFRAA 40 03/24/2022    GFRAA >60 08/30/2021    GFRAA >60 08/29/2021    LABGLOM 33 03/24/2022    LABGLOM 51 08/30/2021    LABGLOM 57 08/29/2021    LABALBU 3.9 03/24/2022    LABALBU 3.9 08/29/2021    LABALBU 3.6 12/15/2020         Calcium:    Lab Results   Component Value Date    CALCIUM 9.1 03/24/2022     Phosphorus:  No results found for: PHOS    U/A:    Lab Results   Component Value Date    PROTEINU NEGATIVE 12/04/2020    NITRU NEGATIVE 12/04/2020    NITRU NEGATIVE 08/18/2012    COLORU YELLOW 12/04/2020    WBCUA <1 12/04/2020    RBCUA 3 12/04/2020    MUCUS RARE 10/30/2020    TRICHOMONAS NONE SEEN 12/04/2020    BACTERIA NEGATIVE 12/04/2020    CLARITYU SLIGHTLY CLOUDY 12/04/2020    SPECGRAV 1.016 12/04/2020    UROBILINOGEN NORMAL 12/04/2020    BILIRUBINUR NEGATIVE 12/04/2020    BLOODU NEGATIVE 12/04/2020    GLUCOSEU NEGATIVE 08/18/2012    KETUA NEGATIVE 12/04/2020           IMPRESSION:  1. Acute kidney injury with underlying CKD stage III-based on limited data, potential etiology, cardiorenal syndrome type I, cocaine induced vasospasm, may have transient intravascular volume depletion from nausea vomiting and diarrhea and home diuretics, but she has risk for pulmonary edema. I will make sure there is no other additional intrinsic kidney disease. On CT of the chest only upper pole of the left kidney is visible, will at least get a bladder scan to make sure there is no acute bladder obstruction, and if the kidney function does not improve then renal ultrasound  2. Severe nonischemic cardiomyopathy of unknown etiology-complicated by valvular disease  3.  Probable stimulant  Abuse/dependency mainly cocaine    PLAN:    Start with UA and urinary indicis  Bladder scan  When she becomes more alert, awake, get more history  Also collateral history from the family and friends  More interview regarding her substance abuse, in a nonconfrontational way  Watch for pulmonary edema, rule out any occult infection  Redo kidney function tomorrow morning

## 2022-03-24 NOTE — ED NOTES
Pt reinaldo from ED 15 to T3, pt absent of acute cardiorespiratory/neurological distress. Pt unable to remain still, pt reports cocaine use by inhalation.       Kuldeep Ritchie RN  03/24/22 [id number]

## 2022-03-24 NOTE — H&P
History and Physical      Name:  Pancho Ramesh /Age/Sex: 1965  (61 y.o. female)   MRN & CSN:  [id number] & [id number] Admission Date/Time: 3/24/2022  2:09 AM   [facility]  [facility] PCP: No primary care provider on file. Hospital Day: 1           Assessment and Plan:   # Possible allergic reaction - presented with acute sob, facial swelling, n/v/d. Unknown agent at this time. Possibly could be drug related, given UDS positive for cocaine. CT soft tissue neck did note mild edema in the bilateral mid to posterior buccal fatty tissues. Reportedly symptom improved after receiving Benadryl, epinephrine, Solu-Medrol in ED. No respiratory compromise noted at this time, Os sats 96% on RA. We will continue management with antihistamine as needed, steroid-taper given buccal swelling, continuous pulse ox monitoring supplemental O2 as needed    # Gastroenteritis -reportedly had nausea vomiting diarrhea -length of symptoms unknown at this time. Rapid Covid negative. Can check for C. difficile if diarrhea continues. Diet as tolerated antiemetics as needed    # Elevated troponin -Hx [facility]  showing normal coronary arteries. Cardiology c/s. Will trend troponins, TTE ordered, manage on asa, will need to hold beta-blocker given UDS + for cocaine f/u Cardiology recs for resuming, lipid panel in am. Monitor on tele    # Acute kidney injury on CKD stage III -creatinine baseline appears to be 1.0-1.1. Potassium 3.4 CO2 22. At risk for worsening due to given IV contrast in ED. Will hold patient's ACE inhibitor and diuretics for now. Bladder scan rule out urinary retention. Avoid nephrotoxins IV contrast studies and NSAIDs. Keep MAP >65.  Renal dose medications for current GFR, RFP in am. Consult Nephrology for eval/recs    # Hypokalemia -low dose replacement ordered, magnesium 2.2    # Mild hyponatremia -monitor, given elevated BNP will hold off on IV hydration, received IV lasix in ED, f/u repeat RFP    # Leukocytosis - Mild, pt afebrile, rapid COVID negative, CTA chest negative for airspace disease, f/u UA, repeat CBC in am    # Prolonged Qtc, avoid qtc prolonging agents, repeat EKG    # CMP EF less than 20% on TTE -8/2021 -patient had prior admissions for CHF exacerbation however the patient leaves AMA. Repeat TTE ordered, repeat trops Cardiology consulted for management. Hold beta-blocker as noted, will hold ACE inhibitor at this time given DOROTHY. Diuretic therapy as per Cardiology recommendations. Daily wts, strict I/O, Cardiac diet. # Essential HTN - BP stable at this time, holding acei and beta-blocker as noted, monitor for further needed interventions    # Anxiety - Monitor for now, ativan prn as deemed appropriate      Present on Admission:   SOB (shortness of breath)             Diet ADULT DIET; Regular; Low Fat/Low Chol/High Fiber/2 gm Na; Low Sodium (2 gm)   DVT Prophylaxis [x] Lovenox, []  Heparin, [] SCDs, [] Ambulation  [] Long term AC   GI Prophylaxis [x] PPI,  [] H2 Blocker,  [] Carafate,  [] Diet/Tube Feeds   Code Status Full code   Disposition Admit to [facility]pt. Patient plans to return home upon discharge         Chief Complaint: Facial Swelling, Shortness of Breath, Nausea, Emesis, Cough, and Diarrhea      History obtained from EHR  History of Present Illness:   Farooq Hernandez is a 61 y.o. female  with history of cardiomyopathy EF less than 20%, essential hypertension, CKD, anxiety, drug abuse who presents with facial swelling shortness of breath nausea vomiting diarrhea. Patient unable to give history at this time. History obtained ED notes. Patient has received unable to give any history at this time. Per ED notes the patient stated over the past 3 days been feeling ill with nausea vomiting diarrhea and fatigue. She had not taken her medications due to her symptoms. Reportedly she woke up this a.m. with facial swelling difficulty breathing.  She also noted the inside of her mouth was burning therefore presented to the ER for evaluation. No reports of chest pain. She was evaluated emergency department. She presented afebrile pulse 93 respirations 18 blood pressure 130/103 with O2 sat 96% on room air. CXR showed interstitial edema. CTA of the chest was performed which was negative for pulmonary embolism no acute airspace disease noted other findings noted below. CT soft tissue neck with contrast showed mild edema in the bilateral mid to posterior buccal fatty tissues possibly representing mild cellulitis no abscess. Dilated pulmonary artery 4cm suspect due to pulmonary artery hypertension, other findings noted below. EKG showed NSR with T wave abn, prolonged QTC. Trop slightly elevated, BNP 7829. LFT wnl. WBC 10.6, Hgb 14.9. Rapid COVID negative. UDS + for cocaine. The patient was given solumedrol, benadryl and epi with symptom improvement. She received 40mg IV lasix as well. She has been admitted for further management. Ten point ROS: reviewed and are negative, unless as noted in above HPI. Objective:   No intake or output data in the 24 hours ending 03/24/22 0856     Vitals:   Vitals:    03/24/22 0600 03/24/22 0615 03/24/22 0630 03/24/22 0745   BP: 110/66  107/66 134/83   Pulse: 83 89 91 92   Resp: 23 22 22 24   Temp:    98.2 °F (36.8 °C)   TempSrc:    Axillary   SpO2:       Weight:       Height:           Physical Exam: 03/24/22     GEN -Lethargic appearing female, in NAD. Appears given age. EYES -mild facial swelling, sclera anicteric, conjunctiva pink. HENT -Head is normocephalic, atraumatic. MM are moist. Oral pharynx without exudates, no evidence of thrush. NECK -Supple, no apparent thyromegaly or masses. RESP -CTA,, no stridor wheezes or rhonchi. Symmetric chest movement   C/V -S1/S2 auscultated. RRR without appreciable M/R/G. No JVD. Cap refill <3 sec. No peripheral edema. GI -Abdomen is soft non distended, non tender to palpation. + BS.  No masses or guarding.  -No CVA/ flank tenderness. Lamb catheter is not present. LYMPH- No petechiae or ecchymoses. MS -No gross joint deformities. SKIN -Normal coloration, warm, dry. NEURO- no lateralizing weakness, awaknes to name then drifts back off to sleep  PSYC-Lethargic due to receiving sedative mediations in ED. Past Medical History:      Past Medical History:   Diagnosis Date    Anxiety     Environmental allergies     Non-healing surgical wound     RLS (restless legs syndrome)    CMP, Essential HTN    Past Surgical  History:    has a past surgical history that includes Cholecystectomy; Appendectomy;  section; and knee surgery (2014). Family  History:   family history includes Depression in an other family member. Social History:     Social History     Socioeconomic History    Marital status:      Spouse name: None    Number of children: None    Years of education: None    Highest education level: None   Occupational History    None   Tobacco Use    Smoking status: Current Every Day Smoker     Packs/day: 0.50     Types: Cigarettes    Smokeless tobacco: Never Used   Vaping Use    Vaping Use: Never used   Substance and Sexual Activity    Alcohol use: No    Drug use: No    Sexual activity: Not Currently   Other Topics Concern    None   Social History Narrative    None     Social Determinants of Health     Financial Resource Strain:     Difficulty of Paying Living Expenses: Not on file   Food Insecurity:     Worried About Running Out of Food in the Last Year: Not on file    Travis of Food in the Last Year: Not on file   Transportation Needs:     Lack of Transportation (Medical): Not on file    Lack of Transportation (Non-Medical):  Not on file   Physical Activity:     Days of Exercise per Week: Not on file    Minutes of Exercise per Session: Not on file   Stress:     Feeling of Stress : Not on file   Social Connections:     Frequency of Communication with Friends and Family: Not on file    Frequency of Social Gatherings with Friends and Family: Not on file    Attends [employer] Services: Not on file    Active Member of Clubs or Organizations: Not on file    Attends Club or Organization Meetings: Not on file    Marital Status: Not on file   Intimate Partner Violence:     Fear of Current or Ex-Partner: Not on file    Emotionally Abused: Not on file    Physically Abused: Not on file    Sexually Abused: Not on file   Housing Stability:     Unable to Pay for Housing in the Last Year: Not on file    Number of [place] in the Last Year: Not on file    Unstable Housing in the Last Year: Not on file      reports that she has been smoking cigarettes. She has been smoking about 0.50 packs per day. She has never used smokeless tobacco.   reports no history of alcohol use. reports no history of drug use.  + Hx Drug abuse  Allergies: Allergies   Allergen Reactions    Adderall [Amphetamine-Dextroamphetamine] Itching    Morphine And Related     Tramadol     Ultram [Tramadol Hcl] Itching    Codeine Itching    Pcn [Penicillins] Swelling and Dermatitis       Home Medications:     Prior to Admission medications    Medication Sig Start Date End Date Taking? Authorizing Provider   nitroGLYCERIN (NITROSTAT) 0.4 MG SL tablet up to max of 3 total doses.  If no relief after 1 dose, call 911. 12/18/20   Kristin Paul MD   albuterol sulfate  (90 Base) MCG/ACT inhaler Inhale 2 puffs into the lungs every 6 hours as needed for Wheezing 12/18/20   Kristin Paul MD   lisinopril (PRINIVIL;ZESTRIL) 2.5 MG tablet Take 1 tablet by mouth daily 12/18/20   Kristin Paul MD   metoprolol succinate (TOPROL XL) 25 MG extended release tablet Take 1 tablet by mouth daily 12/18/20   Kristin Paul MD   spironolactone (ALDACTONE) 25 MG tablet Take 1 tablet by mouth daily 12/18/20   Kristin Paul MD   furosemide (LASIX) 40 MG tablet Take 1 tablet by tonsils are normal in appearance. The tongue is normal in appearance. The valleculae, epiglottis, aryepiglottic folds and pyriform sinuses appear unremarkable. The true and false vocal cords are normal in appearance. No mass or abscess is seen. SALIVARY GLANDS/THYROID:  The parotid and submandibular glands appear unremarkable. The thyroid gland appears unremarkable. LYMPH NODES:  No cervical or supraclavicular lymphadenopathy is seen. SOFT TISSUES:  Mild edema of the bilateral mid to posterior buccal fatty tissues is noted. No evidence of abscess or mass. BRAIN/ORBITS/SINUSES:  The visualized portion of the intracranial contents appear unremarkable. The visualized portion of the orbits, paranasal sinuses and mastoid air cells demonstrate no acute abnormality. There is an old healed medial right orbital wall blowout fracture. LUNG APICES/SUPERIOR MEDIASTINUM:  No focal consolidation is seen within the visualized lung apices. No superior mediastinal lymphadenopathy or mass. The visualized portion of the trachea appears unremarkable. The main pulmonary artery is dilated measuring 4.4 cm diameter. BONES:  No aggressive appearing lytic or blastic bony lesion. There is moderate reversal of the normal cervical lordosis which may be due to muscle spasm or patient positioning. Motion artifact gives the appearance of a dens fracture. No fracture is present. Mild edema of the bilateral mid to posterior buccal fatty tissues possibly representing mild cellulitis. No evidence of abscess or mass. Dilated minor pulmonary artery at 4.4 cm which may be due to pulmonary arterial hypertension. Old healed right medial orbital wall blowout fracture. Moderate reversal of the normal cervical lordosis which may be due to muscle spasm or patient positioning.      XR CHEST PORTABLE    Result Date: 3/24/2022  EXAMINATION: ONE X-RAY VIEW OF THE CHEST 3/24/2022 2:21 am COMPARISON: August 29, 2021 HISTORY: [facility] PROVIDED HISTORY: dyspnea TECHNOLOGIST PROVIDED HISTORY: Reason for exam:->dyspnea Reason for Exam: dyspnea Additional signs and symptoms: cough , sob FINDINGS: No lines or tubes. Cardiomediastinal silhouette is enlarged. Ground-glass and reticular opacities are identified bilaterally. No significant pleural effusions. No pneumothorax. No acute or aggressive osseous lesion. 1. Cardiomegaly with atypical infection versus mild interstitial edema. CTA CHEST W CONTRAST    Result Date: 3/24/2022  EXAMINATION: CTA OF THE CHEST 3/24/2022 4:15 am TECHNIQUE: CTA of the chest was performed after the administration of intravenous contrast.  Multiplanar reformatted images are provided for review. MIP images are provided for review. Dose modulation, iterative reconstruction, and/or weight based adjustment of the mA/kV was utilized to reduce the radiation dose to as low as reasonably achievable. COMPARISON: Chest radiograph today. Chest CT 12/15/2020. HISTORY: ORDERING SYSTEM PROVIDED HISTORY: dyspnea TECHNOLOGIST PROVIDED HISTORY: Reason for exam:->dyspnea Reason for Exam: dyspnea FINDINGS: Pulmonary Arteries: Suboptimal contrast timing, limiting evaluation of the segmental branches. Motion artifact is also noted. No evidence for central pulmonary embolism. The main pulmonary artery is enlarged measuring 40 mm. Mediastinum: No evidence of mediastinal lymphadenopathy. The heart and pericardium demonstrate no acute abnormality. Cardiomegaly. There is no acute abnormality of the thoracic aorta. Lungs/pleura: Mild respiratory motion artifact. Findings compatible subsegmental atelectasis in the lung bases. No consolidation, evidence for edema or effusion. Mild centrilobular emphysematous disease. The central airway is patent. Upper Abdomen: Cholecystectomy. No acute findings. Soft Tissues/Bones: Osseous detail degraded by motion artifact, notably involving the sternum and ribs laterally.   No convincing evidence for acute fracture. No acute soft tissue abnormality identified. 1.  Suboptimal exam.  No evidence for central pulmonary embolism. 2.  No acute airspace disease identified. Findings compatible with subsegmental atelectasis. Mild emphysematous disease. 3.  Cardiomegaly. Enlarged main pulmonary artery again demonstrated, which may indicate underlying portal hypertension.            EKG this visit:  Reviewed         Electronically signed by SHAQUILLE Minor CNP on 3/24/2022 at 8:56 AM

## 2022-03-25 LAB
ANION GAP SERPL CALCULATED.3IONS-SCNC: 12 MMOL/L (ref 4–16)
BASOPHILS ABSOLUTE: 0 K/CU MM
BASOPHILS RELATIVE PERCENT: 0.1 % (ref 0–1)
BUN BLDV-MCNC: 23 MG/DL (ref 6–23)
CALCIUM SERPL-MCNC: 9.7 MG/DL (ref 8.3–10.6)
CHLORIDE BLD-SCNC: 103 MMOL/L (ref 99–110)
CHOLESTEROL: 159 MG/DL
CO2: 27 MMOL/L (ref 21–32)
CREAT SERPL-MCNC: 1.3 MG/DL (ref 0.6–1.1)
DIFFERENTIAL TYPE: ABNORMAL
EOSINOPHILS ABSOLUTE: 0 K/CU MM
EOSINOPHILS RELATIVE PERCENT: 0 % (ref 0–3)
GFR AFRICAN AMERICAN: 51 ML/MIN/1.73M2
GFR NON-AFRICAN AMERICAN: 42 ML/MIN/1.73M2
GLUCOSE BLD-MCNC: 109 MG/DL (ref 70–99)
HCT VFR BLD CALC: 50 % (ref 37–47)
HDLC SERPL-MCNC: 38 MG/DL
HEMOGLOBIN: 15.8 GM/DL (ref 12.5–16)
IMMATURE NEUTROPHIL %: 0.5 % (ref 0–0.43)
LDL CHOLESTEROL CALCULATED: 103 MG/DL
LYMPHOCYTES ABSOLUTE: 1 K/CU MM
LYMPHOCYTES RELATIVE PERCENT: 9.1 % (ref 24–44)
MCH RBC QN AUTO: 30.3 PG (ref 27–31)
MCHC RBC AUTO-ENTMCNC: 31.6 % (ref 32–36)
MCV RBC AUTO: 96 FL (ref 78–100)
MONOCYTES ABSOLUTE: 0.5 K/CU MM
MONOCYTES RELATIVE PERCENT: 4.1 % (ref 0–4)
NUCLEATED RBC %: 0 %
PDW BLD-RTO: 13.2 % (ref 11.7–14.9)
PLATELET # BLD: 352 K/CU MM (ref 140–440)
PMV BLD AUTO: 9.1 FL (ref 7.5–11.1)
POTASSIUM SERPL-SCNC: 4.9 MMOL/L (ref 3.5–5.1)
PRO-BNP: 4303 PG/ML
RBC # BLD: 5.21 M/CU MM (ref 4.2–5.4)
SEGMENTED NEUTROPHILS ABSOLUTE COUNT: 9.5 K/CU MM
SEGMENTED NEUTROPHILS RELATIVE PERCENT: 86.2 % (ref 36–66)
SODIUM BLD-SCNC: 142 MMOL/L (ref 135–145)
TOTAL IMMATURE NEUTOROPHIL: 0.06 K/CU MM
TOTAL NUCLEATED RBC: 0 K/CU MM
TRIGL SERPL-MCNC: 88 MG/DL
TROPONIN T: <0.01 NG/ML
WBC # BLD: 11 K/CU MM (ref 4–10.5)

## 2022-03-25 PROCEDURE — 6360000002 HC RX W HCPCS: Performed by: INTERNAL MEDICINE

## 2022-03-25 PROCEDURE — 80048 BASIC METABOLIC PNL TOTAL CA: CPT

## 2022-03-25 PROCEDURE — 85025 COMPLETE CBC W/AUTO DIFF WBC: CPT

## 2022-03-25 PROCEDURE — 2700000000 HC OXYGEN THERAPY PER DAY

## 2022-03-25 PROCEDURE — C9113 INJ PANTOPRAZOLE SODIUM, VIA: HCPCS | Performed by: NURSE PRACTITIONER

## 2022-03-25 PROCEDURE — 83880 ASSAY OF NATRIURETIC PEPTIDE: CPT

## 2022-03-25 PROCEDURE — 2580000003 HC RX 258: Performed by: INTERNAL MEDICINE

## 2022-03-25 PROCEDURE — 2580000003 HC RX 258: Performed by: EMERGENCY MEDICINE

## 2022-03-25 PROCEDURE — 6360000002 HC RX W HCPCS: Performed by: NURSE PRACTITIONER

## 2022-03-25 PROCEDURE — 80061 LIPID PANEL: CPT

## 2022-03-25 PROCEDURE — 99233 SBSQ HOSP IP/OBS HIGH 50: CPT | Performed by: INTERNAL MEDICINE

## 2022-03-25 PROCEDURE — 2140000000 HC CCU INTERMEDIATE R&B

## 2022-03-25 PROCEDURE — 6370000000 HC RX 637 (ALT 250 FOR IP): Performed by: NURSE PRACTITIONER

## 2022-03-25 PROCEDURE — 36415 COLL VENOUS BLD VENIPUNCTURE: CPT

## 2022-03-25 PROCEDURE — 94761 N-INVAS EAR/PLS OXIMETRY MLT: CPT

## 2022-03-25 PROCEDURE — 84484 ASSAY OF TROPONIN QUANT: CPT

## 2022-03-25 RX ADMIN — SODIUM CHLORIDE, PRESERVATIVE FREE 10 ML: 5 INJECTION INTRAVENOUS at 20:03

## 2022-03-25 RX ADMIN — METHYLPREDNISOLONE SODIUM SUCCINATE 40 MG: 40 INJECTION, POWDER, FOR SOLUTION INTRAMUSCULAR; INTRAVENOUS at 12:54

## 2022-03-25 RX ADMIN — PANTOPRAZOLE SODIUM 40 MG: 40 INJECTION, POWDER, FOR SOLUTION INTRAVENOUS at 09:28

## 2022-03-25 RX ADMIN — SODIUM CHLORIDE, PRESERVATIVE FREE 10 ML: 5 INJECTION INTRAVENOUS at 09:28

## 2022-03-25 RX ADMIN — ENOXAPARIN SODIUM 40 MG: 40 INJECTION SUBCUTANEOUS at 09:27

## 2022-03-25 RX ADMIN — DIPHENHYDRAMINE HYDROCHLORIDE 50 MG: 25 TABLET ORAL at 09:27

## 2022-03-25 RX ADMIN — ASPIRIN 81 MG 81 MG: 81 TABLET ORAL at 09:27

## 2022-03-25 ASSESSMENT — PAIN SCALES - GENERAL
PAINLEVEL_OUTOF10: 0
PAINLEVEL_OUTOF10: 0

## 2022-03-25 NOTE — CARE COORDINATION
CM in to see Pt to initiate discharge planning. Pt from home alone. Pt states she is independent with ADL's prior to admission and uses no DME. Pt denies the need for home care at this time. Pt requested this CM not call her family, stating no one knows she is here. Pt states her car is in the parking lot and she will provide transportation for herself at discharge. Pt has insurance, pcp, and can afford medications. Pt denies any needs at this time.      CM following

## 2022-03-25 NOTE — PROGRESS NOTES
Nephrology Progress Note  3/25/2022 1:46 PM        Subjective:   Admit Date: 3/24/2022  PCP: No primary care provider on file. Interval History: Patient seen early morning, this is a late entry    Diet: Unsure and probably none    ROS: She was still sleeping and unable to talk to me  Urine output recorded 800 cc for the last 24 hours  No overt shortness of breath, PND or orthopnea  No fever and acceptable blood pressure  Data:     Current meds:    sodium chloride flush  5-40 mL IntraVENous BID    sodium chloride flush  10 mL IntraVENous 2 times per day    enoxaparin  40 mg SubCUTAneous Daily    aspirin  81 mg Oral Daily    pantoprazole  40 mg IntraVENous Daily    [START ON 3/26/2022] predniSONE  20 mg Oral Daily      sodium chloride           I/O last 3 completed shifts: In: 240 [P.O.:240]  Out: 800 [Urine:800]    CBC:   Recent Labs     03/24/22  0230 03/25/22  0948   WBC 10.6* 11.0*   HGB 14.9 15.8    352          Recent Labs     03/24/22  0230 03/25/22  0948   * 142   K 3.4* 4.9   CL 95* 103   CO2 22 27   BUN 23 23   CREATININE 1.6* 1.3*   GLUCOSE 124* 109*       Lab Results   Component Value Date    CALCIUM 9.7 03/25/2022       Objective:     Vitals: /80   Pulse 87   Temp 96.9 °F (36.1 °C) (Axillary)   Resp 27   Ht 5' 7\" (1.702 m)   Wt 169 lb 8.5 oz (76.9 kg)   SpO2 96%   BMI 26.55 kg/m²     General appearance: Sleeping, arousable but goes back to sleep  HEENT: No gross conjunctival pallor  Neck: Seems supple  Lungs: Coarse breath sounds but no crackles  Heart: Seemed regular rate and rhythm  Abdomen: Soft nontender  Extremities: No edema      Problem List :         Impression :     1. Acute kidney injury with underlying CKD stage III-nonoliguric-her urine was 100% bland-very minimal proteinuria-urine sodium was rather low, all suggestive of reduced renal perfusion and hemodynamic changes. Creatinine better now  2.  Underlying severe cardiomyopathy-she has no overt pulmonary edema, surprisingly BNP level is low-we will watch closely for pulmonary edema  3. Possible cocaine abuse-does not look like angioedema  4. Hyponatremia-sodium increased by 11 mEq, but in more than 30 hours, would be acceptable all things considered-other electrolytes are okay now    Recommendation/Plan  :     1. I have reviewed the CT with the radiologist-there is no evidence of at least radiologically, angioedema  2. We will watch for pulmonary edema  3. She may need some rehab for cocaine abuse, education,   4.  Follow clinically and biochemically      Christie Yap MD MD

## 2022-03-25 NOTE — PROGRESS NOTES
Heparin, [] SCDs, [] Ambulation   GI Prophylaxis [x] PPI,  [] H2 Blocker,  [] Carafate,  [] Diet/Tube Feeds   Code Status Full Code   Disposition Patient requires continued admission due to gastroenteritis, acute respiratory failure  The anticipated discharge is in less than 24 hours. MDM [] Low, [x] Moderate,[]  High       SUBJECTIVE:  Patient seen and examined at bedside. Alert and oriented x3. comfortable. Mild acute distress. complaining of shortness of breath. Denies chest pain, palpitations, fever, nausea, vomiting, diarrhea, headache or urinary symptoms. Saturating on 2 L nasal cannula. Ten point ROS reviewed negative, unless as noted above    Objective: Intake/Output Summary (Last 24 hours) at 3/25/2022 1553  Last data filed at 3/24/2022 2003  Gross per 24 hour   Intake 240 ml   Output --   Net 240 ml      Vitals:   Vitals:    03/25/22 1100   BP: 107/80   Pulse: 87   Resp: 27   Temp:    SpO2: 96%     Physical Exam:   GEN Awake female, sitting upright in bed in no apparent distress. Appears given age. EYES Pupils are equally round. No scleral erythema, discharge, or conjunctivitis. HENT Mucous membranes are moist. Oral pharynx without exudates, no evidence of thrush. NECK Supple, no apparent thyromegaly or masses. RESP Clear to auscultation, no wheezes, rales or rhonchi. Symmetric chest movement while on room air. CARDIO/VASC S1/S2 auscultated. Regular rate without appreciable murmurs, rubs, or gallops. No JVD or carotid bruits. Peripheral pulses equal bilaterally and palpable. No peripheral edema. GI Abdomen is soft without significant tenderness, masses, or guarding. Bowel sounds are normoactive. Rectal exam deferred.  No costovertebral angle tenderness. Normal appearing external genitalia. Lamb catheter is not present. HEME/LYMPH No palpable cervical lymphadenopathy and no hepatosplenomegaly. No petechiae or ecchymoses. MSK No gross joint deformities.   SKIN Normal coloration, warm, dry. NEURO Cranial nerves appear grossly intact, normal speech, no lateralizing weakness. PSYCH Awake, alert, oriented x 4. Affect appropriate.     Medications:   Medications:    sodium chloride flush  5-40 mL IntraVENous BID    sodium chloride flush  10 mL IntraVENous 2 times per day    enoxaparin  40 mg SubCUTAneous Daily    aspirin  81 mg Oral Daily    pantoprazole  40 mg IntraVENous Daily    [START ON 3/26/2022] predniSONE  20 mg Oral Daily      Infusions:    sodium chloride       PRN Meds: albuterol sulfate HFA, 2 puff, Q6H PRN  sodium chloride flush, 10 mL, PRN  sodium chloride, 25 mL, PRN  bisacodyl, 5 mg, Daily PRN  acetaminophen, 650 mg, Q6H PRN   Or  acetaminophen, 650 mg, Q6H PRN  diphenhydrAMINE, 50 mg, Q6H PRN  prochlorperazine, 10 mg, Q6H PRN      Electronically signed by Teagan Mendez MD on 3/25/2022 at 3:53 PM

## 2022-03-25 NOTE — PROGRESS NOTES
CARDIOLOGY PROGRESS NOTE                                                  Name:  Mariana Horne /Age/Sex: 1965  (61 y.o. female)   MRN & CSN:  [id number] & [id number] Admission Date/Time: 3/24/2022  2:09 AM   [facility]  [facility] PCP: No primary care provider on file. Admit Date:  3/24/2022  Hospital Day: 2      SUBJECTIVE:   Seen patient as follow up as consultation for [address]    Patient is somnolent and does not wishes to engage in conversation    TELEMETRY: SR       Intake/Output Summary (Last 24 hours) at 3/25/2022 1548  Last data filed at 3/24/2022 2003  Gross per 24 hour   Intake 240 ml   Output --   Net 240 ml       Assessment/Plan:         Severe nonischemic cardiomyopathy  History of noncompliance  Acute renal failure with underlying CKD  Questionable angioedema on admission   Drug abuse, positive for cocaine  Essential hypertension           Suggest to discontinue lisinopril, do not continue on discharge -avoid ARBS as they have 5% crossover risk of   IV steroids, taper, antihistamines  Elevated troponin, non-ACS, in the setting of nonischemic cardiomyopathy, avoid nonselective beta-blocker in the setting of cocaine abuse  History of nausea cardiomyopathy, suggest Coreg 6.25 twice daily on discharge. CT of the chest negative for PE, no pulmonary edema noted. Clinically patient does not appear to be in volume overload. Diuretics as per nephrology  Patient will benefit from drug rehab  Unfortunately she has been noncompliant which worsen her situation    Please call us with any questions    Echo      Summary   Technically difficult study due to lack of patient cooperation. Left ventricular systolic function is abnormal.   Ejection fraction is visually estimated at 15%. Mild left ventricular hypertrophy. The left ventricle is dilated. Grade II diastolic dysfunction. Moderately dilated left atrium. Trivial aortic regurgitation.    No evidence of any pericardial effusion. Past medical history:    has a past medical history of Anxiety, Environmental allergies, Non-healing surgical wound, and RLS (restless legs syndrome). Past surgical history:   has a past surgical history that includes Cholecystectomy; Appendectomy;  section; and knee surgery (). Social History:   reports that she has been smoking cigarettes. She has been smoking about 0.50 packs per day. She has never used smokeless tobacco. She reports that she does not drink alcohol and does not use drugs. Family history:  family history includes Depression in an other family member. OBJECTIVE:     /80   Pulse 87   Temp 96.9 °F (36.1 °C) (Axillary)   Resp 27   Ht 5' 7\" (1.702 m)   Wt 169 lb 8.5 oz (76.9 kg)   SpO2 96%   BMI 26.55 kg/m²       Intake/Output Summary (Last 24 hours) at 3/25/2022 1548  Last data filed at 3/24/2022 2003  Gross per 24 hour   Intake 240 ml   Output --   Net 240 ml       Physical Exam:    Constitutional:  well developed, Well nourished   HENT:  Normocephalic, Atraumatic, Bilateral external ears normal,    Eyes:   o discharge. Respiratory:  Normal breath sounds, No respiratory distress,     Cardiovascular S1-S2 No Murmurs, added sounds. Normal rate rhythm. No rubs gallops. Carotid pulses and amplitude are normal no bruit noted. Pedal pulses normal femoral pulses normal.  Trace pedal edema  GI:  Bowel sounds normal, Soft, No tenderness  : No CVA tenderness. Musculoskeletal: No edema, No tenderness, No cyanosis, No clubbing. Back- No tenderness. Integument:  Warm, Dry, No erythema, No rash. Lymphatic:  No lymphadenopathy noted.    Neurologic:  somnolent  Psychiatric:  somnolent          MEDICATIONS:     sodium chloride flush  5-40 mL IntraVENous BID    sodium chloride flush  10 mL IntraVENous 2 times per day    enoxaparin  40 mg SubCUTAneous Daily    aspirin  81 mg Oral Daily    pantoprazole  40 mg IntraVENous Daily    [START ON 3/26/2022] predniSONE  20 mg Oral Daily      sodium chloride       albuterol sulfate HFA, sodium chloride flush, sodium chloride, bisacodyl, acetaminophen **OR** acetaminophen, diphenhydrAMINE, prochlorperazine  Allergies   Allergen Reactions    Adderall [Amphetamine-Dextroamphetamine] Itching    Morphine And Related     Tramadol     Ultram [Tramadol Hcl] Itching    Codeine Itching    Pcn [Penicillins] Swelling and Dermatitis       Lab Data:  CBC:   Recent Labs     03/24/22  0230 03/25/22  0948   WBC 10.6* 11.0*   HGB 14.9 15.8   HCT 45.5 50.0*   MCV 91.5 96.0    352     BMP:   Recent Labs     03/24/22  0230 03/25/22  0948   * 142   K 3.4* 4.9   CL 95* 103   CO2 22 27   BUN 23 23   CREATININE 1.6* 1.3*     LIVER PROFILE:   Recent Labs     03/24/22  0230   AST 32   ALT 32   BILITOT 0.5   ALKPHOS [address] Lennox, MD, MD 3/25/2022 3:48 PM

## 2022-03-26 VITALS
RESPIRATION RATE: 17 BRPM | DIASTOLIC BLOOD PRESSURE: 99 MMHG | SYSTOLIC BLOOD PRESSURE: 150 MMHG | HEIGHT: 67 IN | TEMPERATURE: 97 F | HEART RATE: 103 BPM | OXYGEN SATURATION: 93 % | BODY MASS INDEX: 26.61 KG/M2 | WEIGHT: 169.53 LBS

## 2022-03-26 LAB
ANION GAP SERPL CALCULATED.3IONS-SCNC: 12 MMOL/L (ref 4–16)
BASOPHILS ABSOLUTE: 0 K/CU MM
BASOPHILS RELATIVE PERCENT: 0.2 % (ref 0–1)
BUN BLDV-MCNC: 19 MG/DL (ref 6–23)
CALCIUM SERPL-MCNC: 9.1 MG/DL (ref 8.3–10.6)
CHLORIDE BLD-SCNC: 103 MMOL/L (ref 99–110)
CO2: 26 MMOL/L (ref 21–32)
CREAT SERPL-MCNC: 1.2 MG/DL (ref 0.6–1.1)
DIFFERENTIAL TYPE: ABNORMAL
EOSINOPHILS ABSOLUTE: 0.1 K/CU MM
EOSINOPHILS RELATIVE PERCENT: 0.6 % (ref 0–3)
GFR AFRICAN AMERICAN: 56 ML/MIN/1.73M2
GFR NON-AFRICAN AMERICAN: 46 ML/MIN/1.73M2
GLUCOSE BLD-MCNC: 104 MG/DL (ref 70–99)
HCT VFR BLD CALC: 49.2 % (ref 37–47)
HEMOGLOBIN: 16 GM/DL (ref 12.5–16)
IMMATURE NEUTROPHIL %: 0.3 % (ref 0–0.43)
LYMPHOCYTES ABSOLUTE: 1.6 K/CU MM
LYMPHOCYTES RELATIVE PERCENT: 14.2 % (ref 24–44)
MCH RBC QN AUTO: 30.7 PG (ref 27–31)
MCHC RBC AUTO-ENTMCNC: 32.5 % (ref 32–36)
MCV RBC AUTO: 94.3 FL (ref 78–100)
MONOCYTES ABSOLUTE: 0.9 K/CU MM
MONOCYTES RELATIVE PERCENT: 7.5 % (ref 0–4)
NUCLEATED RBC %: 0 %
PDW BLD-RTO: 13.2 % (ref 11.7–14.9)
PLATELET # BLD: 336 K/CU MM (ref 140–440)
PMV BLD AUTO: 9.1 FL (ref 7.5–11.1)
POTASSIUM SERPL-SCNC: 4.3 MMOL/L (ref 3.5–5.1)
RBC # BLD: 5.22 M/CU MM (ref 4.2–5.4)
SEGMENTED NEUTROPHILS ABSOLUTE COUNT: 8.8 K/CU MM
SEGMENTED NEUTROPHILS RELATIVE PERCENT: 77.2 % (ref 36–66)
SODIUM BLD-SCNC: 141 MMOL/L (ref 135–145)
TOTAL IMMATURE NEUTOROPHIL: 0.03 K/CU MM
TOTAL NUCLEATED RBC: 0 K/CU MM
WBC # BLD: 11.5 K/CU MM (ref 4–10.5)

## 2022-03-26 PROCEDURE — 94761 N-INVAS EAR/PLS OXIMETRY MLT: CPT

## 2022-03-26 PROCEDURE — 85025 COMPLETE CBC W/AUTO DIFF WBC: CPT

## 2022-03-26 PROCEDURE — 6360000002 HC RX W HCPCS: Performed by: INTERNAL MEDICINE

## 2022-03-26 PROCEDURE — 36415 COLL VENOUS BLD VENIPUNCTURE: CPT

## 2022-03-26 PROCEDURE — 6370000000 HC RX 637 (ALT 250 FOR IP): Performed by: NURSE PRACTITIONER

## 2022-03-26 PROCEDURE — 80048 BASIC METABOLIC PNL TOTAL CA: CPT

## 2022-03-26 PROCEDURE — C9113 INJ PANTOPRAZOLE SODIUM, VIA: HCPCS | Performed by: NURSE PRACTITIONER

## 2022-03-26 PROCEDURE — 6360000002 HC RX W HCPCS: Performed by: NURSE PRACTITIONER

## 2022-03-26 PROCEDURE — 2580000003 HC RX 258: Performed by: INTERNAL MEDICINE

## 2022-03-26 RX ORDER — ASPIRIN 81 MG/1
81 TABLET, CHEWABLE ORAL DAILY
Qty: 30 TABLET | Refills: 0 | Status: SHIPPED | OUTPATIENT
Start: 2022-03-27

## 2022-03-26 RX ORDER — PREDNISONE 20 MG/1
20 TABLET ORAL DAILY
Qty: 5 TABLET | Refills: 0 | Status: SHIPPED | OUTPATIENT
Start: 2022-03-27 | End: 2022-04-01

## 2022-03-26 RX ADMIN — PANTOPRAZOLE SODIUM 40 MG: 40 INJECTION, POWDER, FOR SOLUTION INTRAVENOUS at 08:30

## 2022-03-26 RX ADMIN — PREDNISONE 20 MG: 20 TABLET ORAL at 08:30

## 2022-03-26 RX ADMIN — SODIUM CHLORIDE, PRESERVATIVE FREE 10 ML: 5 INJECTION INTRAVENOUS at 08:31

## 2022-03-26 RX ADMIN — ENOXAPARIN SODIUM 40 MG: 40 INJECTION SUBCUTANEOUS at 08:30

## 2022-03-26 RX ADMIN — ASPIRIN 81 MG 81 MG: 81 TABLET ORAL at 08:30

## 2022-03-26 ASSESSMENT — PAIN SCALES - GENERAL: PAINLEVEL_OUTOF10: 0

## 2022-03-26 NOTE — PROGRESS NOTES
Nephrology Progress Note  3/26/2022 4:03 PM        Subjective:   Admit Date: 3/24/2022  PCP: No primary care provider on file. Interval History: Patient seen early morning, this is a late entry    Diet: Reported eating better    ROS: She is finally alert awake and oriented  No shortness of breath, PND or orthopnea  Urine output recorded 800 cc for the last 24 hours  No fever    Data:     Current meds:    sodium chloride flush  5-40 mL IntraVENous BID    sodium chloride flush  10 mL IntraVENous 2 times per day    enoxaparin  40 mg SubCUTAneous Daily    aspirin  81 mg Oral Daily    pantoprazole  40 mg IntraVENous Daily    predniSONE  20 mg Oral Daily      sodium chloride           I/O last 3 completed shifts: In: 240 [P.O.:240]  Out: -     CBC:   Recent Labs     03/24/22  0230 03/25/22  0948 03/26/22  1104   WBC 10.6* 11.0* 11.5*   HGB 14.9 15.8 16.0    352 336          Recent Labs     03/24/22 0230 03/25/22  0948 03/26/22  1104   * 142 141   K 3.4* 4.9 4.3   CL 95* 103 103   CO2 22 27 26   BUN 23 23 19   CREATININE 1.6* 1.3* 1.2*   GLUCOSE 124* 109* 104*       Lab Results   Component Value Date    CALCIUM 9.1 03/26/2022       Objective:     Vitals: BP (!) 150/99   Pulse 103   Temp 97 °F (36.1 °C) (Oral)   Resp 17   Ht 5' 7\" (1.702 m)   Wt 169 lb 8.5 oz (76.9 kg)   SpO2 93%   BMI 26.55 kg/m²     General appearance: Alert awake and oriented  HEENT: No gross conjunctival pallor-maculopapular rash involving face-she is edentulous  Neck: Supple  Lungs: Positive rhonchi no crackles also has some expiratory wheeze  Heart: Acute delirium exam  Abdomen: Soft  Extremities: No overt edema      Problem List :         Impression :     1. Acute kidney injury with underlying CKD stage III-recovering by creatinine criteria  2. Underlying cardiomyopathy but somehow compensated without diuretics  3. Hyponatremia resolved-possible cocaine abuse/dependency    Recommendation/Plan  :     1.  She has been discharged by the time I am writing the note  2. She will be followed up with me as an outpatient  3. I counseled and advised her to abstain from illicit drugs  4. Low-salt  5. Call me with weight gain more than 2 pounds, shortness of breath  6. We will decide about outpatient diuretics if needed  7.  Outpatient close follow-up      Flako Barnes MD MD

## 2022-03-26 NOTE — DISCHARGE SUMMARY
Discharge Summary    Name:  Zeenat Wilson /Age/Sex:   (57 y.o. female)   MRN & CSN:  [id number] & [id number] Admission Date/Time: 3/24/2022  2:09 AM   Attending:  Chris Clements MD Discharging Physician: Chris Clements MD     Hospital Course:     Oneida Mendoza a 61 y. o. female  with history of cardiomyopathy EF less than 20%, essential hypertension, CKD, anxiety, drug abuse who presents with facial swelling, shortness of breath, nausea vomiting and diarrhea for the past 3 days.     Cardiomyopathy, likely cocaine-related  Acute on chronic systolic CHF  Acute hypoxic respiratory failure  Prolonged QTC;  511 ms  Clinically improved  Weaned off O2; sat 97% on RA  Troponin trended down; increased BNP due to CM  EF 15% on TTE  Nonadherence with treatment and f/up  Daily wts, strict I/O, Cardiac diet. Cardiac XDCA 0299 w/ normal coronary arteries. BP stable  CTA negative for PE and pulmonary edema  Seen by cardiology; rec BB and f/up as out pt  Counseled pt about importance of compliance; she does not want us to d/w with family.     Possible allergic reaction? Presented with acute sob, facial swelling, n/v/d.  drug related? UDS positive for cocaine.    CT soft tissue neck showed minimal edema   Improved after Benadryl, epinephrine, Solu-Medrol in ED.    Stable  Stopped ACEI     Gastroenteritis  nausea vomiting diarrhea, resolved     Acute kidney injury on CKD stage III  Hypokalemia   Mild hyponatremia   Likely ATN from dehydration  Creatinine baseline 1.1; currently 1.2  sodium level 131=>142   Stopped ACE inhibitor   Cont lasix  S/b Renal     Essential HTN  BP stable  Cont beta-blocker and Lasix     Cocaine abuse  Anxiety   Social work consulted for rehab needs; pt refused any services    The patient expressed appropriate understanding of and agreement with the discharge recommendations, medications, and plan.      Consults this admission:  IP CONSULT TO HOSPITALIST  IP CONSULT TO CARDIOLOGY  IP CONSULT TO NEPHROLOGY    Discharge Instruction:   Follow up appointments: cardiol  Primary care physician: within 1 week    Diet:  cardiac diet   Activity: activity as tolerated  Disposition: Discharged to:   [x]Home, []C, []SNF, []Acute Rehab, []Hospice   Condition on discharge: Stable    Discharge Medications:        Medication List      START taking these medications    aspirin 81 MG chewable tablet  Take 1 tablet by mouth daily  Start taking on: March 27, 2022     predniSONE 20 MG tablet  Commonly known as: DELTASONE  Take 1 tablet by mouth daily for 5 days  Start taking on: March 27, 2022        CONTINUE taking these medications    albuterol sulfate  (90 Base) MCG/ACT inhaler  Inhale 2 puffs into the lungs every 6 hours as needed for Wheezing     furosemide 40 MG tablet  Commonly known as: Lasix  Take 1 tablet by mouth daily     metoprolol succinate 25 MG extended release tablet  Commonly known as: TOPROL XL  Take 1 tablet by mouth daily     nitroGLYCERIN 0.4 MG SL tablet  Commonly known as: NITROSTAT  up to max of 3 total doses. If no relief after 1 dose, call 911.     spironolactone 25 MG tablet  Commonly known as: Aldactone  Take 1 tablet by mouth daily        STOP taking these medications    lisinopril 2.5 MG tablet  Commonly known as: PRINIVIL;ZESTRIL           Where to Get Your Medications      These medications were sent to [address] [place] [address], [facility]  [address], [address] [place] [id number]    Phone: [phone]   · aspirin 81 MG chewable tablet  · predniSONE 20 MG tablet         Objective Findings at Discharge:   BP (!) 150/99   Pulse 103   Temp 97 °F (36.1 °C) (Oral)   Resp 17   Ht 5' 7\" (1.702 m)   Wt 169 lb 8.5 oz (76.9 kg)   SpO2 93%   BMI 26.55 kg/m²            PHYSICAL EXAM  GEN Awake female, sitting upright in bed in no apparent distress. Appears given age. EYES Pupils are equally round.   No scleral erythema, discharge, or conjunctivitis. HENT Mucous membranes are moist. Oral pharynx without exudates, no evidence of thrush. NECK Supple, no apparent thyromegaly or masses. RESP Clear to auscultation, no wheezes, rales or rhonchi. Symmetric chest movement while on room air. CARDIO/VASC S1/S2 auscultated. Regular rate without appreciable murmurs, rubs, or gallops. No JVD or carotid bruits. Peripheral pulses equal bilaterally and palpable. No peripheral edema. GI Abdomen is soft without significant tenderness, masses, or guarding. Bowel sounds are normoactive. Rectal exam deferred.  No costovertebral angle tenderness. Normal appearing external genitalia. Lamb catheter is not present. HEME/LYMPH No palpable cervical lymphadenopathy and no hepatosplenomegaly. No petechiae or ecchymoses. MSK No gross joint deformities. SKIN Normal coloration, warm, dry. NEURO Cranial nerves appear grossly intact, normal speech, no lateralizing weakness. PSYCH Awake, alert, oriented x 4. Affect appropriate.     BMP/CBC  Recent Labs     03/24/22  0230 03/25/22  0948 03/26/22  1104   * 142 141   K 3.4* 4.9 4.3   CL 95* 103 103   CO2 22 27 26   BUN 23 23 19   CREATININE 1.6* 1.3* 1.2*   WBC 10.6* 11.0* 11.5*   HCT 45.5 50.0* 49.2*    352 336       IMAGING:  As above    Discharge Time of 35 minutes    Electronically signed by Tonia Kocher, MD on 3/26/2022 at 6:03 PM

## 2022-06-01 ENCOUNTER — APPOINTMENT (OUTPATIENT)
Dept: GENERAL RADIOLOGY | Age: 57
DRG: 194 | End: 2022-06-01
Payer: COMMERCIAL

## 2022-06-01 ENCOUNTER — HOSPITAL ENCOUNTER (INPATIENT)
Age: 57
LOS: 1 days | Discharge: LEFT BEFORE DISCHARGE | DRG: 194 | End: 2022-06-01
Attending: EMERGENCY MEDICINE
Payer: COMMERCIAL

## 2022-06-01 VITALS
HEIGHT: 67 IN | HEART RATE: 124 BPM | WEIGHT: 170 LBS | BODY MASS INDEX: 26.68 KG/M2 | TEMPERATURE: 98.3 F | RESPIRATION RATE: 18 BRPM | DIASTOLIC BLOOD PRESSURE: 74 MMHG | SYSTOLIC BLOOD PRESSURE: 97 MMHG | OXYGEN SATURATION: 100 %

## 2022-06-01 DIAGNOSIS — R06.09 EXERTIONAL DYSPNEA: ICD-10-CM

## 2022-06-01 DIAGNOSIS — N17.9 AKI (ACUTE KIDNEY INJURY) (HCC): ICD-10-CM

## 2022-06-01 DIAGNOSIS — F14.91 HISTORY OF COCAINE USE: ICD-10-CM

## 2022-06-01 DIAGNOSIS — I50.9 ACUTE ON CHRONIC CONGESTIVE HEART FAILURE, UNSPECIFIED HEART FAILURE TYPE (HCC): Primary | ICD-10-CM

## 2022-06-01 DIAGNOSIS — R94.31 ST SEGMENT CHANGES ON ELECTROCARDIOGRAM: ICD-10-CM

## 2022-06-01 DIAGNOSIS — J96.01 ACUTE RESPIRATORY FAILURE WITH HYPOXIA (HCC): ICD-10-CM

## 2022-06-01 DIAGNOSIS — R77.8 ELEVATED TROPONIN: ICD-10-CM

## 2022-06-01 PROBLEM — I50.43 CHF (CONGESTIVE HEART FAILURE), NYHA CLASS I, ACUTE ON CHRONIC, COMBINED (HCC): Status: ACTIVE | Noted: 2022-06-01

## 2022-06-01 LAB
ALBUMIN SERPL-MCNC: 3.6 GM/DL (ref 3.4–5)
ALP BLD-CCNC: 80 IU/L (ref 40–128)
ALT SERPL-CCNC: 47 U/L (ref 10–40)
AMPHETAMINES: NEGATIVE
ANION GAP SERPL CALCULATED.3IONS-SCNC: 15 MMOL/L (ref 4–16)
AST SERPL-CCNC: 32 IU/L (ref 15–37)
BARBITURATE SCREEN URINE: NEGATIVE
BASOPHILS ABSOLUTE: 0.1 K/CU MM
BASOPHILS RELATIVE PERCENT: 0.6 % (ref 0–1)
BENZODIAZEPINE SCREEN, URINE: NEGATIVE
BILIRUB SERPL-MCNC: 0.6 MG/DL (ref 0–1)
BUN BLDV-MCNC: 19 MG/DL (ref 6–23)
CALCIUM SERPL-MCNC: 8.6 MG/DL (ref 8.3–10.6)
CANNABINOID SCREEN URINE: NEGATIVE
CHLORIDE BLD-SCNC: 95 MMOL/L (ref 99–110)
CO2: 23 MMOL/L (ref 21–32)
COCAINE METABOLITE: ABNORMAL
CREAT SERPL-MCNC: 1.5 MG/DL (ref 0.6–1.1)
DIFFERENTIAL TYPE: ABNORMAL
EOSINOPHILS ABSOLUTE: 0.2 K/CU MM
EOSINOPHILS RELATIVE PERCENT: 1.7 % (ref 0–3)
GFR AFRICAN AMERICAN: 43 ML/MIN/1.73M2
GFR NON-AFRICAN AMERICAN: 36 ML/MIN/1.73M2
GLUCOSE BLD-MCNC: 130 MG/DL (ref 70–99)
HCT VFR BLD CALC: 41.6 % (ref 37–47)
HEMOGLOBIN: 12.8 GM/DL (ref 12.5–16)
IMMATURE NEUTROPHIL %: 0.4 % (ref 0–0.43)
LYMPHOCYTES ABSOLUTE: 1.9 K/CU MM
LYMPHOCYTES RELATIVE PERCENT: 20.1 % (ref 24–44)
MAGNESIUM: 1.7 MG/DL (ref 1.8–2.4)
MCH RBC QN AUTO: 28.1 PG (ref 27–31)
MCHC RBC AUTO-ENTMCNC: 30.8 % (ref 32–36)
MCV RBC AUTO: 91.4 FL (ref 78–100)
MONOCYTES ABSOLUTE: 0.9 K/CU MM
MONOCYTES RELATIVE PERCENT: 9.3 % (ref 0–4)
NUCLEATED RBC %: 0 %
OPIATES, URINE: NEGATIVE
OXYCODONE: NEGATIVE
PDW BLD-RTO: 14.6 % (ref 11.7–14.9)
PHENCYCLIDINE, URINE: NEGATIVE
PLATELET # BLD: 344 K/CU MM (ref 140–440)
PMV BLD AUTO: 9.4 FL (ref 7.5–11.1)
POTASSIUM SERPL-SCNC: 3.4 MMOL/L (ref 3.5–5.1)
PRO-BNP: ABNORMAL PG/ML
RBC # BLD: 4.55 M/CU MM (ref 4.2–5.4)
SARS-COV-2, NAAT: NOT DETECTED
SEGMENTED NEUTROPHILS ABSOLUTE COUNT: 6.6 K/CU MM
SEGMENTED NEUTROPHILS RELATIVE PERCENT: 67.9 % (ref 36–66)
SODIUM BLD-SCNC: 133 MMOL/L (ref 135–145)
SOURCE: NORMAL
TOTAL IMMATURE NEUTOROPHIL: 0.04 K/CU MM
TOTAL NUCLEATED RBC: 0 K/CU MM
TOTAL PROTEIN: 6.6 GM/DL (ref 6.4–8.2)
TROPONIN T: 0.04 NG/ML
WBC # BLD: 9.7 K/CU MM (ref 4–10.5)

## 2022-06-01 PROCEDURE — 6370000000 HC RX 637 (ALT 250 FOR IP): Performed by: EMERGENCY MEDICINE

## 2022-06-01 PROCEDURE — 71045 X-RAY EXAM CHEST 1 VIEW: CPT

## 2022-06-01 PROCEDURE — 82803 BLOOD GASES ANY COMBINATION: CPT

## 2022-06-01 PROCEDURE — 6370000000 HC RX 637 (ALT 250 FOR IP): Performed by: NURSE PRACTITIONER

## 2022-06-01 PROCEDURE — 80053 COMPREHEN METABOLIC PANEL: CPT

## 2022-06-01 PROCEDURE — 87635 SARS-COV-2 COVID-19 AMP PRB: CPT

## 2022-06-01 PROCEDURE — 96375 TX/PRO/DX INJ NEW DRUG ADDON: CPT

## 2022-06-01 PROCEDURE — 96376 TX/PRO/DX INJ SAME DRUG ADON: CPT

## 2022-06-01 PROCEDURE — 2580000003 HC RX 258: Performed by: EMERGENCY MEDICINE

## 2022-06-01 PROCEDURE — 96374 THER/PROPH/DIAG INJ IV PUSH: CPT

## 2022-06-01 PROCEDURE — 85025 COMPLETE CBC W/AUTO DIFF WBC: CPT

## 2022-06-01 PROCEDURE — 84443 ASSAY THYROID STIM HORMONE: CPT

## 2022-06-01 PROCEDURE — 82140 ASSAY OF AMMONIA: CPT

## 2022-06-01 PROCEDURE — 99285 EMERGENCY DEPT VISIT HI MDM: CPT

## 2022-06-01 PROCEDURE — 83735 ASSAY OF MAGNESIUM: CPT

## 2022-06-01 PROCEDURE — 6360000002 HC RX W HCPCS: Performed by: EMERGENCY MEDICINE

## 2022-06-01 PROCEDURE — 93005 ELECTROCARDIOGRAM TRACING: CPT | Performed by: EMERGENCY MEDICINE

## 2022-06-01 PROCEDURE — 96361 HYDRATE IV INFUSION ADD-ON: CPT

## 2022-06-01 PROCEDURE — 1200000000 HC SEMI PRIVATE

## 2022-06-01 PROCEDURE — 36600 WITHDRAWAL OF ARTERIAL BLOOD: CPT

## 2022-06-01 PROCEDURE — 84484 ASSAY OF TROPONIN QUANT: CPT

## 2022-06-01 PROCEDURE — 80307 DRUG TEST PRSMV CHEM ANLYZR: CPT

## 2022-06-01 PROCEDURE — 83880 ASSAY OF NATRIURETIC PEPTIDE: CPT

## 2022-06-01 RX ORDER — MAGNESIUM SULFATE 1 G/100ML
1000 INJECTION INTRAVENOUS ONCE
Status: DISCONTINUED | OUTPATIENT
Start: 2022-06-01 | End: 2022-06-01 | Stop reason: HOSPADM

## 2022-06-01 RX ORDER — 0.9 % SODIUM CHLORIDE 0.9 %
500 INTRAVENOUS SOLUTION INTRAVENOUS ONCE
Status: COMPLETED | OUTPATIENT
Start: 2022-06-01 | End: 2022-06-01

## 2022-06-01 RX ORDER — LORAZEPAM 2 MG/ML
0.5 INJECTION INTRAMUSCULAR EVERY 6 HOURS PRN
Status: DISCONTINUED | OUTPATIENT
Start: 2022-06-01 | End: 2022-06-01 | Stop reason: HOSPADM

## 2022-06-01 RX ORDER — ENOXAPARIN SODIUM 100 MG/ML
40 INJECTION SUBCUTANEOUS DAILY
Status: DISCONTINUED | OUTPATIENT
Start: 2022-06-01 | End: 2022-06-01 | Stop reason: HOSPADM

## 2022-06-01 RX ORDER — FAMOTIDINE 20 MG/1
20 TABLET, FILM COATED ORAL 2 TIMES DAILY
Status: DISCONTINUED | OUTPATIENT
Start: 2022-06-01 | End: 2022-06-01 | Stop reason: HOSPADM

## 2022-06-01 RX ORDER — ACETAMINOPHEN 325 MG/1
650 TABLET ORAL EVERY 6 HOURS PRN
Status: DISCONTINUED | OUTPATIENT
Start: 2022-06-01 | End: 2022-06-01 | Stop reason: HOSPADM

## 2022-06-01 RX ORDER — ONDANSETRON 4 MG/1
4 TABLET, ORALLY DISINTEGRATING ORAL EVERY 8 HOURS PRN
Status: DISCONTINUED | OUTPATIENT
Start: 2022-06-01 | End: 2022-06-01

## 2022-06-01 RX ORDER — FUROSEMIDE 10 MG/ML
40 INJECTION INTRAMUSCULAR; INTRAVENOUS 2 TIMES DAILY
Status: DISCONTINUED | OUTPATIENT
Start: 2022-06-01 | End: 2022-06-01 | Stop reason: HOSPADM

## 2022-06-01 RX ORDER — SODIUM CHLORIDE 0.9 % (FLUSH) 0.9 %
5-40 SYRINGE (ML) INJECTION PRN
Status: DISCONTINUED | OUTPATIENT
Start: 2022-06-01 | End: 2022-06-01 | Stop reason: HOSPADM

## 2022-06-01 RX ORDER — ATORVASTATIN CALCIUM 10 MG/1
20 TABLET, FILM COATED ORAL NIGHTLY
Status: DISCONTINUED | OUTPATIENT
Start: 2022-06-01 | End: 2022-06-01 | Stop reason: HOSPADM

## 2022-06-01 RX ORDER — ALBUTEROL SULFATE 90 UG/1
2 AEROSOL, METERED RESPIRATORY (INHALATION) EVERY 6 HOURS PRN
Status: DISCONTINUED | OUTPATIENT
Start: 2022-06-01 | End: 2022-06-01 | Stop reason: HOSPADM

## 2022-06-01 RX ORDER — SODIUM CHLORIDE 0.9 % (FLUSH) 0.9 %
5-40 SYRINGE (ML) INJECTION EVERY 12 HOURS SCHEDULED
Status: DISCONTINUED | OUTPATIENT
Start: 2022-06-01 | End: 2022-06-01 | Stop reason: HOSPADM

## 2022-06-01 RX ORDER — LANOLIN ALCOHOL/MO/W.PET/CERES
400 CREAM (GRAM) TOPICAL DAILY
Status: DISCONTINUED | OUTPATIENT
Start: 2022-06-01 | End: 2022-06-01

## 2022-06-01 RX ORDER — NITROGLYCERIN 0.4 MG/1
0.4 TABLET SUBLINGUAL EVERY 5 MIN PRN
Status: DISCONTINUED | OUTPATIENT
Start: 2022-06-01 | End: 2022-06-01

## 2022-06-01 RX ORDER — FUROSEMIDE 10 MG/ML
20 INJECTION INTRAMUSCULAR; INTRAVENOUS ONCE
Status: COMPLETED | OUTPATIENT
Start: 2022-06-01 | End: 2022-06-01

## 2022-06-01 RX ORDER — ONDANSETRON 2 MG/ML
4 INJECTION INTRAMUSCULAR; INTRAVENOUS EVERY 6 HOURS PRN
Status: DISCONTINUED | OUTPATIENT
Start: 2022-06-01 | End: 2022-06-01

## 2022-06-01 RX ORDER — LORAZEPAM 2 MG/ML
0.5 INJECTION INTRAMUSCULAR ONCE
Status: COMPLETED | OUTPATIENT
Start: 2022-06-01 | End: 2022-06-01

## 2022-06-01 RX ORDER — SPIRONOLACTONE 50 MG/1
25 TABLET, FILM COATED ORAL DAILY
Status: DISCONTINUED | OUTPATIENT
Start: 2022-06-02 | End: 2022-06-01 | Stop reason: HOSPADM

## 2022-06-01 RX ORDER — ACETAMINOPHEN 650 MG/1
650 SUPPOSITORY RECTAL EVERY 6 HOURS PRN
Status: DISCONTINUED | OUTPATIENT
Start: 2022-06-01 | End: 2022-06-01 | Stop reason: HOSPADM

## 2022-06-01 RX ORDER — POLYETHYLENE GLYCOL 3350 17 G/17G
17 POWDER, FOR SOLUTION ORAL DAILY PRN
Status: DISCONTINUED | OUTPATIENT
Start: 2022-06-01 | End: 2022-06-01 | Stop reason: HOSPADM

## 2022-06-01 RX ORDER — SODIUM CHLORIDE 9 MG/ML
INJECTION, SOLUTION INTRAVENOUS PRN
Status: DISCONTINUED | OUTPATIENT
Start: 2022-06-01 | End: 2022-06-01 | Stop reason: HOSPADM

## 2022-06-01 RX ORDER — ASPIRIN 81 MG/1
81 TABLET, CHEWABLE ORAL DAILY
Status: DISCONTINUED | OUTPATIENT
Start: 2022-06-01 | End: 2022-06-01 | Stop reason: HOSPADM

## 2022-06-01 RX ADMIN — FUROSEMIDE 20 MG: 10 INJECTION, SOLUTION INTRAVENOUS at 07:21

## 2022-06-01 RX ADMIN — MAGNESIUM OXIDE 400 MG (241.3 MG MAGNESIUM) TABLET 400 MG: TABLET at 07:25

## 2022-06-01 RX ADMIN — LORAZEPAM 0.5 MG: 2 INJECTION INTRAMUSCULAR; INTRAVENOUS at 07:20

## 2022-06-01 RX ADMIN — LORAZEPAM 0.5 MG: 2 INJECTION INTRAMUSCULAR; INTRAVENOUS at 05:56

## 2022-06-01 RX ADMIN — POTASSIUM BICARBONATE 40 MEQ: 782 TABLET, EFFERVESCENT ORAL at 07:23

## 2022-06-01 RX ADMIN — SODIUM CHLORIDE 500 ML: 9 INJECTION, SOLUTION INTRAVENOUS at 06:33

## 2022-06-01 NOTE — H&P
avoiding beta-blocker for now, benzo prn agitation  # Tobacco abuse - Cessation encouraged    Present on Admission:   CHF (congestive heart failure), NYHA class I, acute on chronic, combined ([address]             Diet ADULT DIET; Regular; Low Sodium (2 gm)   DVT Prophylaxis [x] Lovenox, []  Heparin, [] SCDs, [] Ambulation  [] Long term AC   GI Prophylaxis [] PPI,  [x] H2 Blocker,  [] Carafate,  [] Diet/Tube Feeds   Code Status Full code   Disposition Admit to step down. Patient plans to return home upon discharge         Chief Complaint: Shortness of Breath and Leg Swelling      History obtained from patient and EHR  History of Present Illness:   Elinor Thompson is a 61 y.o. female  with history of CHF, CMP EF 15%, HTN, CKD III, Cocaine and tobacco abuse who presents with shortness of breath and lower extremity swelling. The patient reports worsening shortness of breath over the past 2 days; worse with exertion. She does report chronic shortness of breath for several years. She reports PND and lower extremity swelling. She denies chest pain. Difficulty to obtain history due to patients was medicated with ativan in ED. The patient had a recent admission 5/27/2022 at [facility] for abdominal pain showing free fluid in the cul-de sac on CT abd/pelvis, evalauted by surgery with no recommendations/indications for surgical intervention. She was seen by Cardiology given [facility] and was noted to be compensated. She was started on isordil and had outpt Cardiology f/u. Patient is reporting compliance with cardiac medications. She states she has not used cocaine only \"bags cocaine\" however feels its getting in her skin. She was evaluated in ED today. She presented afebrile, tachycardic, blood pressure 128/86 with O2 sat 96% however later was found to have O2 sat 87% and was placed on 4 L per nasal cannula. Chest x-ray performed showed cardiomegaly and mild pulmonary vascular congestion.   EKG showed right bundle branch block with T wave abnormality and prolonged QTC. Troponin 0.041, BNP 24,332. Chemistry panel showed sodium 133 potassium 3.4 chloride 95 CO2 23 BUN 19 creatinine 1.5 GFR 36 glucose 130 calcium 8.6 magnesium 1.7. ALT 47 otherwise unremarkable LFTs. CBC unremarkable. UDS positive for cocaine. She was given 20mg lasix, 500ml NS bolus, potassium and total 1mg IV ativan. She has been admitted for further management. Ten point ROS: reviewed and are negative, unless as noted in above HPI. Objective:   No intake or output data in the 24 hours ending 22 0857     Vitals:   Vitals:    22 0549 22 0630 22 0710 22 0732   BP:  (!) 125/105 97/74    Pulse: (!) 119   (!) 124   Resp:    18   Temp:       TempSrc:       SpO2:   (!) 87% 100%       Physical Exam: 22     GEN -Drowsy appearing female, in NAD. Appears given age. EYES -anicteric, conjunctiva pink. HENT -Head is normocephalic, atraumatic. MM are moist. No evidence of thrush. NECK -Supple, no apparent thyromegaly or masses. RESP -CTA, no wheezes, rales or rhonchi. Symmetric chest movement   C/V -S1/S2 auscultated. RRR without appreciable M/R/G. No JVD. Cap refill <3 sec. +BLE edema > near ankles. GI -Abdomen is soft non distended, non tender to palpation. + BS. No masses or guarding.  -No CVA/ flank tenderness. LYMPH- No petechiae or ecchymoses. MS -No gross joint deformities. SKIN -Normal coloration, warm, dry. NEURO-Cranial nerves appear grossly intact, normal speech, no lateralizing weakness. PSYC-Drowsy, restless oriented x 3. .    Past Medical History:      Past Medical History:   Diagnosis Date    Anxiety     Environmental allergies     Non-healing surgical wound     RLS (restless legs syndrome)    CMP, HTN, Cocaine abuse, CKD stage III    PMH reviewed  Past Surgical  History:    has a past surgical history that includes Cholecystectomy; Appendectomy;  section; and knee surgery ().     Surgical in the Last Year: Not on file    Unstable Housing in the Last Year: Not on file      reports that she has been smoking cigarettes. She has been smoking about 0.50 packs per day. She has never used smokeless tobacco.   reports no history of alcohol use. reports no history of drug use. Social history reviewed  Allergies: Allergies   Allergen Reactions    Adderall [Amphetamine-Dextroamphetamine] Itching    Morphine And Related     Tramadol     Ultram [Tramadol Hcl] Itching    Codeine Itching    Pcn [Penicillins] Swelling and Dermatitis       Home Medications:     Prior to Admission medications    Medication Sig Start Date End Date Taking? Authorizing Provider   aspirin 81 MG chewable tablet Take 1 tablet by mouth daily 3/27/22   Ivon Bob MD   nitroGLYCERIN (NITROSTAT) 0.4 MG SL tablet up to max of 3 total doses.  If no relief after 1 dose, call 911. 12/18/20   Sherel Osler, MD   albuterol sulfate  (90 Base) MCG/ACT inhaler Inhale 2 puffs into the lungs every 6 hours as needed for Wheezing 12/18/20   Sherel Osler, MD   metoprolol succinate (TOPROL XL) 25 MG extended release tablet Take 1 tablet by mouth daily 12/18/20   Sherel Osler, MD   spironolactone (ALDACTONE) 25 MG tablet Take 1 tablet by mouth daily 12/18/20   Sherel Osler, MD   furosemide (LASIX) 40 MG tablet Take 1 tablet by mouth daily 12/18/20   Sherel Osler, MD         Medications:   Medications:    potassium bicarb-citric acid  40 mEq Oral Daily    magnesium oxide  400 mg Oral Daily    sodium chloride flush  5-40 mL IntraVENous 2 times per day    enoxaparin  40 mg SubCUTAneous Daily    famotidine  20 mg Oral BID    furosemide  40 mg IntraVENous BID    aspirin  81 mg Oral Daily    [START ON 6/2/2022] spironolactone  25 mg Oral Daily      Infusions:    sodium chloride       PRN Meds: sodium chloride flush, 5-40 mL, PRN  sodium chloride, , PRN  ondansetron, 4 mg, Q8H PRN   Or  ondansetron, 4 mg, Q6H PRN  polyethylene glycol, 17 g, Daily PRN  acetaminophen, 650 mg, Q6H PRN   Or  acetaminophen, 650 mg, Q6H PRN  albuterol sulfate HFA, 2 puff, Q6H PRN  nitroGLYCERIN, 0.4 mg, Q5 Min PRN  LORazepam, 0.5 mg, Q6H PRN        Data:     Laboratory this visit:  Reviewed  Recent Labs     06/01/22  0530   WBC 9.7   HGB 12.8   HCT 41.6         Recent Labs     06/01/22  0530   *   K 3.4*   CL 95*   CO2 23   BUN 19   CREATININE 1.5*     Recent Labs     06/01/22  0530   AST 32   ALT 47*   BILITOT 0.6   ALKPHOS 80     No results for input(s): INR in the last 72 hours. Radiology this visit:  Reviewed. XR CHEST PORTABLE    Result Date: 6/1/2022  EXAMINATION: ONE XRAY VIEW OF THE CHEST 6/1/2022 5:53 am COMPARISON: 03/24/2022 HISTORY: ORDERING SYSTEM PROVIDED HISTORY: chest pain TECHNOLOGIST PROVIDED HISTORY: Reason for exam:->chest pain Reason for Exam: chest pain FINDINGS: Cardiac silhouette remains moderately enlarged with mild pulmonary vascular congestion. There is no significant pleural effusion and no pneumothorax. There is no new consolidation within the lungs. No lines or tubes within the chest.  Bony thorax appears stable. Cardiomegaly with mild pulmonary vascular congestion.            EKG this visit:  personally reviewed         Electronically signed by SHAQUILLE Royal CNP on 6/1/2022 at 8:57 AM
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
## 2022-06-01 NOTE — ED NOTES
Patient in hallway saying she is not staying. Patient removed PIV while standing in hallway. Patient left AMA.       Ale Levin RN  06/01/22 [id number]

## 2022-06-01 NOTE — ED NOTES
Patient put on 4L of O2. Patient O2 sats 76% after ambulation to bathroom. Provider notified.      Mirna Kowalski RN  06/01/22 [id number]

## 2022-06-01 NOTE — ED NOTES
[id number] paged hospitalist     Barbara Riggins  06/01/22 0881  [id number] Latia NP with [facility]S group returned call      Barbara Riggins  06/01/22 3881

## 2022-06-01 NOTE — ED NOTES
[id number] Perfect serve message sent to Dr Yuki Jensen on in pt consult. Iona Anderson  06/01/22 4877  [id number] Dr Yuki Jensen acknowledged serve message.  Added to treatment team. .     Iona Anderson  06/01/22 0913

## 2022-06-01 NOTE — ED TRIAGE NOTES
Patient has shortness of breath for a few days. Got discharged from [address] [place] [address] on Saturday where she was admitted for congestive heart failure.

## 2022-06-01 NOTE — ED PROVIDER NOTES
CHIEF COMPLAINT  Chief Complaint   Patient presents with    Shortness of Breath    Leg Swelling       HPI  Cathy Alexander is a 61 y.o. female with history of recent hospitalization for CHF, PVCs, cocaine use who presents with increasing shortness of breath and bilateral leg swelling. Patient states \"I was in the hospital last week and they released me on Friday, I felt short of breath since Saturday\". She states she has not been taking any medications for the shortness of breath. She has had cough productive of white phlegm. She denies hemoptysis. She denies any chest pain. She states \"I am just here because I cannot breathe\". She denies any pain. She is having intermittent protrusion of lower abdominal hernia which she is able to easily reduce at home. She denies any history of blood clots. Per chart review she was hospitalized at [facility] last week. Denies fevers. Signs and symptoms moderate and persistent, present at time of evaluation here.       REVIEW OF SYSTEMS  Review of Systems   History obtained from chart review and the patient  General ROS: positive for  - fatigue  Ophthalmic ROS: negative for - decreased vision or double vision  ENT ROS: negative for - headaches  Hematological and Lymphatic ROS: negative for - bleeding problems or blood clots  Endocrine ROS: negative for - unexpected weight changes  Respiratory ROS: positive for - cough  Cardiovascular ROS: positive for - dyspnea on exertion  Gastrointestinal ROS: no abdominal pain, change in bowel habits, or black or bloody stools  Genito-Urinary ROS: no dysuria, trouble voiding, or hematuria  Musculoskeletal ROS: negative for - joint stiffness or joint swelling  Neurological ROS: no TIA or stroke symptoms      PAST MEDICAL HISTORY  Past Medical History:   Diagnosis Date    Anxiety     Environmental allergies     Non-healing surgical wound     RLS (restless legs syndrome)        FAMILY HISTORY  Family History   Problem Relation Age of Onset    Depression Other        SOCIAL HISTORY  Social History     Socioeconomic History    Marital status:      Spouse name: Not on file    Number of children: Not on file    Years of education: Not on file    Highest education level: Not on file   Occupational History    Not on file   Tobacco Use    Smoking status: Current Every Day Smoker     Packs/day: 0.50     Types: Cigarettes    Smokeless tobacco: Never Used   Vaping Use    Vaping Use: Never used   Substance and Sexual Activity    Alcohol use: No    Drug use: No    Sexual activity: Not Currently   Other Topics Concern    Not on file   Social History Narrative    Not on file     Social Determinants of Health     Financial Resource Strain:     Difficulty of Paying Living Expenses: Not on file   Food Insecurity:     Worried About Running Out of Food in the Last Year: Not on file    Travis of Food in the Last Year: Not on file   Transportation Needs:     Lack of Transportation (Medical): Not on file    Lack of Transportation (Non-Medical):  Not on file   Physical Activity:     Days of Exercise per Week: Not on file    Minutes of Exercise per Session: Not on file   Stress:     Feeling of Stress : Not on file   Social Connections:     Frequency of Communication with Friends and Family: Not on file    Frequency of Social Gatherings with Friends and Family: Not on file    Attends Advent Services: Not on file    Active Member of [address] [place] [address] [employer] or Organizations: Not on file    Attends Club or Organization Meetings: Not on file    Marital Status: Not on file   Intimate Partner Violence:     Fear of Current or Ex-Partner: Not on file    Emotionally Abused: Not on file    Physically Abused: Not on file    Sexually Abused: Not on file   Housing Stability:     Unable to Pay for Housing in the Last Year: Not on file    Number of [place] in the Last Year: Not on file    Unstable Housing in the Last Year: Not on file       SURGICAL HISTORY  Past Surgical History:   Procedure Laterality Date    APPENDECTOMY       SECTION      CHOLECYSTECTOMY      KNEE SURGERY         CURRENT MEDICATIONS  No current facility-administered medications on file prior to encounter. Current Outpatient Medications on File Prior to Encounter   Medication Sig Dispense Refill    aspirin 81 MG chewable tablet Take 1 tablet by mouth daily 30 tablet 0    nitroGLYCERIN (NITROSTAT) 0.4 MG SL tablet up to max of 3 total doses. If no relief after 1 dose, call 911. 25 tablet 3    albuterol sulfate  (90 Base) MCG/ACT inhaler Inhale 2 puffs into the lungs every 6 hours as needed for Wheezing 1 Inhaler 3    metoprolol succinate (TOPROL XL) 25 MG extended release tablet Take 1 tablet by mouth daily 30 tablet 3    spironolactone (ALDACTONE) 25 MG tablet Take 1 tablet by mouth daily 30 tablet 3    furosemide (LASIX) 40 MG tablet Take 1 tablet by mouth daily 60 tablet 3         ALLERGIES  Allergies   Allergen Reactions    Adderall [Amphetamine-Dextroamphetamine] Itching    Morphine And Related     Tramadol     Ultram [Tramadol Hcl] Itching    Codeine Itching    Pcn [Penicillins] Swelling and Dermatitis       PHYSICAL EXAM  VITAL SIGNS: BP 97/74   Pulse (!) 124   Temp 98.3 °F (36.8 °C) (Oral)   Resp 18   SpO2 100%   Constitutional: Well developed, Well nourished, appears to feel unwell  HENT: Normocephalic, Atraumatic, Bilateral external ears normal, Oropharynx moist, No oral exudates, Nose normal.   Eyes: PERRL, EOMI, Conjunctiva normal, No discharge. Neck: Normal range of motion, Supple, No stridor. Cardiovascular: Tachycardic heart rate, Normal rhythm, No murmurs, No rubs, No gallops. Thorax & Lungs: Normal breath sounds, No respiratory distress, No wheezing, No chest tenderness. Abdomen: Bowel sounds normal, Soft, No tenderness, no guarding, no rebound, No masses, No pulsatile masses. Skin: Warm, Dry, No erythema, No rash. Extremities: Intact distal pulses, No edema, No tenderness, No cyanosis, No clubbing. Musculoskeletal: Good gross range of motion in all major joints. No major deformities noted. Neurologic: Alert & oriented x 3, Normal gross motor function, Normal gross sensory function, No focal deficits noted. Psychiatric: Affect normal with mood situationally anxious    EKG  EKG Interpretation    Interpreted by emergency department physician from June 1 at 5:30 AM    Rhythm: normal sinus   Rate: tachycardia  Axis: normal  Ectopy: PAC  Conduction: right bundle branch block (incomplete)  ST Segments: T wave inversions lateral leads new from comparison  T Waves: non specific changes  Q Waves: none    Clinical Impression: Sinus tachycardia with rate of 119, ST changes lateral leads, no STEMI    Danica Hammond MD      RADIOLOGY/PROCEDURES/LABS  Last Imaging results   XR CHEST PORTABLE   Final Result   Cardiomegaly with mild pulmonary vascular congestion.              Imaging reviewed by myself    Labs Reviewed   CBC WITH AUTO DIFFERENTIAL - Abnormal; Notable for the following components:       Result Value    MCHC 30.8 (*)     Segs Relative 67.9 (*)     Lymphocytes % 20.1 (*)     Monocytes % 9.3 (*)     All other components within normal limits   COMPREHENSIVE METABOLIC PANEL W/ REFLEX TO MG FOR LOW K - Abnormal; Notable for the following components:    Sodium 133 (*)     Potassium 3.4 (*)     Chloride 95 (*)     CREATININE 1.5 (*)     Glucose 130 (*)     ALT 47 (*)     GFR Non- 36 (*)     GFR  43 (*)     All other components within normal limits   TROPONIN - Abnormal; Notable for the following components:    Troponin T 0.041 (*)     All other components within normal limits   BRAIN NATRIURETIC PEPTIDE - Abnormal; Notable for the following components:    Pro-BNP 24,322 (*)     All other components within normal limits   MAGNESIUM - Abnormal; Notable for the following components:    Magnesium 1.7 (*)     All other components within normal limits   COVID-19, RAPID   URINE DRUG SCREEN         Medications   0.9 % sodium chloride bolus (500 mLs IntraVENous New Bag 6/1/22 0280)   LORazepam (ATIVAN) injection 0.5 mg (has no administration in time range)   furosemide (LASIX) injection 20 mg (has no administration in time range)   potassium bicarb-citric acid (EFFER-K) effervescent tablet 40 mEq (has no administration in time range)   LORazepam (ATIVAN) injection 0.5 mg (0.5 mg IntraVENous Given 6/1/22 3428)       COURSE & MEDICAL DECISION MAKING  Pertinent Labs & Imaging studies reviewed. (See chart for details)    79-year-old female presents with shortness of breath, signs and symptoms not suggestive of decompensated CHF. While in the department, patient's friend brought her biscuits and gravy, hashbrowns and other salty breakfast foods. We discussed dietary modification to decrease risk of CHF with overload. Here the patient also has psychomotor agitation, after walking to the bathroom in the back to her room she had exertional dyspnea with resulting hypoxia. Requiring oxygen supplementation. Her potassium and magnesium will be repleted, she will be initiated on diuresis. Initially small fluid bolus was given due to the tachycardia, this is most likely secondary to decompensated CHF, as is her lateral ST depression. There is also some concern the patient may have used cocaine as in the past she has had similar hospitalizations following. She is agreeable to hospitalization, will be discussed with hospitalist to facilitate. At time of recheck, she is taken her oxygen and her leads off, she is directed to please maintain the oxygen and the leads, she is 84% on room air, 2 L and is now 94%. I have low clinical suspicion for pneumonia, PE.  COVID is negative.     CRITICAL CARE NOTE:  There was a high probability of clinically significant life-threatening deterioration of the patient's condition requiring my urgent intervention due to CHF, hypoxia. Oxygen titration, repeat reevaluations was performed to address this. Total critical care time is 32 minutes. This includes vital sign monitoring, pulse oximetry monitoring, telemetry monitoring, clinical response to the IV medications, reviewing the nursing notes, consultation time, dictation/documentation time, and interpretation of the lab work. This time excludes time spent performing procedures and separately billable procedures and family discussion time. FINAL IMPRESSION  Problem List Items Addressed This Visit     None      Visit Diagnoses     Acute on chronic congestive heart failure, unspecified heart failure type ([address]    -  Primary    Exertional dyspnea        Acute respiratory failure with hypoxia (HCC)        DOROTHY (acute kidney injury) ([facility]        Elevated troponin        ST segment changes on electrocardiogram        History of cocaine use          1.    2.   3.    Patient gave me permission to discuss medical history, care, and plan with those present in the room.   Electronically signed by: Marybel Shay MD, 6/1/2022  MD Marybel Spaulding MD  06/01/22 7379

## 2022-06-02 LAB
EKG ATRIAL RATE: 119 BPM
EKG DIAGNOSIS: NORMAL
EKG P AXIS: 93 DEGREES
EKG P-R INTERVAL: 188 MS
EKG Q-T INTERVAL: 360 MS
EKG QRS DURATION: 134 MS
EKG QTC CALCULATION (BAZETT): 506 MS
EKG R AXIS: 18 DEGREES
EKG T AXIS: 83 DEGREES
EKG VENTRICULAR RATE: 119 BPM

## 2022-06-02 PROCEDURE — 93010 ELECTROCARDIOGRAM REPORT: CPT | Performed by: INTERNAL MEDICINE

## 2022-06-07 ENCOUNTER — APPOINTMENT (OUTPATIENT)
Dept: GENERAL RADIOLOGY | Age: 57
DRG: 816 | End: 2022-06-07
Payer: COMMERCIAL

## 2022-06-07 ENCOUNTER — HOSPITAL ENCOUNTER (INPATIENT)
Age: 57
LOS: 3 days | Discharge: LEFT BEFORE DISCHARGE | DRG: 816 | End: 2022-06-11
Attending: EMERGENCY MEDICINE | Admitting: INTERNAL MEDICINE
Payer: COMMERCIAL

## 2022-06-07 DIAGNOSIS — J81.0 ACUTE PULMONARY EDEMA (HCC): Primary | ICD-10-CM

## 2022-06-07 LAB
BASE EXCESS MIXED: 7 (ref 0–2.3)
BASOPHILS ABSOLUTE: 0.1 K/CU MM
BASOPHILS RELATIVE PERCENT: 0.6 % (ref 0–1)
DIFFERENTIAL TYPE: ABNORMAL
EOSINOPHILS ABSOLUTE: 0.2 K/CU MM
EOSINOPHILS RELATIVE PERCENT: 2.6 % (ref 0–3)
HCO3 VENOUS: 31.3 MMOL/L (ref 19–25)
HCT VFR BLD CALC: 40.7 % (ref 37–47)
HEMOGLOBIN: 12.6 GM/DL (ref 12.5–16)
IMMATURE NEUTROPHIL %: 0.4 % (ref 0–0.43)
LYMPHOCYTES ABSOLUTE: 1.4 K/CU MM
LYMPHOCYTES RELATIVE PERCENT: 15.8 % (ref 24–44)
MCH RBC QN AUTO: 28.1 PG (ref 27–31)
MCHC RBC AUTO-ENTMCNC: 31 % (ref 32–36)
MCV RBC AUTO: 90.6 FL (ref 78–100)
MONOCYTES ABSOLUTE: 0.6 K/CU MM
MONOCYTES RELATIVE PERCENT: 7.4 % (ref 0–4)
NUCLEATED RBC %: 0 %
O2 SAT, VEN: 76.2 % (ref 50–70)
PCO2, VEN: 42 MMHG (ref 38–52)
PDW BLD-RTO: 15 % (ref 11.7–14.9)
PH VENOUS: 7.48 (ref 7.32–7.42)
PLATELET # BLD: 364 K/CU MM (ref 140–440)
PMV BLD AUTO: 9.9 FL (ref 7.5–11.1)
PO2, VEN: 51 MMHG (ref 28–48)
RBC # BLD: 4.49 M/CU MM (ref 4.2–5.4)
SEGMENTED NEUTROPHILS ABSOLUTE COUNT: 6.3 K/CU MM
SEGMENTED NEUTROPHILS RELATIVE PERCENT: 73.2 % (ref 36–66)
TOTAL IMMATURE NEUTOROPHIL: 0.03 K/CU MM
TOTAL NUCLEATED RBC: 0 K/CU MM
WBC # BLD: 8.6 K/CU MM (ref 4–10.5)

## 2022-06-07 PROCEDURE — 85025 COMPLETE CBC W/AUTO DIFF WBC: CPT

## 2022-06-07 PROCEDURE — 84484 ASSAY OF TROPONIN QUANT: CPT

## 2022-06-07 PROCEDURE — 6360000002 HC RX W HCPCS: Performed by: EMERGENCY MEDICINE

## 2022-06-07 PROCEDURE — 82805 BLOOD GASES W/O2 SATURATION: CPT

## 2022-06-07 PROCEDURE — 99285 EMERGENCY DEPT VISIT HI MDM: CPT

## 2022-06-07 PROCEDURE — 80053 COMPREHEN METABOLIC PANEL: CPT

## 2022-06-07 PROCEDURE — 71045 X-RAY EXAM CHEST 1 VIEW: CPT

## 2022-06-07 PROCEDURE — 96374 THER/PROPH/DIAG INJ IV PUSH: CPT

## 2022-06-07 PROCEDURE — 83880 ASSAY OF NATRIURETIC PEPTIDE: CPT

## 2022-06-07 PROCEDURE — 93005 ELECTROCARDIOGRAM TRACING: CPT | Performed by: EMERGENCY MEDICINE

## 2022-06-07 RX ORDER — FUROSEMIDE 10 MG/ML
40 INJECTION INTRAMUSCULAR; INTRAVENOUS ONCE
Status: COMPLETED | OUTPATIENT
Start: 2022-06-07 | End: 2022-06-07

## 2022-06-07 RX ADMIN — FUROSEMIDE 40 MG: 10 INJECTION, SOLUTION INTRAVENOUS at 23:24

## 2022-06-08 PROBLEM — I50.23 SYSTOLIC CHF, ACUTE ON CHRONIC (HCC): Status: ACTIVE | Noted: 2022-06-08

## 2022-06-08 LAB
ALBUMIN SERPL-MCNC: 3.3 GM/DL (ref 3.4–5)
ALBUMIN SERPL-MCNC: 3.4 GM/DL (ref 3.4–5)
ALP BLD-CCNC: 74 IU/L (ref 40–129)
ALP BLD-CCNC: 77 IU/L (ref 40–129)
ALT SERPL-CCNC: 31 U/L (ref 10–40)
ALT SERPL-CCNC: 32 U/L (ref 10–40)
ANION GAP SERPL CALCULATED.3IONS-SCNC: 14 MMOL/L (ref 4–16)
ANION GAP SERPL CALCULATED.3IONS-SCNC: 14 MMOL/L (ref 4–16)
AST SERPL-CCNC: 41 IU/L (ref 15–37)
AST SERPL-CCNC: 50 IU/L (ref 15–37)
BILIRUB SERPL-MCNC: 0.9 MG/DL (ref 0–1)
BILIRUB SERPL-MCNC: 1.1 MG/DL (ref 0–1)
BUN BLDV-MCNC: 22 MG/DL (ref 6–23)
BUN BLDV-MCNC: 23 MG/DL (ref 6–23)
CALCIUM SERPL-MCNC: 8.5 MG/DL (ref 8.3–10.6)
CALCIUM SERPL-MCNC: 8.6 MG/DL (ref 8.3–10.6)
CHLORIDE BLD-SCNC: 90 MMOL/L (ref 99–110)
CHLORIDE BLD-SCNC: 95 MMOL/L (ref 99–110)
CO2: 25 MMOL/L (ref 21–32)
CO2: 28 MMOL/L (ref 21–32)
CREAT SERPL-MCNC: 1.3 MG/DL (ref 0.6–1.1)
CREAT SERPL-MCNC: 1.4 MG/DL (ref 0.6–1.1)
EKG ATRIAL RATE: 304 BPM
EKG ATRIAL RATE: 68 BPM
EKG DIAGNOSIS: NORMAL
EKG DIAGNOSIS: NORMAL
EKG P AXIS: -50 DEGREES
EKG P AXIS: 61 DEGREES
EKG P-R INTERVAL: 166 MS
EKG Q-T INTERVAL: 282 MS
EKG Q-T INTERVAL: 378 MS
EKG QRS DURATION: 144 MS
EKG QRS DURATION: 94 MS
EKG QTC CALCULATION (BAZETT): 448 MS
EKG QTC CALCULATION (BAZETT): 521 MS
EKG R AXIS: -7 DEGREES
EKG R AXIS: 59 DEGREES
EKG T AXIS: 109 DEGREES
EKG T AXIS: 18 DEGREES
EKG VENTRICULAR RATE: 114 BPM
EKG VENTRICULAR RATE: 152 BPM
GFR AFRICAN AMERICAN: 47 ML/MIN/1.73M2
GFR AFRICAN AMERICAN: 51 ML/MIN/1.73M2
GFR NON-AFRICAN AMERICAN: 39 ML/MIN/1.73M2
GFR NON-AFRICAN AMERICAN: 42 ML/MIN/1.73M2
GLUCOSE BLD-MCNC: 120 MG/DL (ref 70–99)
GLUCOSE BLD-MCNC: 124 MG/DL (ref 70–99)
MAGNESIUM: 1.7 MG/DL (ref 1.8–2.4)
POTASSIUM SERPL-SCNC: 3.1 MMOL/L (ref 3.5–5.1)
POTASSIUM SERPL-SCNC: 3.3 MMOL/L (ref 3.5–5.1)
PRO-BNP: ABNORMAL PG/ML
SODIUM BLD-SCNC: 129 MMOL/L (ref 135–145)
SODIUM BLD-SCNC: 137 MMOL/L (ref 135–145)
TOTAL PROTEIN: 6.3 GM/DL (ref 6.4–8.2)
TOTAL PROTEIN: 6.6 GM/DL (ref 6.4–8.2)
TROPONIN T: 0.05 NG/ML
TROPONIN T: 0.06 NG/ML

## 2022-06-08 PROCEDURE — 93005 ELECTROCARDIOGRAM TRACING: CPT | Performed by: INTERNAL MEDICINE

## 2022-06-08 PROCEDURE — 2500000003 HC RX 250 WO HCPCS

## 2022-06-08 PROCEDURE — 6370000000 HC RX 637 (ALT 250 FOR IP): Performed by: INTERNAL MEDICINE

## 2022-06-08 PROCEDURE — 2500000003 HC RX 250 WO HCPCS: Performed by: INTERNAL MEDICINE

## 2022-06-08 PROCEDURE — 93010 ELECTROCARDIOGRAM REPORT: CPT | Performed by: INTERNAL MEDICINE

## 2022-06-08 PROCEDURE — 6360000002 HC RX W HCPCS: Performed by: INTERNAL MEDICINE

## 2022-06-08 PROCEDURE — 83735 ASSAY OF MAGNESIUM: CPT

## 2022-06-08 PROCEDURE — 2580000003 HC RX 258: Performed by: INTERNAL MEDICINE

## 2022-06-08 PROCEDURE — 2140000000 HC CCU INTERMEDIATE R&B

## 2022-06-08 PROCEDURE — 84484 ASSAY OF TROPONIN QUANT: CPT

## 2022-06-08 PROCEDURE — 36415 COLL VENOUS BLD VENIPUNCTURE: CPT

## 2022-06-08 PROCEDURE — 99254 IP/OBS CNSLTJ NEW/EST MOD 60: CPT | Performed by: INTERNAL MEDICINE

## 2022-06-08 PROCEDURE — 80053 COMPREHEN METABOLIC PANEL: CPT

## 2022-06-08 RX ORDER — FUROSEMIDE 10 MG/ML
40 INJECTION INTRAMUSCULAR; INTRAVENOUS 2 TIMES DAILY
Status: DISCONTINUED | OUTPATIENT
Start: 2022-06-08 | End: 2022-06-10

## 2022-06-08 RX ORDER — HYDROXYZINE HYDROCHLORIDE 50 MG/ML
50 INJECTION, SOLUTION INTRAMUSCULAR ONCE
Status: COMPLETED | OUTPATIENT
Start: 2022-06-08 | End: 2022-06-08

## 2022-06-08 RX ORDER — METOPROLOL TARTRATE 5 MG/5ML
5 INJECTION INTRAVENOUS EVERY 6 HOURS
Status: COMPLETED | OUTPATIENT
Start: 2022-06-08 | End: 2022-06-08

## 2022-06-08 RX ORDER — SODIUM CHLORIDE 0.9 % (FLUSH) 0.9 %
5-40 SYRINGE (ML) INJECTION EVERY 12 HOURS SCHEDULED
Status: DISCONTINUED | OUTPATIENT
Start: 2022-06-08 | End: 2022-06-11 | Stop reason: HOSPADM

## 2022-06-08 RX ORDER — POTASSIUM CHLORIDE 20 MEQ/1
20 TABLET, EXTENDED RELEASE ORAL 2 TIMES DAILY WITH MEALS
Status: DISCONTINUED | OUTPATIENT
Start: 2022-06-08 | End: 2022-06-11 | Stop reason: HOSPADM

## 2022-06-08 RX ORDER — HYDROXYZINE HYDROCHLORIDE 25 MG/1
25 TABLET, FILM COATED ORAL 3 TIMES DAILY PRN
Status: DISCONTINUED | OUTPATIENT
Start: 2022-06-08 | End: 2022-06-08

## 2022-06-08 RX ORDER — METOPROLOL TARTRATE 5 MG/5ML
INJECTION INTRAVENOUS
Status: COMPLETED
Start: 2022-06-08 | End: 2022-06-08

## 2022-06-08 RX ORDER — ASPIRIN 81 MG/1
81 TABLET, CHEWABLE ORAL DAILY
Status: DISCONTINUED | OUTPATIENT
Start: 2022-06-08 | End: 2022-06-11 | Stop reason: HOSPADM

## 2022-06-08 RX ORDER — LISINOPRIL 5 MG/1
2.5 TABLET ORAL DAILY
Status: CANCELLED | OUTPATIENT
Start: 2022-06-08

## 2022-06-08 RX ORDER — ACETAMINOPHEN 650 MG/1
650 SUPPOSITORY RECTAL EVERY 6 HOURS PRN
Status: DISCONTINUED | OUTPATIENT
Start: 2022-06-08 | End: 2022-06-11 | Stop reason: HOSPADM

## 2022-06-08 RX ORDER — ISOSORBIDE DINITRATE 10 MG/1
10 TABLET ORAL 3 TIMES DAILY
Status: DISCONTINUED | OUTPATIENT
Start: 2022-06-08 | End: 2022-06-11 | Stop reason: HOSPADM

## 2022-06-08 RX ORDER — DILTIAZEM HYDROCHLORIDE 60 MG/1
60 TABLET, FILM COATED ORAL EVERY 6 HOURS SCHEDULED
Status: DISCONTINUED | OUTPATIENT
Start: 2022-06-08 | End: 2022-06-09

## 2022-06-08 RX ORDER — ONDANSETRON 2 MG/ML
4 INJECTION INTRAMUSCULAR; INTRAVENOUS EVERY 6 HOURS PRN
Status: DISCONTINUED | OUTPATIENT
Start: 2022-06-08 | End: 2022-06-11 | Stop reason: HOSPADM

## 2022-06-08 RX ORDER — DILTIAZEM HYDROCHLORIDE 5 MG/ML
10 INJECTION INTRAVENOUS EVERY 4 HOURS PRN
Status: DISCONTINUED | OUTPATIENT
Start: 2022-06-08 | End: 2022-06-11 | Stop reason: HOSPADM

## 2022-06-08 RX ORDER — HYDROXYZINE HYDROCHLORIDE 25 MG/1
25 TABLET, FILM COATED ORAL 4 TIMES DAILY
Status: DISCONTINUED | OUTPATIENT
Start: 2022-06-08 | End: 2022-06-09

## 2022-06-08 RX ORDER — SODIUM CHLORIDE 9 MG/ML
INJECTION, SOLUTION INTRAVENOUS PRN
Status: DISCONTINUED | OUTPATIENT
Start: 2022-06-08 | End: 2022-06-11 | Stop reason: HOSPADM

## 2022-06-08 RX ORDER — SODIUM CHLORIDE 0.9 % (FLUSH) 0.9 %
5-40 SYRINGE (ML) INJECTION PRN
Status: DISCONTINUED | OUTPATIENT
Start: 2022-06-08 | End: 2022-06-11 | Stop reason: HOSPADM

## 2022-06-08 RX ORDER — ONDANSETRON 4 MG/1
4 TABLET, ORALLY DISINTEGRATING ORAL EVERY 8 HOURS PRN
Status: DISCONTINUED | OUTPATIENT
Start: 2022-06-08 | End: 2022-06-11 | Stop reason: HOSPADM

## 2022-06-08 RX ORDER — TORSEMIDE 20 MG/1
20 TABLET ORAL DAILY
COMMUNITY
End: 2022-07-18

## 2022-06-08 RX ORDER — SPIRONOLACTONE 50 MG/1
25 TABLET, FILM COATED ORAL DAILY
Status: DISCONTINUED | OUTPATIENT
Start: 2022-06-08 | End: 2022-06-11 | Stop reason: HOSPADM

## 2022-06-08 RX ORDER — POLYETHYLENE GLYCOL 3350 17 G/17G
17 POWDER, FOR SOLUTION ORAL DAILY PRN
Status: DISCONTINUED | OUTPATIENT
Start: 2022-06-08 | End: 2022-06-11 | Stop reason: HOSPADM

## 2022-06-08 RX ORDER — LACTULOSE 10 G/15ML
30 SOLUTION ORAL 2 TIMES DAILY
Status: DISCONTINUED | OUTPATIENT
Start: 2022-06-08 | End: 2022-06-11 | Stop reason: HOSPADM

## 2022-06-08 RX ORDER — ACETAMINOPHEN 325 MG/1
650 TABLET ORAL EVERY 6 HOURS PRN
Status: DISCONTINUED | OUTPATIENT
Start: 2022-06-08 | End: 2022-06-11 | Stop reason: HOSPADM

## 2022-06-08 RX ORDER — ENOXAPARIN SODIUM 100 MG/ML
40 INJECTION SUBCUTANEOUS DAILY
Status: DISCONTINUED | OUTPATIENT
Start: 2022-06-08 | End: 2022-06-11 | Stop reason: HOSPADM

## 2022-06-08 RX ORDER — GUAIFENESIN 100 MG/5ML
200 SOLUTION ORAL EVERY 4 HOURS PRN
Status: DISCONTINUED | OUTPATIENT
Start: 2022-06-08 | End: 2022-06-11 | Stop reason: HOSPADM

## 2022-06-08 RX ORDER — ISOSORBIDE DINITRATE 10 MG/1
10 TABLET ORAL 3 TIMES DAILY
COMMUNITY

## 2022-06-08 RX ORDER — METHOCARBAMOL 500 MG/1
750 TABLET, FILM COATED ORAL 3 TIMES DAILY
Status: DISCONTINUED | OUTPATIENT
Start: 2022-06-08 | End: 2022-06-11 | Stop reason: HOSPADM

## 2022-06-08 RX ADMIN — HYDROXYZINE HYDROCHLORIDE 50 MG: 50 INJECTION, SOLUTION INTRAMUSCULAR at 12:55

## 2022-06-08 RX ADMIN — HYDROXYZINE HYDROCHLORIDE 25 MG: 25 TABLET, FILM COATED ORAL at 13:30

## 2022-06-08 RX ADMIN — METHOCARBAMOL 750 MG: 500 TABLET ORAL at 13:30

## 2022-06-08 RX ADMIN — SODIUM CHLORIDE, PRESERVATIVE FREE 10 ML: 5 INJECTION INTRAVENOUS at 22:10

## 2022-06-08 RX ADMIN — HYDROXYZINE HYDROCHLORIDE 25 MG: 25 TABLET, FILM COATED ORAL at 22:05

## 2022-06-08 RX ADMIN — SODIUM CHLORIDE, PRESERVATIVE FREE 10 ML: 5 INJECTION INTRAVENOUS at 09:25

## 2022-06-08 RX ADMIN — ISOSORBIDE DINITRATE 10 MG: 10 TABLET ORAL at 09:26

## 2022-06-08 RX ADMIN — ASPIRIN 81 MG 81 MG: 81 TABLET ORAL at 09:26

## 2022-06-08 RX ADMIN — ENOXAPARIN SODIUM 40 MG: 100 INJECTION SUBCUTANEOUS at 09:26

## 2022-06-08 RX ADMIN — DILTIAZEM HYDROCHLORIDE 60 MG: 60 TABLET, FILM COATED ORAL at 22:05

## 2022-06-08 RX ADMIN — ISOSORBIDE DINITRATE 10 MG: 10 TABLET ORAL at 17:50

## 2022-06-08 RX ADMIN — HYDROXYZINE HYDROCHLORIDE 25 MG: 25 TABLET, FILM COATED ORAL at 17:50

## 2022-06-08 RX ADMIN — DILTIAZEM HYDROCHLORIDE 10 MG: 5 INJECTION INTRAVENOUS at 12:13

## 2022-06-08 RX ADMIN — POTASSIUM CHLORIDE 20 MEQ: 20 TABLET, EXTENDED RELEASE ORAL at 17:50

## 2022-06-08 RX ADMIN — ONDANSETRON 4 MG: 4 TABLET, ORALLY DISINTEGRATING ORAL at 05:00

## 2022-06-08 RX ADMIN — SPIRONOLACTONE 25 MG: 50 TABLET ORAL at 09:26

## 2022-06-08 RX ADMIN — HYDROXYZINE HYDROCHLORIDE 25 MG: 25 TABLET, FILM COATED ORAL at 05:00

## 2022-06-08 RX ADMIN — FUROSEMIDE 40 MG: 10 INJECTION, SOLUTION INTRAMUSCULAR; INTRAVENOUS at 17:51

## 2022-06-08 RX ADMIN — DILTIAZEM HYDROCHLORIDE 60 MG: 60 TABLET, FILM COATED ORAL at 17:50

## 2022-06-08 RX ADMIN — METHOCARBAMOL 750 MG: 500 TABLET ORAL at 09:26

## 2022-06-08 RX ADMIN — DILTIAZEM HYDROCHLORIDE 60 MG: 60 TABLET, FILM COATED ORAL at 09:26

## 2022-06-08 RX ADMIN — HYDROXYZINE HYDROCHLORIDE 25 MG: 25 TABLET, FILM COATED ORAL at 09:32

## 2022-06-08 RX ADMIN — METHOCARBAMOL 750 MG: 500 TABLET ORAL at 22:05

## 2022-06-08 RX ADMIN — METOPROLOL TARTRATE 5 MG: 5 INJECTION INTRAVENOUS at 12:30

## 2022-06-08 RX ADMIN — ISOSORBIDE DINITRATE 10 MG: 10 TABLET ORAL at 13:30

## 2022-06-08 RX ADMIN — METOPROLOL TARTRATE 5 MG: 1 INJECTION, SOLUTION INTRAVENOUS at 12:30

## 2022-06-08 RX ADMIN — POTASSIUM CHLORIDE 20 MEQ: 20 TABLET, EXTENDED RELEASE ORAL at 09:26

## 2022-06-08 RX ADMIN — DILTIAZEM HYDROCHLORIDE 60 MG: 60 TABLET, FILM COATED ORAL at 13:30

## 2022-06-08 RX ADMIN — FUROSEMIDE 40 MG: 10 INJECTION, SOLUTION INTRAMUSCULAR; INTRAVENOUS at 10:42

## 2022-06-08 RX ADMIN — LACTULOSE 30 G: 10 SOLUTION ORAL at 17:52

## 2022-06-08 ASSESSMENT — PAIN SCALES - GENERAL: PAINLEVEL_OUTOF10: 5

## 2022-06-08 NOTE — ED PROVIDER NOTES
Triage Chief Complaint:   Shortness of Breath    [place] IRA:  Holly Saab is a 61 y.o. female that presents with main complaint of shortness of breath. The patient states that her symptoms started yesterday and she is felt like there is an elephant sitting on her chest and her shortness of breath worse with exertion consistent with prior similar symptoms. She was admitted to the hospital within the last week with similar symptoms but left AMA prior to completion of treatment. She has systolic heart failure with an EF of 50% and uses cocaine. States that last use was a few days ago. States that she has had a cough but denies any fevers, nausea, vomiting, diarrhea. No other acute complaints at this time. ROS:  At least 10 systems reviewed and otherwise acutely negative except as in the [address]     Past Medical History:   Diagnosis Date    Anxiety     Environmental allergies     Non-healing surgical wound     RLS (restless legs syndrome)      Past Surgical History:   Procedure Laterality Date    APPENDECTOMY       SECTION      CHOLECYSTECTOMY      KNEE SURGERY  2014     Family History   Problem Relation Age of Onset    Depression Other      Social History     Socioeconomic History    Marital status:      Spouse name: Not on file    Number of children: Not on file    Years of education: Not on file    Highest education level: Not on file   Occupational History    Not on file   Tobacco Use    Smoking status: Current Every Day Smoker     Packs/day: 0.50     Types: Cigarettes    Smokeless tobacco: Never Used   Vaping Use    Vaping Use: Never used   Substance and Sexual Activity    Alcohol use: No    Drug use: No    Sexual activity: Not Currently   Other Topics Concern    Not on file   Social History Narrative    Not on file     Social Determinants of Health     Financial Resource Strain:     Difficulty of Paying Living Expenses: Not on file   Food Insecurity:     Worried About Running Out of Food in the Last Year: Not on file    Ran Out of Food in the Last Year: Not on file   Transportation Needs:     Lack of Transportation (Medical): Not on file    Lack of Transportation (Non-Medical): Not on file   Physical Activity:     Days of Exercise per Week: Not on file    Minutes of Exercise per Session: Not on file   Stress:     Feeling of Stress : Not on file   Social Connections:     Frequency of Communication with Friends and Family: Not on file    Frequency of Social Gatherings with Friends and Family: Not on file    Attends Restorationism Services: Not on file    Active Member of [address] [place] [address] [employer] or Organizations: Not on file    Attends Club or Organization Meetings: Not on file    Marital Status: Not on file   Intimate Partner Violence:     Fear of Current or Ex-Partner: Not on file    Emotionally Abused: Not on file    Physically Abused: Not on file    Sexually Abused: Not on file   Housing Stability:     Unable to Pay for Housing in the Last Year: Not on file    Number of [place] in the Last Year: Not on file    Unstable Housing in the Last Year: Not on file     No current facility-administered medications for this encounter. Current Outpatient Medications   Medication Sig Dispense Refill    aspirin 81 MG chewable tablet Take 1 tablet by mouth daily 30 tablet 0    nitroGLYCERIN (NITROSTAT) 0.4 MG SL tablet up to max of 3 total doses.  If no relief after 1 dose, call 911. 25 tablet 3    albuterol sulfate  (90 Base) MCG/ACT inhaler Inhale 2 puffs into the lungs every 6 hours as needed for Wheezing 1 Inhaler 3    metoprolol succinate (TOPROL XL) 25 MG extended release tablet Take 1 tablet by mouth daily 30 tablet 3    spironolactone (ALDACTONE) 25 MG tablet Take 1 tablet by mouth daily 30 tablet 3    furosemide (LASIX) 40 MG tablet Take 1 tablet by mouth daily 60 tablet 3     Allergies   Allergen Reactions    Adderall [Amphetamine-Dextroamphetamine] Itching    Morphine And Related     Tramadol     Ultram [Tramadol Hcl] Itching    Codeine Itching    Pcn [Penicillins] Swelling and Dermatitis       Nursing Notes Reviewed    Physical Exam:  ED Triage Vitals [06/07/22 2235]   Enc Vitals Group      BP (!) 140/98      Heart Rate (!) 125      Resp 24      Temp 98.2 °F (36.8 °C)      Temp Source Oral      SpO2 95 %      Weight 176 lb (79.8 kg)      Height 5' 7\" (1.702 m)      Head Circumference       Peak Flow       Pain Score       Pain Loc       Pain Edu? Excl. in [address]? GENERAL APPEARANCE: Awake and alert. Cooperative. No acute distress. Mildly agitated. Hyperactive  HEAD: Normocephalic. Atraumatic. EYES: EOM's grossly intact. Sclera anicteric. ENT: Mucous membranes are moist. Tolerates saliva. No trismus. NECK: Supple. No meningismus. Trachea midline. HEART: Tachycardic. Radial pulses 2+. LUNGS: Respirations tachypneic with diminished lung sounds bilaterally  ABDOMEN: Soft. Non-tender. No guarding or rebound. EXTREMITIES: No acute deformities. Pretibial trace pitting edema  SKIN: Warm and dry. NEUROLOGICAL: No gross facial drooping. Moves all 4 extremities spontaneously. PSYCHIATRIC: Normal mood.     I have reviewed and interpreted all of the currently available lab results from this visit (if applicable):  Results for orders placed or performed during the hospital encounter of 06/07/22   CBC with Auto Differential   Result Value Ref Range    WBC 8.6 4.0 - 10.5 K/CU MM    RBC 4.49 4.2 - 5.4 M/CU MM    Hemoglobin 12.6 12.5 - 16.0 GM/DL    Hematocrit 40.7 37 - 47 %    MCV 90.6 78 - 100 FL    MCH 28.1 27 - 31 PG    MCHC 31.0 (L) 32.0 - 36.0 %    RDW 15.0 (H) 11.7 - 14.9 %    Platelets 422 834 - 283 K/CU MM    MPV 9.9 7.5 - 11.1 FL    Differential Type AUTOMATED DIFFERENTIAL     Segs Relative 73.2 (H) 36 - 66 %    Lymphocytes % 15.8 (L) 24 - 44 %    Monocytes % 7.4 (H) 0 - 4 %    Eosinophils % 2.6 0 - 3 %    Basophils % 0.6 0 - 1 %    Segs Absolute 6.3 K/CU MM    Lymphocytes Absolute 1.4 K/CU MM    Monocytes Absolute 0.6 K/CU MM    Eosinophils Absolute 0.2 K/CU MM    Basophils Absolute 0.1 K/CU MM    Nucleated RBC % 0.0 %    Total Nucleated RBC 0.0 K/CU MM    Total Immature Neutrophil 0.03 K/CU MM    Immature Neutrophil % 0.4 0 - 0.43 %   Comprehensive Metabolic Panel   Result Value Ref Range    Sodium 129 (L) 135 - 145 MMOL/L    Potassium 3.3 (L) 3.5 - 5.1 MMOL/L    Chloride 90 (L) 99 - 110 mMol/L    CO2 25 21 - 32 MMOL/L    BUN 22 6 - 23 MG/DL    CREATININE 1.4 (H) 0.6 - 1.1 MG/DL    Glucose 120 (H) 70 - 99 MG/DL    Calcium 8.6 8.3 - 10.6 MG/DL    Albumin 3.4 3.4 - 5.0 GM/DL    Total Protein 6.6 6.4 - 8.2 GM/DL    Total Bilirubin 0.9 0.0 - 1.0 MG/DL    ALT 32 10 - 40 U/L    AST 50 (H) 15 - 37 IU/L    Alkaline Phosphatase 77 40 - 129 IU/L    GFR Non- 39 (L) >60 mL/min/1.73m2    GFR  47 (L) >60 mL/min/1.73m2    Anion Gap 14 4 - 16   Blood Gas, Venous   Result Value Ref Range    pH, Gurjit 7.48 (H) 7.32 - 7.42    pCO2, Gurjit 42 38 - 52 mmHG    pO2, Gurjit 51 (H) 28 - 48 mmHG    Base Exc, Mixed 7 (H) 0 - 2.3    HCO3, Venous 31.3 (H) 19 - 25 MMOL/L    O2 Sat, Gurjit 76.2 (H) 50 - 70 %   Brain Natriuretic Peptide   Result Value Ref Range    Pro-BNP 18,823 (H) <300 PG/ML   Troponin   Result Value Ref Range    Troponin T 0.046 (H) <0.01 NG/ML      Radiographs (if obtained):  [] The following radiograph was interpreted by myself in the absence of a radiologist:  [x] Radiologist's Report Reviewed:    EKG (if obtained): (All EKG's are interpreted by myself in the absence of a cardiologist)  Sinus tachycardia with PVCs. Normal axis. LVH with repolarization abnormality. No ST elevation. QTc is normal.  No specific ST or T wave changes otherwise. MDM:  Plan of care is discussed thoroughly with the patient and family if present. If performed, all imaging and lab work also discussed with patient.   All relevant prior results and chart reviewed if available. Presents as above with chest pain, shortness of breath, tachycardia. She is slightly agitated and hyperactive consistent with history of cocaine abuse. She has some pretibial pitting edema and likely has a component of hypervolemia with acute on chronic CHF. She has a known EF of 15%. EKG shows LVH but no specific ST or T wave changes or evidence of acute ischemia. She is given dose of Lasix here. Patient not currently hypoxic. She is tachycardic, tachypneic and has diminished breath sounds. BNP and troponin are elevated here consistent with likely CHF exacerbation. This will need to be trended but I have low suspicion for ACS. Patient remains tachycardic here. She is not in acute distress on reevaluation. Patient admitted for further management at this time. She is agreeable with this plan of care. Clinical Impression:  1.  Acute pulmonary edema (HCC)      (Please note that portions of this note may have been completed with a voice recognition program. Efforts were made to edit the dictations but occasionally words are mis-transcribed.)    MD Soni Lowe MD  06/08/22 2904

## 2022-06-08 NOTE — PROGRESS NOTES
Patient continues to have tachycardia, Dr ordered iv Diltiazem, per Dennie Sally , patient to be transferred to cardiac icu. Awaiting room number.

## 2022-06-08 NOTE — ED NOTES
Asked pt the last time she used. Pt responds \"I havent used for a couple days. You cant smoke crack if yuou cant breath. \"  Pt able to speak in compleat sentences. Pt unable to sit still in bed. Pt moving head from right to left, running heals up and down bed and unable to keep arms still.       Monique Gray  06/07/22 6723

## 2022-06-08 NOTE — ACP (ADVANCE CARE PLANNING)
Patient does not have any ACP documents/Medical Power of . LSW notes hospital will follow [place]'s Next of Kin hierarchy in the following descending order for priority:    Guardian  Spouse  [de-identified] of adult Children  Parents  [de-identified] of adult Siblings  Nearest Relative not described above    Per [place]'s Next of Kin hierarchy:    Patients' adult child will be [address].

## 2022-06-08 NOTE — CARE COORDINATION
.CM has reviewed pt's chart for needs. CM screening shows that pt has insurance and is independent PTA. Pt has a hx of cocaine abuse. Substance abuse resources added to d/c instructions. No PCP listed. PCP list added to d/c instructions. If any d/c needs arise please contact SANDRA.   JAMESON

## 2022-06-08 NOTE — CONSULTS
5-40 mL, 2 times per day  sodium chloride flush 0.9 % injection 5-40 mL, PRN  0.9 % sodium chloride infusion, PRN  ondansetron (ZOFRAN-ODT) disintegrating tablet 4 mg, Q8H PRN   Or  ondansetron (ZOFRAN) injection 4 mg, Q6H PRN  polyethylene glycol (GLYCOLAX) packet 17 g, Daily PRN  acetaminophen (TYLENOL) tablet 650 mg, Q6H PRN   Or  acetaminophen (TYLENOL) suppository 650 mg, Q6H PRN  enoxaparin (LOVENOX) injection 40 mg, Daily  furosemide (LASIX) injection 40 mg, BID  aspirin chewable tablet 81 mg, Daily  isosorbide dinitrate (ISORDIL) tablet 10 mg, TID  spironolactone (ALDACTONE) tablet 25 mg, Daily  potassium chloride (KLOR-CON M) extended release tablet 20 mEq, BID WC  dilTIAZem injection 10 mg, Q4H PRN  dilTIAZem (CARDIZEM) tablet 60 mg, 4 times per day  hydrOXYzine HCl (ATARAX) tablet 25 mg, 4x Daily  methocarbamol (ROBAXIN) tablet 750 mg, TID  guaiFENesin (ROBITUSSIN) 100 MG/5ML oral solution 200 mg, Q4H PRN      Current Facility-Administered Medications   Medication Dose Route Frequency Provider Last Rate Last Admin    sodium chloride flush 0.9 % injection 5-40 mL  5-40 mL IntraVENous 2 times per day Bryant Martinez MD   10 mL at 06/08/22 0925    sodium chloride flush 0.9 % injection 5-40 mL  5-40 mL IntraVENous PRN Bryant Martinez MD        0.9 % sodium chloride infusion   IntraVENous PRN Bryant Martinez MD        ondansetron (ZOFRAN-ODT) disintegrating tablet 4 mg  4 mg Oral Q8H PRN Bryant Martinez MD   4 mg at 06/08/22 0500    Or    ondansetron (ZOFRAN) injection 4 mg  4 mg IntraVENous Q6H PRN Bryant Martinez MD        polyethylene glycol (GLYCOLAX) packet 17 g  17 g Oral Daily PRN Bryant Martinez MD        acetaminophen (TYLENOL) tablet 650 mg  650 mg Oral Q6H PRN Bryant Martinez MD        Or    acetaminophen (TYLENOL) suppository 650 mg  650 mg Rectal Q6H PRN Bryant Martinez MD        enoxaparin (LOVENOX) injection 40 mg  40 mg SubCUTAneous Daily Aric Shannon MD   40 mg at 06/08/22 0926    furosemide (LASIX) injection 40 mg  40 mg IntraVENous BID Aric Shannon MD   40 mg at 06/08/22 1042    aspirin chewable tablet 81 mg  81 mg Oral Daily Aric Shannon MD   81 mg at 06/08/22 0926    isosorbide dinitrate (ISORDIL) tablet 10 mg  10 mg Oral TID Aric Shannon MD   10 mg at 06/08/22 1330    spironolactone (ALDACTONE) tablet 25 mg  25 mg Oral Daily Aric Shannon MD   25 mg at 06/08/22 0926    potassium chloride (KLOR-CON M) extended release tablet 20 mEq  20 mEq Oral BID WC Aric Shannon MD   20 mEq at 06/08/22 0926    dilTIAZem injection 10 mg  10 mg IntraVENous Q4H PRN Trent Poole MD   10 mg at 06/08/22 1213    dilTIAZem (CARDIZEM) tablet 60 mg  60 mg Oral 4 times per day Trent Poole MD   60 mg at 06/08/22 1330    hydrOXYzine HCl (ATARAX) tablet 25 mg  25 mg Oral 4x Daily Trent Poole MD   25 mg at 06/08/22 1330    methocarbamol (ROBAXIN) tablet 750 mg  750 mg Oral TID Trent Poole MD   750 mg at 06/08/22 1330    guaiFENesin (ROBITUSSIN) 100 MG/5ML oral solution 200 mg  200 mg Oral Q4H PRN Trent Poole MD         Review of Systems:  All 14 systems reviewed, all negative except for shortness of breath    Physical Examination:    /67   Pulse (!) 150   Temp 98.1 °F (36.7 °C) (Oral)   Resp 28   Ht 5' 7\" (1.702 m)   Wt 176 lb (79.8 kg)   SpO2 94%   BMI 27.57 kg/m²      Wt Readings from Last 3 Encounters:   06/07/22 176 lb (79.8 kg)   06/01/22 170 lb (77.1 kg)   03/25/22 169 lb 8.5 oz (76.9 kg)     Body mass index is 27.57 kg/m².       General Appearance: Fair  Head: normocephalic     Eyes: normal, noninjected conjunctiva    ENT: normal mucosa, noninjected throat, normal     NECK: No JVP  No thyromegaly        Cardiovascular: No thrills palpated   Auscultation: Normal S1 and S2, no murmur   carotid bruit no   Abdominal Aorta no bruit    Respiratory:    Breath sounds diminished bilaterally    Extremities: Trace edema clubbing ,   no cyanosis    SKIN: Warm and well perfused, no pallor or cyanosis    Vascular exam:  Pedal Pulses: Palp bilaterally        Abdomen:  No masses or tenderness. No organomegaly noted. Neurological:  Oriented to time, place, and person   No focal neurological deficit noted.   Psychiatric:normal mood, no anxiety    Lab Review   Recent Results (from the past 24 hour(s))   EKG 12 Lead    Collection Time: 06/07/22 10:43 PM   Result Value Ref Range    Ventricular Rate 114 BPM    Atrial Rate 68 BPM    P-R Interval 166 ms    QRS Duration 94 ms    Q-T Interval 378 ms    QTc Calculation (Bazett) 521 ms    P Axis 61 degrees    R Axis 59 degrees    T Axis 109 degrees    Diagnosis       Sinus rhythm with frequent and consecutive premature ventricular complexes  Left ventricular hypertrophy with repolarization abnormality  Prolonged QT  Abnormal ECG  When compared with ECG of 01-JUN-2022 05:30,  premature ventricular complexes are now present  premature supraventricular complexes are no longer present  Right bundle branch block is no longer present     Blood Gas, Venous    Collection Time: 06/07/22 11:00 PM   Result Value Ref Range    pH, Gurjit 7.48 (H) 7.32 - 7.42    pCO2, Gurjit 42 38 - 52 mmHG    pO2, Gurjit 51 (H) 28 - 48 mmHG    Base Exc, Mixed 7 (H) 0 - 2.3    HCO3, Venous 31.3 (H) 19 - 25 MMOL/L    O2 Sat, Gurjit 76.2 (H) 50 - 70 %   CBC with Auto Differential    Collection Time: 06/07/22 11:10 PM   Result Value Ref Range    WBC 8.6 4.0 - 10.5 K/CU MM    RBC 4.49 4.2 - 5.4 M/CU MM    Hemoglobin 12.6 12.5 - 16.0 GM/DL    Hematocrit 40.7 37 - 47 %    MCV 90.6 78 - 100 FL    MCH 28.1 27 - 31 PG    MCHC 31.0 (L) 32.0 - 36.0 %    RDW 15.0 (H) 11.7 - 14.9 %    Platelets 457 090 - 656 K/CU MM    MPV 9.9 7.5 - 11.1 FL    Differential Type AUTOMATED DIFFERENTIAL     Segs Relative 73.2 (H) 36 - 66 %    Lymphocytes % 15.8 (L) 24 - 44 %    Monocytes % 7.4 (H) 0 - 4 %    Eosinophils MMOL/L    Potassium 3.1 (L) 3.5 - 5.1 MMOL/L    Chloride 95 (L) 99 - 110 mMol/L    CO2 28 21 - 32 MMOL/L    BUN 23 6 - 23 MG/DL    CREATININE 1.3 (H) 0.6 - 1.1 MG/DL    Glucose 124 (H) 70 - 99 MG/DL    Calcium 8.5 8.3 - 10.6 MG/DL    Albumin 3.3 (L) 3.4 - 5.0 GM/DL    Total Protein 6.3 (L) 6.4 - 8.2 GM/DL    Total Bilirubin 1.1 (H) 0.0 - 1.0 MG/DL    ALT 31 10 - 40 U/L    AST 41 (H) 15 - 37 IU/L    Alkaline Phosphatase 74 40 - 129 IU/L    GFR Non- 42 (L) >60 mL/min/1.73m2    GFR  51 (L) >60 mL/min/1.73m2    Anion Gap 14 4 - 16   Troponin    Collection Time: 06/08/22 12:34 PM   Result Value Ref Range    Troponin T 0.060 (H) <0.01 NG/ML   Magnesium    Collection Time: 06/08/22 12:34 PM   Result Value Ref Range    Magnesium 1.7 (L) 1.8 - 2.4 mg/dl           Assessment/Recommendations:     -HFrEF: Patient extensive cardiac work-up including cardiac catheterization and echo was and she has nonischemic cardiomyopathy has not followed up in the office because there was a discussion regarding intracardiac defibrillator for her in the past  -As far as the HFrEF is concerned we will continue with medical therapy however given her noncompliance and cocaine abuse she has not been taking her medications  -Tachycardia patient is giving Cardizem for that         Dinesh Donnelly MD, 6/8/2022 2:46 PM       Please note this report has been partially produced using speech recognition software and may contain errors related to that system including errors in grammar, punctuation, and spelling, as well as words and phrases that may be inappropriate. If there are any questions or concerns please feel free to contact the dictating provider for clarification.

## 2022-06-08 NOTE — H&P
History and Physical      Name:  Rasheeda Garsia /Age/Sex: 1965  (61 y.o. female)   MRN & CSN:  [id number] & [id number] Encounter Date/Time: 2022 1:36 AM EDT   Location:  ED[facility]/ED-20 PCP: No primary care provider on file. Hospital Day: 2    Assessment and Plan:     #. Acute on chronic combined systolic and diastolic CHF  -Patient complaining of shortness of breath, chest tightness, proBNP elevated, bilateral lower extremity swelling.  -Chest x-ray-mild bibasilar patchy opacities without focal consolidation may represent atelectasis/mild pulmonary edema.  -Patient received Lasix in ER  -Continue IV Lasix  -Strict input/output, daily weight. #.  Detectable troponin  -Troponin 0.046. Patient admits to chest pain.  -Serial troponins, repeat EKG. -Cath-2020-normal coronaries    #. Persistent tachycardia  -Could be secondary to cocaine use    #. Hypervolemic hyponatremia  -Sodium 129, was 141-3/2020 2022  -Diurese and monitor    #. Hypokalemia  -Potassium supplements ordered    #. CKD stage III    #. Chronic systolic CHF  -[id number] 44%, grade 2 diastolic dysfunction  -Patient is on torsemide, isosorbide dinitrate, spironolactone  -Not on lisinopril-had an admission 3/2022 where patient presented with facial swelling with concerns for angioedema-not clear if it was due to ACEI. -Avoiding beta-blockers due to cocaine use.  -Noncompliant with physician follow-up. #.  Chronic tobacco dependence    #. Cocaine abuse    #. Anxiety disorder  -Patient reports intolerance to Ativan-patient reported she was not thinking straight and that's why left AMA last admission.  -Hydroxyzine ordered. Disposition:   Current Living situation: home    Diet  cardiac   DVT Prophylaxis [x] Lovenox, []  Heparin, [] SCDs, [] Ambulation,  [] Eliquis, [] Xarelto   Code Status Prior   Surrogate Decision Maker/ POA      History from:   EMR, patient.     History of Present Illness:     Chief Complaint: Systolic CHF, acute on chronic (HCC)  Ammy Fabian is a 61 y.o. female with chronic systolic and diastolic CHF, chronic tobacco dependence, chronic cocaine use, anxiety disorder, noncompliance presented to ED with complaints of shortness of breath. Patient reports shortness of breath with minimal activity, lying flat. Reported having chest pain, described as someone sitting on the chest.  Has a dry cough. Denied any fever, chills, cough, abdominal pain, denied any urinary complaints, denied any constipation or diarrhea. Witnessed patient walking back and forth to the restroom with a steady gait. Patient could not sit still in bed during my interview. Patient was admitted to [address] [place] [address] with complaints of abdominal pain and was noted to be in CHF exacerbation. Patient was admitted to this hospital 6/1/2022 and left AMA. At presentation patient was noted to have /98, , RR 24, temp 98.2, saturating 95% on room air. Lab work was significant for sodium 129, potassium 3.3, creatinine 1.4, random glucose 120, proBNP 18 823, troponin 0.046, AST 50, CBC within normal range. VBG-pH 7.48, PCO2 42, PO2 51, HCO3 31.3. Chest x-ray with pulmonary vascular congestion. Patient received Lasix in ER. Review of Systems: Need 10 Elements   10 point review of systems conducted and pertinent positives and negatives as per HPI. Objective:   No intake or output data in the 24 hours ending 06/08/22 0136   Vitals:   Vitals:    06/07/22 2235 06/07/22 2327 06/07/22 2330 06/08/22 0030   BP: (!) 140/98 (!) 142/99 (!) 123/97 (!) 153/81   Pulse: (!) 125 (!) 119 (!) 128 (!) 123   Resp: 24 22 21 24   Temp: 98.2 °F (36.8 °C)      TempSrc: Oral      SpO2: 95% 94% 94% 92%   Weight: 176 lb (79.8 kg)      Height: 5' 7\" (1.702 m)          Medications Prior to Admission   Reviewed medications with patient    Prior to Admission medications    Medication Sig Start Date End Date Taking?  Authorizing Provider   aspirin 81 MG chewable tablet Take 1 tablet by mouth daily 3/27/22   Gini Gallegos MD   nitroGLYCERIN (NITROSTAT) 0.4 MG SL tablet up to max of 3 total doses. If no relief after 1 dose, call 911. 20   Viridiana Vasquez MD   albuterol sulfate  (90 Base) MCG/ACT inhaler Inhale 2 puffs into the lungs every 6 hours as needed for Wheezing 20   Viridiana Vasquez MD   metoprolol succinate (TOPROL XL) 25 MG extended release tablet Take 1 tablet by mouth daily 20   Viridiana Vasquez MD   spironolactone (ALDACTONE) 25 MG tablet Take 1 tablet by mouth daily 20   Viridiana Vasquez MD   furosemide (LASIX) 40 MG tablet Take 1 tablet by mouth daily 20   Viridiana Vasquez MD       Physical Exam: Need 8 Elements   Physical Exam     GEN  -Awake, alert, NAD.   EYES   -PERRL. HENT  -MM are moist.   RESP  -LS CTA equal bilat, no wheezes, rales or rhonchi. Symmetric chest movement. No respiratory distress noted. C/V  -S1/S2 auscultated, tachycardia without appreciable M/R/G. + peripheral edema. No reproducible chest wall tenderness. GI  -Abdomen is soft, non-distended, no significant tenderness. No masses or guarding. + BS in all quadrants. Rectal exam deferred.   -No CVA tenderness. Lamb catheter is not present. MS  -B/L extremities - No gross joint deformities. + swelling, intact sensation symmetrical.   SKIN  -Normal coloration, warm, dry. NEURO  - Awake, alert, oriented x 3, no focal deficits. PSYC  - Appropriate affect. Past Medical History:   Reviewed patient's past medical, surgical, social, family history and allergies. PMHx   Past Medical History:   Diagnosis Date    Anxiety     Environmental allergies     Non-healing surgical wound     RLS (restless legs syndrome)      PSHX:  has a past surgical history that includes Cholecystectomy; Appendectomy;  section; and knee surgery (). Allergies:    Allergies   Allergen Reactions    Adderall [Amphetamine-Dextroamphetamine] Itching    Morphine And Related     Tramadol     Ultram [Tramadol Hcl] Itching    Codeine Itching    Pcn [Penicillins] Swelling and Dermatitis     Fam HX: family history includes Depression in an other family member. Soc HX:   Social History     Socioeconomic History    Marital status:      Spouse name: None    Number of children: None    Years of education: None    Highest education level: None   Occupational History    None   Tobacco Use    Smoking status: Current Every Day Smoker     Packs/day: 0.50     Types: Cigarettes    Smokeless tobacco: Never Used   Vaping Use    Vaping Use: Never used   Substance and Sexual Activity    Alcohol use: No    Drug use: No    Sexual activity: Not Currently   Other Topics Concern    None   Social History Narrative    None     Social Determinants of Health     Financial Resource Strain:     Difficulty of Paying Living Expenses: Not on file   Food Insecurity:     Worried About Running Out of Food in the Last Year: Not on file    Travis of Food in the Last Year: Not on file   Transportation Needs:     Lack of Transportation (Medical): Not on file    Lack of Transportation (Non-Medical):  Not on file   Physical Activity:     Days of Exercise per Week: Not on file    Minutes of Exercise per Session: Not on file   Stress:     Feeling of Stress : Not on file   Social Connections:     Frequency of Communication with Friends and Family: Not on file    Frequency of Social Gatherings with Friends and Family: Not on file    Attends Taoist Services: Not on file    Active Member of Clubs or Organizations: Not on file    Attends Club or Organization Meetings: Not on file    Marital Status: Not on file   Intimate Partner Violence:     Fear of Current or Ex-Partner: Not on file    Emotionally Abused: Not on file    Physically Abused: Not on file    Sexually Abused: Not on file   Housing Stability:     Unable to Pay for Housing in the Last Year: Not on file    Number of Places Lived in the Last Year: Not on file    Unstable Housing in the Last Year: Not on file       Medications:   Medications:    Infusions:   PRN Meds: hydrOXYzine HCl, 25 mg, TID PRN        Labs      CBC:   Recent Labs     06/07/22 2310   WBC 8.6   HGB 12.6        BMP:    Recent Labs     06/07/22 2310   *   K 3.3*   CL 90*   CO2 25   BUN 22   CREATININE 1.4*   GLUCOSE 120*     Hepatic:   Recent Labs     06/07/22 2310   AST 50*   ALT 32   BILITOT 0.9   ALKPHOS 77     Lipids:   Lab Results   Component Value Date    CHOL 159 03/25/2022    HDL 38 03/25/2022    TRIG 88 03/25/2022     Hemoglobin A1C: No results found for: LABA1C  TSH: No results found for: TSH  Troponin:   Lab Results   Component Value Date    TROPONINT 0.046 06/07/2022    TROPONINT 0.041 06/01/2022    TROPONINT <0.010 03/24/2022     Lactic Acid: No results for input(s): LACTA in the last 72 hours. BNP:   Recent Labs     06/07/22 2310   PROBNP 18,823*     UA:  Lab Results   Component Value Date    NITRU NEGATIVE 03/24/2022    NITRU NEGATIVE 08/18/2012    COLORU YELLOW 03/24/2022    WBCUA 4 03/24/2022    RBCUA 1 03/24/2022    MUCUS RARE 10/30/2020    TRICHOMONAS NONE SEEN 03/24/2022    BACTERIA NEGATIVE 03/24/2022    CLARITYU CLEAR 03/24/2022    SPECGRAV 1.010 03/24/2022    LEUKOCYTESUR TRACE 03/24/2022    UROBILINOGEN 0.2 03/24/2022    BILIRUBINUR NEGATIVE 03/24/2022    BLOODU SMALL 03/24/2022    GLUCOSEU NEGATIVE 08/18/2012    KETUA TRACE 03/24/2022     Urine Cultures: No results found for: Adelaida Hester  Blood Cultures: No results found for: BC  No results found for: BLOODCULT2  Organism: No results found for: ORG    Imaging/Diagnostics Last 24 Hours   XR CHEST PORTABLE    Result Date: 6/8/2022  EXAMINATION: ONE XRAY VIEW OF THE CHEST 6/7/2022 10:51 pm COMPARISON: June 1, 2020.  HISTORY: ORDERING SYSTEM PROVIDED HISTORY: shortness of breath TECHNOLOGIST PROVIDED HISTORY: Reason for exam:->shortness of breath Reason for Exam: SOB Additional signs and symptoms: SOB FINDINGS: Frontal portable view of the chest.  Normal lung volume. Mild bibasilar patchy opacities. No focal consolidation. Prominent pulmonary vasculature. No pleural effusion or pneumothorax. Stable cardiomediastinal silhouette and great vessels with redemonstration of cardiomegaly. Stable regional skeleton. Mild bibasilar patchy opacities without focal consolidation may represent atelectasis and/or mild pulmonary edema. Short-term follow-up PA and lateral chest radiographs may be helpful for further evaluation. Cardiomegaly. XR CHEST PORTABLE    Result Date: 6/1/2022  EXAMINATION: ONE XRAY VIEW OF THE CHEST 6/1/2022 5:53 am COMPARISON: 03/24/2022 HISTORY: ORDERING SYSTEM PROVIDED HISTORY: chest pain TECHNOLOGIST PROVIDED HISTORY: Reason for exam:->chest pain Reason for Exam: chest pain FINDINGS: Cardiac silhouette remains moderately enlarged with mild pulmonary vascular congestion. There is no significant pleural effusion and no pneumothorax. There is no new consolidation within the lungs. No lines or tubes within the chest.  Bony thorax appears stable. Cardiomegaly with mild pulmonary vascular congestion. Personally reviewed Lab Studies, Imaging, and discussed case with ED physician.     Electronically signed by Keily Laurent MD on 6/8/2022 at 1:36 AM

## 2022-06-08 NOTE — PROGRESS NOTES
Perfect served Philipp Schmidt MD in regards to pt being in extreme tach at 150's. [employer] tech states it looks like she might be trying to go into a flutter and strips have been printed off. Waiting on response. @ [phone] Philipp Schmidt MD  conversation without response.  Resubmitted above statement to Dr. Nicole Simental who states she was going to forward this message to the day shift hospitalist.

## 2022-06-08 NOTE — PROGRESS NOTES
4 Eyes Skin Assessment     NAME:  Ailyn Thomas OF BIRTH:  1965  MEDICAL RECORD NUMBER:  [id number]    The patient is being assess for  Admission    I agree that 2 RN's have performed a thorough Head to Toe Skin Assessment on the patient. ALL assessment sites listed below have been assessed. Areas assessed by both nurses:    Head, Face, Ears, Shoulders, Back, Chest, Arms, Elbows, Hands, Sacrum. Buttock, Coccyx, Ischium and Legs. Feet and Heels        Does the Patient have a Wound?  No noted wound(s)       Gunnar Prevention initiated:  Yes   Wound Care Orders initiated:  No    Pressure Injury (Stage 3,4, Unstageable, DTI, NWPT, and Complex wounds) if present place consult order under [de-identified] No    New and Established Ostomies if present place consult order under : No      Nurse 1 eSignature: Electronically signed by Candy Boyd LPN on 2/4/74 at 8:37 AM EDT    **SHARE this note so that the co-signing nurse is able to place an eSignature**    Nurse 2 eSignature: Electronically signed by Joaquin Landau, RN on 6/8/22 at 3:18 AM EDT

## 2022-06-08 NOTE — PROGRESS NOTES
Hospitalist Progress Note      Name:  Tamra Cowan /Age/Sex: 1965  (61 y.o. female)   MRN & CSN:  [id number] & [id number] Admission Date/Time: 2022 10:45 PM   Location:  [address] [place] [address] PCP: No primary care provider on file. Hospital Day: 2  Discharge barrier/Reason for continued hospitalization: Tachycardia    Assessment and Plan:   Tamra Cowan is a 61 y.o.  female with a past medical history of polysubstance abuse including cocaine, noncompliance, HFrEF with EF of 15%, anxiety disorder who presented to the ED on 2022 with shortness of breath and was found to be extremely tachycardic and in Systolic CHF, acute on chronic ([address]    1. Acute on chronic systolic CHF: Due to noncompliance and cocaine use  IV diuresis-strict I's and O's  Low-salt diet  Daily weight    2. Tachycardia: Due to cocaine use, decompensated CHF  Continue diltiazem p.o. and IV as needed  Discussed with bedside RN    3. Hyponatremia: Due to hypervolemia, expect improvement with diuresis. Expect improvement    4. Elevated troponin: Due to demand ischemia due to #2 above  History of normal cath 2020    5. General anxiety: Acute on chronic. Continue Atarax p.o.  Give 1 dose of IM Atarax. 6.  Intractable cough: Robitussin every 4. Add Robaxin for chest pain    7. Nonadherence to medical therapy      Diet ADULT DIET;  Regular; Low Fat/Low Chol/High Fiber/2 gm Na  ADULT ORAL NUTRITION SUPPLEMENT; Breakfast; Low Calorie/High Protein Oral Supplement   DVT Prophylaxis [x] Lovenox, []  Heparin, [] SCDs, [] Ambulation   GI Prophylaxis [] PPI,  [] H2 Blocker,  [] Carafate,  [x] Diet/Tube Feeds   Code Status Full Code   MDM [] Low, [] Moderate,[x]  High 39 mins   Due to a high probability of clinically significant, life threatening deterioration, the patient required my highest level of preparedness to intervene emergently and I personally spent this critical care time directly and personally managing the patient that excludes separate billable procedures. This critical care time included obtaining a history; examining the patient; pulse oximetry; ordering and review of studies; arranging urgent treatment with development of a management plan; evaluation of patient's response to treatment; frequent reassessment; and, discussions with other providers. This critical care time was performed to assess and manage the high probability of imminent, life-threatening deterioration that could result in multi-organ failure. History of Present Illness:     Chief Complaint: Systolic CHF, acute on chronic ([facility]     Renny Liu is a 61 y.o.  female  who presents with shortness of breath. 6/8/2022: Patient is seen and examined several times today. Patient was seen laying down reverse Trendelenburg, complaining of shortness of breath. Encouraged her to sleep semi-Lr's position. She was also complaining of chest pain from intractable cough. Patient seen again due to persistent tachycardia, recommended IV diltiazem. Ten point ROS reviewed, negative, unless as noted above    Objective:   No intake or output data in the 24 hours ending 06/08/22 1455     Vitals:   Vitals:    06/08/22 0916 06/08/22 1222 06/08/22 1417 06/08/22 1433   BP: (!) 126/104 101/67     Pulse: 95 (!) 150     Resp: 20  28    Temp: 98.1 °F (36.7 °C)      TempSrc: Oral      SpO2: 94% (!) 88% 94%    Weight:       Height:    5' 7\" (1.702 m)        Physical Exam:   GEN: Awake female, alert and oriented x3 in mild apparent distress. Appears given age. HEENT: Normal except JVD  RESP: Rhonchi bilaterally. Symmetric chest movement while on 2 L  CVS: Tachycardic, S1, S2  GI/: Abdomen is soft, nontender, mild ascites, no organomegaly. . Bowel sounds normal, rectal exam deferred. No CVA tenderness. MSK: No gross joint deformities. No tenderness. 1+ bilateral ankle edema  SKIN: Normal coloration, warm, dry.   NEURO: Cranial nerves appear grossly intact, normal speech, no lateralizing weakness.   PSYCH:  Affect appropriate    Medications:   Medications:    sodium chloride flush  5-40 mL IntraVENous 2 times per day    enoxaparin  40 mg SubCUTAneous Daily    furosemide  40 mg IntraVENous BID    aspirin  81 mg Oral Daily    isosorbide dinitrate  10 mg Oral TID    spironolactone  25 mg Oral Daily    potassium chloride  20 mEq Oral BID WC    dilTIAZem  60 mg Oral 4 times per day    hydrOXYzine HCl  25 mg Oral 4x Daily    methocarbamol  750 mg Oral TID      Infusions:    sodium chloride       PRN Meds: sodium chloride flush, 5-40 mL, PRN  sodium chloride, , PRN  ondansetron, 4 mg, Q8H PRN   Or  ondansetron, 4 mg, Q6H PRN  polyethylene glycol, 17 g, Daily PRN  acetaminophen, 650 mg, Q6H PRN   Or  acetaminophen, 650 mg, Q6H PRN  dilTIAZem, 10 mg, Q4H PRN  guaiFENesin, 200 mg, Q4H PRN          Electronically signed by Yecenia Reid MD on 6/8/2022 at 2:55 PM

## 2022-06-08 NOTE — ED NOTES
Pt keeps taking herself off of monitor to go to the bathroom. Asked pt to place call light when she needs to go so we can calculate how much fluid she is putting out. Pt verbalized an understanding.       Amna Hamilton  06/08/22 0056

## 2022-06-08 NOTE — PLAN OF CARE
Problem: Discharge Planning  Goal: Discharge to home or other facility with appropriate resources  Outcome: Progressing  Flowsheets (Taken 6/8/2022 8655)  Discharge to home or other facility with appropriate resources:   Identify barriers to discharge with patient and caregiver   Identify discharge learning needs (meds, wound care, etc)     Problem: Skin/Tissue Integrity  Goal: Absence of new skin breakdown  Description: 1. Monitor for areas of redness and/or skin breakdown  2. Assess vascular access sites hourly  3. Every 4-6 hours minimum:  Change oxygen saturation probe site  4. Every 4-6 hours:  If on nasal continuous positive airway pressure, respiratory therapy assess nares and determine need for appliance change or resting period.   Outcome: Progressing     Problem: Safety - Adult  Goal: Free from fall injury  Outcome: Progressing

## 2022-06-09 LAB
AMPHETAMINES: NEGATIVE
BARBITURATE SCREEN URINE: NEGATIVE
BENZODIAZEPINE SCREEN, URINE: NEGATIVE
CANNABINOID SCREEN URINE: NEGATIVE
COCAINE METABOLITE: ABNORMAL
OPIATES, URINE: NEGATIVE
OXYCODONE: NEGATIVE
PHENCYCLIDINE, URINE: NEGATIVE

## 2022-06-09 PROCEDURE — 6360000002 HC RX W HCPCS: Performed by: INTERNAL MEDICINE

## 2022-06-09 PROCEDURE — 6370000000 HC RX 637 (ALT 250 FOR IP): Performed by: NURSE PRACTITIONER

## 2022-06-09 PROCEDURE — 6370000000 HC RX 637 (ALT 250 FOR IP): Performed by: INTERNAL MEDICINE

## 2022-06-09 PROCEDURE — 2580000003 HC RX 258: Performed by: INTERNAL MEDICINE

## 2022-06-09 PROCEDURE — 99232 SBSQ HOSP IP/OBS MODERATE 35: CPT | Performed by: INTERNAL MEDICINE

## 2022-06-09 PROCEDURE — 80048 BASIC METABOLIC PNL TOTAL CA: CPT

## 2022-06-09 PROCEDURE — 80307 DRUG TEST PRSMV CHEM ANLYZR: CPT

## 2022-06-09 PROCEDURE — 94761 N-INVAS EAR/PLS OXIMETRY MLT: CPT

## 2022-06-09 PROCEDURE — 2140000000 HC CCU INTERMEDIATE R&B

## 2022-06-09 PROCEDURE — APPSS45 APP SPLIT SHARED TIME 31-45 MINUTES: Performed by: NURSE PRACTITIONER

## 2022-06-09 RX ORDER — TRAZODONE HYDROCHLORIDE 50 MG/1
50 TABLET ORAL NIGHTLY
Status: DISCONTINUED | OUTPATIENT
Start: 2022-06-09 | End: 2022-06-10

## 2022-06-09 RX ORDER — HYDROXYZINE HYDROCHLORIDE 25 MG/1
50 TABLET, FILM COATED ORAL 4 TIMES DAILY
Status: DISCONTINUED | OUTPATIENT
Start: 2022-06-09 | End: 2022-06-11

## 2022-06-09 RX ORDER — CARVEDILOL 6.25 MG/1
6.25 TABLET ORAL 2 TIMES DAILY WITH MEALS
Status: DISCONTINUED | OUTPATIENT
Start: 2022-06-09 | End: 2022-06-10

## 2022-06-09 RX ORDER — HYDROXYZINE HYDROCHLORIDE 50 MG/ML
50 INJECTION, SOLUTION INTRAMUSCULAR ONCE
Status: COMPLETED | OUTPATIENT
Start: 2022-06-09 | End: 2022-06-09

## 2022-06-09 RX ADMIN — SPIRONOLACTONE 25 MG: 50 TABLET ORAL at 08:03

## 2022-06-09 RX ADMIN — HYDROXYZINE HYDROCHLORIDE 50 MG: 25 TABLET, FILM COATED ORAL at 12:27

## 2022-06-09 RX ADMIN — METHOCARBAMOL 750 MG: 500 TABLET ORAL at 13:44

## 2022-06-09 RX ADMIN — METHOCARBAMOL 750 MG: 500 TABLET ORAL at 21:37

## 2022-06-09 RX ADMIN — CARVEDILOL 6.25 MG: 6.25 TABLET, FILM COATED ORAL at 17:19

## 2022-06-09 RX ADMIN — HYDROXYZINE HYDROCHLORIDE 25 MG: 25 TABLET, FILM COATED ORAL at 08:04

## 2022-06-09 RX ADMIN — ISOSORBIDE DINITRATE 10 MG: 10 TABLET ORAL at 08:03

## 2022-06-09 RX ADMIN — LACTULOSE 30 G: 10 SOLUTION ORAL at 08:03

## 2022-06-09 RX ADMIN — HYDROXYZINE HYDROCHLORIDE 50 MG: 50 INJECTION, SOLUTION INTRAMUSCULAR at 13:44

## 2022-06-09 RX ADMIN — METHOCARBAMOL 750 MG: 500 TABLET ORAL at 08:03

## 2022-06-09 RX ADMIN — ASPIRIN 81 MG 81 MG: 81 TABLET ORAL at 08:04

## 2022-06-09 RX ADMIN — POTASSIUM CHLORIDE 20 MEQ: 20 TABLET, EXTENDED RELEASE ORAL at 08:03

## 2022-06-09 RX ADMIN — ISOSORBIDE DINITRATE 10 MG: 10 TABLET ORAL at 17:19

## 2022-06-09 RX ADMIN — TRAZODONE HYDROCHLORIDE 50 MG: 50 TABLET ORAL at 21:38

## 2022-06-09 RX ADMIN — POTASSIUM CHLORIDE 20 MEQ: 20 TABLET, EXTENDED RELEASE ORAL at 17:19

## 2022-06-09 RX ADMIN — FUROSEMIDE 40 MG: 10 INJECTION, SOLUTION INTRAMUSCULAR; INTRAVENOUS at 18:50

## 2022-06-09 RX ADMIN — HYDROXYZINE HYDROCHLORIDE 50 MG: 25 TABLET, FILM COATED ORAL at 17:19

## 2022-06-09 RX ADMIN — ENOXAPARIN SODIUM 40 MG: 100 INJECTION SUBCUTANEOUS at 08:04

## 2022-06-09 RX ADMIN — SODIUM CHLORIDE, PRESERVATIVE FREE 10 ML: 5 INJECTION INTRAVENOUS at 21:38

## 2022-06-09 RX ADMIN — SODIUM CHLORIDE, PRESERVATIVE FREE 10 ML: 5 INJECTION INTRAVENOUS at 08:04

## 2022-06-09 RX ADMIN — DILTIAZEM HYDROCHLORIDE 60 MG: 60 TABLET, FILM COATED ORAL at 06:21

## 2022-06-09 RX ADMIN — FUROSEMIDE 40 MG: 10 INJECTION, SOLUTION INTRAMUSCULAR; INTRAVENOUS at 08:07

## 2022-06-09 RX ADMIN — HYDROXYZINE HYDROCHLORIDE 50 MG: 25 TABLET, FILM COATED ORAL at 21:37

## 2022-06-09 RX ADMIN — LACTULOSE 30 G: 10 SOLUTION ORAL at 21:37

## 2022-06-09 RX ADMIN — ISOSORBIDE DINITRATE 10 MG: 10 TABLET ORAL at 12:27

## 2022-06-09 ASSESSMENT — PAIN SCALES - GENERAL
PAINLEVEL_OUTOF10: 0

## 2022-06-09 NOTE — PATIENT CHOICE
Pt. Has been asked multiple times when going to restroom for a urine sample. Pt continues to remove urine collection hat from the toilet, and refusing to give a urine sample.

## 2022-06-09 NOTE — PROGRESS NOTES
Systems:   All 14 systems were reviewed and are negative  Except for the positive findings which are documented     /73   Pulse (!) 107   Temp 97.3 °F (36.3 °C) (Oral)   Resp (!) 34   Ht 5' 7\" (1.702 m)   Wt 175 lb 14.8 oz (79.8 kg)   SpO2 92%   BMI 27.55 kg/m²       Intake/Output Summary (Last 24 hours) at 6/9/2022 1055  Last data filed at 6/9/2022 0841  Gross per 24 hour   Intake 240 ml   Output 200 ml   Net 40 ml       Physical Exam:  Physical Exam  HENT:      Head: Atraumatic. Mouth/Throat:      Mouth: Mucous membranes are dry. Neck:      Vascular: No carotid bruit. Cardiovascular:      Rate and Rhythm: Normal rate and regular rhythm. Heart sounds: Normal heart sounds. Pulmonary:      Breath sounds: Normal breath sounds. No wheezing or rales. Abdominal:      General: There is no distension. Tenderness: There is no guarding. Musculoskeletal:      Right lower leg: No edema. Left lower leg: No edema. Skin:     General: Skin is warm and dry. Capillary Refill: Capillary refill takes less than 2 seconds. Psychiatric:         Behavior: Behavior is uncooperative.           Medications:    traZODone  50 mg Oral Nightly    hydrOXYzine  50 mg IntraMUSCular Once    hydrOXYzine HCl  50 mg Oral 4x Daily    sodium chloride flush  5-40 mL IntraVENous 2 times per day    enoxaparin  40 mg SubCUTAneous Daily    furosemide  40 mg IntraVENous BID    aspirin  81 mg Oral Daily    isosorbide dinitrate  10 mg Oral TID    spironolactone  25 mg Oral Daily    potassium chloride  20 mEq Oral BID WC    dilTIAZem  60 mg Oral 4 times per day    methocarbamol  750 mg Oral TID    lactulose  30 g Oral BID      sodium chloride       sodium chloride flush, sodium chloride, ondansetron **OR** ondansetron, polyethylene glycol, acetaminophen **OR** acetaminophen, dilTIAZem, guaiFENesin    Lab Data:  CBC:   Recent Labs     06/07/22  2310   WBC 8.6   HGB 12.6   HCT 40.7   MCV 90.6   PLT 364     BMP:   Recent Labs     220 22  1234   * 137   K 3.3* 3.1*   CL 90* 95*   CO2 25 28   BUN 22 23   CREATININE 1.4* 1.3*     PT/INR: No results for input(s): PROTIME, INR in the last 72 hours. BNP:    Recent Labs     22  2310   PROBNP 18,823*     TROPONIN:   Recent Labs     22  1234   TROPONINT 0.046* 0.060*              Impression:  Principal Problem:    Systolic CHF, acute on chronic (HCC)  Active Problems:    Acute pulmonary edema (HCC)  Resolved Problems:    * No resolved hospital problems. *       All labs, medications and tests reviewed by myself, continue all other medications of all above medical condition listed as is except for changes mentioned above. Thank you   Please call with questions. Electronically signed by SHAQUILLE Barahona CNP on 2022 at 10:55 AM  I have seen ,spoken to  and examined this patient personally, independently of the ARABELLA. I have reviewed the hospital care given to date and reviewed all pertinent labs and imaging. I have spoken with patient, nursing staff and provided written and verbal instructions . The above note has been reviewed     CARDIOLOGY ATTENDING ADDENDUM    HPI:  I have reviewed the above HPI  And agree with above     Pulse Range: Pulse  Av  Min: 96  Max: 107    Blood Presuure Range:  Systolic (83ZMM), XHW:427 , Min:81 , OXZ:421   ; Diastolic (56MND), OUU:72, Min:64, Max:106      Pulse ox Range: SpO2  Av.7 %  Min: 91 %  Max: 94 %    24hr I & O:    Intake/Output Summary (Last 24 hours) at 2022 1240  Last data filed at 2022 0841  Gross per 24 hour   Intake 240 ml   Output 200 ml   Net 40 ml         /79   Pulse 99   Temp 98.2 °F (36.8 °C) (Oral)   Resp 19   Ht 5' 7\" (1.702 m)   Wt 175 lb 14.8 oz (79.8 kg)   SpO2 91%   BMI 27.55 kg/m²       Physical Exam:  General:  Awake, alert, NAD  Head:normal  Eye:normal  Neck:  No JVD   Chest:  Clear to auscultation, respiration easy  Cardiovascular:  RRR S1S2  Abdomen:   nontender  Extremities:  tr edema  Pulses; palpable  Neuro: grossly normal      MEDICAL DECISION MAKING;    Pt assessed , chart reviewed, patient examined examined , all available data was reviewed, following is the plan which was discussed with ARABELLA as well:    -HFrEF: Patient extensive cardiac work-up including cardiac catheterization and echo was and she has nonischemic cardiomyopathy has not followed up in the office because there was a discussion regarding intracardiac defibrillator for her in the past on Coreg which will be continued  Heart cath done in November 2020 showed no significant CAD had cardiomyopathy    -As far as the HFrEF is concerned we will continue with medical therapy however given her noncompliance and cocaine abuse she has not been taking her medications    -Tachycardia patient is giving Cardizem for that    -Hypokalemia being corrected    -Cocaine abuse addressed with the patient  Nitrates will be continued      Aron Serrano MD [facility]

## 2022-06-09 NOTE — PROGRESS NOTES
Hospitalist Progress Note      Name:  Jovany Conklin /Age/Sex: 1965  (61 y.o. female)   MRN & CSN:  [id number] & [id number] Admission Date/Time: 2022 10:45 PM   Location:  [address] [place] [address] PCP: No primary care provider on file. Hospital Day: 3  Discharge barrier/Reason for continued hospitalization: Tachycardia    Assessment and Plan:   Jovany Conklin is a 61 y.o.  female with a past medical history of polysubstance abuse including cocaine, noncompliance, HFrEF with EF of 15%, anxiety disorder who presented to the ED on 2022 with shortness of breath and was found to be extremely tachycardic and in Systolic CHF, acute on chronic ([address]    1. Acute on chronic systolic CHF: Due to noncompliance and cocaine use  IV diuresis-strict I's and O's  Low-salt diet  Daily weight  Transition IV Lasix to p.o in a.m. 2.  Cocaine induced tachycardia: Improved with CCB  Continue diltiazem p.o. and IV as needed  Will continue to avoid beta-blocker to prevent unopposed alpha adrenergic stimulation. 3.  Hyponatremia: Due to hypervolemia, expect improvement with diuresis. Resolved. Repeat BMP in AM.    4.  Demand ischemia versus type II MI: Due to cocaine induced tachycardia and decompensated CHF  History of normal cath 2020  Continue to avoid beta-blocker. See #2 above. 5.  General anxiety: Acute on chronic. Continue Atarax p.o.  Give 1 dose of IM Atarax. 6.  Intractable cough: Robitussin every 4. Continue Robaxin for chest pain    7. Nonadherence to medical therapy: Due to cocaine use. Possible psychosis as this often coexists with cocaine use. 8.  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 IM dose x1. If no improvement, will try low-dose benzodiazepine. Stop trazodone nightly. 9.  Constipation: Resolved with aggressive bowel regimen. Diet ADULT DIET;  Regular; Low Fat/Low Chol/High Fiber/2 gm Na  ADULT ORAL NUTRITION SUPPLEMENT; Breakfast; Low Calorie/High Protein Oral Supplement   DVT Prophylaxis [x] Lovenox, []  Heparin, [] SCDs, [] Ambulation   GI Prophylaxis [] PPI,  [] H2 Blocker,  [] Carafate,  [x] Diet/Tube Feeds   Code Status Full Code   MDM [] Low, [] Moderate,[x]  High       History of Present Illness:     Chief Complaint: Systolic CHF, acute on chronic ([facility] [address] [address]     Marcel Cheney is a 61 y.o.  female  who presents with shortness of breath. 6/8/2022: Patient is seen and examined several times today. Patient was seen laying down reverse Trendelenburg, complaining of shortness of breath. Encouraged her to sleep semi-Lr's position. She was also complaining of chest pain from intractable cough. Patient seen again due to persistent tachycardia, recommended IV diltiazem. 6/9/2022: Patient is seen and examined, has no new complaints. Chest pain is resolved, cough is much better, bowel movements is improved however she still has restlessness and insomnia. Ten point ROS reviewed, negative, unless as noted above    Objective: Intake/Output Summary (Last 24 hours) at 6/9/2022 1542  Last data filed at 6/9/2022 0841  Gross per 24 hour   Intake 240 ml   Output 200 ml   Net 40 ml        Vitals:   Vitals:    06/09/22 0621 06/09/22 0801 06/09/22 0847 06/09/22 1207   BP: 100/71 101/73  109/79   Pulse:  96 (!) 107 99   Resp:  28 (!) 34 19   Temp:  97.3 °F (36.3 °C)  98.2 °F (36.8 °C)   TempSrc:  Oral  Oral   SpO2:  94% 92% 91%   Weight:       Height:            Physical Exam:   GEN: Awake female, alert and oriented x3 in akathesia. Appears given age. HEENT: Normal except JVD  RESP: Rhonchi bilaterally. Symmetric chest movement while on 2 L  CVS: No longer tachycardic, S1, S2  GI/: Abdomen is soft, nontender, mild ascites, no organomegaly. . Bowel sounds normal, rectal exam deferred. No CVA tenderness. MSK: No gross joint deformities. No tenderness. Ankle edema resolved.   SKIN: Normal coloration, warm, dry.  NEURO: Cranial nerves appear grossly intact, normal speech, no lateralizing weakness.   PSYCH:  Affect appropriate    Medications:   Medications:    traZODone  50 mg Oral Nightly    hydrOXYzine HCl  50 mg Oral 4x Daily    carvedilol  6.25 mg Oral BID WC    sodium chloride flush  5-40 mL IntraVENous 2 times per day    enoxaparin  40 mg SubCUTAneous Daily    furosemide  40 mg IntraVENous BID    aspirin  81 mg Oral Daily    isosorbide dinitrate  10 mg Oral TID    spironolactone  25 mg Oral Daily    potassium chloride  20 mEq Oral BID WC    methocarbamol  750 mg Oral TID    lactulose  30 g Oral BID      Infusions:    sodium chloride       PRN Meds: sodium chloride flush, 5-40 mL, PRN  sodium chloride, , PRN  ondansetron, 4 mg, Q8H PRN   Or  ondansetron, 4 mg, Q6H PRN  polyethylene glycol, 17 g, Daily PRN  acetaminophen, 650 mg, Q6H PRN   Or  acetaminophen, 650 mg, Q6H PRN  dilTIAZem, 10 mg, Q4H PRN  guaiFENesin, 200 mg, Q4H PRN          Electronically signed by Ronnie Márquez MD on 6/9/2022 at 3:42 PM

## 2022-06-10 LAB
ANION GAP SERPL CALCULATED.3IONS-SCNC: 17 MMOL/L (ref 4–16)
BUN BLDV-MCNC: 28 MG/DL (ref 6–23)
CALCIUM SERPL-MCNC: 8.7 MG/DL (ref 8.3–10.6)
CHLORIDE BLD-SCNC: 99 MMOL/L (ref 99–110)
CO2: 20 MMOL/L (ref 21–32)
CREAT SERPL-MCNC: 1.4 MG/DL (ref 0.6–1.1)
GFR AFRICAN AMERICAN: 47 ML/MIN/1.73M2
GFR NON-AFRICAN AMERICAN: 39 ML/MIN/1.73M2
GLUCOSE BLD-MCNC: 106 MG/DL (ref 70–99)
POTASSIUM SERPL-SCNC: 4.7 MMOL/L (ref 3.5–5.1)
SODIUM BLD-SCNC: 136 MMOL/L (ref 135–145)

## 2022-06-10 PROCEDURE — 80048 BASIC METABOLIC PNL TOTAL CA: CPT

## 2022-06-10 PROCEDURE — APPSS45 APP SPLIT SHARED TIME 31-45 MINUTES: Performed by: NURSE PRACTITIONER

## 2022-06-10 PROCEDURE — 99232 SBSQ HOSP IP/OBS MODERATE 35: CPT | Performed by: INTERNAL MEDICINE

## 2022-06-10 PROCEDURE — 2140000000 HC CCU INTERMEDIATE R&B

## 2022-06-10 PROCEDURE — 6370000000 HC RX 637 (ALT 250 FOR IP): Performed by: NURSE PRACTITIONER

## 2022-06-10 PROCEDURE — 94761 N-INVAS EAR/PLS OXIMETRY MLT: CPT

## 2022-06-10 PROCEDURE — 6370000000 HC RX 637 (ALT 250 FOR IP): Performed by: INTERNAL MEDICINE

## 2022-06-10 PROCEDURE — 2580000003 HC RX 258: Performed by: INTERNAL MEDICINE

## 2022-06-10 PROCEDURE — 36415 COLL VENOUS BLD VENIPUNCTURE: CPT

## 2022-06-10 PROCEDURE — 6360000002 HC RX W HCPCS: Performed by: INTERNAL MEDICINE

## 2022-06-10 RX ORDER — FUROSEMIDE 40 MG/1
40 TABLET ORAL 2 TIMES DAILY
Status: DISCONTINUED | OUTPATIENT
Start: 2022-06-10 | End: 2022-06-11 | Stop reason: HOSPADM

## 2022-06-10 RX ORDER — TRAZODONE HYDROCHLORIDE 50 MG/1
100 TABLET ORAL NIGHTLY
Status: DISCONTINUED | OUTPATIENT
Start: 2022-06-10 | End: 2022-06-11 | Stop reason: HOSPADM

## 2022-06-10 RX ORDER — CARVEDILOL 6.25 MG/1
12.5 TABLET ORAL 2 TIMES DAILY WITH MEALS
Status: DISCONTINUED | OUTPATIENT
Start: 2022-06-10 | End: 2022-06-11 | Stop reason: HOSPADM

## 2022-06-10 RX ADMIN — HYDROXYZINE HYDROCHLORIDE 50 MG: 25 TABLET, FILM COATED ORAL at 20:34

## 2022-06-10 RX ADMIN — POTASSIUM CHLORIDE 20 MEQ: 20 TABLET, EXTENDED RELEASE ORAL at 08:32

## 2022-06-10 RX ADMIN — CARVEDILOL 6.25 MG: 6.25 TABLET, FILM COATED ORAL at 08:32

## 2022-06-10 RX ADMIN — POTASSIUM CHLORIDE 20 MEQ: 20 TABLET, EXTENDED RELEASE ORAL at 17:23

## 2022-06-10 RX ADMIN — ISOSORBIDE DINITRATE 10 MG: 10 TABLET ORAL at 13:43

## 2022-06-10 RX ADMIN — ASPIRIN 81 MG 81 MG: 81 TABLET ORAL at 08:33

## 2022-06-10 RX ADMIN — METHOCARBAMOL 750 MG: 500 TABLET ORAL at 13:43

## 2022-06-10 RX ADMIN — SPIRONOLACTONE 25 MG: 50 TABLET ORAL at 08:32

## 2022-06-10 RX ADMIN — SODIUM CHLORIDE, PRESERVATIVE FREE 10 ML: 5 INJECTION INTRAVENOUS at 20:35

## 2022-06-10 RX ADMIN — METHOCARBAMOL 750 MG: 500 TABLET ORAL at 08:32

## 2022-06-10 RX ADMIN — HYDROXYZINE HYDROCHLORIDE 50 MG: 25 TABLET, FILM COATED ORAL at 13:43

## 2022-06-10 RX ADMIN — ISOSORBIDE DINITRATE 10 MG: 10 TABLET ORAL at 08:32

## 2022-06-10 RX ADMIN — TRAZODONE HYDROCHLORIDE 100 MG: 50 TABLET ORAL at 20:34

## 2022-06-10 RX ADMIN — LACTULOSE 30 G: 10 SOLUTION ORAL at 20:34

## 2022-06-10 RX ADMIN — ISOSORBIDE DINITRATE 10 MG: 10 TABLET ORAL at 17:23

## 2022-06-10 RX ADMIN — SODIUM CHLORIDE, PRESERVATIVE FREE 10 ML: 5 INJECTION INTRAVENOUS at 08:33

## 2022-06-10 RX ADMIN — ENOXAPARIN SODIUM 40 MG: 100 INJECTION SUBCUTANEOUS at 08:33

## 2022-06-10 RX ADMIN — HYDROXYZINE HYDROCHLORIDE 50 MG: 25 TABLET, FILM COATED ORAL at 08:32

## 2022-06-10 RX ADMIN — FUROSEMIDE 40 MG: 40 TABLET ORAL at 08:32

## 2022-06-10 RX ADMIN — FUROSEMIDE 40 MG: 40 TABLET ORAL at 17:23

## 2022-06-10 RX ADMIN — METHOCARBAMOL 750 MG: 500 TABLET ORAL at 20:34

## 2022-06-10 RX ADMIN — HYDROXYZINE HYDROCHLORIDE 50 MG: 25 TABLET, FILM COATED ORAL at 17:23

## 2022-06-10 RX ADMIN — CARVEDILOL 12.5 MG: 6.25 TABLET, FILM COATED ORAL at 17:23

## 2022-06-10 RX ADMIN — LACTULOSE 30 G: 10 SOLUTION ORAL at 08:33

## 2022-06-10 ASSESSMENT — PAIN SCALES - GENERAL
PAINLEVEL_OUTOF10: 0

## 2022-06-10 NOTE — PROGRESS NOTES
Hospitalist Progress Note      Name:  Kayleigh Moreno /Age/Sex: 1965  (61 y.o. female)   MRN & CSN:  [id number] & [id number] Admission Date/Time: 2022 10:45 PM   Location:  [address] [place] [address]- PCP: No primary care provider on file. Hospital Day: 4  Discharge barrier/Reason for continued hospitalization: cocaine withdrawal    Assessment and Plan:   Kayleigh Moreno is a 61 y.o.  female with a past medical history of polysubstance abuse including cocaine, noncompliance, HFrEF with EF of 15%, anxiety with disorder, CKD 3B who presented to the ED on 2022 with shortness of breath and was found to be extremely tachycardic and in Systolic CHF, acute on chronic ([address]    1. Acute on chronic systolic CHF: Due to noncompliance and cocaine use  IV diuresis-strict I's and O's  Low-salt diet  Daily weight  GDMT per cardiology-Coreg 12.5 mg twice daily, Lasix 40 mg twice daily, Aldactone 25 mg daily, Isordil 3 times daily  ACE inhibitor/ARB contraindicated due to CKD 3B    2. Cocaine withdrawal: Acathisia, tachycardia  Started on beta-blocker per cardiology 2022. Tolerating. 3.  Hyponatremia: Due to hypervolemia, expect improvement with diuresis. Resolved. Repeat BMP in AM.    4.  Demand ischemia versus type II MI: Due to cocaine induced tachycardia and decompensated CHF  History of normal cath 2020  Continue to avoid beta-blocker. See #2 above. 5.  General anxiety: Acute on chronic. Improved with Atarax    6. Intractable cough: Robitussin every 4. Continue Robaxin for chest pain    7. Nonadherence to medical therapy: Due to cocaine use. Possible psychosis as this often coexists with cocaine use. 8.  Choreoathetosis/akathisia: See progress note 2022  Continue trazodone, increase 200 mg every evening    9. Constipation: Resolved with aggressive bowel regimen. 10.  Diuretic induced hypokalemia: Resolved with replacement. 11.  CKD 3B: Stable    Diet ADULT DIET; Regular;  Low Fat/Low Chol/High Fiber/2 gm Na  ADULT ORAL NUTRITION SUPPLEMENT; Breakfast; Low Calorie/High Protein Oral Supplement   DVT Prophylaxis [x] Lovenox, []  Heparin, [] SCDs, [] Ambulation   GI Prophylaxis [] PPI,  [] H2 Blocker,  [] Carafate,  [x] Diet/Tube Feeds   Code Status Full Code   MDM [] Low, [x] Moderate,[]  High       History of Present Illness:     Chief Complaint: Systolic CHF, acute on chronic ([facility] [address] [address]     Duane Gifford is a 61 y.o.  female  who presents with shortness of breath. 6/8/2022: Patient is seen and examined several times today. Patient was seen laying down reverse Trendelenburg, complaining of shortness of breath. Encouraged her to sleep semi-Lr's position. She was also complaining of chest pain from intractable cough. Patient seen again due to persistent tachycardia, recommended IV diltiazem. 6/9/2022: Patient is seen and examined, has no new complaints. Chest pain is resolved, cough is much better, bowel movements is improved however she still has restlessness and insomnia. 6/10/2022: Patient is seen and examined, complains of poor sleep overnight. Ten point ROS reviewed, negative, unless as noted above    Objective: Intake/Output Summary (Last 24 hours) at 6/10/2022 1500  Last data filed at 6/10/2022 1306  Gross per 24 hour   Intake 1080 ml   Output 1175 ml   Net -95 ml        Vitals:   Vitals:    06/10/22 0345 06/10/22 0402 06/10/22 0800 06/10/22 1154   BP:  106/73 (!) 132/107 94/70   Pulse:  98 (!) 119 97   Resp:  26 15    Temp:  97.8 °F (36.6 °C) 97.1 °F (36.2 °C) 97.8 °F (36.6 °C)   TempSrc:  Oral Axillary Axillary   SpO2:  96% 95%    Weight: 179 lb 14.3 oz (81.6 kg)      Height:            Physical Exam:   GEN: Awake female, alert and oriented x3 in no obvious distress. Appears given age. HEENT: Normal lids  RESP: Rhonchi bilaterally.  Symmetric chest movement while on 2 L  CVS: No longer tachycardic, S1, S2  GI/: Abdomen is soft, nontender, mild ascites, no organomegaly. . Bowel sounds normal, rectal exam deferred. No CVA tenderness. MSK: No gross joint deformities. No tenderness. Ankle edema resolved. SKIN: Normal coloration, warm, dry. NEURO: Cranial nerves appear grossly intact, normal speech, no lateralizing weakness.   PSYCH:  Affect appropriate    Medications:   Medications:    furosemide  40 mg Oral BID    carvedilol  12.5 mg Oral BID WC    traZODone  50 mg Oral Nightly    hydrOXYzine HCl  50 mg Oral 4x Daily    sodium chloride flush  5-40 mL IntraVENous 2 times per day    enoxaparin  40 mg SubCUTAneous Daily    aspirin  81 mg Oral Daily    isosorbide dinitrate  10 mg Oral TID    spironolactone  25 mg Oral Daily    potassium chloride  20 mEq Oral BID WC    methocarbamol  750 mg Oral TID    lactulose  30 g Oral BID      Infusions:    sodium chloride       PRN Meds: sodium chloride flush, 5-40 mL, PRN  sodium chloride, , PRN  ondansetron, 4 mg, Q8H PRN   Or  ondansetron, 4 mg, Q6H PRN  polyethylene glycol, 17 g, Daily PRN  acetaminophen, 650 mg, Q6H PRN   Or  acetaminophen, 650 mg, Q6H PRN  dilTIAZem, 10 mg, Q4H PRN  guaiFENesin, 200 mg, Q4H PRN          Electronically signed by Helena Barron MD on 6/10/2022 at 3:00 PM

## 2022-06-10 NOTE — PROGRESS NOTES
Fourth visit made to patient. Today she was apologetic and receptive to receiving heart failure education. Cardiologist- [place] follow up appt-  6/15 at [facility] - patient informed of appt  Cardiac rehabilitation referral-not addressed at this time   Smoking Cessation information and referral- patient reports she has stopped smoking when she came into hospital and plans to not smoke again. Denies interest in discussing plan to quit, smoking cessation classes or nicotine patches. Substance use education - denies desire for assistance in connecting to rehab program. She states she is going to live with her mother at discharge as a means to avoid this lifestyle. States her mother can transport her to appts. Pneumonia vaccine- none documented   PCP-none   Patient has a digital scale- scale delivered to patient  Able to obtain medications without difficulty- states she does not have difficulty affording, picking up or refilling medications. Reviewed heart failure education booklet with patient. she did not ask questions. Minimally attentive. Dozing at times. Turned on the 4 hospital education programs for patient. She was minimally attentive. Patient's heart failure medications reviewed and stressed the importance of taking regularly. Instructed to follow up with heart [place] and call if experiencing any problems with medications. Reviewed the [employer] Handout with patient and instructed when to call her provider. Heart Failure education booklet, the [place] [employer] handout and a link to the [facility] [place] with Heart Failure website given to the patient. 1 hour of education provided.

## 2022-06-10 NOTE — PROGRESS NOTES
Cardiology Progress Note     Today's Plan increase Coreg  Change lasix to po    Admit Date:  2022    Consult reason/ Seen today for: CHF    Subjective and  Overnight Events:  Up sitting on side of bed- states she is feeling tired today - denies chest pain-notes shortness of breath    Telemetry ST with bigeminy     Assessment / Plan:    Nonischemic cardiomyopathy: Her systolic dysfunction EF 45%: resumed coreg/ aldactone/ isordil and lasix     HFrEF acute on chronic decompensated: CXR mild pulmonary edema: BNP >18 K: IV Lasix to po:   Non adherence to medical regimen  strict I/O    Tachycardia: Heart rate improved: increase coreg     Elevated troponin - type 2 leak in the setting decompensated HF      Hypokalemia: resolved     Positive cocaine-      History of Presenting Illness:    Chief complain on admission : 61 y. o.year old who is admitted for  Chief Complaint   Patient presents with    Shortness of Breath        Past medical history:    has a past medical history of Anxiety, Environmental allergies, Non-healing surgical wound, and RLS (restless legs syndrome). Past surgical history:   has a past surgical history that includes Cholecystectomy; Appendectomy;  section; and knee surgery (). Social History:   reports that she has been smoking cigarettes. She has been smoking about 0.50 packs per day. She has never used smokeless tobacco. She reports current drug use. Frequency: 5.00 times per week. Drug: Cocaine. She reports that she does not drink alcohol. Family history:  family history includes Depression in an other family member.     Allergies   Allergen Reactions    Adderall [Amphetamine-Dextroamphetamine] Itching    Morphine And Related     Tramadol     Ultram [Tramadol Hcl] Itching    Codeine Itching    Pcn [Penicillins] Swelling and Dermatitis       Review of Systems:   All 14 systems were reviewed and are negative  Except for the positive findings which are documented     BP (!) 132/107   Pulse (!) 119   Temp 97.1 °F (36.2 °C) (Axillary)   Resp 15   Ht 5' 7\" (1.702 m)   Wt 179 lb 14.3 oz (81.6 kg)   SpO2 95%   BMI 28.18 kg/m²       Intake/Output Summary (Last 24 hours) at 6/10/2022 1052  Last data filed at 6/10/2022 1041  Gross per 24 hour   Intake 1080 ml   Output 975 ml   Net 105 ml       Physical Exam:  Physical Exam  HENT:      Head: Atraumatic. Mouth/Throat:      Mouth: Mucous membranes are dry. Neck:      Vascular: No carotid bruit. Cardiovascular:      Rate and Rhythm: Regular rhythm. Tachycardia present. Heart sounds: Gallop present. Pulmonary:      Breath sounds: Decreased air movement present. Transmitted upper airway sounds: base  No wheezing or rales. Abdominal:      General: There is no distension. Tenderness: There is no guarding. Musculoskeletal:      Right lower leg: No edema. Left lower leg: No edema. Skin:     General: Skin is warm and dry. Capillary Refill: Capillary refill takes less than 2 seconds. Neurological:      Mental Status: She is oriented to person, place, and time. Psychiatric:         Mood and Affect: Mood is anxious.           Medications:    furosemide  40 mg Oral BID    traZODone  50 mg Oral Nightly    hydrOXYzine HCl  50 mg Oral 4x Daily    carvedilol  6.25 mg Oral BID WC    sodium chloride flush  5-40 mL IntraVENous 2 times per day    enoxaparin  40 mg SubCUTAneous Daily    aspirin  81 mg Oral Daily    isosorbide dinitrate  10 mg Oral TID    spironolactone  25 mg Oral Daily    potassium chloride  20 mEq Oral BID WC    methocarbamol  750 mg Oral TID    lactulose  30 g Oral BID      sodium chloride       sodium chloride flush, sodium chloride, ondansetron **OR** ondansetron, polyethylene glycol, acetaminophen **OR** acetaminophen, dilTIAZem, guaiFENesin    Lab Data:  CBC:   Recent Labs     06/07/22  2310   WBC 8.6 HGB 12.6   HCT 40.7   MCV 90.6        BMP:   Recent Labs     22  2310 22  1234 06/10/22  0737   * 137 136   K 3.3* 3.1* 4.7   CL 90* 95* 99   CO2 25 28 20*   BUN 22 23 28*   CREATININE 1.4* 1.3* 1.4*     PT/INR: No results for input(s): PROTIME, INR in the last 72 hours. BNP:    Recent Labs     22   PROBNP 18,823*     TROPONIN:   Recent Labs     220 22  1234   TROPONINT 0.046* 0.060*              Impression:  Principal Problem:    Systolic CHF, acute on chronic (HCC)  Active Problems:    Acute pulmonary edema (HCC)  Resolved Problems:    * No resolved hospital problems. *       All labs, medications and tests reviewed by myself, continue all other medications of all above medical condition listed as is except for changes mentioned above. Thank you   Please call with questions. Electronically signed by Luisa Ganser, APRN - CNP on 6/10/2022 at 10:52 AM    I have seen ,spoken to  and examined this patient personally, independently of the ARABELLA. I have reviewed the hospital care given to date and reviewed all pertinent labs and imaging. I have spoken with patient, nursing staff and provided written and verbal instructions . The above note has been reviewed     CARDIOLOGY ATTENDING ADDENDUM    HPI:  I have reviewed the above HPI  And agree with above     Pulse Range: Pulse  Av.2  Min: 97  Max: 119    Blood Presuure Range:  Systolic (30JWB), AXR:112 , Min:93 , XGN:392   ; Diastolic (60VAJ), AGW:73, Min:70, Max:107      Pulse ox Range: SpO2  Av.3 %  Min: 94 %  Max: 96 %    24hr I & O:    Intake/Output Summary (Last 24 hours) at 6/10/2022 1620  Last data filed at 6/10/2022 1306  Gross per 24 hour   Intake 1080 ml   Output 1175 ml   Net -95 ml         BP 93/73   Pulse 97   Temp (!) 94.9 °F (34.9 °C) (Axillary)   Resp 24   Ht 5' 7\" (1.702 m)   Wt 179 lb 14.3 oz (81.6 kg)   SpO2 96%   BMI 28.18 kg/m²       Physical Exam:  General:  Awake, alert, NAD  Head:normal  Eye:normal  Neck:  No JVD   Chest:  Clear to auscultation, respiration easy  Cardiovascular:  RRR S1S2  Abdomen:   nontender  Extremities:  TR edema  Pulses; palpable  Neuro: grossly normal      MEDICAL DECISION MAKING;    Pt assessed , chart reviewed, patient examined examined , all available data was reviewed, following is the plan which was discussed with ARABELLA as well:    -HFrEF: Patient extensive cardiac work-up including cardiac catheterization and echo was and she has nonischemic cardiomyopathy has not followed up in the office because there was a discussion regarding intracardiac defibrillator for her in the past on Coreg which will be continued  Heart cath done in November 2020 showed no significant CAD had cardiomyopathy     -As far as the HFrEF is concerned we will continue with medical therapy however given her noncompliance and cocaine abuse she has not been taking her medications     -Tachycardia patient is giving Cardizem for that  We will increase Coreg     -Hypokalemia being corrected     -Cocaine abuse addressed with the patient  Nitrates will be continued          Tiago More MD [address] [facility]

## 2022-06-11 VITALS
TEMPERATURE: 97.6 F | WEIGHT: 179.9 LBS | HEART RATE: 81 BPM | DIASTOLIC BLOOD PRESSURE: 58 MMHG | BODY MASS INDEX: 28.24 KG/M2 | SYSTOLIC BLOOD PRESSURE: 92 MMHG | HEIGHT: 67 IN | OXYGEN SATURATION: 94 % | RESPIRATION RATE: 17 BRPM

## 2022-06-11 LAB
ANION GAP SERPL CALCULATED.3IONS-SCNC: 12 MMOL/L (ref 4–16)
BUN BLDV-MCNC: 28 MG/DL (ref 6–23)
CALCIUM SERPL-MCNC: 8.6 MG/DL (ref 8.3–10.6)
CHLORIDE BLD-SCNC: 96 MMOL/L (ref 99–110)
CO2: 27 MMOL/L (ref 21–32)
CREAT SERPL-MCNC: 1.4 MG/DL (ref 0.6–1.1)
GFR AFRICAN AMERICAN: 47 ML/MIN/1.73M2
GFR NON-AFRICAN AMERICAN: 39 ML/MIN/1.73M2
GLUCOSE BLD-MCNC: 116 MG/DL (ref 70–99)
POTASSIUM SERPL-SCNC: 4.5 MMOL/L (ref 3.5–5.1)
SODIUM BLD-SCNC: 135 MMOL/L (ref 135–145)

## 2022-06-11 PROCEDURE — 6370000000 HC RX 637 (ALT 250 FOR IP): Performed by: NURSE PRACTITIONER

## 2022-06-11 PROCEDURE — 80048 BASIC METABOLIC PNL TOTAL CA: CPT

## 2022-06-11 PROCEDURE — 6370000000 HC RX 637 (ALT 250 FOR IP): Performed by: INTERNAL MEDICINE

## 2022-06-11 PROCEDURE — 2580000003 HC RX 258: Performed by: INTERNAL MEDICINE

## 2022-06-11 PROCEDURE — 94761 N-INVAS EAR/PLS OXIMETRY MLT: CPT

## 2022-06-11 PROCEDURE — 99232 SBSQ HOSP IP/OBS MODERATE 35: CPT | Performed by: INTERNAL MEDICINE

## 2022-06-11 PROCEDURE — APPSS60 APP SPLIT SHARED TIME 46-60 MINUTES: Performed by: NURSE PRACTITIONER

## 2022-06-11 PROCEDURE — 6360000002 HC RX W HCPCS: Performed by: INTERNAL MEDICINE

## 2022-06-11 PROCEDURE — 36415 COLL VENOUS BLD VENIPUNCTURE: CPT

## 2022-06-11 RX ORDER — HYDROXYZINE HYDROCHLORIDE 25 MG/1
25 TABLET, FILM COATED ORAL 3 TIMES DAILY
Status: DISCONTINUED | OUTPATIENT
Start: 2022-06-11 | End: 2022-06-11 | Stop reason: HOSPADM

## 2022-06-11 RX ADMIN — CARVEDILOL 12.5 MG: 6.25 TABLET, FILM COATED ORAL at 10:23

## 2022-06-11 RX ADMIN — METHOCARBAMOL 750 MG: 500 TABLET ORAL at 10:24

## 2022-06-11 RX ADMIN — SPIRONOLACTONE 25 MG: 50 TABLET ORAL at 10:23

## 2022-06-11 RX ADMIN — POTASSIUM CHLORIDE 20 MEQ: 20 TABLET, EXTENDED RELEASE ORAL at 10:24

## 2022-06-11 RX ADMIN — ASPIRIN 81 MG 81 MG: 81 TABLET ORAL at 10:23

## 2022-06-11 RX ADMIN — ISOSORBIDE DINITRATE 10 MG: 10 TABLET ORAL at 10:23

## 2022-06-11 RX ADMIN — FUROSEMIDE 40 MG: 40 TABLET ORAL at 10:24

## 2022-06-11 RX ADMIN — SODIUM CHLORIDE, PRESERVATIVE FREE 10 ML: 5 INJECTION INTRAVENOUS at 10:25

## 2022-06-11 RX ADMIN — HYDROXYZINE HYDROCHLORIDE 25 MG: 25 TABLET, FILM COATED ORAL at 14:46

## 2022-06-11 RX ADMIN — HYDROXYZINE HYDROCHLORIDE 50 MG: 25 TABLET, FILM COATED ORAL at 10:24

## 2022-06-11 RX ADMIN — METHOCARBAMOL 750 MG: 500 TABLET ORAL at 14:46

## 2022-06-11 RX ADMIN — ONDANSETRON 4 MG: 4 TABLET, ORALLY DISINTEGRATING ORAL at 14:46

## 2022-06-11 RX ADMIN — ENOXAPARIN SODIUM 40 MG: 100 INJECTION SUBCUTANEOUS at 10:25

## 2022-06-11 NOTE — PROGRESS NOTES
Hospitalist Progress Note      Name:  Romaine Nam /Age/Sex: 1965  (61 y.o. female)   MRN & CSN:  [id number] & [id number] Admission Date/Time: 2022 10:45 PM   Location:  [facility] PCP: No primary care provider on file. Hospital Day: 5  Discharge barrier/Reason for continued hospitalization: Does not feel ready to go home yet. Reports living by herself. Assessment and Plan:   Romaine Nam is a 61 y.o.  female with a past medical history of polysubstance abuse including cocaine, noncompliance, HFrEF with EF of 15%, anxiety with disorder, CKD 3B who presented to the ED on 2022 with shortness of breath and was found to be extremely tachycardic and in Systolic CHF, acute on chronic ([address]    1. Acute on chronic systolic CHF: Due to noncompliance and cocaine use  Continue diuresis, now p.o. Not a candidate for GDMT due to CKD. Continue Coreg 12.5 mg twice daily, Lasix 40 mg twice daily, Aldactone 25 mg daily, Isordil 3 times daily    2. Cocaine withdrawal: Sustained clinical improvement. Started on beta-blocker per cardiology 2022. Tolerating. 3.  Hyponatremia: Due to hypervolemia, expect improvement with diuresis. Resolved. Repeat BMP in AM.    4.  Demand ischemia versus type II MI: Due to cocaine induced tachycardia and decompensated CHF  History of normal cath 2020  Continue to avoid beta-blocker. See #2 above. 5.  General anxiety: Acute on chronic. Improved with Atarax    6. Intractable cough: Robitussin every 4. Continue Robaxin for chest pain    7. Nonadherence to medical therapy: Due to cocaine use. Possible psychosis as this often coexists with cocaine use. 8.  Choreoathetosis/akathisia: See progress note 2022  Continue trazodone every evening  Decrease Atarax to 25 mg 3 times daily. 9.  Constipation: Resolved with aggressive bowel regimen. 10.  Diuretic induced hypokalemia: Resolved with replacement.     11.  CKD 3B: Stable    Diet ADULT DIET; Regular; Low Fat/Low Chol/High Fiber/2 gm Na  ADULT ORAL NUTRITION SUPPLEMENT; Breakfast; Low Calorie/High Protein Oral Supplement   DVT Prophylaxis [x] Lovenox, []  Heparin, [] SCDs, [] Ambulation   GI Prophylaxis [] PPI,  [] H2 Blocker,  [] Carafate,  [x] Diet/Tube Feeds   Code Status Full Code   MDM [] Low, [x] Moderate,[]  High       History of Present Illness:     Chief Complaint: Systolic CHF, acute on chronic ([facility] [address]     Muriel Washburn is a 61 y.o.  female  who presents with shortness of breath. 6/8/2022: Patient is seen and examined several times today. Patient was seen laying down reverse Trendelenburg, complaining of shortness of breath. Encouraged her to sleep semi-Lr's position. She was also complaining of chest pain from intractable cough. Patient seen again due to persistent tachycardia, recommended IV diltiazem. 6/9/2022: Patient is seen and examined, has no new complaints. Chest pain is resolved, cough is much better, bowel movements is improved however she still has restlessness and insomnia. 6/10/2022: Patient is seen and examined, complains of poor sleep overnight.    6/11/2022: Patient is seen and examined, sleepy but arousable. Does not feel ready to go home yet. Finished her breakfast.    Ten point ROS reviewed, negative, unless as noted above    Objective: Intake/Output Summary (Last 24 hours) at 6/11/2022 1223  Last data filed at 6/10/2022 1306  Gross per 24 hour   Intake --   Output 200 ml   Net -200 ml        Vitals:   Vitals:    06/10/22 2300 06/11/22 0000 06/11/22 0400 06/11/22 1145   BP:  100/76  (!) 96/56   Pulse: 94 91 92 82   Resp: 25 21 26 28   Temp:  97.8 °F (36.6 °C)  97.6 °F (36.4 °C)   TempSrc:  Oral  Oral   SpO2:  91%     Weight:       Height:            Physical Exam:   GEN: Awake female, alert and oriented x3 in no obvious distress. Appears given age. HEENT: Normal lids  RESP: Diminished BS bilaterally.  Symmetric chest movement while on 2 L  CVS: Regular rate and rhythm, S1-S2  GI/: Abdomen is soft, nontender, mild ascites, no organomegaly. . Bowel sounds normal, rectal exam deferred. No CVA tenderness. MSK: No gross joint deformities. No tenderness. SKIN: Normal coloration, warm, dry. NEURO: Cranial nerves appear grossly intact, normal speech, no lateralizing weakness.   PSYCH:  Affect appropriate    Medications:   Medications:    furosemide  40 mg Oral BID    carvedilol  12.5 mg Oral BID WC    traZODone  100 mg Oral Nightly    hydrOXYzine HCl  50 mg Oral 4x Daily    sodium chloride flush  5-40 mL IntraVENous 2 times per day    enoxaparin  40 mg SubCUTAneous Daily    aspirin  81 mg Oral Daily    isosorbide dinitrate  10 mg Oral TID    spironolactone  25 mg Oral Daily    potassium chloride  20 mEq Oral BID WC    methocarbamol  750 mg Oral TID    lactulose  30 g Oral BID      Infusions:    sodium chloride       PRN Meds: sodium chloride flush, 5-40 mL, PRN  sodium chloride, , PRN  ondansetron, 4 mg, Q8H PRN   Or  ondansetron, 4 mg, Q6H PRN  polyethylene glycol, 17 g, Daily PRN  acetaminophen, 650 mg, Q6H PRN   Or  acetaminophen, 650 mg, Q6H PRN  dilTIAZem, 10 mg, Q4H PRN  guaiFENesin, 200 mg, Q4H PRN          Electronically signed by Jolene Martniez MD on 6/11/2022 at 12:23 PM

## 2022-06-11 NOTE — PROGRESS NOTES
Cardiology Progress Note     Today's Plan okay for D/C    Admit Date:  2022    Consult reason/ Seen today for: CHF    Subjective and  Overnight Events:  Resting quietly in bed, eager to be discharged    Assessment / Plan:    1. Nonischemic cardiomyopathy: Her systolic dysfunction EF 71%: resumed coreg/ aldactone/ isordil and lasix     2. HFrEF acute on chronic decompensated: CXR mild pulmonary edema: BNP >18 K: IV Lasix to po:         Non adherence to medical regimen        strict I/O    3. Tachycardia: Heart rate improved: controlled on coreg 12.5 mg BID    4. Elevated troponin - type 2 leak in the setting decompensated HF    5. Hypokalemia: resolved     6. Cocaine abuse-postive urine toxicity       History of Presenting Illness:    Chief complain on admission : 61 y. o.year old who is admitted for  Chief Complaint   Patient presents with    Shortness of Breath        Past medical history:    has a past medical history of Anxiety, Environmental allergies, Non-healing surgical wound, and RLS (restless legs syndrome). Past surgical history:   has a past surgical history that includes Cholecystectomy; Appendectomy;  section; and knee surgery (). Social History:   reports that she has been smoking cigarettes. She has been smoking about 0.50 packs per day. She has never used smokeless tobacco. She reports current drug use. Frequency: 5.00 times per week. Drug: Cocaine. She reports that she does not drink alcohol. Family history:  family history includes Depression in an other family member.     Allergies   Allergen Reactions    Adderall [Amphetamine-Dextroamphetamine] Itching    Morphine And Related     Tramadol     Ultram [Tramadol Hcl] Itching    Codeine Itching    Pcn [Penicillins] Swelling and Dermatitis       Review of Systems:   All 14 systems were reviewed and are negative  Except for the positive findings which are documented     BP (!) 96/56   Pulse 82   Temp 97.6 °F (36.4 °C) (Oral)   Resp 28   Ht 5' 7\" (1.702 m)   Wt 179 lb 14.3 oz (81.6 kg)   SpO2 91%   BMI 28.18 kg/m²       Intake/Output Summary (Last 24 hours) at 6/11/2022 1256  Last data filed at 6/10/2022 1306  Gross per 24 hour   Intake --   Output 200 ml   Net -200 ml       Physical Exam:  Physical Exam  HENT:      Head: Atraumatic. Mouth/Throat:      Mouth: Mucous membranes are dry. Neck:      Vascular: No carotid bruit. Cardiovascular:      Rate and Rhythm: Normal rate and regular rhythm. Heart sounds: Gallop present. Pulmonary:      Breath sounds: Decreased air movement present. Transmitted upper airway sounds: base  No wheezing or rales. Abdominal:      General: There is no distension. Tenderness: There is no guarding. Musculoskeletal:      Right lower leg: No edema. Left lower leg: No edema. Skin:     General: Skin is warm and dry. Capillary Refill: Capillary refill takes less than 2 seconds. Neurological:      Mental Status: She is oriented to person, place, and time. Psychiatric:         Mood and Affect: Mood is anxious. Medications:    hydrOXYzine HCl  25 mg Oral TID    furosemide  40 mg Oral BID    carvedilol  12.5 mg Oral BID WC    traZODone  100 mg Oral Nightly    sodium chloride flush  5-40 mL IntraVENous 2 times per day    enoxaparin  40 mg SubCUTAneous Daily    aspirin  81 mg Oral Daily    isosorbide dinitrate  10 mg Oral TID    spironolactone  25 mg Oral Daily    potassium chloride  20 mEq Oral BID WC    methocarbamol  750 mg Oral TID    lactulose  30 g Oral BID      sodium chloride       sodium chloride flush, sodium chloride, ondansetron **OR** ondansetron, polyethylene glycol, acetaminophen **OR** acetaminophen, dilTIAZem, guaiFENesin    Lab Data:  CBC:   No results for input(s): WBC, HGB, HCT, MCV, PLT in the last 72 hours.   BMP:   Recent Labs     06/10/22  0737 22  0613    135   K 4.7 4.5   CL 99 96*   CO2 20* 27   BUN 28* 28*   CREATININE 1.4* 1.4*     PT/INR: No results for input(s): PROTIME, INR in the last 72 hours. BNP:    No results for input(s): PROBNP in the last 72 hours. TROPONIN:   No results for input(s): TROPONINT in the last 72 hours. Impression:  Principal Problem:    Systolic CHF, acute on chronic (HCC)  Active Problems:    Acute pulmonary edema (HCC)  Resolved Problems:    * No resolved hospital problems. *       All labs, medications and tests reviewed by myself, continue all other medications of all above medical condition listed as is except for changes mentioned above. Thank you   Please call with questions. Electronically signed by SHAQUILLE Benton CNP on 2022 at 12:56 PM    I have seen ,spoken to  and examined this patient personally, independently of the ARABELLA. I have reviewed the hospital care given to date and reviewed all pertinent labs and imaging. I have spoken with patient, nursing staff and provided written and verbal instructions . The above note has been reviewed     CARDIOLOGY ATTENDING ADDENDUM    HPI:  I have reviewed the above HPI  And agree with above     Pulse Range: Pulse  Av.4  Min: 82  Max: 97    Blood Presuure Range:  Systolic (64ZUB), GHY:44 , Min:93 , MAD:113   ; Diastolic (07ZTI), RBN:99, Min:56, Max:76      Pulse ox Range: SpO2  Av.3 %  Min: 91 %  Max: 96 %    24hr I & O:  No intake or output data in the 24 hours ending 22 1321      BP (!) 96/56   Pulse 82   Temp 97.6 °F (36.4 °C) (Oral)   Resp 28   Ht 5' 7\" (1.702 m)   Wt 179 lb 14.3 oz (81.6 kg)   SpO2 91%   BMI 28.18 kg/m²       Physical Exam:  General:  Awake, alert, NAD  Head:normal  Eye:normal  Neck:  No JVD   Chest:  Clear to auscultation, respiration easy  Cardiovascular:  RRR S1S2  Abdomen:   nontender  Extremities:  no edema  Pulses; palpable  Neuro: grossly normal      MEDICAL DECISION MAKING;    Pt assessed , chart reviewed, patient examined examined , all available data was reviewed, following is the plan which was discussed with ARABELLA as well:    -HFrEF: Patient extensive cardiac work-up including cardiac catheterization and echo was and she has nonischemic cardiomyopathy has not followed up in the office because there was a discussion regarding intracardiac defibrillator for her in the past on Coreg which will be continued  Heart cath done in November 2020 showed no significant CAD had cardiomyopathy     -As far as the HFrEF is concerned we will continue with medical therapy however given her noncompliance and cocaine abuse she has not been taking her medications     -Tachycardia patient is giving Cardizem for that  We will increase Coreg     -Hypokalemia being corrected     -Cocaine abuse addressed with the patient  Nitrates will be continued      -          Quita Ortiz MD [facility] [facility]

## 2022-06-11 NOTE — PROGRESS NOTES
Pt left AMA, Dr. Vivienne Moulton notified prior to pt leaving. Pt signed AMA form, IVs removed. Pt left with all belongings.

## 2022-06-12 NOTE — DISCHARGE SUMMARY
Discharge Summary    Name:  Mikala Schneider /Age/Sex: [birth year]  (61 y.o. female)   MRN & CSN:  [id number] & [id number] Admission Date/Time: 2022 10:45 PM   Attending:  No att. providers found Discharging Physician: Rocael Khanna MD     Hospital Course:   Mikala Schneider is a 61 y.o.  female with a past medical history of polysubstance abuse including cocaine, noncompliance, HFrEF with EF of 15%, anxiety with disorder, CKD 3B who presented to the ED on 2022 with shortness of breath and was found to be extremely tachycardic and in Systolic CHF, acute on chronic. Patient improved with caution channel blocker and was eventually transitioned to beta-blockers. Patient then developed symptoms of cocaine withdrawal in the hospital which improved with Atarax 3 times a day and trazodone nightly. On 2027, patient was suspected to be using cocaine in the hospital and when security search was to be made, patient decided to discharge [address] Please refer to progress note done on same day of service for order details of acute hospitalization which included demand ischemia/type II MI, acute on chronic anxiety, hyponatremia, constipation, CKD 3 A and nonadherence to medical therapy.       Consults this admission:  IP CONSULT TO HOSPITALIST  IP CONSULT TO HEART FAILURE NURSE/COORDINATOR  IP CONSULT TO DIETITIAN  IP CONSULT TO CARDIOLOGY    Discharge Instruction:   Follow up appointments: None  Primary care physician:  within 2 weeks      Discharge Medications:        Medication List      ASK your doctor about these medications    albuterol sulfate  (90 Base) MCG/ACT inhaler  Commonly known as: PROVENTIL;VENTOLIN;PROAIR  Inhale 2 puffs into the lungs every 6 hours as needed for Wheezing     aspirin 81 MG chewable tablet  Take 1 tablet by mouth daily     isosorbide dinitrate 10 MG tablet  Commonly known as: ISORDIL     nitroGLYCERIN 0.4 MG SL tablet  Commonly known as: NITROSTAT  up to max of 3 total doses.  If no relief after 1 dose, call 911.     spironolactone 25 MG tablet  Commonly known as: Aldactone  Take 1 tablet by mouth daily     torsemide 20 MG tablet  Commonly known as: DEMADEX             Objective Findings at Discharge:   BP (!) 92/58 Comment: manual  Pulse 81   Temp 97.6 °F (36.4 °C) (Oral)   Resp 17   Ht 5' 7\" (1.702 m)   Wt 179 lb 14.3 oz (81.6 kg)   SpO2 94%   BMI 28.18 kg/m²            Refer to exam earlier    BMP/CBC  Recent Labs     06/10/22  0737 06/11/22  0613    135   K 4.7 4.5   CL 99 96*   CO2 20* 27   BUN 28* 28*   CREATININE 1.4* 1.4*     Discharge Time of 28 minutes    Electronically signed by Autumn Nichols MD on 6/11/2022 at 8:01 PM

## 2022-07-18 ENCOUNTER — TELEPHONE (OUTPATIENT)
Dept: CARDIOLOGY CLINIC | Age: 57
End: 2022-07-18

## 2022-07-18 ENCOUNTER — OFFICE VISIT (OUTPATIENT)
Dept: CARDIOLOGY CLINIC | Age: 57
End: 2022-07-18
Payer: COMMERCIAL

## 2022-07-18 VITALS
BODY MASS INDEX: 27.31 KG/M2 | WEIGHT: 174 LBS | SYSTOLIC BLOOD PRESSURE: 104 MMHG | HEART RATE: 80 BPM | HEIGHT: 67 IN | DIASTOLIC BLOOD PRESSURE: 74 MMHG

## 2022-07-18 DIAGNOSIS — I50.43 CHF (CONGESTIVE HEART FAILURE), NYHA CLASS I, ACUTE ON CHRONIC, COMBINED (HCC): Primary | ICD-10-CM

## 2022-07-18 PROCEDURE — G8419 CALC BMI OUT NRM PARAM NOF/U: HCPCS | Performed by: NURSE PRACTITIONER

## 2022-07-18 PROCEDURE — 4004F PT TOBACCO SCREEN RCVD TLK: CPT | Performed by: NURSE PRACTITIONER

## 2022-07-18 PROCEDURE — 3017F COLORECTAL CA SCREEN DOC REV: CPT | Performed by: NURSE PRACTITIONER

## 2022-07-18 PROCEDURE — 99214 OFFICE O/P EST MOD 30 MIN: CPT | Performed by: NURSE PRACTITIONER

## 2022-07-18 PROCEDURE — G8427 DOCREV CUR MEDS BY ELIG CLIN: HCPCS | Performed by: NURSE PRACTITIONER

## 2022-07-18 RX ORDER — TORSEMIDE 20 MG/1
20 TABLET ORAL DAILY
Qty: 30 TABLET | Refills: 3 | Status: SHIPPED | OUTPATIENT
Start: 2022-07-18

## 2022-07-18 RX ORDER — POTASSIUM CHLORIDE 20 MEQ/1
20 TABLET, EXTENDED RELEASE ORAL DAILY
Qty: 30 TABLET | Refills: 0 | Status: SHIPPED | OUTPATIENT
Start: 2022-07-18

## 2022-07-18 RX ORDER — CARVEDILOL 3.12 MG/1
3.12 TABLET ORAL 2 TIMES DAILY
Qty: 60 TABLET | Refills: 0 | Status: SHIPPED | OUTPATIENT
Start: 2022-07-18

## 2022-07-18 ASSESSMENT — ENCOUNTER SYMPTOMS
SHORTNESS OF BREATH: 1
ORTHOPNEA: 1

## 2022-07-18 NOTE — TELEPHONE ENCOUNTER
Per NORTH Macdonald NP, patient has a daughter, Fatou Jones, who works at [employer] and [address] in [place], [place]. [address] [facility]  [address] [phone]  NORTH Jiang Jesús spoke with daughter who stated there was a bed for [facility] if she could get there today. No phone number for daughter in chart. (Pt. Had called daughter on her cell phone for Kev[facility] Comings). Placed call to patient requesting call back for more information. Placed call to [address] [place] [address] at [phone]. [facility] representative unable to find this facility in their database without further information such as tax ID #. Care source representative states facility is most likely out of service area. Patient would need to move to [place] and apply for [facility] to qualify. Placed call to [address] [place] [address], Marie admissions coordinator, he is aware of the situation and was expecting a call. Fax # for [facility] is [phone]. Order for SNF referral received from [address] [place] [address]. Order, face sheet, today's office visit summary and discharge summary from hospital faxed to facility.

## 2022-07-18 NOTE — PROGRESS NOTES
7/18/2022  Primary cardiologist: Dr. Igor Macario:   John Paul Smith  is an established 61 y.o.  female here for follow up from hospital for       SUBJECTIVE/OBJECTIVE:  John Paul Smith is a 61 y.o. female with a history of polysubstance abuse including cocaine, noncompliance, HFrEF, nonischemic cardiomyopathy anxiety, and CKD      HPI :   John Paul Smith was recently in the hospital for decompensated systolic heart failure with tachycardia. . She left AMA and has not been on medication since discharge. Today she notes that she has swelling up to the mid thigh along with increased shortness of breath. She is tearful today -admits to using cocaine today however states that she wants to quit is wanting to go to rehab. Reports that during her most recent hospitalization she was denied rehab due to her use of cocaine. She is ready to stop    Review of Systems   Constitutional: Positive for malaise/fatigue. Cardiovascular:  Positive for dyspnea on exertion, leg swelling, orthopnea and palpitations. Negative for chest pain. Respiratory:  Positive for shortness of breath. Psychiatric/Behavioral:  The patient is nervous/anxious. Vitals:    07/18/22 1140   BP: 104/74   Pulse: 80   Weight: 174 lb (78.9 kg)   Height: 5' 7\" (1.702 m)     No flowsheet data found. Wt Readings from Last 3 Encounters:   07/18/22 174 lb (78.9 kg)   06/10/22 179 lb 14.3 oz (81.6 kg)   06/01/22 170 lb (77.1 kg)     Body mass index is 27.25 kg/m². Physical Exam  HENT:      Head: Normocephalic. Eyes:      Extraocular Movements: Extraocular movements intact. Neck:      Vascular: JVD present. Cardiovascular:      Rate and Rhythm: Normal rate and regular rhythm. Pulses: Normal pulses. Heart sounds: Normal heart sounds. Pulmonary:      Breath sounds: Examination of the right-lower field reveals rales. Examination of the left-lower field reveals rales. Rales (posterior) present. Abdominal:      Tenderness: There is no abdominal tenderness. Musculoskeletal:      Cervical back: No tenderness. Right lower leg: 3+ Edema present. Left lower leg: 3+ Edema present. Skin:     General: Skin is warm and dry. Neurological:      Mental Status: She is alert and oriented to person, place, and time. Current Outpatient Medications   Medication Sig Dispense Refill    aspirin 81 MG chewable tablet Take 1 tablet by mouth daily 30 tablet 0    torsemide (DEMADEX) 20 MG tablet Take 20 mg by mouth daily (Patient not taking: Reported on 7/18/2022)      isosorbide dinitrate (ISORDIL) 10 MG tablet Take 10 mg by mouth 3 times daily (Patient not taking: Reported on 7/18/2022)      nitroGLYCERIN (NITROSTAT) 0.4 MG SL tablet up to max of 3 total doses. If no relief after 1 dose, call 911. (Patient not taking: Reported on 7/18/2022) 25 tablet 3    albuterol sulfate  (90 Base) MCG/ACT inhaler Inhale 2 puffs into the lungs every 6 hours as needed for Wheezing (Patient not taking: Reported on 7/18/2022) 1 Inhaler 3    spironolactone (ALDACTONE) 25 MG tablet Take 1 tablet by mouth daily (Patient not taking: Reported on 7/18/2022) 30 tablet 3     No current facility-administered medications for this visit. All pertinent data reviewed and discussed with patient       ASSESSMENT/PLAN:      Acute systolic heart failure  Acute on chronic decompensated systolic heart failure secondary to non adherence to medications. She has gross pitting edema from mid thighs down along with associated dyspnea with exertion: She does not want a return to hospital.  States she is anticipating going to a rehab facility to ensure better compliance with medication along with monitoring of withdrawal symptoms   Will resume torsemide along with potassium replacement. We will resume carvedilol low-dose and plan for titration as warranted   would benefit from use of Entresto / MRA and SGLT2 as well:  We will need close monitoring of renal function : Low-sodium diet 2000 mg along with fluid restriction of 64-68 ounces per day    She would like to go to [facility] and Rehab in [place] to help assist with medical management   I spoke with her daughter via telephone : states she is the POA-she states that they have a bed for her to go to the rehab facility and will do medical management of her health needs-advised her to establish with cardiology and [address] for ongoing medical management. Cocaine use  Admits to use of cocaine this morning. Reports that she threw cocaine in the front yard this morning and states that she no longer wants to be on medication and would like some help. Concern for withdrawal.  In the setting of cocaine, not recommend use of unopposed beta-blocker : Can use labetalol or Coreg in place to assist with tachycardia    [facility] [phone]     Tests ordered:  none  Follow-up  3 months or sooner if needed      Signed:  SHAQUILLE Antoine CNP, 7/18/2022, 11:42 AM    An electronic signature was used to authenticate this note. Please note this report has been partially produced using speech recognition software and may contain errors related to that system including errors in grammar, punctuation, and spelling, as well as words and phrases that may be inappropriate. If there are any questions or concerns please feel free to contact the dictating provider for clarification.

## 2022-10-27 NOTE — CONSULTS
Chart reviewed  Full  Note to follow                    Name:  Christopher Villa /Age/Sex: 1965  (61 y.o. female)   MRN & CSN:  [id number] & [id number] Admission Date/Time: 2021 11:40 AM   Location:  ED27/ED-27 PCP: No primary care provider on file. Hospital Day: 1          Referring physician:  No admitting provider for patient encounter. Reason for consultation:  CHF        Thanks for referral.    Information source: patient    CC;  SOB      HPI:   Thank you for involving me in taking  care of Christopher Villa who  is a 61 y. o.year  Old female  Presents with  H/o NICM, ? Compliance with meds now with worsening SOB,   Stopped diuretics as pt has leg cramps,  BNP is elevated. Past medical history:    has a past medical history of Anxiety, Environmental allergies, Non-healing surgical wound, and RLS (restless legs syndrome). Past surgical history:   has a past surgical history that includes Cholecystectomy; Appendectomy;  section; and knee surgery (). Social History:   reports that she has been smoking cigarettes. She has been smoking about 0.50 packs per day. She has never used smokeless tobacco. She reports that she does not drink alcohol and does not use drugs. Family history:  family history includes Depression in an other family member.     Allergies   Allergen Reactions    Adderall [Amphetamine-Dextroamphetamine] Itching    Morphine And Related     Tramadol     Ultram [Tramadol Hcl] Itching    Codeine Itching    Pcn [Penicillins] Swelling and Dermatitis       albuterol sulfate  (90 Base) MCG/ACT inhaler 2 puff, Q6H PRN  lisinopril (PRINIVIL;ZESTRIL) tablet 2.5 mg, Daily  metoprolol succinate (TOPROL XL) extended release tablet 25 mg, Daily  nitroGLYCERIN (NITROSTAT) SL tablet 0.4 mg, Q5 Min PRN  spironolactone (ALDACTONE) tablet 25 mg, Daily  sodium chloride flush 0.9 % injection 5-40 mL, 2 times per day  sodium chloride flush 0.9 % injection 5-40 mL, PRN  0.9 % sodium chloride infusion, PRN  enoxaparin (LOVENOX) injection 40 mg, Daily  ondansetron (ZOFRAN-ODT) disintegrating tablet 4 mg, Q8H PRN   Or  ondansetron (ZOFRAN) injection 4 mg, Q6H PRN  polyethylene glycol (GLYCOLAX) packet 17 g, Daily PRN  acetaminophen (TYLENOL) tablet 650 mg, Q6H PRN   Or  acetaminophen (TYLENOL) suppository 650 mg, Q6H PRN  furosemide (LASIX) injection 40 mg, BID      Current Facility-Administered Medications   Medication Dose Route Frequency Provider Last Rate Last Admin    albuterol sulfate  (90 Base) MCG/ACT inhaler 2 puff  2 puff Inhalation Q6H PRN Abhijeet Patch, APRN - CNP        lisinopril (PRINIVIL;ZESTRIL) tablet 2.5 mg  2.5 mg Oral Daily Abhijeet Patch, APRN - CNP        metoprolol succinate (TOPROL XL) extended release tablet 25 mg  25 mg Oral Daily Abhijeet Patch, APRN - CNP   25 mg at 08/29/21 1849    nitroGLYCERIN (NITROSTAT) SL tablet 0.4 mg  0.4 mg Sublingual Q5 Min PRN Abhijeet Patch, APRN - CNP        spironolactone (ALDACTONE) tablet 25 mg  25 mg Oral Daily Abhijeet Patch, APRN - CNP   25 mg at 08/29/21 1853    sodium chloride flush 0.9 % injection 5-40 mL  5-40 mL IntraVENous 2 times per day Abhijeet Patch, APRN - CNP        sodium chloride flush 0.9 % injection 5-40 mL  5-40 mL IntraVENous PRN Abhijeet Patch, APRN - CNP        0.9 % sodium chloride infusion  25 mL IntraVENous PRN Abhijeet Patch, APRN - CNP        enoxaparin (LOVENOX) injection 40 mg  40 mg Subcutaneous Daily Abhijeet Patch, APRN - CNP   40 mg at 08/29/21 1851    ondansetron (ZOFRAN-ODT) disintegrating tablet 4 mg  4 mg Oral Q8H PRN Abhijeet Patch, APRN - CNP        Or    ondansetron (ZOFRAN) injection 4 mg  4 mg IntraVENous Q6H PRN Abhijeet Patch, APRN - CNP        polyethylene glycol (GLYCOLAX) packet 17 g  17 g Oral Daily PRN Abhijeet Patch, APRN - CNP        acetaminophen (TYLENOL) tablet 650 mg  650 mg Oral Q6H PRN Abhijeet Patch, APRN - CNP        Or    acetaminophen (TYLENOL) suppository 650 mg  650 mg Rectal Q6H PRN Sahley Kail, APRN - CNP        furosemide (LASIX) injection 40 mg  40 mg IntraVENous BID Ashley Kail, APRN - CNP   40 mg at 08/29/21 1851     Current Outpatient Medications   Medication Sig Dispense Refill    nitroGLYCERIN (NITROSTAT) 0.4 MG SL tablet up to max of 3 total doses. If no relief after 1 dose, call 911. 25 tablet 3    albuterol sulfate  (90 Base) MCG/ACT inhaler Inhale 2 puffs into the lungs every 6 hours as needed for Wheezing 1 Inhaler 3    lisinopril (PRINIVIL;ZESTRIL) 2.5 MG tablet Take 1 tablet by mouth daily 30 tablet 3    metoprolol succinate (TOPROL XL) 25 MG extended release tablet Take 1 tablet by mouth daily 30 tablet 3    spironolactone (ALDACTONE) 25 MG tablet Take 1 tablet by mouth daily 30 tablet 3    furosemide (LASIX) 40 MG tablet Take 1 tablet by mouth daily 60 tablet 3     Review of Systems:  All 14 systems reviewed, all negative except for  sob    Physical Examination:    /86   Pulse 98   Temp 98.1 °F (36.7 °C) (Oral)   Resp 22   Ht 5' 7\" (1.702 m)   Wt 180 lb (81.6 kg)   SpO2 96%   BMI 28.19 kg/m²      Wt Readings from Last 3 Encounters:   08/29/21 180 lb (81.6 kg)   12/17/20 173 lb 14.4 oz (78.9 kg)   12/04/20 180 lb (81.6 kg)     Body mass index is 28.19 kg/m². General Appearance:  fair  Head: normocephalic     Eyes: normal, noninjected conjunctiva    ENT: normal mucosa, noninjected throat, normal     NECK: No JVP  No thyromegaly        Cardiovascular: No thrills palpated   Auscultation: Normal S1 and S2,  no murmur   carotid bruit no   Abdominal Aorta no bruit    Respiratory:    Breath sounds Diminshed bilaterally     Extremities:  Trace Edema clubbing ,   no cyanosis    SKIN: Warm and well perfused, no pallor or cyanosis    Vascular exam:  Pedal Pulses: palp  bilaterally        Abdomen:  No masses or tenderness. No organomegaly noted.     Neurological:  Oriented to time, place, and person   No focal neurological deficit noted. Psychiatric:normal mood, no anxiety    Lab Review   Recent Labs     08/29/21  1220   WBC 9.0   HGB 13.7   HCT 42.9         Recent Labs     08/29/21  1220      K 4.4      CO2 24   BUN 12   CREATININE 1.0     Recent Labs     08/29/21  1220   AST 23   ALT 21   BILITOT 0.6   ALKPHOS 80     No results for input(s): TROPONINI in the last 72 hours. Lab Results   Component Value Date     (H) 08/18/2012     (H) 08/06/2012     Lab Results   Component Value Date    INR 1.05 06/12/2015    PROTIME 11.9 06/12/2015           Assessment/Recommendations:     - HfrEF: BOOM ahumada, ACE-I  - ?  ICD          Amina Carrillo MD, 8/29/2021 7:45 PM Body Location Override (Optional - Billing Will Still Be Based On Selected Body Map Location If Applicable): right upper quadrant abdomen

## 2023-01-04 NOTE — PLAN OF CARE
Nutrition Progress Note  (Physician Action Needed)    Physician- please edit note, check the appropriate diagnosis from list below and indicate whether you agree with the plan of care    The registered dietitian has identified that this patient meets criteria for malnutrition.    Nutrition Diagnosis: Malnutrition  Malnutrition in the context of chronic illness: Severe  Related to: Decreased intake, dislike of food at rehab facility, increased metabolic demands of malignancy  As evidenced by: Diet history/recall, Documented/reported poor oral intake, Weight loss over time   Malnutrition chronic; severe: Energy intake of </ 75% estimated energy requirement for >/equal 1 month, Weight loss of >10%/6 months     Coordination of nutrition care: Patient has been followed by outpatient oncology RD and received education on high protein high calorie diet. Last saw 10/2022. Recommend continued follow up.      Meals & snacks: Continue regular diet. Encouraged meal intakes.      Nutrition supplement therapy: Will send Ensure Plus HP (350 kcal, 20 gm pro) BID. Encouraged to increase consumption of Boost at facility to at least once every day    Physician Documentation  Based on above, patient meets criteria for:    []  Severe Protein Calorie Malnutrition  []  Moderate Protein Calorie Malnutrition  []  Mild Protein Calorie Malnutrition  [] Unable to determine   [] Other:     * Please add the appropriate diagnosis to the patient's Problem List and subsequent Progress Notes.     Plan of care:   []  Agree with nutrition assessment and plan of care as stated above.    [] Agree with nutrition assessment and plan of care, with exception. Patient meets criteria for diagnosis except ________.    [] Disagree with nutrition assessment and plan of care because_________.         Problem: Falls - Risk of:  Goal: Will remain free from falls  Description: Will remain free from falls  Outcome: Completed  Goal: Absence of physical injury  Description: Absence of physical injury  Outcome: Completed     Problem: Fluid Volume:  Goal: Ability to achieve a balanced intake and output will improve  Description: Ability to achieve a balanced intake and output will improve  Outcome: Completed     Problem: Physical Regulation:  Goal: Ability to maintain clinical measurements within normal limits will improve  Description: Ability to maintain clinical measurements within normal limits will improve  Outcome: Completed  Goal: Will show no signs and symptoms of electrolyte imbalance  Description: Will show no signs and symptoms of electrolyte imbalance  Outcome: Completed     Problem: Breathing Pattern - Ineffective:  Goal: Ability to achieve and maintain a regular respiratory rate will improve  Description: Ability to achieve and maintain a regular respiratory rate will improve  Outcome: Completed     Problem: Nausea/Vomiting:  Goal: Absence of nausea/vomiting  Description: Absence of nausea/vomiting  Outcome: Completed  Goal: Able to drink  Description: Able to drink  Outcome: Completed  Goal: Able to eat  Description: Able to eat  Outcome: Completed  Goal: Ability to achieve adequate nutritional intake will improve  Description: Ability to achieve adequate nutritional intake will improve  Outcome: Completed     Problem: Anxiety:  Goal: Level of anxiety will decrease  Description: Level of anxiety will decrease  Outcome: Completed

## 2023-11-04 ENCOUNTER — APPOINTMENT (OUTPATIENT)
Dept: GENERAL RADIOLOGY | Age: 58
DRG: 194 | End: 2023-11-04
Attending: STUDENT IN AN ORGANIZED HEALTH CARE EDUCATION/TRAINING PROGRAM
Payer: COMMERCIAL

## 2023-11-04 ENCOUNTER — HOSPITAL ENCOUNTER (INPATIENT)
Age: 58
DRG: 194 | End: 2023-11-04
Attending: STUDENT IN AN ORGANIZED HEALTH CARE EDUCATION/TRAINING PROGRAM
Payer: COMMERCIAL

## 2023-11-04 ENCOUNTER — APPOINTMENT (OUTPATIENT)
Dept: CT IMAGING | Age: 58
DRG: 194 | End: 2023-11-04
Payer: COMMERCIAL

## 2023-11-04 DIAGNOSIS — I48.91 ATRIAL FIBRILLATION, UNSPECIFIED TYPE (HCC): ICD-10-CM

## 2023-11-04 DIAGNOSIS — R79.89 ELEVATED TROPONIN: ICD-10-CM

## 2023-11-04 DIAGNOSIS — I50.23 SYSTOLIC CHF, ACUTE ON CHRONIC (HCC): ICD-10-CM

## 2023-11-04 DIAGNOSIS — N17.9 AKI (ACUTE KIDNEY INJURY) (HCC): ICD-10-CM

## 2023-11-04 DIAGNOSIS — A41.9 SEPTICEMIA (HCC): ICD-10-CM

## 2023-11-04 DIAGNOSIS — R41.82 ALTERED MENTAL STATUS, UNSPECIFIED ALTERED MENTAL STATUS TYPE: Primary | ICD-10-CM

## 2023-11-04 PROBLEM — I48.19 PERSISTENT ATRIAL FIBRILLATION (HCC): Status: ACTIVE | Noted: 2023-11-04

## 2023-11-04 LAB
ALBUMIN SERPL-MCNC: 3.3 GM/DL (ref 3.4–5)
ALP BLD-CCNC: 105 IU/L (ref 40–129)
ALT SERPL-CCNC: 1029 U/L (ref 10–40)
AMPHETAMINES: NEGATIVE
ANION GAP SERPL CALCULATED.3IONS-SCNC: 21 MMOL/L (ref 4–16)
AST SERPL-CCNC: 741 IU/L (ref 15–37)
B PARAP IS1001 DNA NPH QL NAA+NON-PROBE: NOT DETECTED
B PERT.PT PRMT NPH QL NAA+NON-PROBE: NOT DETECTED
BACTERIA: NEGATIVE /HPF
BARBITURATE SCREEN URINE: NEGATIVE
BASE EXCESS: 7 (ref 0–2.4)
BASOPHILS ABSOLUTE: 0 K/CU MM
BASOPHILS RELATIVE PERCENT: 0.1 % (ref 0–1)
BENZODIAZEPINE SCREEN, URINE: NEGATIVE
BILIRUB SERPL-MCNC: 3.3 MG/DL (ref 0–1)
BILIRUBIN URINE: NEGATIVE MG/DL
BLOOD, URINE: ABNORMAL
BUN SERPL-MCNC: 121 MG/DL (ref 6–23)
C PNEUM DNA NPH QL NAA+NON-PROBE: NOT DETECTED
CALCIUM SERPL-MCNC: 8.7 MG/DL (ref 8.3–10.6)
CANNABINOID SCREEN URINE: NEGATIVE
CHLORIDE BLD-SCNC: 92 MMOL/L (ref 99–110)
CLARITY: CLEAR
CO2: 16 MMOL/L (ref 21–32)
COCAINE METABOLITE: ABNORMAL
COLOR: YELLOW
COMMENT: ABNORMAL
CREAT SERPL-MCNC: 3.2 MG/DL (ref 0.6–1.1)
DIFFERENTIAL TYPE: ABNORMAL
EKG ATRIAL RATE: 113 BPM
EKG DIAGNOSIS: NORMAL
EKG Q-T INTERVAL: 330 MS
EKG QRS DURATION: 100 MS
EKG QTC CALCULATION (BAZETT): 512 MS
EKG R AXIS: -13 DEGREES
EKG T AXIS: 135 DEGREES
EKG VENTRICULAR RATE: 145 BPM
EOSINOPHILS ABSOLUTE: 0 K/CU MM
EOSINOPHILS RELATIVE PERCENT: 0.1 % (ref 0–3)
FENTANYL URINE: NEGATIVE
FLUAV H1 2009 PAN RNA NPH NAA+NON-PROBE: NOT DETECTED
FLUAV H1 RNA NPH QL NAA+NON-PROBE: NOT DETECTED
FLUAV H3 RNA NPH QL NAA+NON-PROBE: NOT DETECTED
FLUAV RNA NPH QL NAA+NON-PROBE: NOT DETECTED
FLUBV RNA NPH QL NAA+NON-PROBE: NOT DETECTED
GFR SERPL CREATININE-BSD FRML MDRD: 16 ML/MIN/1.73M2
GLUCOSE SERPL-MCNC: 119 MG/DL (ref 70–99)
GLUCOSE, URINE: NEGATIVE MG/DL
HADV DNA NPH QL NAA+NON-PROBE: NOT DETECTED
HCO3 VENOUS: 16.7 MMOL/L (ref 19–25)
HCOV 229E RNA NPH QL NAA+NON-PROBE: NOT DETECTED
HCOV HKU1 RNA NPH QL NAA+NON-PROBE: NOT DETECTED
HCOV NL63 RNA NPH QL NAA+NON-PROBE: NOT DETECTED
HCOV OC43 RNA NPH QL NAA+NON-PROBE: NOT DETECTED
HCT VFR BLD CALC: 44.1 % (ref 37–47)
HEMOGLOBIN: 14.1 GM/DL (ref 12.5–16)
HMPV RNA NPH QL NAA+NON-PROBE: NOT DETECTED
HPIV1 RNA NPH QL NAA+NON-PROBE: NOT DETECTED
HPIV2 RNA NPH QL NAA+NON-PROBE: NOT DETECTED
HPIV3 RNA NPH QL NAA+NON-PROBE: NOT DETECTED
HPIV4 RNA NPH QL NAA+NON-PROBE: NOT DETECTED
IMMATURE NEUTROPHIL %: 0.3 % (ref 0–0.43)
KETONES, URINE: NEGATIVE MG/DL
LACTATE: 1.5 MMOL/L (ref 0.5–1.9)
LACTATE: 1.5 MMOL/L (ref 0.5–1.9)
LACTATE: 1.6 MMOL/L (ref 0.5–1.9)
LACTIC ACID, SEPSIS: 2.2 MMOL/L (ref 0.4–2)
LACTIC ACID, SEPSIS: 2.3 MMOL/L (ref 0.4–2)
LEUKOCYTE ESTERASE, URINE: NEGATIVE
LYMPHOCYTES ABSOLUTE: 0.7 K/CU MM
LYMPHOCYTES RELATIVE PERCENT: 7.6 % (ref 24–44)
M PNEUMO DNA NPH QL NAA+NON-PROBE: NOT DETECTED
MCH RBC QN AUTO: 29.7 PG (ref 27–31)
MCHC RBC AUTO-ENTMCNC: 32 % (ref 32–36)
MCV RBC AUTO: 92.8 FL (ref 78–100)
MONOCYTES ABSOLUTE: 0.5 K/CU MM
MONOCYTES RELATIVE PERCENT: 5.4 % (ref 0–4)
MUCUS: ABNORMAL HPF
NITRITE URINE, QUANTITATIVE: NEGATIVE
NUCLEATED RBC %: 0.5 %
O2 SAT, VEN: 93.4 % (ref 50–70)
OPIATES, URINE: NEGATIVE
OXYCODONE: NEGATIVE
PCO2, VEN: 27 MMHG (ref 38–52)
PDW BLD-RTO: 17.6 % (ref 11.7–14.9)
PH VENOUS: 7.4 (ref 7.32–7.42)
PH, URINE: 5.5 (ref 5–8)
PLATELET # BLD: 162 K/CU MM (ref 140–440)
PO2, VEN: 164 MMHG (ref 28–48)
POTASSIUM SERPL-SCNC: 5.2 MMOL/L (ref 3.5–5.1)
PRO-BNP: ABNORMAL PG/ML
PROCALCITONIN SERPL-MCNC: 1.66 NG/ML
PROTEIN UA: ABNORMAL MG/DL
RBC # BLD: 4.75 M/CU MM (ref 4.2–5.4)
RBC URINE: 1 /HPF (ref 0–6)
RSV RNA NPH QL NAA+NON-PROBE: NOT DETECTED
RV+EV RNA NPH QL NAA+NON-PROBE: NOT DETECTED
SARS-COV-2 RNA NPH QL NAA+NON-PROBE: NOT DETECTED
SEGMENTED NEUTROPHILS ABSOLUTE COUNT: 7.9 K/CU MM
SEGMENTED NEUTROPHILS RELATIVE PERCENT: 86.5 % (ref 36–66)
SODIUM BLD-SCNC: 129 MMOL/L (ref 135–145)
SPECIFIC GRAVITY UA: 1.02 (ref 1–1.03)
SQUAMOUS EPITHELIAL: <1 /HPF
TOTAL IMMATURE NEUTOROPHIL: 0.03 K/CU MM
TOTAL NUCLEATED RBC: 0.1 K/CU MM
TOTAL PROTEIN: 6.5 GM/DL (ref 6.4–8.2)
TRICHOMONAS: ABNORMAL /HPF
TROPONIN, HIGH SENSITIVITY: 175 NG/L (ref 0–14)
TROPONIN, HIGH SENSITIVITY: 177 NG/L (ref 0–14)
TROPONIN, HIGH SENSITIVITY: 179 NG/L (ref 0–14)
TROPONIN, HIGH SENSITIVITY: 193 NG/L (ref 0–14)
UROBILINOGEN, URINE: 0.2 MG/DL (ref 0.2–1)
WBC # BLD: 9.2 K/CU MM (ref 4–10.5)
WBC UA: 1 /HPF (ref 0–5)

## 2023-11-04 PROCEDURE — 93005 ELECTROCARDIOGRAM TRACING: CPT | Performed by: STUDENT IN AN ORGANIZED HEALTH CARE EDUCATION/TRAINING PROGRAM

## 2023-11-04 PROCEDURE — 36415 COLL VENOUS BLD VENIPUNCTURE: CPT

## 2023-11-04 PROCEDURE — 2700000000 HC OXYGEN THERAPY PER DAY

## 2023-11-04 PROCEDURE — 71045 X-RAY EXAM CHEST 1 VIEW: CPT

## 2023-11-04 PROCEDURE — 2140000000 HC CCU INTERMEDIATE R&B

## 2023-11-04 PROCEDURE — 83605 ASSAY OF LACTIC ACID: CPT

## 2023-11-04 PROCEDURE — 2580000003 HC RX 258: Performed by: STUDENT IN AN ORGANIZED HEALTH CARE EDUCATION/TRAINING PROGRAM

## 2023-11-04 PROCEDURE — 81001 URINALYSIS AUTO W/SCOPE: CPT

## 2023-11-04 PROCEDURE — 83880 ASSAY OF NATRIURETIC PEPTIDE: CPT

## 2023-11-04 PROCEDURE — 87040 BLOOD CULTURE FOR BACTERIA: CPT

## 2023-11-04 PROCEDURE — 96365 THER/PROPH/DIAG IV INF INIT: CPT

## 2023-11-04 PROCEDURE — 96367 TX/PROPH/DG ADDL SEQ IV INF: CPT

## 2023-11-04 PROCEDURE — 6360000002 HC RX W HCPCS: Performed by: INTERNAL MEDICINE

## 2023-11-04 PROCEDURE — 80053 COMPREHEN METABOLIC PANEL: CPT

## 2023-11-04 PROCEDURE — 2580000003 HC RX 258: Performed by: INTERNAL MEDICINE

## 2023-11-04 PROCEDURE — 94761 N-INVAS EAR/PLS OXIMETRY MLT: CPT

## 2023-11-04 PROCEDURE — 99253 IP/OBS CNSLTJ NEW/EST LOW 45: CPT | Performed by: INTERNAL MEDICINE

## 2023-11-04 PROCEDURE — 93010 ELECTROCARDIOGRAM REPORT: CPT | Performed by: INTERNAL MEDICINE

## 2023-11-04 PROCEDURE — 85025 COMPLETE CBC W/AUTO DIFF WBC: CPT

## 2023-11-04 PROCEDURE — 82805 BLOOD GASES W/O2 SATURATION: CPT

## 2023-11-04 PROCEDURE — 0202U NFCT DS 22 TRGT SARS-COV-2: CPT

## 2023-11-04 PROCEDURE — 99285 EMERGENCY DEPT VISIT HI MDM: CPT

## 2023-11-04 PROCEDURE — 70450 CT HEAD/BRAIN W/O DYE: CPT

## 2023-11-04 PROCEDURE — 6360000002 HC RX W HCPCS: Performed by: STUDENT IN AN ORGANIZED HEALTH CARE EDUCATION/TRAINING PROGRAM

## 2023-11-04 PROCEDURE — 6370000000 HC RX 637 (ALT 250 FOR IP): Performed by: INTERNAL MEDICINE

## 2023-11-04 PROCEDURE — 84145 PROCALCITONIN (PCT): CPT

## 2023-11-04 PROCEDURE — 80307 DRUG TEST PRSMV CHEM ANLYZR: CPT

## 2023-11-04 PROCEDURE — 96374 THER/PROPH/DIAG INJ IV PUSH: CPT

## 2023-11-04 PROCEDURE — 76937 US GUIDE VASCULAR ACCESS: CPT

## 2023-11-04 PROCEDURE — 84484 ASSAY OF TROPONIN QUANT: CPT

## 2023-11-04 RX ORDER — ONDANSETRON 4 MG/1
4 TABLET, ORALLY DISINTEGRATING ORAL EVERY 8 HOURS PRN
Status: DISCONTINUED | OUTPATIENT
Start: 2023-11-04 | End: 2023-12-06 | Stop reason: HOSPADM

## 2023-11-04 RX ORDER — SODIUM CHLORIDE 9 MG/ML
INJECTION, SOLUTION INTRAVENOUS PRN
Status: DISCONTINUED | OUTPATIENT
Start: 2023-11-04 | End: 2023-12-06 | Stop reason: HOSPADM

## 2023-11-04 RX ORDER — ENOXAPARIN SODIUM 100 MG/ML
30 INJECTION SUBCUTANEOUS DAILY
Status: DISCONTINUED | OUTPATIENT
Start: 2023-11-04 | End: 2023-11-04

## 2023-11-04 RX ORDER — ONDANSETRON 2 MG/ML
4 INJECTION INTRAMUSCULAR; INTRAVENOUS EVERY 6 HOURS PRN
Status: DISCONTINUED | OUTPATIENT
Start: 2023-11-04 | End: 2023-11-04 | Stop reason: ALTCHOICE

## 2023-11-04 RX ORDER — DIGOXIN 0.25 MG/ML
500 INJECTION INTRAMUSCULAR; INTRAVENOUS ONCE
Status: COMPLETED | OUTPATIENT
Start: 2023-11-04 | End: 2023-11-04

## 2023-11-04 RX ORDER — SODIUM CHLORIDE, SODIUM LACTATE, POTASSIUM CHLORIDE, AND CALCIUM CHLORIDE .6; .31; .03; .02 G/100ML; G/100ML; G/100ML; G/100ML
500 INJECTION, SOLUTION INTRAVENOUS ONCE
Status: COMPLETED | OUTPATIENT
Start: 2023-11-04 | End: 2023-11-04

## 2023-11-04 RX ORDER — SODIUM CHLORIDE 0.9 % (FLUSH) 0.9 %
5-40 SYRINGE (ML) INJECTION EVERY 12 HOURS SCHEDULED
Status: DISCONTINUED | OUTPATIENT
Start: 2023-11-04 | End: 2023-12-06 | Stop reason: HOSPADM

## 2023-11-04 RX ORDER — ENOXAPARIN SODIUM 100 MG/ML
1 INJECTION SUBCUTANEOUS DAILY
Status: DISCONTINUED | OUTPATIENT
Start: 2023-11-04 | End: 2023-11-06

## 2023-11-04 RX ORDER — ASPIRIN 81 MG/1
81 TABLET, CHEWABLE ORAL DAILY
Status: DISCONTINUED | OUTPATIENT
Start: 2023-11-04 | End: 2023-12-06 | Stop reason: HOSPADM

## 2023-11-04 RX ORDER — ACETAMINOPHEN 325 MG/1
650 TABLET ORAL EVERY 6 HOURS PRN
Status: DISCONTINUED | OUTPATIENT
Start: 2023-11-04 | End: 2023-12-06 | Stop reason: HOSPADM

## 2023-11-04 RX ORDER — FUROSEMIDE 10 MG/ML
40 INJECTION INTRAMUSCULAR; INTRAVENOUS 2 TIMES DAILY
Status: DISCONTINUED | OUTPATIENT
Start: 2023-11-04 | End: 2023-11-06

## 2023-11-04 RX ORDER — POLYETHYLENE GLYCOL 3350 17 G/17G
17 POWDER, FOR SOLUTION ORAL DAILY PRN
Status: DISCONTINUED | OUTPATIENT
Start: 2023-11-04 | End: 2023-12-06 | Stop reason: HOSPADM

## 2023-11-04 RX ORDER — ACETAMINOPHEN 650 MG/1
650 SUPPOSITORY RECTAL EVERY 6 HOURS PRN
Status: DISCONTINUED | OUTPATIENT
Start: 2023-11-04 | End: 2023-12-06 | Stop reason: HOSPADM

## 2023-11-04 RX ORDER — SODIUM CHLORIDE 0.9 % (FLUSH) 0.9 %
5-40 SYRINGE (ML) INJECTION PRN
Status: DISCONTINUED | OUTPATIENT
Start: 2023-11-04 | End: 2023-12-06 | Stop reason: HOSPADM

## 2023-11-04 RX ORDER — DIGOXIN 0.25 MG/ML
250 INJECTION INTRAMUSCULAR; INTRAVENOUS DAILY
Status: DISCONTINUED | OUTPATIENT
Start: 2023-11-05 | End: 2023-11-05

## 2023-11-04 RX ORDER — DIGOXIN 0.25 MG/ML
250 INJECTION INTRAMUSCULAR; INTRAVENOUS ONCE
Status: COMPLETED | OUTPATIENT
Start: 2023-11-04 | End: 2023-11-04

## 2023-11-04 RX ORDER — DOBUTAMINE HYDROCHLORIDE 200 MG/100ML
2.5 INJECTION INTRAVENOUS CONTINUOUS
Status: DISCONTINUED | OUTPATIENT
Start: 2023-11-04 | End: 2023-11-04

## 2023-11-04 RX ADMIN — DIGOXIN 500 MCG: 0.25 INJECTION INTRAMUSCULAR; INTRAVENOUS at 12:57

## 2023-11-04 RX ADMIN — SODIUM CHLORIDE, POTASSIUM CHLORIDE, SODIUM LACTATE AND CALCIUM CHLORIDE 500 ML: 600; 310; 30; 20 INJECTION, SOLUTION INTRAVENOUS at 11:44

## 2023-11-04 RX ADMIN — ASPIRIN 81 MG CHEWABLE TABLET 81 MG: 81 TABLET CHEWABLE at 16:12

## 2023-11-04 RX ADMIN — SODIUM CHLORIDE, PRESERVATIVE FREE 10 ML: 5 INJECTION INTRAVENOUS at 22:23

## 2023-11-04 RX ADMIN — DIGOXIN 250 MCG: 0.25 INJECTION INTRAMUSCULAR; INTRAVENOUS at 16:18

## 2023-11-04 RX ADMIN — VANCOMYCIN HYDROCHLORIDE 1250 MG: 1.25 INJECTION, POWDER, LYOPHILIZED, FOR SOLUTION INTRAVENOUS at 12:15

## 2023-11-04 RX ADMIN — CEFEPIME 2000 MG: 2 INJECTION, POWDER, FOR SOLUTION INTRAVENOUS at 11:45

## 2023-11-04 RX ADMIN — FUROSEMIDE 40 MG: 10 INJECTION, SOLUTION INTRAMUSCULAR; INTRAVENOUS at 16:48

## 2023-11-04 RX ADMIN — ENOXAPARIN SODIUM 80 MG: 100 INJECTION SUBCUTANEOUS at 16:12

## 2023-11-04 ASSESSMENT — PAIN DESCRIPTION - FREQUENCY: FREQUENCY: CONTINUOUS

## 2023-11-04 ASSESSMENT — PAIN DESCRIPTION - DESCRIPTORS: DESCRIPTORS: DULL

## 2023-11-04 ASSESSMENT — PAIN SCALES - GENERAL
PAINLEVEL_OUTOF10: 3
PAINLEVEL_OUTOF10: 0

## 2023-11-04 ASSESSMENT — PAIN DESCRIPTION - PAIN TYPE: TYPE: ACUTE PAIN

## 2023-11-04 ASSESSMENT — PAIN DESCRIPTION - ORIENTATION: ORIENTATION: LEFT

## 2023-11-04 ASSESSMENT — PAIN DESCRIPTION - LOCATION: LOCATION: CHEST

## 2023-11-04 NOTE — CARE COORDINATION
MCG criteria for respiratory distress reviewed at this time, criteria supports Inpatient Admission.  TRIP,RN/CM

## 2023-11-04 NOTE — CONSULTS
IV Consult complete. Nexiva 20g 1.75\" PIV Inserted in Right Forearm  x 1 attempt using sterile ultrasound guided technique. Brisk blood return, flushes easy. Second set blood cultures obtained.

## 2023-11-04 NOTE — ED PROVIDER NOTES
Emergency Department Encounter    Patient: Malena Kapoor  MRN: [id number]  : 1965  Date of Evaluation: 2023  ED Provider:  Ramos Londono MD    Triage Chief Complaint:   Rectal Bleeding, Altered Mental Status, and Shortness of Breath (85% at home)    [employer]:  Malena Kapoor is a 60 y.o. female with past medical history of polysubstance use including cocaine, heart failure with an ejection fraction of 15%, anxiety, CKD 3 that presents with confusion, shortness of breath, possible rectal bleeding. According to EMS they were called due to rectal bleeding, however patient was not able to provide any more information. In addition they report that patient was saturating 85% on room air at home so they have to give her some nasal cannula. In my evaluation patient reports chest pain, abdominal pain, shortness of breath. However given the patient is alert into times 3, she is confused and is slow to respond.     ROS - see HPI    Past Medical History:   Diagnosis Date    Anxiety     Environmental allergies     Non-healing surgical wound     RLS (restless legs syndrome)      Past Surgical History:   Procedure Laterality Date    APPENDECTOMY       SECTION      CHOLECYSTECTOMY      KNEE SURGERY  2014     Family History   Problem Relation Age of Onset    Depression Other      Social History     Socioeconomic History    Marital status:      Spouse name: Not on file    Number of children: Not on file    Years of education: Not on file    Highest education level: Not on file   Occupational History    Not on file   Tobacco Use    Smoking status: Every Day     Packs/day: .5     Types: Cigarettes    Smokeless tobacco: Never   Vaping Use    Vaping Use: Never used   Substance and Sexual Activity    Alcohol use: No    Drug use: Yes     Frequency: 5.0 times per week     Types: Cocaine     Comment: smokes crack cocaine    Sexual activity: Not Currently   Other Topics Concern    Not on file   Social RBC % 0.5 %    Total Nucleated RBC 0.1 K/CU MM    Total Immature Neutrophil 0.03 K/CU MM    Immature Neutrophil % 0.3 0 - 0.43 %   Comprehensive Metabolic Panel   Result Value Ref Range    Sodium 129 (L) 135 - 145 MMOL/L    Potassium 5.2 (H) 3.5 - 5.1 MMOL/L    Chloride 92 (L) 99 - 110 mMol/L    CO2 16 (L) 21 - 32 MMOL/L    Anion Gap 21 (H) 4 - 16    Glucose 119 (H) 70 - 99 MG/DL     (H) 6 - 23 MG/DL    Creatinine 3.2 (H) 0.6 - 1.1 MG/DL    Est, Glom Filt Rate 16 (L) >60 mL/min/1.73m2    Calcium 8.7 8.3 - 10.6 MG/DL    Total Protein 6.5 6.4 - 8.2 GM/DL    Albumin 3.3 (L) 3.4 - 5.0 GM/DL    Total Bilirubin 3.3 (H) 0.0 - 1.0 MG/DL    Alkaline Phosphatase 105 40 - 129 IU/L    ALT 1,029 (H) 10 - 40 U/L     (H) 15 - 37 IU/L   Lactate, Sepsis   Result Value Ref Range    Lactic Acid, Sepsis 2.2 (HH) 0.4 - 2.0 mMOL/L   Procalcitonin   Result Value Ref Range    Procalcitonin 1.66    Troponin Now and Q1Hr for [employer]   Result Value Ref Range    Troponin, High Sensitivity 177 (HH) 0 - 14 ng/L   Troponin Now and Q1Hr for 2 [employer]   Result Value Ref Range    Troponin, High Sensitivity 193 (HH) 0 - 14 ng/L   Urinalysis with Reflex to Culture    Specimen: Urine   Result Value Ref Range    Color, UA YELLOW YELLOW    Clarity, UA CLEAR CLEAR    Glucose, Urine NEGATIVE NEGATIVE MG/DL    Bilirubin Urine NEGATIVE NEGATIVE MG/DL    Ketones, Urine NEGATIVE NEGATIVE MG/DL    Specific Gravity, UA 1.020 1.001 - 1.035    Blood, Urine SMALL NUMBER OR AMOUNT OBSERVED (A) NEGATIVE    pH, Urine 5.5 5.0 - 8.0    Protein, UA TRACE (A) NEGATIVE MG/DL    Urobilinogen, Urine 0.2 0.2 - 1.0 MG/DL    Nitrite Urine, Quantitative NEGATIVE NEGATIVE    Leukocyte Esterase, Urine NEGATIVE NEGATIVE   Urine Drug Screen   Result Value Ref Range    Cannabinoid Scrn, Ur NEGATIVE NEGATIVE    Amphetamines NEGATIVE NEGATIVE    Cocaine Metabolite UNCONFIRMED POSITIVE (A) NEGATIVE    Benzodiazepine Screen, Urine NEGATIVE NEGATIVE    Barbiturate

## 2023-11-04 NOTE — CONSULTS
CARDIOLOGY CONSULT NOTE   Reason for consultation:  afib/ troponin elevation    Referring physician:  Allie Osman MD     Primary care physician: No primary care provider on file. Dear  Dr. Allie Osman MD   Thanks for the consult. Chief Complaints :  Chief Complaint   Patient presents with    Rectal Bleeding    Altered Mental Status    Shortness of Breath     85% at home        History of present illness:Courtney is a 60 y. o.year old who presents with hypoxia chest x-ray concerning for pulmonary edema elevated troponin level she is well-known to cardiology service due to history of nonischemic cardiomyopathy in the emergency department she was noted to be in A-fib with rapid heart rate up to 140s she recently used cocaine she has longstanding history of cocaine use leading to nonischemic cardiomyopathy and noncompliance with medication. Last echo shows EF 15% she is in A-fib and noted to be short of breath elevated lactic acid level being admitted for possible sepsis and respiratory failure    Past medical history:    has a past medical history of Anxiety, Environmental allergies, Non-healing surgical wound, and RLS (restless legs syndrome). Past surgical history:   has a past surgical history that includes Cholecystectomy; Appendectomy;  section; and knee surgery (). Social History:   reports that she has been smoking cigarettes. She has been smoking an average of .5 packs per day. She has never used smokeless tobacco. She reports current drug use. Frequency: 5.00 times per week. Drug: Cocaine. She reports that she does not drink alcohol.   Family history:   no family history of CAD, STROKE of DM at early age    Allergies   Allergen Reactions    Adderall [Amphetamine-Dextroamphetamine] Itching    Morphine And Related     Tramadol     Ultram [Tramadol Hcl] Itching    Codeine Itching    Pcn [Penicillins] Swelling and Dermatitis       sodium chloride flush 0.9 % injection 5-40 mL, 2 TISSUES/SKULL:  No acute abnormality of the visualized skull or soft tissues. 1. 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 2. Encephalomalacia surrounding this area in the left frontal lobe with a separate focus also seen in the posterior left parietal lobe. Additional areas of prior infarction that are favored to be remote. An embolic phenomenon could be considered given differing vascular distributions. 3. No acute intracranial abnormality. XR CHEST PORTABLE    Result Date: 11/4/2023  EXAMINATION: ONE XRAY VIEW OF THE CHEST 11/4/2023 9:47 am COMPARISON: 06/07/2022 radiograph HISTORY: ORDERING SYSTEM PROVIDED HISTORY: Sepsis TECHNOLOGIST PROVIDED HISTORY: Reason for exam:->Sepsis Reason for Exam: ams/sob FINDINGS: The heart is enlarged. Mediastinum is normal.  Mild perihilar opacities centrally and a central pattern of mild ground-glass attenuation has developed in the lungs. No significant pleural fluid. No skeletal finding. Central pattern of ground-glass airspace change may represent vascular congestion or mild edema. A developing viral infection may be considered clinically. All labs, medications and tests reviewed by myself including data  from outside source , patient and available family . Continue all other medications of all above medical condition listed as is. Impression:  Principal Problem:    Acute on chronic systolic (congestive) heart failure (HCC)  Active Problems:    CHF (congestive heart failure), NYHA class I, acute on chronic, combined (HCC)    Troponin I above reference range    Non-ischemic cardiomyopathy ([address])    Persistent atrial fibrillation ([address])  Resolved Problems:    * No resolved hospital problems. *      Assessment: 60 y. o.year old with PMH of  has a past medical history of Anxiety, Environmental allergies, Non-healing surgical wound, and RLS (restless legs syndrome).       Plan and Recommendations:    Elevated Troponin : Check serial

## 2023-11-04 NOTE — PROGRESS NOTES
Had an extensive conversation with patient's daughter/POA Twila about patient's status and history as patient is very confused. When the patient is asked where she has been staying, she says with her mother. Patient's daughter states that her and the patient's mother were trying to find the patient and get ahold of her for the last month but were unable to. Unsure of where the patient is staying. Twila says that patient has gotten very confused in the past when she builds up extra fluid. She would like to discuss her case with cardiology. Mardy Lesches NP notified of this who will notify Dr Samia Chaidez. [address] [place] [address] placed in room for patient safety as patient is very impulsive. Will monitor closely.

## 2023-11-04 NOTE — ED TRIAGE NOTES
Per EMS patient oxygen sat 85% at home. Family reports rectal bleeding and altered mental status for several days.

## 2023-11-04 NOTE — PROGRESS NOTES
4 Eyes Skin Assessment     NAME:  Ana Lilia Raya OF BIRTH:  1965  MEDICAL RECORD NUMBER:  [id number]    The patient is being assessed for  Admission    I agree that at least one RN has performed a thorough Head to Toe Skin Assessment on the patient. ALL assessment sites listed below have been assessed. Areas assessed by both nurses:    Head, Face, Ears, Shoulders, Back, Chest, Arms, Elbows, Hands, Sacrum. Buttock, Coccyx, Ischium, Legs. Feet and Heels, and Under Medical Devices         Does the Patient have a Wound?  No noted wound(s)       Gunnar Prevention initiated by RN: No  Wound Care Orders initiated by RN: No    Pressure Injury (Stage 3,4, Unstageable, DTI, NWPT, and Complex wounds) if present, place Wound referral order by RN under : No    New Ostomies, if present place, Ostomy referral order under : No     Nurse 1 eSignature: Electronically signed by Chung Dickinson RN on 11/4/23 at 3:54 PM EDT    **SHARE this note so that the co-signing nurse can place an eSignature**    Nurse 2 eSignature: Electronically signed by Benny Yoo RN on 11/4/23 at 4:01 PM EDT

## 2023-11-04 NOTE — ED NOTES
ED TO INPATIENT SBAR HANDOFF    Patient Name: Ileana Speak   :    60 y.o. Preferred Name  [facility]  Family/Caregiver Present no   Restraints no   C-SSRS: Risk of Suicide: No Risk  Sitter no   Sepsis Risk Score Sepsis Risk Score: 2.56      Situation  Chief Complaint   Patient presents with    Rectal Bleeding    Altered Mental Status    Shortness of Breath     85% at home     Brief Description of Patient's Condition: Pt appears to be mostly alert and oriented. Report received from previous RN described pt as being confused. PT has been alert and oriented for this RN. Mental Status: {IP PT MENTAL STATUS:}  Arrived from: {Places; discharge locations:15616}    Imaging:   CT HEAD WO CONTRAST   Final Result   1. Remote infarct in the left basal ganglia that is new since .   2. Encephalomalacia surrounding this area in the left frontal lobe with a   separate focus also seen in the posterior left parietal lobe. Additional   areas of prior infarction that are favored to be remote. An embolic   phenomenon could be considered given differing vascular distributions. 3. No acute intracranial abnormality. XR CHEST PORTABLE   Final Result   Central pattern of ground-glass airspace change may represent vascular   congestion or mild edema. A developing viral infection may be considered   clinically.            Abnormal labs:   Abnormal Labs Reviewed   CBC WITH AUTO DIFFERENTIAL - Abnormal; Notable for the following components:       Result Value    RDW 17.6 (*)     Segs Relative 86.5 (*)     Lymphocytes % 7.6 (*)     Monocytes % 5.4 (*)     All other components within normal limits   COMPREHENSIVE METABOLIC PANEL - Abnormal; Notable for the following components:    Sodium 129 (*)     Potassium 5.2 (*)     Chloride 92 (*)     CO2 16 (*)     Anion Gap 21 (*)     Glucose 119 (*)      (*)     Creatinine 3.2 (*)     Est, Glom Filt Rate 16 (*)     Albumin 3.3 (*)     Total Bilirubin 3.3 (*) ALT 1,029 (*)      (*)     All other components within normal limits   LACTATE, SEPSIS - Abnormal; Notable for the following components:    Lactic Acid, Sepsis 2.2 (*)     All other components within normal limits   TROPONIN - Abnormal; Notable for the following components:    Troponin, High Sensitivity 177 (*)     All other components within normal limits   TROPONIN - Abnormal; Notable for the following components:    Troponin, High Sensitivity 193 (*)     All other components within normal limits   URINALYSIS WITH REFLEX TO CULTURE - Abnormal; Notable for the following components:    Blood, Urine SMALL NUMBER OR AMOUNT OBSERVED (*)     Protein, UA TRACE (*)     All other components within normal limits   URINE DRUG SCREEN - Abnormal; Notable for the following components:    Cocaine Metabolite UNCONFIRMED POSITIVE (*)     All other components within normal limits   BRAIN NATRIURETIC PEPTIDE - Abnormal; Notable for the following components:    Pro-BNP 46,042 (*)     All other components within normal limits   BLOOD GAS, VENOUS - Abnormal; Notable for the following components:    pCO2, Gurjit 27 (*)     pO2, Gurjit 164 (*)     Base Excess 7 (*)     HCO3, Venous 16.7 (*)     O2 Sat, Gurjit 93.4 (*)     All other components within normal limits   URINE MICROSCOPIC WITH REFLEX TO CULTURE - Abnormal; Notable for the following components:    Mucus, UA RARE (*)     All other components within normal limits       Background  History:   Past Medical History:   Diagnosis Date    Anxiety     Environmental allergies     Non-healing surgical wound     RLS (restless legs syndrome)        Assessment    Vitals/MEWS:    Level of Consciousness: New confusion or agitation (1)   Vitals:    11/04/23 1235 11/04/23 1257 11/04/23 1300 11/04/23 1315   BP: 118/81 116/80 98/86 (!) 117/96   Pulse: (!) 159  (!) 128 (!) 134   Resp: 28  (!) 35 (!) 47   Temp:       TempSrc:       SpO2: 95%  96%    Weight:       Height:         PO Status: {Slp

## 2023-11-04 NOTE — ED NOTES
Pt changed into gown and brief placed on pt. Pts shirt, pants, and sandals placed in property bag.       Ceci Dwyer RN  11/04/23 7041

## 2023-11-04 NOTE — ED NOTES
Discussed HR with MD Burrell- cruzito fagan ordered d/t concern for pts compensation. MD aware of pts continued elevated HR.       Verlinda Phoenix, RN  11/04/23 4291

## 2023-11-04 NOTE — H&P
V2.0  History and Physical      Name:  Jaylen Kemp /Age/Sex: 1965  (60 y.o. female)   MRN & CSN:  [id number] & [id number] Encounter Date/Time: 2023 1:48 PM EDT   Location:   PCP: No primary care provider on file. Hospital Day: 1    Assessment and Plan:   Jaylen Kemp is a 60 y.o. female with a pmh of HFrEF, polysubstance abuse, CKD stage III who presents with Acute on chronic systolic (congestive) heart failure [facility]    Hospital Problems             Last Modified POA    * (Principal) Acute on chronic systolic (congestive) heart failure ([address]) 2023 Yes     Acute metabolic encephalopathy  Likely in the setting of hypoxia, substance abuse and cardiogenic shock  Patient was able to state her name, age, birthdate, current year accurately. Overall poor historian      Acute respiratory failure with hypoxia  Saturating 85% at home  Likely due to CHF exacerbation  Currently saturating well on nasal cannula  Wean off    Cardiogenic shock  Criteria met by elevated lactate, cardiorenal syndrome, endorgan dysfunction and severely low EF  Lactic acid 2.3 mg/DL, creatinine 3.2 NG/DL, proBNP 4604 2, ALT/ALT 1029, 741  Acute on chronic systolic and diastolic heart failure  Per echocardiogram on 3/24/2022, EF 20%, grade 2 diastolic dysfunction, dilated left ventricle  On Coreg 3.125 twice daily, Aldactone 25 daily, Isordil 10 mg 3 times daily and torsemide 20 mg daily at home. Doubt compliance  Start low-dose dobutamine drip without titration  Start IV Lasix 40 mg twice daily  Trend lactic acid  Her shock is likely due to cardiogenic. Doubt sepsis. Hold off on further antibiotics. Blood cultures have been sent from ED, continue to follow for final results.     NSTEMI  Troponin initially 177, trended up to 193  EKG  Continue to monitor troponin  Cardiology on board, follow recommendation    A-fib RVR  Obtain digoxin in the emergency room  Consider amiodarone if continues to sustained No results for input(s): \"LACTA\" in the last 72 hours. BNP:   Recent Labs     11/04/23  0950   PROBNP 46,042*     UA:  Lab Results   Component Value Date/Time    NITRU NEGATIVE 11/04/2023 11:25 AM    NITRU NEGATIVE 08/18/2012 08:01 PM    COLORU YELLOW 11/04/2023 11:25 AM    WBCUA 1 11/04/2023 11:25 AM    RBCUA 1 11/04/2023 11:25 AM    MUCUS RARE 11/04/2023 11:25 AM    TRICHOMONAS NONE SEEN 11/04/2023 11:25 AM    BACTERIA NEGATIVE 11/04/2023 11:25 AM    CLARITYU CLEAR 11/04/2023 11:25 AM    SPECGRAV 1.020 11/04/2023 11:25 AM    LEUKOCYTESUR NEGATIVE 11/04/2023 11:25 AM    UROBILINOGEN 0.2 11/04/2023 11:25 AM    BILIRUBINUR NEGATIVE 11/04/2023 11:25 AM    BLOODU SMALL NUMBER OR AMOUNT OBSERVED 11/04/2023 11:25 AM    GLUCOSEU NEGATIVE 08/18/2012 08:01 PM    Meseret Cornet NEGATIVE 11/04/2023 11:25 AM     Urine Cultures: No results found for: \"LABURIN\"  Blood Cultures: No results found for: \"BC\"  No results found for: \"BLOODCULT2\"  Organism: No results found for: \"ORG\"    Imaging/Diagnostics Last 24 Hours   CT HEAD WO CONTRAST    Result Date: 11/4/2023  EXAMINATION: CT OF THE HEAD WITHOUT CONTRAST  11/4/2023 10:37 am TECHNIQUE: CT of the head was performed without the administration of intravenous contrast. Automated exposure control, iterative reconstruction, and/or weight based adjustment of the mA/kV was utilized to reduce the radiation dose to as low as reasonably achievable. COMPARISON: 06/12/2015 CT HISTORY: ORDERING SYSTEM PROVIDED HISTORY: AMS TECHNOLOGIST PROVIDED HISTORY: Reason for exam:->AMS Has a \"code stroke\" or \"stroke alert\" been called? ->No Decision Support Exception - unselect if not a suspected or confirmed emergency medical condition->Emergency Medical Condition (MA) Reason for Exam: AMS FINDINGS: BRAIN/VENTRICLES: There is an area of prior infarct within the left basal ganglia with a more diffuse pattern of decreased attenuation in the left frontal lobe.   No sulcal effacement or mass effect upon the lateral

## 2023-11-05 ENCOUNTER — APPOINTMENT (OUTPATIENT)
Dept: ULTRASOUND IMAGING | Age: 58
DRG: 194 | End: 2023-11-05
Payer: COMMERCIAL

## 2023-11-05 LAB
ALBUMIN SERPL-MCNC: 3.1 GM/DL (ref 3.4–5)
ALP BLD-CCNC: 105 IU/L (ref 40–129)
ALT SERPL-CCNC: 746 U/L (ref 10–40)
ANION GAP SERPL CALCULATED.3IONS-SCNC: 12 MMOL/L (ref 4–16)
AST SERPL-CCNC: 402 IU/L (ref 15–37)
BASOPHILS ABSOLUTE: 0 K/CU MM
BASOPHILS RELATIVE PERCENT: 0.1 % (ref 0–1)
BILIRUB SERPL-MCNC: 3.3 MG/DL (ref 0–1)
BILIRUBIN DIRECT: 1.6 MG/DL (ref 0–0.3)
BILIRUBIN, INDIRECT: 1.7 MG/DL (ref 0–0.7)
BUN SERPL-MCNC: 99 MG/DL (ref 6–23)
CALCIUM SERPL-MCNC: 8.7 MG/DL (ref 8.3–10.6)
CHLORIDE BLD-SCNC: 97 MMOL/L (ref 99–110)
CHOLEST SERPL-MCNC: 92 MG/DL
CO2: 26 MMOL/L (ref 21–32)
CREAT SERPL-MCNC: 2.8 MG/DL (ref 0.6–1.1)
DIFFERENTIAL TYPE: ABNORMAL
EOSINOPHILS ABSOLUTE: 0 K/CU MM
EOSINOPHILS RELATIVE PERCENT: 0.6 % (ref 0–3)
GFR SERPL CREATININE-BSD FRML MDRD: 19 ML/MIN/1.73M2
GLUCOSE SERPL-MCNC: 92 MG/DL (ref 70–99)
HCT VFR BLD CALC: 40.3 % (ref 37–47)
HDLC SERPL-MCNC: 8 MG/DL
HEMOGLOBIN: 13 GM/DL (ref 12.5–16)
IMMATURE NEUTROPHIL %: 0.3 % (ref 0–0.43)
LACTATE: 1.2 MMOL/L (ref 0.5–1.9)
LDLC SERPL CALC-MCNC: 57 MG/DL
LYMPHOCYTES ABSOLUTE: 0.6 K/CU MM
LYMPHOCYTES RELATIVE PERCENT: 8.5 % (ref 24–44)
MAGNESIUM: 2.6 MG/DL (ref 1.8–2.4)
MCH RBC QN AUTO: 30.2 PG (ref 27–31)
MCHC RBC AUTO-ENTMCNC: 32.3 % (ref 32–36)
MCV RBC AUTO: 93.5 FL (ref 78–100)
MONOCYTES ABSOLUTE: 0.5 K/CU MM
MONOCYTES RELATIVE PERCENT: 6.4 % (ref 0–4)
NUCLEATED RBC %: 0 %
PDW BLD-RTO: 17.1 % (ref 11.7–14.9)
PLATELET # BLD: 89 K/CU MM (ref 140–440)
PMV BLD AUTO: 10.6 FL (ref 7.5–11.1)
POTASSIUM SERPL-SCNC: 3.4 MMOL/L (ref 3.5–5.1)
RBC # BLD: 4.31 M/CU MM (ref 4.2–5.4)
SEGMENTED NEUTROPHILS ABSOLUTE COUNT: 6.1 K/CU MM
SEGMENTED NEUTROPHILS RELATIVE PERCENT: 84.1 % (ref 36–66)
SODIUM BLD-SCNC: 135 MMOL/L (ref 135–145)
TOTAL IMMATURE NEUTOROPHIL: 0.02 K/CU MM
TOTAL NUCLEATED RBC: 0 K/CU MM
TOTAL PROTEIN: 5.6 GM/DL (ref 6.4–8.2)
TRIGL SERPL-MCNC: 133 MG/DL
WBC # BLD: 7.2 K/CU MM (ref 4–10.5)

## 2023-11-05 PROCEDURE — 80061 LIPID PANEL: CPT

## 2023-11-05 PROCEDURE — 6360000002 HC RX W HCPCS: Performed by: INTERNAL MEDICINE

## 2023-11-05 PROCEDURE — 80053 COMPREHEN METABOLIC PANEL: CPT

## 2023-11-05 PROCEDURE — 2580000003 HC RX 258: Performed by: INTERNAL MEDICINE

## 2023-11-05 PROCEDURE — 2140000000 HC CCU INTERMEDIATE R&B

## 2023-11-05 PROCEDURE — 6370000000 HC RX 637 (ALT 250 FOR IP): Performed by: INTERNAL MEDICINE

## 2023-11-05 PROCEDURE — 6370000000 HC RX 637 (ALT 250 FOR IP): Performed by: STUDENT IN AN ORGANIZED HEALTH CARE EDUCATION/TRAINING PROGRAM

## 2023-11-05 PROCEDURE — 85025 COMPLETE CBC W/AUTO DIFF WBC: CPT

## 2023-11-05 PROCEDURE — 99233 SBSQ HOSP IP/OBS HIGH 50: CPT | Performed by: INTERNAL MEDICINE

## 2023-11-05 PROCEDURE — 80048 BASIC METABOLIC PNL TOTAL CA: CPT

## 2023-11-05 PROCEDURE — 83605 ASSAY OF LACTIC ACID: CPT

## 2023-11-05 PROCEDURE — 94761 N-INVAS EAR/PLS OXIMETRY MLT: CPT

## 2023-11-05 PROCEDURE — 76705 ECHO EXAM OF ABDOMEN: CPT

## 2023-11-05 PROCEDURE — 82248 BILIRUBIN DIRECT: CPT

## 2023-11-05 PROCEDURE — 83735 ASSAY OF MAGNESIUM: CPT

## 2023-11-05 PROCEDURE — 36415 COLL VENOUS BLD VENIPUNCTURE: CPT

## 2023-11-05 RX ORDER — CARVEDILOL 6.25 MG/1
6.25 TABLET ORAL 2 TIMES DAILY WITH MEALS
Status: DISCONTINUED | OUTPATIENT
Start: 2023-11-05 | End: 2023-11-06

## 2023-11-05 RX ORDER — POTASSIUM CHLORIDE 20 MEQ/1
40 TABLET, EXTENDED RELEASE ORAL ONCE
Status: COMPLETED | OUTPATIENT
Start: 2023-11-05 | End: 2023-11-05

## 2023-11-05 RX ADMIN — SODIUM CHLORIDE, PRESERVATIVE FREE 5 ML: 5 INJECTION INTRAVENOUS at 08:39

## 2023-11-05 RX ADMIN — CARVEDILOL 6.25 MG: 6.25 TABLET, FILM COATED ORAL at 16:40

## 2023-11-05 RX ADMIN — FUROSEMIDE 40 MG: 10 INJECTION, SOLUTION INTRAMUSCULAR; INTRAVENOUS at 08:39

## 2023-11-05 RX ADMIN — POTASSIUM CHLORIDE 40 MEQ: 1500 TABLET, EXTENDED RELEASE ORAL at 10:49

## 2023-11-05 RX ADMIN — DIGOXIN 250 MCG: 0.25 INJECTION INTRAMUSCULAR; INTRAVENOUS at 08:39

## 2023-11-05 RX ADMIN — ENOXAPARIN SODIUM 80 MG: 100 INJECTION SUBCUTANEOUS at 08:38

## 2023-11-05 RX ADMIN — ASPIRIN 81 MG CHEWABLE TABLET 81 MG: 81 TABLET CHEWABLE at 08:38

## 2023-11-05 RX ADMIN — FUROSEMIDE 40 MG: 10 INJECTION, SOLUTION INTRAMUSCULAR; INTRAVENOUS at 16:41

## 2023-11-05 ASSESSMENT — PAIN SCALES - GENERAL
PAINLEVEL_OUTOF10: 0

## 2023-11-05 NOTE — PROGRESS NOTES
1525 Patient to ultrasound for US liver. 1550 patient returned from ultrasound and ordered meal tray.

## 2023-11-05 NOTE — CONSULTS
Nephrology Service Consultation    Patient:  Coleman Rudolph  MRN: [id number]  Consulting physician:  Fly Roblero MD  Reason for Consult: Acute renal failure    History Obtained From:  patient, electronic medical record  PCP: No primary care provider on file. HISTORY OF PRESENT ILLNESS:   The patient is a 60 y.o. female who presents with known history of congestive heart failure polysubstance abuse presents with weakness confusion change in mental status. Patient states she lives at home with her mother she is aware she needs to change her lifestyle and habits presents with confusion and weakness struggling to breathe and noted to have acute renal failure in the above setting. Also noted to have some anemia with GI bleed possibly. Patient prior to admission had been on diuretics with torsemide as well as on Aldactone and potassium supplements and since admission has had over 3L urine output baseline creatinine increasing to 3.2 currently at 2.8 and last EF 15% with underlying A-fib and no significant proteinuria in the past renal asked to evaluate. Past Medical History:        Diagnosis Date    Anxiety     Environmental allergies     Non-healing surgical wound     RLS (restless legs syndrome)        Past Surgical History:        Procedure Laterality Date    APPENDECTOMY       SECTION      CHOLECYSTECTOMY      KNEE SURGERY         Medications:   Scheduled Meds:   potassium chloride  40 mEq Oral Once    sodium chloride flush  5-40 mL IntraVENous 2 times per day    aspirin  81 mg Oral Daily    furosemide  40 mg IntraVENous BID    digoxin  250 mcg IntraVENous Daily    enoxaparin  1 mg/kg SubCUTAneous Daily     Continuous Infusions:   sodium chloride       PRN Meds:.sodium chloride flush, sodium chloride, ondansetron **OR** [DISCONTINUED] ondansetron, polyethylene glycol, acetaminophen **OR** acetaminophen    Allergies:   Adderall [amphetamine-dextroamphetamine], Morphine and related, AM     Phosphorus:  No results found for: \"PHOS\"  U/A:    Lab Results   Component Value Date/Time    NITRU NEGATIVE 11/04/2023 11:25 AM    NITRU NEGATIVE 08/18/2012 08:01 PM    COLORU YELLOW 11/04/2023 11:25 AM    WBCUA 1 11/04/2023 11:25 AM    RBCUA 1 11/04/2023 11:25 AM    MUCUS RARE 11/04/2023 11:25 AM    TRICHOMONAS NONE SEEN 11/04/2023 11:25 AM    BACTERIA NEGATIVE 11/04/2023 11:25 AM    CLARITYU CLEAR 11/04/2023 11:25 AM    SPECGRAV 1.020 11/04/2023 11:25 AM    UROBILINOGEN 0.2 11/04/2023 11:25 AM    BILIRUBINUR NEGATIVE 11/04/2023 11:25 AM    BLOODU SMALL NUMBER OR AMOUNT OBSERVED 11/04/2023 11:25 AM    GLUCOSEU NEGATIVE 08/18/2012 08:01 PM    KETUA NEGATIVE 11/04/2023 11:25 AM     ABG:  No results found for: \"PHART\", \"DZZ4HUN\", \"PO2ART\", \"WWS4CIW\", \"BEART\", \"THGBART\", \"BSO7VCV\", \"A8AMZFKH\"  HgBA1c:  No results found for: \"LABA1C\"  Microalbumen/Creatinine ratio:  No components found for: \"RUCREAT\"  TSH:  No results found for: \"TSH\"  IRON:  No results found for: \"IRON\"  Iron Saturation:  No components found for: \"PERCENTFE\"  TIBC:  No results found for: \"TIBC\"  FERRITIN:  No results found for: \"FERRITIN\"  RPR:  No results found for: \"RPR\"  DALILA:  No results found for: \"ANATITER\", \"DALILA\"  24 Hour Urine for Creatinine Clearance:  No components found for: \"CREAT4\", \"UHRS10\", \"UTV10\"  -----------------------------------------------------------------      Assessment and Recommendations     Patient Active Problem List   Diagnosis Code    Non-healing surgical wound T81.89XA    Fx boxers, closed, initial encounter S62.339A    New onset of congestive heart failure ([address]) I50.9    Chest pain R07.9    Dyspnea and respiratory abnormalities R06.00, R06.89    Troponin I above reference range R79.89    Heart failure ([address]) I50.9    Acute on chronic systolic (congestive) heart failure ([address]) I50.23    Acute combined systolic and diastolic CHF, NYHA class 1 ([address]) I50.41    Non-ischemic cardiomyopathy ([address]) I42.8    SOB (shortness

## 2023-11-05 NOTE — PROGRESS NOTES
V2.0    [facility] Progress Note      Name:  Neela Braswell /Age/Sex: 1965  (60 y.o. female)   MRN & CSN:  [id number] & [id number] Encounter Date/Time: 2023 9:58 AM EST   Location:  [address] [place] [address] PCP: No primary care provider on file. Attending:Steph Marmolejo MD       Hospital Day: 2    Assessment and Recommendations   Neela Braswell is a 60 y.o. female who presents with Acute on chronic systolic (congestive) heart failure ([address])      Plan:     Acute metabolic encephalopathy  -Likely secondary to substance abuse and hypoxia. Resolved    Acute hypoxia, troponin elevation secondary to acute on chronic systolic and diastolic heart failure  -Precipitant likely the cocaine abuse.  -Started on IV Lasix. Lactic acidosis improved with diuresis. -Cardiology consulted. As per cardiology trend troponins and no urgent ischemia evaluation anticipated. .    Acute renal failure  -Suspect cardiorenal syndrome   -Diuresis as above. Nephrology consulted. A-fib with RVR  -Heart rate improved digoxin. Appreciate cardiology recommendations. Transaminitis, hyperbilirubinemia  -We will check an acute hepatitis panel. Check right upper quadrant ultrasound. If unable to find a cause will likely suspect due to congestive hepatopathy versus cocaine. Substance abuse  -Counseled extensively on cessation. Diet ADULT DIET; Regular; No Added Salt (3-4 gm)   DVT Prophylaxis [x] Lovenox, []  Heparin, [] SCDs, [] Ambulation,  [] Eliquis, [] Xarelto  [] Coumadin   Code Status Full Code   Disposition From: Home  Expected Disposition: Home health care versus SNF  Estimated Date of Discharge: 3 days  Patient requires continued admission due to diuresis   Surrogate Decision Maker/ Trinidad Wallace (Parent)      Personally reviewed Lab Studies and Imaging     Discussed management of the case with Nephrology who recommended Continued diuresis.      EKG interpreted personally and results Afib with RVR    Imaging that 11/04/2023 11:25 AM    CLARITYU CLEAR 11/04/2023 11:25 AM    SPECGRAV 1.020 11/04/2023 11:25 AM    LEUKOCYTESUR NEGATIVE 11/04/2023 11:25 AM    UROBILINOGEN 0.2 11/04/2023 11:25 AM    BILIRUBINUR NEGATIVE 11/04/2023 11:25 AM    BLOODU SMALL NUMBER OR AMOUNT OBSERVED 11/04/2023 11:25 AM    GLUCOSEU NEGATIVE 08/18/2012 08:01 PM    KETUA NEGATIVE 11/04/2023 11:25 AM         Electronically signed by Windy Marsh MD on 11/5/2023 at 9:58 AM

## 2023-11-05 NOTE — PROGRESS NOTES
Received call from ultrasound regarding US liver. Patient needs to be NPO for 6-7 hours prior to ultrasound. Patient had her breakfast between 8 and 9am. Ultrasound will be completed this afternoon and this was communicated to the patient. She is agreeable.

## 2023-11-05 NOTE — PROGRESS NOTES
Consult: afib/elevated troponin    Elevated troponin- trending down. Acute on Chronic systolic heart failure- ECHO 3/24/22 EF 15%- follow up ECHO is pending. -2.4 L fluid balance since admisison. Last [facility] in 2020- severe nonischemic cardiomyopathy. Was started on GDMT for heart failure at that time  No ICD in place. Not on ACE/ARB or ARNI due to creatinine. Atrial Fibrillation with RVR- patient received digoxin loading. Would recommend changing digoxin to 125 mcg daily after loading. ? BB due to cocaine abuse history    Hypercoagulable State: on weight based lovenox at this time. Will need AC at discharge. ?  Compliance     History of Drug Abuse      Changes today  Hypokalemia- recommend to keep K 4.0-4.5. replete per protocol  Continue IV diuresis  Consider changing to digoxin 125 mcg tomorrow  Heart rate is currently controlled

## 2023-11-06 ENCOUNTER — CLINICAL DOCUMENTATION (OUTPATIENT)
Dept: NON INVASIVE DIAGNOSTICS | Age: 58
End: 2023-11-06

## 2023-11-06 ENCOUNTER — APPOINTMENT (OUTPATIENT)
Dept: NON INVASIVE DIAGNOSTICS | Age: 58
DRG: 194 | End: 2023-11-06
Attending: INTERNAL MEDICINE
Payer: COMMERCIAL

## 2023-11-06 PROBLEM — I51.9 SEVERE LEFT VENTRICULAR SYSTOLIC DYSFUNCTION: Status: ACTIVE | Noted: 2023-11-06

## 2023-11-06 PROBLEM — R41.82 ALTERED MENTAL STATUS: Status: ACTIVE | Noted: 2023-11-06

## 2023-11-06 LAB
ALBUMIN SERPL-MCNC: 3.2 GM/DL (ref 3.4–5)
ALP BLD-CCNC: 105 IU/L (ref 40–129)
ALT SERPL-CCNC: 573 U/L (ref 10–40)
ANION GAP SERPL CALCULATED.3IONS-SCNC: 12 MMOL/L (ref 4–16)
AST SERPL-CCNC: 238 IU/L (ref 15–37)
BASOPHILS ABSOLUTE: 0 K/CU MM
BASOPHILS RELATIVE PERCENT: 0.1 % (ref 0–1)
BILIRUB SERPL-MCNC: 3 MG/DL (ref 0–1)
BILIRUBIN DIRECT: 1.4 MG/DL (ref 0–0.3)
BILIRUBIN, INDIRECT: 1.6 MG/DL (ref 0–0.7)
BUN SERPL-MCNC: 79 MG/DL (ref 6–23)
CALCIUM SERPL-MCNC: 8.8 MG/DL (ref 8.3–10.6)
CHLORIDE BLD-SCNC: 94 MMOL/L (ref 99–110)
CO2: 27 MMOL/L (ref 21–32)
CREAT SERPL-MCNC: 2.1 MG/DL (ref 0.6–1.1)
DIFFERENTIAL TYPE: ABNORMAL
DIGOXIN LEVEL: 1.4 NG/ML (ref 0.8–2)
DOSE AMOUNT: NORMAL
DOSE TIME: NORMAL
ECHO AO ROOT DIAM: 3.4 CM
ECHO AO ROOT INDEX: 1.78 CM/M2
ECHO AV AREA PEAK VELOCITY: 2 CM2
ECHO AV AREA VTI: 2.5 CM2
ECHO AV AREA/BSA PEAK VELOCITY: 1 CM2/M2
ECHO AV AREA/BSA VTI: 1.3 CM2/M2
ECHO AV MEAN GRADIENT: 5 MMHG
ECHO AV MEAN VELOCITY: 1.1 M/S
ECHO AV PEAK GRADIENT: 9 MMHG
ECHO AV PEAK VELOCITY: 1.5 M/S
ECHO AV VELOCITY RATIO: 0.47
ECHO AV VTI: 19.4 CM
ECHO BSA: 1.93 M2
ECHO IVC PROX: 1.3 CM
ECHO LA AREA 4C: 28.3 CM2
ECHO LA DIAMETER INDEX: 2.04 CM/M2
ECHO LA DIAMETER: 3.9 CM
ECHO LA MAJOR AXIS: 7.8 CM
ECHO LA TO AORTIC ROOT RATIO: 1.15
ECHO LA VOL A-L A4C: 91 ML (ref 22–52)
ECHO LA VOLUME INDEX A-L A4C: 48 ML/M2 (ref 16–34)
ECHO LV E' LATERAL VELOCITY: 8 CM/S
ECHO LV EDV A4C: 160 ML
ECHO LV EDV INDEX A4C: 84 ML/M2
ECHO LV EJECTION FRACTION A4C: 19 %
ECHO LV ESV A4C: 129 ML
ECHO LV ESV INDEX A4C: 68 ML/M2
ECHO LV FRACTIONAL SHORTENING: 12 % (ref 28–44)
ECHO LV INTERNAL DIMENSION DIASTOLE INDEX: 3.93 CM/M2
ECHO LV INTERNAL DIMENSION DIASTOLIC: 7.5 CM (ref 3.9–5.3)
ECHO LV INTERNAL DIMENSION SYSTOLIC INDEX: 3.46 CM/M2
ECHO LV INTERNAL DIMENSION SYSTOLIC: 6.6 CM
ECHO LV IVSD: 1 CM (ref 0.6–0.9)
ECHO LV MASS 2D: 362.9 G (ref 67–162)
ECHO LV MASS INDEX 2D: 190 G/M2 (ref 43–95)
ECHO LV POSTERIOR WALL DIASTOLIC: 1 CM (ref 0.6–0.9)
ECHO LV RELATIVE WALL THICKNESS RATIO: 0.27
ECHO LVOT AREA: 4.2 CM2
ECHO LVOT AV VTI INDEX: 0.6
ECHO LVOT DIAM: 2.3 CM
ECHO LVOT MEAN GRADIENT: 1 MMHG
ECHO LVOT PEAK GRADIENT: 2 MMHG
ECHO LVOT PEAK VELOCITY: 0.7 M/S
ECHO LVOT STROKE VOLUME INDEX: 25.4 ML/M2
ECHO LVOT SV: 48.6 ML
ECHO LVOT VTI: 11.7 CM
ECHO MV E VELOCITY: 1.07 M/S
ECHO MV E/E' LATERAL: 13.38
ECHO RV MID DIMENSION: 2.4 CM
EOSINOPHILS ABSOLUTE: 0.1 K/CU MM
EOSINOPHILS RELATIVE PERCENT: 0.9 % (ref 0–3)
GFR SERPL CREATININE-BSD FRML MDRD: 27 ML/MIN/1.73M2
GLUCOSE SERPL-MCNC: 97 MG/DL (ref 70–99)
HCT VFR BLD CALC: 44.4 % (ref 37–47)
HEMOGLOBIN: 14.2 GM/DL (ref 12.5–16)
IMMATURE NEUTROPHIL %: 0.3 % (ref 0–0.43)
LYMPHOCYTES ABSOLUTE: 0.7 K/CU MM
LYMPHOCYTES RELATIVE PERCENT: 10.3 % (ref 24–44)
MAGNESIUM: 2.1 MG/DL (ref 1.8–2.4)
MCH RBC QN AUTO: 29.8 PG (ref 27–31)
MCHC RBC AUTO-ENTMCNC: 32 % (ref 32–36)
MCV RBC AUTO: 93.1 FL (ref 78–100)
MONOCYTES ABSOLUTE: 0.5 K/CU MM
MONOCYTES RELATIVE PERCENT: 7.5 % (ref 0–4)
NUCLEATED RBC %: 0 %
PDW BLD-RTO: 17 % (ref 11.7–14.9)
PLATELET # BLD: 102 K/CU MM (ref 140–440)
PMV BLD AUTO: 10.1 FL (ref 7.5–11.1)
POTASSIUM SERPL-SCNC: 3.3 MMOL/L (ref 3.5–5.1)
POTASSIUM SERPL-SCNC: 3.7 MMOL/L (ref 3.5–5.1)
RBC # BLD: 4.77 M/CU MM (ref 4.2–5.4)
REASON FOR REJECTION: NORMAL
REJECTED TEST: NORMAL
SEGMENTED NEUTROPHILS ABSOLUTE COUNT: 5.4 K/CU MM
SEGMENTED NEUTROPHILS RELATIVE PERCENT: 80.9 % (ref 36–66)
SODIUM BLD-SCNC: 133 MMOL/L (ref 135–145)
TOTAL IMMATURE NEUTOROPHIL: 0.02 K/CU MM
TOTAL NUCLEATED RBC: 0 K/CU MM
TOTAL PROTEIN: 6 GM/DL (ref 6.4–8.2)
WBC # BLD: 6.7 K/CU MM (ref 4–10.5)

## 2023-11-06 PROCEDURE — 6360000002 HC RX W HCPCS: Performed by: STUDENT IN AN ORGANIZED HEALTH CARE EDUCATION/TRAINING PROGRAM

## 2023-11-06 PROCEDURE — 80048 BASIC METABOLIC PNL TOTAL CA: CPT

## 2023-11-06 PROCEDURE — 85025 COMPLETE CBC W/AUTO DIFF WBC: CPT

## 2023-11-06 PROCEDURE — 6370000000 HC RX 637 (ALT 250 FOR IP): Performed by: STUDENT IN AN ORGANIZED HEALTH CARE EDUCATION/TRAINING PROGRAM

## 2023-11-06 PROCEDURE — 6360000004 HC RX CONTRAST MEDICATION

## 2023-11-06 PROCEDURE — 36415 COLL VENOUS BLD VENIPUNCTURE: CPT

## 2023-11-06 PROCEDURE — 99232 SBSQ HOSP IP/OBS MODERATE 35: CPT | Performed by: INTERNAL MEDICINE

## 2023-11-06 PROCEDURE — APPNB60 APP NON BILLABLE TIME 46-60 MINS: Performed by: NURSE PRACTITIONER

## 2023-11-06 PROCEDURE — 2140000000 HC CCU INTERMEDIATE R&B

## 2023-11-06 PROCEDURE — 6370000000 HC RX 637 (ALT 250 FOR IP)

## 2023-11-06 PROCEDURE — 6370000000 HC RX 637 (ALT 250 FOR IP): Performed by: INTERNAL MEDICINE

## 2023-11-06 PROCEDURE — 93306 TTE W/DOPPLER COMPLETE: CPT

## 2023-11-06 PROCEDURE — 93306 TTE W/DOPPLER COMPLETE: CPT | Performed by: INTERNAL MEDICINE

## 2023-11-06 PROCEDURE — 83735 ASSAY OF MAGNESIUM: CPT

## 2023-11-06 PROCEDURE — 2580000003 HC RX 258: Performed by: INTERNAL MEDICINE

## 2023-11-06 PROCEDURE — 84132 ASSAY OF SERUM POTASSIUM: CPT

## 2023-11-06 PROCEDURE — 6360000002 HC RX W HCPCS: Performed by: INTERNAL MEDICINE

## 2023-11-06 PROCEDURE — 99254 IP/OBS CNSLTJ NEW/EST MOD 60: CPT | Performed by: INTERNAL MEDICINE

## 2023-11-06 PROCEDURE — 82248 BILIRUBIN DIRECT: CPT

## 2023-11-06 PROCEDURE — 80053 COMPREHEN METABOLIC PANEL: CPT

## 2023-11-06 PROCEDURE — 80162 ASSAY OF DIGOXIN TOTAL: CPT

## 2023-11-06 RX ORDER — POTASSIUM CHLORIDE 20 MEQ/1
40 TABLET, EXTENDED RELEASE ORAL ONCE
Status: DISCONTINUED | OUTPATIENT
Start: 2023-11-06 | End: 2023-11-06

## 2023-11-06 RX ORDER — POTASSIUM CHLORIDE 20 MEQ/1
40 TABLET, EXTENDED RELEASE ORAL ONCE
Status: COMPLETED | OUTPATIENT
Start: 2023-11-06 | End: 2023-11-06

## 2023-11-06 RX ORDER — SPIRONOLACTONE 25 MG/1
25 TABLET ORAL DAILY
Status: DISCONTINUED | OUTPATIENT
Start: 2023-11-06 | End: 2023-12-06 | Stop reason: HOSPADM

## 2023-11-06 RX ORDER — CARVEDILOL 6.25 MG/1
3.12 TABLET ORAL 2 TIMES DAILY WITH MEALS
Status: DISCONTINUED | OUTPATIENT
Start: 2023-11-06 | End: 2023-11-24

## 2023-11-06 RX ORDER — FUROSEMIDE 10 MG/ML
20 INJECTION INTRAMUSCULAR; INTRAVENOUS 2 TIMES DAILY
Status: DISCONTINUED | OUTPATIENT
Start: 2023-11-06 | End: 2023-11-08

## 2023-11-06 RX ADMIN — EMPAGLIFLOZIN 10 MG: 10 TABLET, FILM COATED ORAL at 09:49

## 2023-11-06 RX ADMIN — POTASSIUM CHLORIDE 40 MEQ: 1500 TABLET, EXTENDED RELEASE ORAL at 09:49

## 2023-11-06 RX ADMIN — SODIUM CHLORIDE, PRESERVATIVE FREE 10 ML: 5 INJECTION INTRAVENOUS at 21:23

## 2023-11-06 RX ADMIN — PERFLUTREN 2 ML: 6.52 INJECTION, SUSPENSION INTRAVENOUS at 09:26

## 2023-11-06 RX ADMIN — ASPIRIN 81 MG CHEWABLE TABLET 81 MG: 81 TABLET CHEWABLE at 09:49

## 2023-11-06 RX ADMIN — SODIUM CHLORIDE, PRESERVATIVE FREE 10 ML: 5 INJECTION INTRAVENOUS at 09:52

## 2023-11-06 RX ADMIN — FUROSEMIDE 20 MG: 10 INJECTION, SOLUTION INTRAMUSCULAR; INTRAVENOUS at 09:51

## 2023-11-06 RX ADMIN — ACETAMINOPHEN 650 MG: 325 TABLET ORAL at 21:22

## 2023-11-06 RX ADMIN — SPIRONOLACTONE 25 MG: 50 TABLET ORAL at 09:49

## 2023-11-06 RX ADMIN — APIXABAN 5 MG: 5 TABLET, FILM COATED ORAL at 21:22

## 2023-11-06 RX ADMIN — ENOXAPARIN SODIUM 80 MG: 100 INJECTION SUBCUTANEOUS at 09:49

## 2023-11-06 ASSESSMENT — PAIN SCALES - PAIN ASSESSMENT IN ADVANCED DEMENTIA (PAINAD)
NEGVOCALIZATION: 0
FACIALEXPRESSION: 2
BODYLANGUAGE: 2
BREATHING: 0
CONSOLABILITY: 2
CONSOLABILITY: 1
FACIALEXPRESSION: 0
BODYLANGUAGE: 2
TOTALSCORE: 3
TOTALSCORE: 6
NEGVOCALIZATION: 0
BREATHING: 0

## 2023-11-06 ASSESSMENT — PAIN SCALES - GENERAL: PAINLEVEL_OUTOF10: 0

## 2023-11-06 NOTE — PROGRESS NOTES
Physician Progress Note      PATIENT:               Rocael Duran  CSN #:                  [id number]  :                       1965  ADMIT DATE:       2023 9:26 AM  DISCH DATE:  RESPONDING  PROVIDER #:        Avelino Rodriguez MD          QUERY TEXT:    Pt admitted with Acute on chronic combined systolic and diastolic heart   failure. Pt noted to also have HTN, CAD, and Ischemic cardiomyopathy. If   possible, please document in progress notes and discharge summary the etiology   of CHF, if able to be determined. The medical record reflects the following:  Risk Factors: Acute on chronic systolic and diastolic heart failure  Clinical Indicators: CHF: Acute Systolic/ Diastolic decompensated heart   failure. Appears to be volume overloaded. Plan IV  diuresis. IV Lasix 40 mg   BID. And titrate diuretics accordingly. Ischemic cardiomyopathy ejection   fraction of 15%  Treatment: On Coreg 3.125 twice daily, Aldactone 25 daily, Isordil 10 mg 3   times daily and torsemide 20 mg daily at home, Start low-dose dobutamine drip   without titration- Dc'd by CARDS, Start IV Lasix 40 mg twice daily, ECHO    Thank you, Jovany Luna RN, CDS [phone]  Options provided:  -- CHF due to Hypertensive Heart Disease  -- CHF due to Hypertensive Heart Disease and CAD  -- CHF not due to Hypertension but due to CAD  -- CHF due to Hypertensive Heart Disease and ICMP  -- CHF not due to Hypertension but due to ICMP  -- CHF due to HTN, CAD, and ICMP  -- Other - I will add my own diagnosis  -- Disagree - Not applicable / Not valid  -- Disagree - Clinically unable to determine / Unknown  -- Refer to Clinical Documentation Reviewer    PROVIDER RESPONSE TEXT:    This patient has CHF due to hypertensive heart disease and ICMP. Query created by: Jovany Luna on 2023 9:16 AM      QUERY TEXT:    Pt admitted with Acute on chronic combined systolic and diastolic CHF.   Noted   documentation of Cardiogenic shock on 2023 by ordered

## 2023-11-06 NOTE — PROGRESS NOTES
Met with patient and introduced myself as the Heart failure education R.N.  I have seen this patient for heart failure education on 6/10/22. Patient was going to move in with her mother after that admission as a means to decrease cocaine use. States she is living with her mother. Patient agreed to education session but proceeded to sleep. Discussed returning when patient is more awake. Patient agreed. Educational materials left at bedside. Admitting diagnosis-Acute on systolic heart failure  Cardiologist- Dr. Benjamin Le this admission  Heart Failure Education Nurse consulted- yes   Ejection fraction 20 % as of 7/19/22  Pro BNP- 46,042 on 11/4  Hospital follow up appt-    The following handouts were reviewed with patient and patient was given a copy of the following : Heart Failure education booklet, the 'Stop Light' Handout,  a link to the [employer] Association's Healthier Living with Heart Failure Interactive workbook and a list of heart failure related education available on the hospital TV and how to access it.

## 2023-11-06 NOTE — PROGRESS NOTES
V2.0    [facility] [place] Progress Note      Name:  Major Andrews /Age/Sex: 1965  (60 y.o. female)   MRN & CSN:  [id number] & [id number] Encounter Date/Time: 2023 9:58 AM EST   Location:  3025/4588-Z PCP: No primary care provider on file. Attending:Tiffanie Marmolejo MD       Hospital Day: 3    Assessment and Recommendations   Major Andrews is a 60 y.o. female who presents with Acute on chronic systolic (congestive) heart failure (HCC)      Plan:     Acute metabolic encephalopathy  -Likely secondary to substance abuse and hypoxia. Resolved    Acute hypoxia, troponin elevation secondary to acute on chronic systolic and diastolic heart failure  -Precipitant likely the cocaine abuse.  -Started on IV Lasix. Dose reduced due to softer blood pressures. Lactic acidosis improved with diuresis. -Cardiology consulted. As per cardiology trend troponins and no urgent ischemia evaluation anticipated. .  -Started on GDMT with low-dose Coreg, Aldactone and Jardiance added by nephrology. Acute renal failure  -Suspect cardiorenal syndrome   -Diuresis as above. Nephrology consulted. A-fib with RVR  -Heart rate improved digoxin. Appreciate cardiology recommendations. Transaminitis, hyperbilirubinemia  -We will check an acute hepatitis panel. Check right upper quadrant ultrasound. If unable to find a cause will likely suspect due to congestive hepatopathy versus cocaine. Substance abuse  -Counseled extensively on cessation. Diet ADULT DIET; Regular;  No Added Salt (3-4 gm)   DVT Prophylaxis [x] Lovenox, []  Heparin, [] SCDs, [] Ambulation,  [] Eliquis, [] Xarelto  [] Coumadin   Code Status Full Code   Disposition From: Home  Expected Disposition: Home health care versus SNF  Estimated Date of Discharge: 1-2 days  Patient requires continued admission due to diuresis   Surrogate Decision Maker/ Trinidad Wallace (Parent)      Personally reviewed Lab Studies and Imaging     EKG interpreted personally posterior left parietal lobe. Additional areas of prior infarction that are favored to be remote. An embolic phenomenon could be considered given differing vascular distributions. 3. No acute intracranial abnormality. XR CHEST PORTABLE    Result Date: 11/4/2023  EXAMINATION: ONE XRAY VIEW OF THE CHEST 11/4/2023 9:47 am COMPARISON: 06/07/2022 radiograph HISTORY: ORDERING SYSTEM PROVIDED HISTORY: Sepsis TECHNOLOGIST PROVIDED HISTORY: Reason for exam:->Sepsis Reason for Exam: ams/sob FINDINGS: The heart is enlarged. Mediastinum is normal.  Mild perihilar opacities centrally and a central pattern of mild ground-glass attenuation has developed in the lungs. No significant pleural fluid. No skeletal finding. Central pattern of ground-glass airspace change may represent vascular congestion or mild edema. A developing viral infection may be considered clinically.        CBC:   Recent Labs     11/04/23  0950 11/05/23  0602 11/06/23  0339   WBC 9.2 7.2 6.7   HGB 14.1 13.0 14.2    89* 102*       BMP:    Recent Labs     11/04/23  0950 11/05/23  0602 11/06/23  0339   * 135 133*   K 5.2* 3.4* 3.3*   CL 92* 97* 94*   CO2 16* 26 27   * 99* 79*   CREATININE 3.2* 2.8* 2.1*   GLUCOSE 119* 92 97       Hepatic:   Recent Labs     11/04/23  0950 11/05/23  0602 11/06/23  0339   * 402* 238*   ALT 1,029* 746* 573*   BILITOT 3.3* 3.3* 3.0*   ALKPHOS 105 105 105       Lipids:   Lab Results   Component Value Date/Time    CHOL 92 11/05/2023 06:02 AM    HDL 8 11/05/2023 06:02 AM    TRIG 133 11/05/2023 06:02 AM     Troponin:   Lab Results   Component Value Date/Time    TROPONINT 0.060 06/08/2022 12:34 PM    TROPONINT 0.046 06/07/2022 11:10 PM    TROPONINT 0.041 06/01/2022 05:30 AM     BNP:   Recent Labs     11/04/23  0950   PROBNP 46,042*       UA:  Lab Results   Component Value Date/Time    NITRU NEGATIVE 11/04/2023 11:25 AM    NITRU NEGATIVE 08/18/2012 08:01 PM    COLORU YELLOW 11/04/2023 11:25 AM    WBCUA

## 2023-11-06 NOTE — PROGRESS NOTES
Nephrology Progress Note  2023 8:21 AM        Subjective:   Admit Date: 2023  PCP: No primary care provider on file. Interval History: Patient events leading up to and since admission briefly reviewed. Diet: Reported eating some-    ROS: She was alert and awake but has a camera in her room    Urine output recorded 1.1 L for the last 24 hours  No fever and relatively lower side blood pressure follow thank you for calling me back    Kidney data/creatinine trend :     Creatinine was 0.7 to 0.9 mg/dL back in 2012 did not remain sober had an acute kidney injury back in 2023 creatinine 1.5, and again in 2022 with peak creatinine of 1.5 mg/dL her most recent outpatient creatinine was 1.4 mg/dL back in 2022, this time her peak creatinine was 3.2 on 2023     PMH :   1.  Severe cardiomyopathy with most recent echocardiogram showed left ventricular ejection fraction of 20%-according to cardiologist note, thought to be nonischemic cardiomyopathy-as her heart cath in 2021 showed normal coronaries. 2.  Significant valvular disease mainly moderate mitral regurgitation. 3.  Probable substance abuse history           PSH :  1.   Apparently underwent appendectomy, , cholecystectomy knee surgery        OB GYN Hx:  Unknown at this time kidney     Habits :   Her urine drug screen showed unconfirmed positive cocaine  According to epic report she smokes half pack per day  Unsure about alcohol consumption           Soc Hx:  Apparently she is   Other social history is unknown to [facility]   Epic report showed family history of depression       Data:     Current meds:    potassium chloride  40 mEq Oral Once    carvedilol  6.25 mg Oral BID     sodium chloride flush  5-40 mL IntraVENous 2 times per day    aspirin  81 mg Oral Daily    furosemide  40 mg IntraVENous BID    enoxaparin  1 mg/kg SubCUTAneous Daily      sodium chloride           I/O last 3

## 2023-11-06 NOTE — CONSULTS
Nutrition Education    Pt sleeping/confused during visit  Heart Failure Nutrition Therapy (Nutrition Care Manual) left bedside  Will review with Pt personally over stay as needed  Contact name and number provided    Warden Ward RD, DRISS  Contact Number: [phone]

## 2023-11-06 NOTE — CONSULTS
Electrophysiology Consult Note      Reason for consultation:  ATRIAL FIBRILLATION, ASSESS FOR icd    Chief complaint : Generalized pain    Referring physician:       Primary care physician: No primary care provider on file. History of Present Illness:     Baltazar Ch is a 60year old female with a history of drug abuse, chronic systolic heart failure, CKD. She states she presented with complaints of generalized pain. I am unable to obtain much information from her. She appears to be confused  She is moaning and reports that her legs are hurting. She is unable to provide specific information. She does states she is in the hospital.  She states that her mother brought her to the hospital  She has a tele sitter  Per the notes, patient came to hospital by squad. On admission patient had been missing for 1 month. Family was unable to find her. POA is on chart and was notified that patient was in the hospital.   On admission , K 5.2, Creatinine 3.2, Lactic Acid 2.2, high sensativity troponin 177, 193, 179, 175. EF 20-25% . Pastmedical history:   Past Medical History:   Diagnosis Date    Anxiety     Environmental allergies     Non-healing surgical wound     RLS (restless legs syndrome)        Surgical history :   Past Surgical History:   Procedure Laterality Date    APPENDECTOMY       SECTION      CHOLECYSTECTOMY      KNEE SURGERY         Family history:   Family History   Problem Relation Age of Onset    Depression Other        Social history :  reports that she has been smoking cigarettes. She has been smoking an average of .5 packs per day. She has never used smokeless tobacco. She reports current drug use. Frequency: 5.00 times per week. Drug: Cocaine. She reports that she does not drink alcohol.     Allergies   Allergen Reactions    Adderall [Amphetamine-Dextroamphetamine] Itching    Morphine And Related     Tramadol     Ultram the dictating provider for clarification.

## 2023-11-06 NOTE — DISCHARGE INSTRUCTIONS
Call to schedule follow up hospital follow up appointment:    [address] at [address]  [address] [facility] [address], [address] [facility] [phone]  [address], [address] [facility] [phone]   [address]     Call for new patient Primary Care Physician appointment:     Physician Finder [phone]    Dr. Joceline Jimenes and Dr. Diaz Rodriguez [phone]  [address] [place] [address], [address] [facility] [phone]  [address] [facility] [address] [facility],[address]    Dr. Amanda Ruby and Veronica Hickey [phone]  [address], [address] Amna Lydia    Dr. Zion Earl and Oh LORA [phone]  [facility], [address] [place] [address] [address]  [address], [address] [facility],[address] Floor [address] [facility] Direct Primary Care [phone]   [facility], [address]*    Dr. Danny LORA and Alex Schmidt [place] [phone]  [address] [place] [address] [address], [address] [place], [address] [facility] [place] [address]01 [facility] Wait[facility] List*) [phone]   Ninoska Evans [phone]  [facility], [facility] [address] [facility] [phone]  [address] [facility],[address]    Dr. Jigna eHrnandez [phone]  [address] [facility], [address] AmnaClint Jmienes and Dr. Rich Members [phone]  [address], [address] Junito LORA and [place] [phone]  [address], [address] Fla[facility]ita [facility], [facility] and [place] Manual [phone]  [address] Junito Lawrence and Jc Cooper [phone]  [facility] [facility] [address], [address] Al[address] Bermeo, Dr. Alejandro Tejeda [facility], [employer] [address] [facility] and Leonora Land [address] [facility]   [phone]  [facility]

## 2023-11-06 NOTE — CARE COORDINATION
.CM met with pt for d/c planning. Introduced self and updated white board. Pt states that she is living with her mother. CM informed her that her mother said she has not seen her for a month. Pt states that she was staying with a friend. Pt does not have a PCP. PCP list added to d/c instructions. Pt was positive for cocaine upon admission. Substance abuse resources added to d/c instructions. Pt denies any d/c needs. D/c plan is home with mother, no needs. Notify CM if any d/c needs arise. TE      11/06/23 1424   Service Assessment   Patient Orientation Alert and Oriented   Cognition Alert   History Provided By Patient   Primary [address] [place] Family Members   Patient's Healthcare Decision Maker is:   (SELF)   Prior Functional Level Independent in ADLs/IADLs   Current Functional Level Independent in ADLs/IADLs   Can patient return to prior living arrangement Yes   Ability to make needs known: Good   Family able to assist with home care needs: Yes   Would you like for me to discuss the discharge plan with any other family members/significant others, and if so, who? No   Financial Resources  Resources None   CM/SW Referral No PCP  (PCP LIST ADDED TO D/C INSTRUCTIONS)   Social/Functional History   Lives With Family  (MOTHER)   [address] chair   Home Equipment Oxygen  (4L 24/7)   Receives Help From Family   Ambulation Assistance Independent   Transfer Assistance Independent   Active  Yes   Mode of Transportation Car   Discharge Planning   Type of [address] Family Members   Current Services Prior To Admission Oxygen Therapy   Potential Assistance Needed N/A   DME Ordered?  No   Potential Assistance Purchasing Medications No   Type of Home Care Services None   Patient expects to be discharged to: [place] Discharge   Transition of Care Consult (CM Consult) N/A   Services At/After Discharge None   Condition of

## 2023-11-06 NOTE — PLAN OF CARE
Problem: Discharge Planning  Goal: Discharge to home or other facility with appropriate resources  11/6/2023 0409 by Francisco Oliver RN  Outcome: Progressing  Flowsheets  Taken 11/6/2023 0000  Discharge to home or other facility with appropriate resources:   Arrange for needed discharge resources and transportation as appropriate   Identify barriers to discharge with patient and caregiver   Identify discharge learning needs (meds, wound care, etc)   Arrange for interpreters to assist at discharge as needed   Refer to discharge planning if patient needs post-hospital services based on physician order or complex needs related to functional status, cognitive ability or social support system  Taken 11/5/2023 2000  Discharge to home or other facility with appropriate resources:   Identify barriers to discharge with patient and caregiver   Arrange for needed discharge resources and transportation as appropriate   Identify discharge learning needs (meds, wound care, etc)   Arrange for interpreters to assist at discharge as needed  11/5/2023 1713 by Sandor Perez RN  Outcome: Progressing     Problem: Pain  Goal: Verbalizes/displays adequate comfort level or baseline comfort level  11/6/2023 0409 by Francisco Oliver RN  Outcome: Progressing  Flowsheets  Taken 11/6/2023 0009  Verbalizes/displays adequate comfort level or baseline comfort level:   Encourage patient to monitor pain and request assistance   Assess pain using appropriate pain scale   Administer analgesics based on type and severity of pain and evaluate response   Implement non-pharmacological measures as appropriate and evaluate response   Consider cultural and social influences on pain and pain management   Notify Licensed Independent Practitioner if interventions unsuccessful or patient reports new pain  Taken 11/5/2023 2000  Verbalizes/displays adequate comfort level or baseline comfort level:   Encourage patient to monitor pain and request

## 2023-11-07 LAB
ALBUMIN SERPL-MCNC: 3.2 GM/DL (ref 3.4–5)
ALP BLD-CCNC: 96 IU/L (ref 40–128)
ALT SERPL-CCNC: 393 U/L (ref 10–40)
ANION GAP SERPL CALCULATED.3IONS-SCNC: 13 MMOL/L (ref 4–16)
AST SERPL-CCNC: 121 IU/L (ref 15–37)
BACTERIA: NEGATIVE /HPF
BASOPHILS ABSOLUTE: 0 K/CU MM
BASOPHILS RELATIVE PERCENT: 0.1 % (ref 0–1)
BILIRUB SERPL-MCNC: 2.7 MG/DL (ref 0–1)
BILIRUBIN URINE: NEGATIVE MG/DL
BLOOD, URINE: ABNORMAL
BUN SERPL-MCNC: 68 MG/DL (ref 6–23)
CALCIUM SERPL-MCNC: 8.9 MG/DL (ref 8.3–10.6)
CHLORIDE BLD-SCNC: 94 MMOL/L (ref 99–110)
CLARITY: CLEAR
CO2: 23 MMOL/L (ref 21–32)
COLOR: YELLOW
CREAT SERPL-MCNC: 1.9 MG/DL (ref 0.6–1.1)
DIFFERENTIAL TYPE: ABNORMAL
EOSINOPHILS ABSOLUTE: 0.1 K/CU MM
EOSINOPHILS RELATIVE PERCENT: 1.7 % (ref 0–3)
GFR SERPL CREATININE-BSD FRML MDRD: 30 ML/MIN/1.73M2
GLUCOSE SERPL-MCNC: 101 MG/DL (ref 70–99)
GLUCOSE, URINE: 250 MG/DL
HCT VFR BLD CALC: 42.1 % (ref 37–47)
HEMOGLOBIN: 13.7 GM/DL (ref 12.5–16)
IMMATURE NEUTROPHIL %: 0.4 % (ref 0–0.43)
KETONES, URINE: NEGATIVE MG/DL
LEUKOCYTE ESTERASE, URINE: NEGATIVE
LYMPHOCYTES ABSOLUTE: 1.4 K/CU MM
LYMPHOCYTES RELATIVE PERCENT: 18.6 % (ref 24–44)
MAGNESIUM: 1.9 MG/DL (ref 1.8–2.4)
MCH RBC QN AUTO: 29.5 PG (ref 27–31)
MCHC RBC AUTO-ENTMCNC: 32.5 % (ref 32–36)
MCV RBC AUTO: 90.7 FL (ref 78–100)
MONOCYTES ABSOLUTE: 0.8 K/CU MM
MONOCYTES RELATIVE PERCENT: 10.5 % (ref 0–4)
NITRITE URINE, QUANTITATIVE: NEGATIVE
NUCLEATED RBC %: 0 %
PDW BLD-RTO: 16.6 % (ref 11.7–14.9)
PH, URINE: 6 (ref 5–8)
PHOSPHORUS: 2.6 MG/DL (ref 2.5–4.9)
PLATELET # BLD: 131 K/CU MM (ref 140–440)
PMV BLD AUTO: 10.3 FL (ref 7.5–11.1)
POTASSIUM SERPL-SCNC: 3.8 MMOL/L (ref 3.5–5.1)
PRO-BNP: ABNORMAL PG/ML
PROTEIN UA: NEGATIVE MG/DL
RBC # BLD: 4.64 M/CU MM (ref 4.2–5.4)
RBC URINE: 3 /HPF (ref 0–6)
SEGMENTED NEUTROPHILS ABSOLUTE COUNT: 5.1 K/CU MM
SEGMENTED NEUTROPHILS RELATIVE PERCENT: 68.7 % (ref 36–66)
SODIUM BLD-SCNC: 130 MMOL/L (ref 135–145)
SODIUM URINE: 27 MMOL/L (ref 35–167)
SPECIFIC GRAVITY UA: <1.005 (ref 1–1.03)
SQUAMOUS EPITHELIAL: <1 /HPF
TOTAL IMMATURE NEUTOROPHIL: 0.03 K/CU MM
TOTAL NUCLEATED RBC: 0 K/CU MM
TOTAL PROTEIN: 6.1 GM/DL (ref 6.4–8.2)
TRICHOMONAS: NORMAL /HPF
UROBILINOGEN, URINE: 0.2 MG/DL (ref 0.2–1)
WBC # BLD: 7.4 K/CU MM (ref 4–10.5)
WBC UA: <1 /HPF (ref 0–5)

## 2023-11-07 PROCEDURE — 84100 ASSAY OF PHOSPHORUS: CPT

## 2023-11-07 PROCEDURE — 85025 COMPLETE CBC W/AUTO DIFF WBC: CPT

## 2023-11-07 PROCEDURE — 99232 SBSQ HOSP IP/OBS MODERATE 35: CPT | Performed by: NURSE PRACTITIONER

## 2023-11-07 PROCEDURE — 6360000002 HC RX W HCPCS: Performed by: STUDENT IN AN ORGANIZED HEALTH CARE EDUCATION/TRAINING PROGRAM

## 2023-11-07 PROCEDURE — 84300 ASSAY OF URINE SODIUM: CPT

## 2023-11-07 PROCEDURE — 6370000000 HC RX 637 (ALT 250 FOR IP): Performed by: STUDENT IN AN ORGANIZED HEALTH CARE EDUCATION/TRAINING PROGRAM

## 2023-11-07 PROCEDURE — 83880 ASSAY OF NATRIURETIC PEPTIDE: CPT

## 2023-11-07 PROCEDURE — 6370000000 HC RX 637 (ALT 250 FOR IP): Performed by: INTERNAL MEDICINE

## 2023-11-07 PROCEDURE — 2580000003 HC RX 258: Performed by: INTERNAL MEDICINE

## 2023-11-07 PROCEDURE — 83735 ASSAY OF MAGNESIUM: CPT

## 2023-11-07 PROCEDURE — 99232 SBSQ HOSP IP/OBS MODERATE 35: CPT | Performed by: INTERNAL MEDICINE

## 2023-11-07 PROCEDURE — 6370000000 HC RX 637 (ALT 250 FOR IP)

## 2023-11-07 PROCEDURE — 94761 N-INVAS EAR/PLS OXIMETRY MLT: CPT

## 2023-11-07 PROCEDURE — 2140000000 HC CCU INTERMEDIATE R&B

## 2023-11-07 PROCEDURE — 36415 COLL VENOUS BLD VENIPUNCTURE: CPT

## 2023-11-07 PROCEDURE — 80053 COMPREHEN METABOLIC PANEL: CPT

## 2023-11-07 PROCEDURE — 81001 URINALYSIS AUTO W/SCOPE: CPT

## 2023-11-07 RX ADMIN — SODIUM CHLORIDE, PRESERVATIVE FREE 10 ML: 5 INJECTION INTRAVENOUS at 09:51

## 2023-11-07 RX ADMIN — ASPIRIN 81 MG CHEWABLE TABLET 81 MG: 81 TABLET CHEWABLE at 09:51

## 2023-11-07 RX ADMIN — SODIUM CHLORIDE, PRESERVATIVE FREE 10 ML: 5 INJECTION INTRAVENOUS at 20:56

## 2023-11-07 RX ADMIN — ACETAMINOPHEN 650 MG: 325 TABLET ORAL at 10:56

## 2023-11-07 RX ADMIN — APIXABAN 5 MG: 5 TABLET, FILM COATED ORAL at 09:50

## 2023-11-07 RX ADMIN — SPIRONOLACTONE 25 MG: 50 TABLET ORAL at 09:51

## 2023-11-07 RX ADMIN — EMPAGLIFLOZIN 10 MG: 10 TABLET, FILM COATED ORAL at 09:50

## 2023-11-07 RX ADMIN — CARVEDILOL 3.12 MG: 6.25 TABLET, FILM COATED ORAL at 17:45

## 2023-11-07 RX ADMIN — FUROSEMIDE 20 MG: 10 INJECTION, SOLUTION INTRAMUSCULAR; INTRAVENOUS at 10:57

## 2023-11-07 RX ADMIN — FUROSEMIDE 20 MG: 10 INJECTION, SOLUTION INTRAMUSCULAR; INTRAVENOUS at 17:45

## 2023-11-07 RX ADMIN — CARVEDILOL 3.12 MG: 6.25 TABLET, FILM COATED ORAL at 09:51

## 2023-11-07 ASSESSMENT — PAIN SCALES - GENERAL
PAINLEVEL_OUTOF10: 3
PAINLEVEL_OUTOF10: 0

## 2023-11-07 ASSESSMENT — PAIN - FUNCTIONAL ASSESSMENT: PAIN_FUNCTIONAL_ASSESSMENT: ACTIVITIES ARE NOT PREVENTED

## 2023-11-07 ASSESSMENT — PAIN SCALES - PAIN ASSESSMENT IN ADVANCED DEMENTIA (PAINAD)
BREATHING: 0
NEGVOCALIZATION: 1
FACIALEXPRESSION: 1
CONSOLABILITY: 2
TOTALSCORE: 6
BODYLANGUAGE: 2

## 2023-11-07 ASSESSMENT — PAIN DESCRIPTION - LOCATION
LOCATION: TOE (COMMENT WHICH ONE)
LOCATION: FOOT

## 2023-11-07 ASSESSMENT — PAIN DESCRIPTION - ORIENTATION: ORIENTATION: LEFT

## 2023-11-07 ASSESSMENT — PAIN DESCRIPTION - DESCRIPTORS: DESCRIPTORS: ACHING

## 2023-11-07 NOTE — PLAN OF CARE
Problem: Discharge Planning  Goal: Discharge to home or other facility with appropriate resources  Outcome: Progressing     Problem: Pain  Goal: Verbalizes/displays adequate comfort level or baseline comfort level  Outcome: Progressing     Problem: Skin/Tissue Integrity  Goal: Absence of new skin breakdown  Description: 1. Monitor for areas of redness and/or skin breakdown  2. Assess vascular access sites hourly  3. Every 4-6 hours minimum:  Change oxygen saturation probe site  4. Every 4-6 hours:  If on nasal continuous positive airway pressure, respiratory therapy assess nares and determine need for appliance change or resting period.   Outcome: Progressing     Problem: Safety - Adult  Goal: Free from fall injury  Outcome: Progressing     Problem: ABCDS Injury Assessment  Goal: Absence of physical injury  Outcome: Progressing     Problem: Risk for Elopement  Goal: Patient will not exit the unit/facility without proper excort  Outcome: Progressing  Flowsheets (Taken 11/6/2023 2000 by Naomi Herbert RN)  Nursing Interventions for Elopement Risk: Communicate/escalate to charge nurse the risk of elopement

## 2023-11-07 NOTE — PROGRESS NOTES
BP 86/48, HR 91 afib on telemetry, Cows score 10 pt alert and oriented x2, Amarilys Pulliam NP notified, no new orders received.

## 2023-11-07 NOTE — PLAN OF CARE
Problem: Discharge Planning  Goal: Discharge to home or other facility with appropriate resources  11/7/2023 0817 by Kailash Tripp RN  Outcome: Progressing     Problem: Pain  Goal: Verbalizes/displays adequate comfort level or baseline comfort level  11/7/2023 0817 by Kailash Tripp RN  Outcome: Progressing     Problem: Skin/Tissue Integrity  Goal: Absence of new skin breakdown  Description: 1. Monitor for areas of redness and/or skin breakdown  2. Assess vascular access sites hourly  3. Every 4-6 hours minimum:  Change oxygen saturation probe site  4. Every 4-6 hours:  If on nasal continuous positive airway pressure, respiratory therapy assess nares and determine need for appliance change or resting period.   11/7/2023 0817 by Kailash Tripp RN  Outcome: Progressing     Problem: Safety - Adult  Goal: Free from fall injury  11/7/2023 0817 by Kailash Tripp RN  Outcome: Progressing     Problem: ABCDS Injury Assessment  Goal: Absence of physical injury  11/7/2023 0817 by Kailash Tripp RN  Outcome: Progressing     Problem: Risk for Elopement  Goal: Patient will not exit the unit/facility without proper excort  Recent Flowsheet Documentation  Taken 11/7/2023 0821 by Bonita Burgess  Nursing Interventions for Elopement Risk: Reduce environmental triggers  11/7/2023 0817 by Kailash Tripp RN  Outcome: Progressing  Flowsheets (Taken 11/6/2023 2000 by Igor Cespedes RN)  Nursing Interventions for Elopement Risk: Communicate/escalate to charge nurse the risk of elopement

## 2023-11-08 LAB
ALBUMIN SERPL-MCNC: 3.2 GM/DL (ref 3.4–5)
ALP BLD-CCNC: 85 IU/L (ref 40–128)
ALT SERPL-CCNC: 281 U/L (ref 10–40)
ANION GAP SERPL CALCULATED.3IONS-SCNC: 11 MMOL/L (ref 4–16)
ANION GAP SERPL CALCULATED.3IONS-SCNC: 11 MMOL/L (ref 4–16)
AST SERPL-CCNC: 74 IU/L (ref 15–37)
BACTERIA: ABNORMAL /HPF
BILIRUB SERPL-MCNC: 1.9 MG/DL (ref 0–1)
BILIRUBIN URINE: NEGATIVE MG/DL
BLOOD, URINE: ABNORMAL
BUN SERPL-MCNC: 49 MG/DL (ref 6–23)
BUN SERPL-MCNC: 52 MG/DL (ref 6–23)
CALCIUM SERPL-MCNC: 8.5 MG/DL (ref 8.3–10.6)
CALCIUM SERPL-MCNC: 9 MG/DL (ref 8.3–10.6)
CHLORIDE BLD-SCNC: 94 MMOL/L (ref 99–110)
CHLORIDE BLD-SCNC: 94 MMOL/L (ref 99–110)
CLARITY: CLEAR
CO2: 25 MMOL/L (ref 21–32)
CO2: 26 MMOL/L (ref 21–32)
COLOR: YELLOW
CREAT SERPL-MCNC: 1.5 MG/DL (ref 0.6–1.1)
CREAT SERPL-MCNC: 1.6 MG/DL (ref 0.6–1.1)
GFR SERPL CREATININE-BSD FRML MDRD: 37 ML/MIN/1.73M2
GFR SERPL CREATININE-BSD FRML MDRD: 40 ML/MIN/1.73M2
GLUCOSE SERPL-MCNC: 114 MG/DL (ref 70–99)
GLUCOSE SERPL-MCNC: 88 MG/DL (ref 70–99)
GLUCOSE, URINE: >1000 MG/DL
HAV IGM SERPL QL IA: NON REACTIVE
HBV CORE IGM SERPL QL IA: NON REACTIVE
HBV SURFACE AG SERPL QL IA: NON REACTIVE
HCV AB SERPL QL IA: NON REACTIVE
KETONES, URINE: NEGATIVE MG/DL
LEUKOCYTE ESTERASE, URINE: NEGATIVE
MAGNESIUM: 1.9 MG/DL (ref 1.8–2.4)
NITRITE URINE, QUANTITATIVE: NEGATIVE
OSMOLALITY UR: 291 MOS/L (ref 292–1090)
PH, URINE: 6 (ref 5–8)
POTASSIUM SERPL-SCNC: 4 MMOL/L (ref 3.5–5.1)
POTASSIUM SERPL-SCNC: 4.3 MMOL/L (ref 3.5–5.1)
PROTEIN UA: NEGATIVE MG/DL
RBC URINE: <1 /HPF (ref 0–6)
SODIUM BLD-SCNC: 130 MMOL/L (ref 135–145)
SODIUM BLD-SCNC: 131 MMOL/L (ref 135–145)
SODIUM URINE: 70 MMOL/L (ref 35–167)
SPECIFIC GRAVITY UA: <1.005 (ref 1–1.03)
SQUAMOUS EPITHELIAL: <1 /HPF
TOTAL PROTEIN: 5.8 GM/DL (ref 6.4–8.2)
TRICHOMONAS: ABNORMAL /HPF
UROBILINOGEN, URINE: 0.2 MG/DL (ref 0.2–1)
WBC UA: <1 /HPF (ref 0–5)

## 2023-11-08 PROCEDURE — 80048 BASIC METABOLIC PNL TOTAL CA: CPT

## 2023-11-08 PROCEDURE — 6370000000 HC RX 637 (ALT 250 FOR IP): Performed by: STUDENT IN AN ORGANIZED HEALTH CARE EDUCATION/TRAINING PROGRAM

## 2023-11-08 PROCEDURE — 6370000000 HC RX 637 (ALT 250 FOR IP): Performed by: INTERNAL MEDICINE

## 2023-11-08 PROCEDURE — 94761 N-INVAS EAR/PLS OXIMETRY MLT: CPT

## 2023-11-08 PROCEDURE — 93005 ELECTROCARDIOGRAM TRACING: CPT | Performed by: STUDENT IN AN ORGANIZED HEALTH CARE EDUCATION/TRAINING PROGRAM

## 2023-11-08 PROCEDURE — 2140000000 HC CCU INTERMEDIATE R&B

## 2023-11-08 PROCEDURE — 2580000003 HC RX 258: Performed by: INTERNAL MEDICINE

## 2023-11-08 PROCEDURE — 84300 ASSAY OF URINE SODIUM: CPT

## 2023-11-08 PROCEDURE — 6360000002 HC RX W HCPCS: Performed by: STUDENT IN AN ORGANIZED HEALTH CARE EDUCATION/TRAINING PROGRAM

## 2023-11-08 PROCEDURE — 99232 SBSQ HOSP IP/OBS MODERATE 35: CPT | Performed by: INTERNAL MEDICINE

## 2023-11-08 PROCEDURE — 81001 URINALYSIS AUTO W/SCOPE: CPT

## 2023-11-08 PROCEDURE — 83735 ASSAY OF MAGNESIUM: CPT

## 2023-11-08 PROCEDURE — 83935 ASSAY OF URINE OSMOLALITY: CPT

## 2023-11-08 PROCEDURE — 80053 COMPREHEN METABOLIC PANEL: CPT

## 2023-11-08 PROCEDURE — 99231 SBSQ HOSP IP/OBS SF/LOW 25: CPT | Performed by: NURSE PRACTITIONER

## 2023-11-08 PROCEDURE — 80074 ACUTE HEPATITIS PANEL: CPT

## 2023-11-08 PROCEDURE — 36415 COLL VENOUS BLD VENIPUNCTURE: CPT

## 2023-11-08 PROCEDURE — 6370000000 HC RX 637 (ALT 250 FOR IP)

## 2023-11-08 RX ORDER — TORSEMIDE 20 MG/1
20 TABLET ORAL 2 TIMES DAILY
Qty: 60 TABLET | Refills: 0 | OUTPATIENT
Start: 2023-11-08

## 2023-11-08 RX ORDER — HYDROCODONE BITARTRATE AND ACETAMINOPHEN 5; 325 MG/1; MG/1
1 TABLET ORAL ONCE
Status: COMPLETED | OUTPATIENT
Start: 2023-11-08 | End: 2023-11-08

## 2023-11-08 RX ORDER — FUROSEMIDE 20 MG/1
20 TABLET ORAL DAILY
Status: DISCONTINUED | OUTPATIENT
Start: 2023-11-09 | End: 2023-11-09

## 2023-11-08 RX ADMIN — ASPIRIN 81 MG CHEWABLE TABLET 81 MG: 81 TABLET CHEWABLE at 08:52

## 2023-11-08 RX ADMIN — APIXABAN 5 MG: 5 TABLET, FILM COATED ORAL at 08:52

## 2023-11-08 RX ADMIN — CARVEDILOL 3.12 MG: 6.25 TABLET, FILM COATED ORAL at 18:28

## 2023-11-08 RX ADMIN — ACETAMINOPHEN 650 MG: 325 TABLET ORAL at 14:58

## 2023-11-08 RX ADMIN — SPIRONOLACTONE 25 MG: 50 TABLET ORAL at 08:53

## 2023-11-08 RX ADMIN — HYDROCODONE BITARTRATE AND ACETAMINOPHEN 1 TABLET: 5; 325 TABLET ORAL at 20:37

## 2023-11-08 RX ADMIN — APIXABAN 5 MG: 5 TABLET, FILM COATED ORAL at 20:37

## 2023-11-08 RX ADMIN — SODIUM CHLORIDE, PRESERVATIVE FREE 10 ML: 5 INJECTION INTRAVENOUS at 08:56

## 2023-11-08 RX ADMIN — ACETAMINOPHEN 650 MG: 325 TABLET ORAL at 08:56

## 2023-11-08 RX ADMIN — EMPAGLIFLOZIN 10 MG: 10 TABLET, FILM COATED ORAL at 08:53

## 2023-11-08 RX ADMIN — CARVEDILOL 3.12 MG: 6.25 TABLET, FILM COATED ORAL at 08:52

## 2023-11-08 ASSESSMENT — PAIN SCALES - PAIN ASSESSMENT IN ADVANCED DEMENTIA (PAINAD)
CONSOLABILITY: 0
TOTALSCORE: 0
BREATHING: 0
FACIALEXPRESSION: 0
NEGVOCALIZATION: 0
BODYLANGUAGE: 0

## 2023-11-08 ASSESSMENT — PAIN DESCRIPTION - DESCRIPTORS
DESCRIPTORS: ACHING
DESCRIPTORS: ACHING

## 2023-11-08 ASSESSMENT — PAIN DESCRIPTION - LOCATION
LOCATION: ABDOMEN;LEG;FOOT
LOCATION: FOOT

## 2023-11-08 ASSESSMENT — PAIN DESCRIPTION - ORIENTATION
ORIENTATION: RIGHT;LEFT
ORIENTATION: RIGHT;LEFT

## 2023-11-08 ASSESSMENT — PAIN SCALES - GENERAL
PAINLEVEL_OUTOF10: 0
PAINLEVEL_OUTOF10: 3
PAINLEVEL_OUTOF10: 3
PAINLEVEL_OUTOF10: 0

## 2023-11-08 ASSESSMENT — PAIN - FUNCTIONAL ASSESSMENT
PAIN_FUNCTIONAL_ASSESSMENT: PREVENTS OR INTERFERES SOME ACTIVE ACTIVITIES AND ADLS
PAIN_FUNCTIONAL_ASSESSMENT: PREVENTS OR INTERFERES SOME ACTIVE ACTIVITIES AND ADLS

## 2023-11-08 ASSESSMENT — PAIN SCALES - WONG BAKER: WONGBAKER_NUMERICALRESPONSE: 2

## 2023-11-08 NOTE — PROGRESS NOTES
Pt alert and oriented to self and place currently  sitting in bed rocking back and forth stating \"I cant take this pain\" pt is  complaining of increased pain in feet. Attending notified, awaiting new orders. Pt starts to pant when pain occurs causing respirations to increase into 30s and 40s. Pts o2  sat 98% room air. Sitter at bedside. Pt ate all meals today with no difficulty. Care plan ongoing.

## 2023-11-08 NOTE — PROGRESS NOTES
V2.0    [facility] [place] Progress Note      Name:  Peyton Dumont /Age/Sex: 1965  (60 y.o. female)   MRN & CSN:  [id number] & [id number] Encounter Date/Time: 2023 9:58 AM EST   Location:  [address] [place] [address] PCP: No primary care provider on file. Romaine Porter MD       Hospital Day: 5    Assessment and Recommendations   Peyton Dumont is a 60 y.o. female who presents with Acute on chronic systolic (congestive) heart failure ([address])      Plan:     Acute metabolic encephalopathy  -Likely secondary to substance abuse and hypoxia. Resolved  -Pt still requiring sitter for agitation and behavioral disturbance  -Psychiatry consulted    Acute hypoxia, troponin elevation secondary to acute on chronic systolic and diastolic heart failure  -Precipitant likely the cocaine abuse.  -Started on IV Lasix. Dose reduced due to softer blood pressures. Lactic acidosis improved with diuresis. -Cardiology consulted. As per cardiology trend troponins and no urgent ischemia evaluation anticipated. .  -Started on GDMT with low-dose Coreg, Aldactone and Jardiance added by nephrology. Acute renal failure  -Suspect cardiorenal syndrome   -Diuresis as above. -Nephrology following    A-fib with RVR  Possible apical thrombus -on TTE  NICM -LVEF depressed  -Heart rate improved, continue digoxin.  -Continue full dose AC  -EP evaluated pt for ICD however pt not a candidate at present as pt must be compliant with medications and stop drug use prior to consideration of ICD  -GDMT as able    Transaminitis, hyperbilirubinemia  -RUQ U/S unremarkable.    -Acute hepatitis panel pending  -If unable to find a cause will likely suspect due to congestive hepatopathy versus cocaine. Substance abuse  -Counseled extensively on cessation. Diet ADULT DIET; Regular;  No Added Salt (3-4 gm)   DVT Prophylaxis [x] Lovenox, []  Heparin, [] SCDs, [] Ambulation,  [] Eliquis, [] Xarelto  [] Coumadin   Code Status Full Code   Disposition From: air cells demonstrate no acute abnormality. SOFT TISSUES/SKULL:  No acute abnormality of the visualized skull or soft tissues. 1. 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 2. Encephalomalacia surrounding this area in the left frontal lobe with a separate focus also seen in the posterior left parietal lobe. Additional areas of prior infarction that are favored to be remote. An embolic phenomenon could be considered given differing vascular distributions. 3. No acute intracranial abnormality. XR CHEST PORTABLE    Result Date: 11/4/2023  EXAMINATION: ONE XRAY VIEW OF THE CHEST 11/4/2023 9:47 am COMPARISON: 06/07/2022 radiograph HISTORY: ORDERING SYSTEM PROVIDED HISTORY: Sepsis TECHNOLOGIST PROVIDED HISTORY: Reason for exam:->Sepsis Reason for Exam: ams/sob FINDINGS: The heart is enlarged. Mediastinum is normal.  Mild perihilar opacities centrally and a central pattern of mild ground-glass attenuation has developed in the lungs. No significant pleural fluid. No skeletal finding. Central pattern of ground-glass airspace change may represent vascular congestion or mild edema. A developing viral infection may be considered clinically.        CBC:   Recent Labs     11/06/23 0339 11/07/23 0444   WBC 6.7 7.4   HGB 14.2 13.7   * 131*     BMP:    Recent Labs     11/07/23 0444 11/08/23 0436 11/08/23  0913   * 130* 131*   K 3.8 4.3 4.0   CL 94* 94* 94*   CO2 23 25 26   BUN 68* 52* 49*   CREATININE 1.9* 1.5* 1.6*   GLUCOSE 101* 88 114*     Hepatic:   Recent Labs     11/06/23 0339 11/07/23 0444 11/08/23 0436   * 121* 74*   * 393* 281*   BILITOT 3.0* 2.7* 1.9*   ALKPHOS 105 96 85     Lipids:   Lab Results   Component Value Date/Time    CHOL 92 11/05/2023 06:02 AM    HDL 8 11/05/2023 06:02 AM    TRIG 133 11/05/2023 06:02 AM     Troponin:   Lab Results   Component Value Date/Time    TROPONINT 0.060 06/08/2022 12:34 PM    TROPONINT 0.046 06/07/2022 11:10 PM

## 2023-11-08 NOTE — PLAN OF CARE
Problem: Discharge Planning  Goal: Discharge to home or other facility with appropriate resources  Outcome: Progressing     Problem: Pain  Goal: Verbalizes/displays adequate comfort level or baseline comfort level  Outcome: Progressing     Problem: Skin/Tissue Integrity  Goal: Absence of new skin breakdown  Description: 1. Monitor for areas of redness and/or skin breakdown  2. Assess vascular access sites hourly  3. Every 4-6 hours minimum:  Change oxygen saturation probe site  4. Every 4-6 hours:  If on nasal continuous positive airway pressure, respiratory therapy assess nares and determine need for appliance change or resting period.   Outcome: Progressing     Problem: Safety - Adult  Goal: Free from fall injury  Outcome: Progressing     Problem: ABCDS Injury Assessment  Goal: Absence of physical injury  Outcome: Progressing     Problem: Risk for Elopement  Goal: Patient will not exit the unit/facility without proper excort  Outcome: Progressing

## 2023-11-08 NOTE — PROGRESS NOTES
Second Heart failure education visit made. Patient agitated. Per R.N. patient is oriented to self and place only. Will attempt education later in hospital stay.

## 2023-11-08 NOTE — PROGRESS NOTES
V2.0    [facility] Progress Note      Name:  Javier Paris /Age/Sex: 1965  (60 y.o. female)   MRN & CSN:  [id number] & [id number] Encounter Date/Time: 2023 9:58 AM EST   Location:  [address] [place] [address] PCP: No primary care provider on file. Jasvir Reyna MD       Hospital Day: 4    Assessment and Recommendations   Javier Paris is a 60 y.o. female who presents with Acute on chronic systolic (congestive) heart failure ([address])      Plan:     Acute metabolic encephalopathy  -Likely secondary to substance abuse and hypoxia. Resolved    Acute hypoxia, troponin elevation secondary to acute on chronic systolic and diastolic heart failure  -Precipitant likely the cocaine abuse.  -Started on IV Lasix. Dose reduced due to softer blood pressures. Lactic acidosis improved with diuresis. -Cardiology consulted. As per cardiology trend troponins and no urgent ischemia evaluation anticipated. .  -Started on GDMT with low-dose Coreg, Aldactone and Jardiance added by nephrology. Acute renal failure  -Suspect cardiorenal syndrome   -Diuresis as above. -Nephrology following    A-fib with RVR  Possible apical thrombus -on TTE  NICM -LVEF depressed  -Heart rate improved, continue digoxin.  -Continue full dose AC  -EP evaluated pt for ICD however pt not a candidate at present as pt must be compliant with medications and stop drug use prior to consideration of ICD  -GDMT as able    Transaminitis, hyperbilirubinemia  -RUQ U/S unremarkable.    -Acute hepatitis panel pending  -If unable to find a cause will likely suspect due to congestive hepatopathy versus cocaine. Substance abuse  -Counseled extensively on cessation. Diet ADULT DIET; Regular;  No Added Salt (3-4 gm)   DVT Prophylaxis [x] Lovenox, []  Heparin, [] SCDs, [] Ambulation,  [] Eliquis, [] Xarelto  [] Coumadin   Code Status Full Code   Disposition From: Home  Expected Disposition: Home health care versus SNF  Estimated Date of Discharge: -2

## 2023-11-09 PROBLEM — F14.20 COCAINE USE DISORDER, SEVERE, DEPENDENCE (HCC): Status: ACTIVE | Noted: 2023-11-09

## 2023-11-09 PROBLEM — F05 DELIRIUM DUE TO MEDICAL CONDITION WITH BEHAVIORAL DISTURBANCE: Status: ACTIVE | Noted: 2023-11-09

## 2023-11-09 LAB
CULTURE: NORMAL
CULTURE: NORMAL
EKG ATRIAL RATE: 80 BPM
EKG DIAGNOSIS: NORMAL
EKG Q-T INTERVAL: 352 MS
EKG QRS DURATION: 108 MS
EKG QTC CALCULATION (BAZETT): 425 MS
EKG R AXIS: 12 DEGREES
EKG T AXIS: 147 DEGREES
EKG VENTRICULAR RATE: 88 BPM
Lab: NORMAL
Lab: NORMAL
MAGNESIUM: 1.9 MG/DL (ref 1.8–2.4)
SPECIMEN: NORMAL
SPECIMEN: NORMAL

## 2023-11-09 PROCEDURE — 36415 COLL VENOUS BLD VENIPUNCTURE: CPT

## 2023-11-09 PROCEDURE — 2580000003 HC RX 258: Performed by: INTERNAL MEDICINE

## 2023-11-09 PROCEDURE — 6360000002 HC RX W HCPCS: Performed by: NURSE PRACTITIONER

## 2023-11-09 PROCEDURE — 83735 ASSAY OF MAGNESIUM: CPT

## 2023-11-09 PROCEDURE — 99253 IP/OBS CNSLTJ NEW/EST LOW 45: CPT | Performed by: NURSE PRACTITIONER

## 2023-11-09 PROCEDURE — 2140000000 HC CCU INTERMEDIATE R&B

## 2023-11-09 PROCEDURE — 93010 ELECTROCARDIOGRAM REPORT: CPT | Performed by: INTERNAL MEDICINE

## 2023-11-09 PROCEDURE — 6370000000 HC RX 637 (ALT 250 FOR IP): Performed by: STUDENT IN AN ORGANIZED HEALTH CARE EDUCATION/TRAINING PROGRAM

## 2023-11-09 PROCEDURE — 94761 N-INVAS EAR/PLS OXIMETRY MLT: CPT

## 2023-11-09 PROCEDURE — 6370000000 HC RX 637 (ALT 250 FOR IP): Performed by: INTERNAL MEDICINE

## 2023-11-09 PROCEDURE — 6370000000 HC RX 637 (ALT 250 FOR IP)

## 2023-11-09 RX ORDER — LORAZEPAM 2 MG/ML
1 INJECTION INTRAMUSCULAR ONCE
Status: COMPLETED | OUTPATIENT
Start: 2023-11-09 | End: 2023-11-09

## 2023-11-09 RX ORDER — HALOPERIDOL 5 MG/ML
5 INJECTION INTRAMUSCULAR ONCE
Status: COMPLETED | OUTPATIENT
Start: 2023-11-09 | End: 2023-11-09

## 2023-11-09 RX ORDER — TORSEMIDE 20 MG/1
20 TABLET ORAL 2 TIMES DAILY
Status: DISCONTINUED | OUTPATIENT
Start: 2023-11-09 | End: 2023-11-21

## 2023-11-09 RX ADMIN — FUROSEMIDE 20 MG: 20 TABLET ORAL at 08:40

## 2023-11-09 RX ADMIN — LORAZEPAM 1 MG: 2 INJECTION INTRAMUSCULAR; INTRAVENOUS at 10:14

## 2023-11-09 RX ADMIN — APIXABAN 5 MG: 5 TABLET, FILM COATED ORAL at 08:40

## 2023-11-09 RX ADMIN — CARVEDILOL 3.12 MG: 6.25 TABLET, FILM COATED ORAL at 08:40

## 2023-11-09 RX ADMIN — SODIUM CHLORIDE, PRESERVATIVE FREE 10 ML: 5 INJECTION INTRAVENOUS at 08:41

## 2023-11-09 RX ADMIN — EMPAGLIFLOZIN 10 MG: 10 TABLET, FILM COATED ORAL at 08:40

## 2023-11-09 RX ADMIN — ASPIRIN 81 MG CHEWABLE TABLET 81 MG: 81 TABLET CHEWABLE at 08:40

## 2023-11-09 RX ADMIN — SPIRONOLACTONE 25 MG: 50 TABLET ORAL at 08:40

## 2023-11-09 RX ADMIN — TORSEMIDE 20 MG: 20 TABLET ORAL at 22:56

## 2023-11-09 RX ADMIN — HALOPERIDOL LACTATE 5 MG: 5 INJECTION, SOLUTION INTRAMUSCULAR at 10:14

## 2023-11-09 RX ADMIN — APIXABAN 5 MG: 5 TABLET, FILM COATED ORAL at 22:56

## 2023-11-09 ASSESSMENT — PAIN SCALES - GENERAL
PAINLEVEL_OUTOF10: 0

## 2023-11-09 NOTE — CARE COORDINATION
Pt was discussed in IDR this morning after SOS was called and cleared. Per psych consult, the pt lacks decision making capacity at this time. There is no MPOA documented. Decision making capacity would fall to the pt's family. CM following for needs. Current plan is home with no needs. Plan was made 3 days ago when the pt was alert and oriented.

## 2023-11-09 NOTE — PLAN OF CARE
Problem: Discharge Planning  Goal: Discharge to home or other facility with appropriate resources  Outcome: Progressing     Problem: Pain  Goal: Verbalizes/displays adequate comfort level or baseline comfort level  Outcome: Progressing     Problem: Skin/Tissue Integrity  Goal: Absence of new skin breakdown  Description: 1. Monitor for areas of redness and/or skin breakdown  2. Assess vascular access sites hourly  3. Every 4-6 hours minimum:  Change oxygen saturation probe site  4. Every 4-6 hours:  If on nasal continuous positive airway pressure, respiratory therapy assess nares and determine need for appliance change or resting period. Outcome: Progressing     Problem: Safety - Adult  Goal: Free from fall injury  Outcome: Progressing     Problem: ABCDS Injury Assessment  Goal: Absence of physical injury  Outcome: Progressing     Problem: Risk for Elopement  Goal: Patient will not exit the unit/facility without proper excort  Outcome: Progressing     Problem: Safety - Violent/Self-destructive Restraint  Goal: Remains free of injury from restraints (Restraint for Violent/Self-Destructive Behavior)  Description: INTERVENTIONS:  1. Determine that de-escalation and other, less restrictive measures have been tried or would not be effective before applying the restraint  2. Identify and document the criteria for restraint  3. Evaluate the patient's condition at the time of restraint application  4. Inform patient/family regarding the reason for restraint/seclusion  5. Q2H: Monitor comfort, nutrition and hydration needs  6. Q15M: Perform safety checks including skin, circulation, sensory, respiratory and psychological status  7. Ensure continuous observation  8.  Identify and implement measures to help patient regain control, assess readiness for release and initiate progressive release per policy  Outcome: Progressing

## 2023-11-09 NOTE — CONSULTS
Initial Psychiatric History and Physical    Kelsea Castle  [id number]  11/4/2023 11/09/23    ID: Patient is a 60 yrs y.o. female    CC:agitated    HPI: Patient is a 60year old female with pMHx of cocaine use,  HFrEF,CKD stage III  who presents to [facility] ED due to AMS 2/2 hypoxia, substance use and cardiogenic shock on 11/4/23. Consults include cardiology, nephrology, EP. Psychiatry consulted by Dr Reese Ram due to Tzaneen behavior. \"    SOS called this morning as patient trying to leave AMA despite stated need to remain in the hospital. Patient stating she is aware she is in the hospital but unable to answer further questions despite attending and psychiatry providing support. She makes little eye contact. She is angry and makes many attempts to leave her room. She is wearing a bra only and attempting to leave the room unclothed. She has no shoes on and blood on yellow socks. She is not aware of her lack of clothing She is upset and telling people to get away from her. Attempted to leave room several times needing redirection. Patient ripped out her iv. She has been rocking back and forth sometimes speaking incoherently. Patient required Haldol and ativan for calming medications as unable to redirect. Insight and judgment are poor. Capacity: Patient lacks capacity at this time. Understanding- Patient does not appear to completely understand the why she is here, the treatment and seriousness of her condition. The patient was unable to explain the rationale behind the diagnosis and treatment plan using her own verbiage. The patient was  unable to recall conversations about treatment, to make the link between causal relationships, and to process probabilities for outcomes, especially with her attending at bedside. PT does   display problems with memory, attention span, and neurocognition.    .  Choice-   Patient was provided with several options regarding choices involving treatment including medications, and

## 2023-11-09 NOTE — PROGRESS NOTES
Patient is laying on the couch sleeping at this time. Patient is soiled with urine and is refusing to et staff assist to get her cleaned up. Many attempts have been made and she is still refusing. Patient does not want to lay in the bed only on the couch. She is also refusing tele at this time. Provider aware.      Shivani Rosado RN

## 2023-11-10 PROCEDURE — 84100 ASSAY OF PHOSPHORUS: CPT

## 2023-11-10 PROCEDURE — 6370000000 HC RX 637 (ALT 250 FOR IP): Performed by: INTERNAL MEDICINE

## 2023-11-10 PROCEDURE — 97116 GAIT TRAINING THERAPY: CPT

## 2023-11-10 PROCEDURE — 6370000000 HC RX 637 (ALT 250 FOR IP): Performed by: STUDENT IN AN ORGANIZED HEALTH CARE EDUCATION/TRAINING PROGRAM

## 2023-11-10 PROCEDURE — 97166 OT EVAL MOD COMPLEX 45 MIN: CPT

## 2023-11-10 PROCEDURE — 6370000000 HC RX 637 (ALT 250 FOR IP)

## 2023-11-10 PROCEDURE — 97530 THERAPEUTIC ACTIVITIES: CPT

## 2023-11-10 PROCEDURE — 83735 ASSAY OF MAGNESIUM: CPT

## 2023-11-10 PROCEDURE — 2140000000 HC CCU INTERMEDIATE R&B

## 2023-11-10 PROCEDURE — 80053 COMPREHEN METABOLIC PANEL: CPT

## 2023-11-10 PROCEDURE — 85025 COMPLETE CBC W/AUTO DIFF WBC: CPT

## 2023-11-10 PROCEDURE — 97162 PT EVAL MOD COMPLEX 30 MIN: CPT

## 2023-11-10 PROCEDURE — 83880 ASSAY OF NATRIURETIC PEPTIDE: CPT

## 2023-11-10 RX ADMIN — CARVEDILOL 3.12 MG: 6.25 TABLET, FILM COATED ORAL at 08:26

## 2023-11-10 RX ADMIN — EMPAGLIFLOZIN 10 MG: 10 TABLET, FILM COATED ORAL at 08:26

## 2023-11-10 RX ADMIN — SPIRONOLACTONE 25 MG: 50 TABLET ORAL at 08:26

## 2023-11-10 RX ADMIN — TORSEMIDE 20 MG: 20 TABLET ORAL at 21:18

## 2023-11-10 RX ADMIN — TORSEMIDE 20 MG: 20 TABLET ORAL at 08:26

## 2023-11-10 RX ADMIN — ASPIRIN 81 MG CHEWABLE TABLET 81 MG: 81 TABLET CHEWABLE at 08:26

## 2023-11-10 RX ADMIN — ACETAMINOPHEN 650 MG: 325 TABLET ORAL at 11:03

## 2023-11-10 RX ADMIN — APIXABAN 5 MG: 5 TABLET, FILM COATED ORAL at 21:18

## 2023-11-10 RX ADMIN — APIXABAN 5 MG: 5 TABLET, FILM COATED ORAL at 08:26

## 2023-11-10 ASSESSMENT — PAIN DESCRIPTION - DESCRIPTORS: DESCRIPTORS: ACHING;SHOOTING

## 2023-11-10 ASSESSMENT — PAIN SCALES - WONG BAKER: WONGBAKER_NUMERICALRESPONSE: 0

## 2023-11-10 ASSESSMENT — PAIN DESCRIPTION - PAIN TYPE: TYPE: CHRONIC PAIN

## 2023-11-10 ASSESSMENT — PAIN - FUNCTIONAL ASSESSMENT: PAIN_FUNCTIONAL_ASSESSMENT: ACTIVITIES ARE NOT PREVENTED

## 2023-11-10 ASSESSMENT — PAIN DESCRIPTION - ORIENTATION: ORIENTATION: RIGHT;LEFT

## 2023-11-10 ASSESSMENT — PAIN SCALES - GENERAL: PAINLEVEL_OUTOF10: 4

## 2023-11-10 ASSESSMENT — PAIN DESCRIPTION - LOCATION: LOCATION: FOOT

## 2023-11-10 NOTE — PROGRESS NOTES
Comprehensive Nutrition Assessment    Type and Reason for Visit:  Initial    Nutrition Recommendations/Plan:   Monitor weight trends and offer high calorie nutrition supplement prn   Continue to offer meals TID      Malnutrition Assessment:  Malnutrition Status: At risk for malnutrition (Comment) (11/10/23 1702)    Context:  Acute Illness       Nutrition Assessment:    Pt admitted with heart failure along with mental instability. Nutrition education not appropriate at this time. Pt consuming greater than 50% of meals. Will monitor at moderate nutrition risk. Nutrition Related Findings:    Hx chronic tobacco dependence, cocaine abuse, anxiety, CKD. On torsemide. Wound Type: None       Current Nutrition Intake & Therapies:    Average Meal Intake: 51-75%, %  Average Supplements Intake: None Ordered  ADULT DIET; Regular; No Added Salt (3-4 gm)    Anthropometric Measures:  Height: 170.2 cm (5' 7.01\")  Ideal Body Weight (IBW): 135 lbs (61 kg)    Admission Body Weight: 77.9 kg (171 lb 11.8 oz)  Current Body Weight: 71.1 kg (156 lb 12 oz),   IBW. Weight Source: Bed Scale  Current BMI (kg/m2): 24.5  Usual Body Weight: 78.9 kg (174 lb)  % Weight Change (Calculated): -9.9                    BMI Categories: Normal Weight (BMI 18.5-24. 9)    Estimated Daily Nutrient Needs:  Energy Requirements Based On: Formula  Weight Used for Energy Requirements: Current  Energy (kcal/day): 4886-8001  Weight Used for Protein Requirements: Current  Protein (g/day): 70-85  Method Used for Fluid Requirements: 1 ml/kcal  Fluid (ml/day): 6731-3177    Nutrition Diagnosis:   Predicted inadequate energy intake related to cognitive or neurological impairment as evidenced by weight loss    Nutrition Interventions:   Food and/or Nutrient Delivery: Continue Current Diet  Nutrition Education/Counseling: Education not appropriate  Coordination of Nutrition Care: Continue to monitor while inpatient       Goals:     Goals: Meet at least 75% of

## 2023-11-10 NOTE — PROGRESS NOTES
V2.0    [facility] Progress Note      Name:  Violet Sever /Age/Sex: 1965  (60 y.o. female)   MRN & CSN:  [id number] & [id number] Encounter Date/Time: 2023 9:58 AM EST   Location:  [address] [place] [address]-H PCP: No primary care provider on file. Luis Black MD       Hospital Day: 6    Assessment and Recommendations   Violet Sever is a 60 y.o. female who presents with Acute on chronic systolic (congestive) heart failure ([address])    Patient is on medical hold at this time. Does not have capacity to leave AMA. Careful administration of medications in setting of impaired renal function and transaminitis. May use one time haldol overnight if required for agitation however pt appears to be redirectable thus encourage this technique first.    Plan:     Acute metabolic encephalopathy  Delirium with behavioral disturbance  -Likely secondary to substance abuse and hypoxia.  -Pt still requiring sitter for agitation and behavioral disturbance  -Show support called on  for agitation in setting of impaired mentation with behavioral disturbance, pt ripped out her IV and attempting to leave AMA. Is alert and oriented to herself and place but not aware of why she is in the hospital or what she was treated for  -Psychiatry determined that pt does not have capacity, placed pt on medical hold for her safety  -Attempted to call pt's daughter several times however unsuccessful  -Maintain sitter at bedside    Acute hypoxia, troponin elevation secondary to acute on chronic systolic and diastolic heart failure  -Precipitant likely the cocaine abuse. -Improved with IV diuresis, converted to p.o. torsemide  -Started on GDMT with low-dose Coreg, Aldactone and Jardiance added by nephrology. Acute renal failure  -Suspect cardiorenal syndrome   -Diuresis as above.   -Nephrology following    A-fib with RVR  Possible apical thrombus -on TTE  NICM -LVEF depressed  -Heart rate improved, continue digoxin.  -Continue full dose 5-40 mL, PRN  sodium chloride, , PRN  ondansetron, 4 mg, Q8H PRN  polyethylene glycol, 17 g, Daily PRN  acetaminophen, 650 mg, Q6H PRN   Or  acetaminophen, 650 mg, Q6H PRN        Labs and Imaging   CT HEAD WO CONTRAST    Result Date: 11/4/2023  EXAMINATION: CT OF THE HEAD WITHOUT CONTRAST  11/4/2023 10:37 am TECHNIQUE: CT of the head was performed without the administration of intravenous contrast. Automated exposure control, iterative reconstruction, and/or weight based adjustment of the mA/kV was utilized to reduce the radiation dose to as low as reasonably achievable. COMPARISON: 06/12/2015 CT HISTORY: ORDERING SYSTEM PROVIDED HISTORY: AMS TECHNOLOGIST PROVIDED HISTORY: Reason for exam:->AMS Has a \"code stroke\" or \"stroke alert\" been called? ->No Decision Support Exception - unselect if not a suspected or confirmed emergency medical condition->Emergency Medical Condition (MA) Reason for Exam: AMS FINDINGS: BRAIN/VENTRICLES: There is an area of prior infarct within the left basal ganglia with a more diffuse pattern of decreased attenuation in the left frontal lobe. No sulcal effacement or mass effect upon the lateral ventricle. This is suspected to be remote in nature although this is new from the prior comparison study in 2015. There is no evidence of acute intracranial hemorrhage. Another small area of encephalomalacia is observed in the posterior left parietal lobe near the cortical surface. Brain parenchymal attenuation is otherwise normal. ORBITS: The visualized portion of the orbits demonstrate no acute abnormality. SINUSES: The visualized paranasal sinuses and mastoid air cells demonstrate no acute abnormality. SOFT TISSUES/SKULL:  No acute abnormality of the visualized skull or soft tissues. 1. 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 2. Encephalomalacia surrounding this area in the left frontal lobe with a separate focus also seen in the posterior left parietal lobe.

## 2023-11-10 NOTE — PROGRESS NOTES
11/10/23 1722   Encounter Summary   Encounter Overview/Reason  Initial Encounter   Service Provided For: Patient not available   Referral/Consult From: [facility] [address] Parent; Children   Last Encounter  11/10/23   Complexity of Encounter Low   Begin Time 1723   End Time  1730   Total Time Calculated 7 min   Crisis   Type Family Care   Spiritual/Emotional needs   Type Spiritual Support   Grief, Loss, and Adjustments   Type Adjustment to illness   Assessment/Intervention/Outcome   Assessment Coping   Intervention Sustaining Presence/Ministry of presence;Prayer (assurance of)/[place]   Outcome Coping   Plan and Referrals   Plan/Referrals Continue Support (comment)  (as needed)

## 2023-11-10 NOTE — CARE COORDINATION
informed SANDRA that pt does not have the mental capacity to make her own decisions. She attempted to get a hold of pt's daughter and was unsuccessful. She spoke with pt's 80 yr old mother. She states that she will be willing for pt to come to her home. SANDRA is concerned about mother's safety if pt goes home with her. She informed Dr that pt's daughter/Twila is her POA and will be here on 11/13. Pt attempted to leave the floor and was very agitated. It took several security guards and several staff members on the floor to get pt back in her room. Discussed case with SANDRA/SANTO/Luanne. She suggested making a referral to [facility] in [place] for review only at this time d/t they take mental health pt's with behaviors and substance abuse. Referral made to [facility] for review. Will need to discuss with daughter or mother if they are agreeable to accept pt.   TE

## 2023-11-10 NOTE — PROGRESS NOTES
Occupational Therapy  [facility] ACUTE CARE OCCUPATIONAL THERAPY EVALUATION    Ileana Strickland, 1965, 3112/3112-A, 11/10/2023    Discharge Recommendation: Home with 24 hour supervision/assistance      History:  [place]:  The primary encounter diagnosis was Altered mental status, unspecified altered mental status type. Diagnoses of Septicemia ([address]), Atrial fibrillation, unspecified type ([address]), Elevated troponin, DOROTHY (acute kidney injury) ([address]), and Systolic CHF, acute on chronic [facility]) were also pertinent to this visit. Past Medical History:   Diagnosis Date    Anxiety     Environmental allergies     Non-healing surgical wound     RLS (restless legs syndrome)      Subjective:  Patient states: \"What do you want from me? \"  Pain:  denies  Communication with other providers: co-eval w/ PT, handoff to RN  Restrictions: General Precautions, Fall Risk, sitter    Home Setup/Prior level of function:  Social/Functional History  Lives With: Family (MOTHER)  Bathroom Equipment: Shower chair  Home Equipment: Oxygen (4L 24/7)  Receives Help From: Family  Ambulation Assistance: Independent  Transfer Assistance: Independent  Active : Yes  Mode of Transportation: Car     Examination:  Observation: Supine in bed upon arrival, agreeable to therapy eval.  Vision: WFL  Hearing: WFL  Vitals: Stable vitals throughout session on room air      [address] and functions:  ROM: WFL   Strength: B UE grossly 4/5 across all major joints   Sensation: WFL  Tone: Normal  Coordination: WFL  Perception: WNL      Cognitive and Psychosocial Functioning:  Overall cognitive status: alert, oriented to person, place (with Vcs)  Affect: Normal   Arousal/Alertness Delayed responses to stimuli   Following Commands Follows multistep commands consistently   Attention Span Attends with cues to redirect   Safety Judgement Decreased awareness of need for safety; Decreased awareness of need for assistance   Problem Solving Decreased awareness pt on role of OT, therapy POC and functional goals, progression w/ ADLs and transfers, importance of movement and OOB activity, d/c recommendations     Safety Measures: Gait belt used, Left in bed per pt request, All needs met    Assessment:  Pt is a 60 y o F admitted d/t acute on chronic systolic heart failure. Pt at baseline is IND for ADLs, IND for high level IADLs, and IND for functional transfers/mobility w/o AD. Pt currently presents w/ deficits in ADL and high level IADL independence, functional ADL transfers, strength, and functional activity tolerance. Continued OT services recommended to increase safety and independence with ADL routine and to address remaining functional deficits. Pt would benefit from continued acute care OT services w/ discharge to home w/ 24/7 supervision. Complexity: Moderate  Prognosis: Good, no significant barriers to participation at this time. Occupational Therapy Plan  Times Per Week: 2+         Goals:  Pt will complete all aspects of bed mobility for EOB/OOB ADLs w/ supervision. Pt will complete UB ADLs w/ supervision. Pt will complete LB ADLs w/ supervision. Pt will complete all functional transfers to and from bed, chair, toilet, shower chair w/ supervision. Pt will ambulate functional household distance w/ supervision. Pt will complete all aspects of toileting task w/ supervision. Pt will perform therex/theract in order to increase strength and functional activity tolerance necessary for increased independence w/ ADL routine.     Pt goal: go home, get stronger  Time Frame for STGs: discharge    Equipment: Continue to assess at next LOC    Time:   Time in: 1255  Time out: 1311  Total time: 16  Timed treatment minutes: 8        Electronically signed by:      LIDYA Abbasi/ZAIN  [id number]

## 2023-11-10 NOTE — CARE COORDINATION
Pt's daughter/POA/Twila Choe called this CM. CM informed her that pt cannot make her own decisions at this time and will need to go to a facility d/t her mental status and behavior. Daughter states that she know it will be difficult to find placement for pt. She informed CM that she is agreeable for pt to go to any facility that will be able to accept her. SANDRA informed her that a referral was made to [facility] in [place] and she is agreeable with this plan if they are able to accept pt. She states that she did not mean to hang up on the Dr. She did not recognize the number. CM updated Dr. Waiting for decision from [facility] to accept or not.   TE

## 2023-11-11 LAB
ALBUMIN SERPL-MCNC: 3.5 GM/DL (ref 3.4–5)
ALP BLD-CCNC: 95 IU/L (ref 40–129)
ALT SERPL-CCNC: 125 U/L (ref 10–40)
ANION GAP SERPL CALCULATED.3IONS-SCNC: 11 MMOL/L (ref 4–16)
AST SERPL-CCNC: 41 IU/L (ref 15–37)
BASOPHILS ABSOLUTE: 0 K/CU MM
BASOPHILS RELATIVE PERCENT: 0.4 % (ref 0–1)
BILIRUB SERPL-MCNC: 1.1 MG/DL (ref 0–1)
BUN SERPL-MCNC: 34 MG/DL (ref 6–23)
CALCIUM SERPL-MCNC: 8.8 MG/DL (ref 8.3–10.6)
CHLORIDE BLD-SCNC: 93 MMOL/L (ref 99–110)
CO2: 28 MMOL/L (ref 21–32)
CREAT SERPL-MCNC: 1.6 MG/DL (ref 0.6–1.1)
DIFFERENTIAL TYPE: ABNORMAL
EOSINOPHILS ABSOLUTE: 0.1 K/CU MM
EOSINOPHILS RELATIVE PERCENT: 1.9 % (ref 0–3)
GFR SERPL CREATININE-BSD FRML MDRD: 37 ML/MIN/1.73M2
GLUCOSE SERPL-MCNC: 119 MG/DL (ref 70–99)
HCT VFR BLD CALC: 44.1 % (ref 37–47)
HEMOGLOBIN: 13.5 GM/DL (ref 12.5–16)
IMMATURE NEUTROPHIL %: 0.3 % (ref 0–0.43)
LYMPHOCYTES ABSOLUTE: 1.1 K/CU MM
LYMPHOCYTES RELATIVE PERCENT: 15.3 % (ref 24–44)
MCH RBC QN AUTO: 29.2 PG (ref 27–31)
MCHC RBC AUTO-ENTMCNC: 30.6 % (ref 32–36)
MCV RBC AUTO: 95.2 FL (ref 78–100)
MONOCYTES ABSOLUTE: 1 K/CU MM
MONOCYTES RELATIVE PERCENT: 13.8 % (ref 0–4)
NUCLEATED RBC %: 0 %
PDW BLD-RTO: 16.3 % (ref 11.7–14.9)
PLATELET # BLD: 266 K/CU MM (ref 140–440)
PMV BLD AUTO: 9.3 FL (ref 7.5–11.1)
POTASSIUM SERPL-SCNC: 4.6 MMOL/L (ref 3.5–5.1)
RBC # BLD: 4.63 M/CU MM (ref 4.2–5.4)
SEGMENTED NEUTROPHILS ABSOLUTE COUNT: 4.8 K/CU MM
SEGMENTED NEUTROPHILS RELATIVE PERCENT: 68.3 % (ref 36–66)
SODIUM BLD-SCNC: 132 MMOL/L (ref 135–145)
TOTAL IMMATURE NEUTOROPHIL: 0.02 K/CU MM
TOTAL NUCLEATED RBC: 0 K/CU MM
TOTAL PROTEIN: 6.6 GM/DL (ref 6.4–8.2)
WBC # BLD: 7 K/CU MM (ref 4–10.5)

## 2023-11-11 PROCEDURE — 6370000000 HC RX 637 (ALT 250 FOR IP): Performed by: INTERNAL MEDICINE

## 2023-11-11 PROCEDURE — 6370000000 HC RX 637 (ALT 250 FOR IP)

## 2023-11-11 PROCEDURE — 6370000000 HC RX 637 (ALT 250 FOR IP): Performed by: STUDENT IN AN ORGANIZED HEALTH CARE EDUCATION/TRAINING PROGRAM

## 2023-11-11 PROCEDURE — 2140000000 HC CCU INTERMEDIATE R&B

## 2023-11-11 PROCEDURE — 36415 COLL VENOUS BLD VENIPUNCTURE: CPT

## 2023-11-11 PROCEDURE — 80053 COMPREHEN METABOLIC PANEL: CPT

## 2023-11-11 PROCEDURE — 94761 N-INVAS EAR/PLS OXIMETRY MLT: CPT

## 2023-11-11 PROCEDURE — 85025 COMPLETE CBC W/AUTO DIFF WBC: CPT

## 2023-11-11 RX ORDER — BENZONATATE 100 MG/1
100 CAPSULE ORAL 3 TIMES DAILY PRN
Status: DISCONTINUED | OUTPATIENT
Start: 2023-11-11 | End: 2023-12-06 | Stop reason: HOSPADM

## 2023-11-11 RX ADMIN — APIXABAN 5 MG: 5 TABLET, FILM COATED ORAL at 22:00

## 2023-11-11 RX ADMIN — EMPAGLIFLOZIN 10 MG: 10 TABLET, FILM COATED ORAL at 08:11

## 2023-11-11 RX ADMIN — CARVEDILOL 3.12 MG: 6.25 TABLET, FILM COATED ORAL at 08:12

## 2023-11-11 RX ADMIN — TORSEMIDE 20 MG: 20 TABLET ORAL at 08:10

## 2023-11-11 RX ADMIN — SPIRONOLACTONE 25 MG: 50 TABLET ORAL at 08:10

## 2023-11-11 RX ADMIN — BENZONATATE 100 MG: 100 CAPSULE ORAL at 16:24

## 2023-11-11 RX ADMIN — TORSEMIDE 20 MG: 20 TABLET ORAL at 22:00

## 2023-11-11 RX ADMIN — ASPIRIN 81 MG CHEWABLE TABLET 81 MG: 81 TABLET CHEWABLE at 08:11

## 2023-11-11 RX ADMIN — CARVEDILOL 3.12 MG: 6.25 TABLET, FILM COATED ORAL at 16:24

## 2023-11-11 RX ADMIN — APIXABAN 5 MG: 5 TABLET, FILM COATED ORAL at 08:11

## 2023-11-11 ASSESSMENT — PAIN SCALES - GENERAL
PAINLEVEL_OUTOF10: 0

## 2023-11-11 NOTE — PROGRESS NOTES
V2.0    [facility] [place]      Name:  Coleman Rudolph /Age/Sex: 1965  (60 y.o. female)   MRN & CSN:  [id number] & [id number] Encounter Date/Time: 11/10/2023 9:58 AM EST   Location:  [address] [place] [address] PCP: No primary care provider on file. Jennifer Candelario MD       Hospital Day: 7    Assessment and Recommendations   Coleman Rudolph is a 60 y.o. female who presents with Acute on chronic systolic (congestive) heart failure ([address])    Patient is on medical hold at this time. Does not have capacity to leave AMA. Careful administration of medications in setting of impaired renal function and transaminitis. May use one time haldol overnight if required for agitation however pt appears to be redirectable thus encourage this technique first.    Plan: placement to [place] initiated, patient's daughter/mother agreeable    Acute metabolic encephalopathy  Delirium with behavioral disturbance  -Likely secondary to substance abuse and hypoxia.  -Pt still requiring sitter for agitation and behavioral disturbance  -Show support called on  for agitation in setting of impaired mentation with behavioral disturbance, pt ripped out her IV and attempting to leave AMA. Is alert and oriented to herself and place but not aware of why she is in the hospital or what she was treated for  -Psychiatry determined that pt does not have capacity, placed pt on medical hold for her safety  -Attempted to call pt's daughter several times however unsuccessful  -Maintain sitter at bedside    Acute hypoxia, troponin elevation secondary to acute on chronic systolic and diastolic heart failure  -Precipitant likely the cocaine abuse. -Improved with IV diuresis, converted to p.o. torsemide  -Started on GDMT with low-dose Coreg, Aldactone and Jardiance added by nephrology. Acute renal failure  -Suspect cardiorenal syndrome   -Diuresis as above.   -Nephrology following    A-fib with RVR  Possible apical thrombus -on TTE  NICM -LVEF no evidence of acute intracranial hemorrhage. Another small area of encephalomalacia is observed in the posterior left parietal lobe near the cortical surface. Brain parenchymal attenuation is otherwise normal. ORBITS: The visualized portion of the orbits demonstrate no acute abnormality. SINUSES: The visualized paranasal sinuses and mastoid air cells demonstrate no acute abnormality. SOFT TISSUES/SKULL:  No acute abnormality of the visualized skull or soft tissues. 1. 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 2. Encephalomalacia surrounding this area in the left frontal lobe with a separate focus also seen in the posterior left parietal lobe. Additional areas of prior infarction that are favored to be remote. An embolic phenomenon could be considered given differing vascular distributions. 3. No acute intracranial abnormality. XR CHEST PORTABLE    Result Date: 11/4/2023  EXAMINATION: ONE XRAY VIEW OF THE CHEST 11/4/2023 9:47 am COMPARISON: 06/07/2022 radiograph HISTORY: ORDERING SYSTEM PROVIDED HISTORY: Sepsis TECHNOLOGIST PROVIDED HISTORY: Reason for exam:->Sepsis Reason for Exam: ams/sob FINDINGS: The heart is enlarged. Mediastinum is normal.  Mild perihilar opacities centrally and a central pattern of mild ground-glass attenuation has developed in the lungs. No significant pleural fluid. No skeletal finding. Central pattern of ground-glass airspace change may represent vascular congestion or mild edema. A developing viral infection may be considered clinically. CBC:   No results for input(s): \"WBC\", \"HGB\", \"PLT\" in the last 72 hours.     BMP:    Recent Labs     11/08/23  0436 11/08/23  0913   * 131*   K 4.3 4.0   CL 94* 94*   CO2 25 26   BUN 52* 49*   CREATININE 1.5* 1.6*   GLUCOSE 88 114*     Hepatic:   Recent Labs     11/08/23  0436   AST 74*   *   BILITOT 1.9*   ALKPHOS 85     Lipids:   Lab Results   Component Value Date/Time    CHOL 92 11/05/2023

## 2023-11-11 NOTE — PROGRESS NOTES
Physical Therapy  Facility/Department: [facility] 3N  Physical Therapy Initial Assessment    Name: Sarwat Patel  : 1965  MRN: [id number]  Date of Service: 11/10/2023    Discharge Recommendations:  24 hour supervision or assist, Home with Home health PT        Functional Outcome Measure:    Acute Care Index of Function (ACIF):    Score: 0.94 (0.71 or greater = appropriate for 24 hour supervision/assist)        Patient Diagnosis(es): The primary encounter diagnosis was Altered mental status, unspecified altered mental status type. Diagnoses of Septicemia ([address]), Atrial fibrillation, unspecified type ([address]), Elevated troponin, DOROTHY (acute kidney injury) ([address]), and Systolic CHF, acute on chronic [facility]) were also pertinent to this visit. Past Medical History:  has a past medical history of Anxiety, Environmental allergies, Non-healing surgical wound, and RLS (restless legs syndrome). Past Surgical History:  has a past surgical history that includes Cholecystectomy; Appendectomy;  section; and knee surgery (). Assessment   Body Structures, Functions, Activity Limitations Requiring Skilled Therapeutic Intervention: Decreased functional mobility ; Decreased safe awareness;Decreased high-level IADLs;Decreased endurance;Decreased balance;Decreased strength  Therapy Prognosis: Good  Decision Making: Medium Complexity  Clinical Presentation: evolving  Requires PT Follow-Up: Yes  Activity Tolerance  Activity Tolerance: Patient tolerated evaluation without incident     Plan   Physical Therapy Plan  General Plan: 3-5 times per week  Current Treatment Recommendations: Strengthening, ROM, Balance training, Functional mobility training, Transfer training, ADL/Self-care training, IADL training, Cognitive/Perceptual training, Endurance training, Equipment evaluation, education, & procurement, Gait training, Stair training, Neuromuscular re-education, Pain management, Home exercise program, Cognitive reorientation, [id number]

## 2023-11-11 NOTE — CARE COORDINATION
Confidential VM left for [facility]/[place]/ [phone] to call this CM with decision to accept or not.   TE

## 2023-11-11 NOTE — PLAN OF CARE
Problem: Discharge Planning  Goal: Discharge to home or other facility with appropriate resources  Outcome: Progressing     Problem: Pain  Goal: Verbalizes/displays adequate comfort level or baseline comfort level  Outcome: Progressing     Problem: Skin/Tissue Integrity  Goal: Absence of new skin breakdown  Description: 1. Monitor for areas of redness and/or skin breakdown  2. Assess vascular access sites hourly  3. Every 4-6 hours minimum:  Change oxygen saturation probe site  4. Every 4-6 hours:  If on nasal continuous positive airway pressure, respiratory therapy assess nares and determine need for appliance change or resting period. Outcome: Progressing     Problem: Safety - Adult  Goal: Free from fall injury  Outcome: Progressing     Problem: ABCDS Injury Assessment  Goal: Absence of physical injury  Outcome: Progressing     Problem: Risk for Elopement  Goal: Patient will not exit the unit/facility without proper excort  Outcome: Progressing     Problem: Safety - Violent/Self-destructive Restraint  Goal: Remains free of injury from restraints (Restraint for Violent/Self-Destructive Behavior)  Description: INTERVENTIONS:  1. Determine that de-escalation and other, less restrictive measures have been tried or would not be effective before applying the restraint  2. Identify and document the criteria for restraint  3. Evaluate the patient's condition at the time of restraint application  4. Inform patient/family regarding the reason for restraint/seclusion  5. Q2H: Monitor comfort, nutrition and hydration needs  6. Q15M: Perform safety checks including skin, circulation, sensory, respiratory and psychological status  7. Ensure continuous observation  8.  Identify and implement measures to help patient regain control, assess readiness for release and initiate progressive release per policy  Outcome: Progressing     Problem: Nutrition Deficit:  Goal: Optimize nutritional status  11/11/2023 0308 by Karime Soni Carmen Yoo RN  Outcome: Progressing  11/10/2023 1703 by Adilene Boo RD, LD  Outcome: Progressing  Flowsheets (Taken 11/10/2023 1703)  Nutrient intake appropriate for improving, restoring, or maintaining nutritional needs:   Assess nutritional status and recommend course of action   Monitor oral intake, labs, and treatment plans   Recommend appropriate diets, oral nutritional supplements, and vitamin/mineral supplements

## 2023-11-12 LAB
ALBUMIN SERPL-MCNC: 3.7 GM/DL (ref 3.4–5)
ALP BLD-CCNC: 104 IU/L (ref 40–129)
ALT SERPL-CCNC: 99 U/L (ref 10–40)
ANION GAP SERPL CALCULATED.3IONS-SCNC: 12 MMOL/L (ref 4–16)
AST SERPL-CCNC: 39 IU/L (ref 15–37)
BASOPHILS ABSOLUTE: 0.1 K/CU MM
BASOPHILS RELATIVE PERCENT: 0.6 % (ref 0–1)
BILIRUB SERPL-MCNC: 0.7 MG/DL (ref 0–1)
BUN SERPL-MCNC: 40 MG/DL (ref 6–23)
CALCIUM SERPL-MCNC: 8.9 MG/DL (ref 8.3–10.6)
CHLORIDE BLD-SCNC: 95 MMOL/L (ref 99–110)
CO2: 25 MMOL/L (ref 21–32)
CREAT SERPL-MCNC: 1.8 MG/DL (ref 0.6–1.1)
DIFFERENTIAL TYPE: ABNORMAL
EOSINOPHILS ABSOLUTE: 0.1 K/CU MM
EOSINOPHILS RELATIVE PERCENT: 1.3 % (ref 0–3)
GFR SERPL CREATININE-BSD FRML MDRD: 32 ML/MIN/1.73M2
GLUCOSE SERPL-MCNC: 95 MG/DL (ref 70–99)
HCT VFR BLD CALC: 47.9 % (ref 37–47)
HEMOGLOBIN: 14.7 GM/DL (ref 12.5–16)
IMMATURE NEUTROPHIL %: 0.3 % (ref 0–0.43)
LYMPHOCYTES ABSOLUTE: 1.6 K/CU MM
LYMPHOCYTES RELATIVE PERCENT: 20.1 % (ref 24–44)
MCH RBC QN AUTO: 29.5 PG (ref 27–31)
MCHC RBC AUTO-ENTMCNC: 30.7 % (ref 32–36)
MCV RBC AUTO: 96.2 FL (ref 78–100)
MONOCYTES ABSOLUTE: 0.9 K/CU MM
MONOCYTES RELATIVE PERCENT: 11.9 % (ref 0–4)
NUCLEATED RBC %: 0 %
PDW BLD-RTO: 15.9 % (ref 11.7–14.9)
PLATELET # BLD: 282 K/CU MM (ref 140–440)
PMV BLD AUTO: 9.5 FL (ref 7.5–11.1)
POTASSIUM SERPL-SCNC: 5.3 MMOL/L (ref 3.5–5.1)
RBC # BLD: 4.98 M/CU MM (ref 4.2–5.4)
SEGMENTED NEUTROPHILS ABSOLUTE COUNT: 5.1 K/CU MM
SEGMENTED NEUTROPHILS RELATIVE PERCENT: 65.8 % (ref 36–66)
SODIUM BLD-SCNC: 132 MMOL/L (ref 135–145)
TOTAL IMMATURE NEUTOROPHIL: 0.02 K/CU MM
TOTAL NUCLEATED RBC: 0 K/CU MM
TOTAL PROTEIN: 6.9 GM/DL (ref 6.4–8.2)
WBC # BLD: 7.8 K/CU MM (ref 4–10.5)

## 2023-11-12 PROCEDURE — 6370000000 HC RX 637 (ALT 250 FOR IP)

## 2023-11-12 PROCEDURE — 80053 COMPREHEN METABOLIC PANEL: CPT

## 2023-11-12 PROCEDURE — 2140000000 HC CCU INTERMEDIATE R&B

## 2023-11-12 PROCEDURE — 6370000000 HC RX 637 (ALT 250 FOR IP): Performed by: STUDENT IN AN ORGANIZED HEALTH CARE EDUCATION/TRAINING PROGRAM

## 2023-11-12 PROCEDURE — 6370000000 HC RX 637 (ALT 250 FOR IP): Performed by: INTERNAL MEDICINE

## 2023-11-12 PROCEDURE — 85025 COMPLETE CBC W/AUTO DIFF WBC: CPT

## 2023-11-12 PROCEDURE — 36415 COLL VENOUS BLD VENIPUNCTURE: CPT

## 2023-11-12 RX ADMIN — TORSEMIDE 20 MG: 20 TABLET ORAL at 20:32

## 2023-11-12 RX ADMIN — TORSEMIDE 20 MG: 20 TABLET ORAL at 09:54

## 2023-11-12 RX ADMIN — ASPIRIN 81 MG CHEWABLE TABLET 81 MG: 81 TABLET CHEWABLE at 09:54

## 2023-11-12 RX ADMIN — CARVEDILOL 3.12 MG: 6.25 TABLET, FILM COATED ORAL at 09:54

## 2023-11-12 RX ADMIN — EMPAGLIFLOZIN 10 MG: 10 TABLET, FILM COATED ORAL at 09:54

## 2023-11-12 RX ADMIN — SPIRONOLACTONE 25 MG: 50 TABLET ORAL at 09:57

## 2023-11-12 RX ADMIN — APIXABAN 5 MG: 5 TABLET, FILM COATED ORAL at 20:32

## 2023-11-12 RX ADMIN — APIXABAN 5 MG: 5 TABLET, FILM COATED ORAL at 09:54

## 2023-11-12 NOTE — PLAN OF CARE
Problem: Safety - Adult  Goal: Free from fall injury  Outcome: Progressing     Problem: ABCDS Injury Assessment  Goal: Absence of physical injury  Outcome: Progressing     Problem: Risk for Elopement  Goal: Patient will not exit the unit/facility without proper excort  Outcome: Not Progressing  Flowsheets  Taken 11/12/2023 0100  Nursing Interventions for Elopement Risk:   Reduce environmental triggers   Assist with personal care needs such as toileting, eating, dressing, as needed to reduce the risk of wandering   Make sure patient has all necessary personal care items  Taken 11/11/2023 2159  Nursing Interventions for Elopement Risk:   Reduce environmental triggers   Assist with personal care needs such as toileting, eating, dressing, as needed to reduce the risk of wandering   Make sure patient has all necessary personal care items     Problem: Safety - Violent/Self-destructive Restraint  Goal: Remains free of injury from restraints (Restraint for Violent/Self-Destructive Behavior)  Description: INTERVENTIONS:  1. Determine that de-escalation and other, less restrictive measures have been tried or would not be effective before applying the restraint  2. Identify and document the criteria for restraint  3. Evaluate the patient's condition at the time of restraint application  4. Inform patient/family regarding the reason for restraint/seclusion  5. Q2H: Monitor comfort, nutrition and hydration needs  6. Q15M: Perform safety checks including skin, circulation, sensory, respiratory and psychological status  7. Ensure continuous observation  8.  Identify and implement measures to help patient regain control, assess readiness for release and initiate progressive release per policy  Outcome: Not Progressing     Problem: Nutrition Deficit:  Goal: Optimize nutritional status  Outcome: Progressing     Problem: Risk for Elopement  Goal: Patient will not exit the unit/facility without proper excort  Outcome: Not Progressing  Flowsheets  Taken 11/12/2023 0100  Nursing Interventions for Elopement Risk:   Reduce environmental triggers   Assist with personal care needs such as toileting, eating, dressing, as needed to reduce the risk of wandering   Make sure patient has all necessary personal care items  Taken 11/11/2023 2159  Nursing Interventions for Elopement Risk:   Reduce environmental triggers   Assist with personal care needs such as toileting, eating, dressing, as needed to reduce the risk of wandering   Make sure patient has all necessary personal care items     Problem: Safety - Violent/Self-destructive Restraint  Goal: Remains free of injury from restraints (Restraint for Violent/Self-Destructive Behavior)  Description: INTERVENTIONS:  1. Determine that de-escalation and other, less restrictive measures have been tried or would not be effective before applying the restraint  2. Identify and document the criteria for restraint  3. Evaluate the patient's condition at the time of restraint application  4. Inform patient/family regarding the reason for restraint/seclusion  5. Q2H: Monitor comfort, nutrition and hydration needs  6. Q15M: Perform safety checks including skin, circulation, sensory, respiratory and psychological status  7. Ensure continuous observation  8.  Identify and implement measures to help patient regain control, assess readiness for release and initiate progressive release per policy  Outcome: Not Progressing

## 2023-11-12 NOTE — PROGRESS NOTES
[phone] This charge nurse and the tech were notified that this patient was found outside with their sitter and security had responded to the situation. Upon arriving outside the patient was found to be in a random person's car. That person was waiting in the front loop for their loved one when she jumped in. The owner of the car cooperated with nursing staff and security stating they did not know the patient and proceeded to get out of the vehicle so our staff could aid in getting the patient out of the vehicle. The patient was then placed in a wheelchair and escorted back to their room with a sitter at the bedside. The patient was left in the bed and the sitter was left with a working phone and walkie talkie. This nurse notified the provider of the situation via perfect serve and notified the primary nurse verbally.

## 2023-11-12 NOTE — PROGRESS NOTES
IntraVENous 2 times per day    aspirin  81 mg Oral Daily      Infusions:    sodium chloride       PRN Meds: benzonatate, 100 mg, TID PRN  sodium chloride flush, 5-40 mL, PRN  sodium chloride, , PRN  ondansetron, 4 mg, Q8H PRN  polyethylene glycol, 17 g, Daily PRN  acetaminophen, 650 mg, Q6H PRN   Or  acetaminophen, 650 mg, Q6H PRN        Labs and Imaging   CT HEAD WO CONTRAST    Result Date: 11/4/2023  EXAMINATION: CT OF THE HEAD WITHOUT CONTRAST  11/4/2023 10:37 am TECHNIQUE: CT of the head was performed without the administration of intravenous contrast. Automated exposure control, iterative reconstruction, and/or weight based adjustment of the mA/kV was utilized to reduce the radiation dose to as low as reasonably achievable. COMPARISON: 06/12/2015 CT HISTORY: ORDERING SYSTEM PROVIDED HISTORY: AMS TECHNOLOGIST PROVIDED HISTORY: Reason for exam:->AMS Has a \"code stroke\" or \"stroke alert\" been called? ->No Decision Support Exception - unselect if not a suspected or confirmed emergency medical condition->Emergency Medical Condition (MA) Reason for Exam: AMS FINDINGS: BRAIN/VENTRICLES: There is an area of prior infarct within the left basal ganglia with a more diffuse pattern of decreased attenuation in the left frontal lobe. No sulcal effacement or mass effect upon the lateral ventricle. This is suspected to be remote in nature although this is new from the prior comparison study in 2015. There is no evidence of acute intracranial hemorrhage. Another small area of encephalomalacia is observed in the posterior left parietal lobe near the cortical surface. Brain parenchymal attenuation is otherwise normal. ORBITS: The visualized portion of the orbits demonstrate no acute abnormality. SINUSES: The visualized paranasal sinuses and mastoid air cells demonstrate no acute abnormality. SOFT TISSUES/SKULL:  No acute abnormality of the visualized skull or soft tissues.      1. 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 2. Encephalomalacia surrounding this area in the left frontal lobe with a separate focus also seen in the posterior left parietal lobe. Additional areas of prior infarction that are favored to be remote. An embolic phenomenon could be considered given differing vascular distributions. 3. No acute intracranial abnormality. XR CHEST PORTABLE    Result Date: 11/4/2023  EXAMINATION: ONE XRAY VIEW OF THE CHEST 11/4/2023 9:47 am COMPARISON: 06/07/2022 radiograph HISTORY: ORDERING SYSTEM PROVIDED HISTORY: Sepsis TECHNOLOGIST PROVIDED HISTORY: Reason for exam:->Sepsis Reason for Exam: ams/sob FINDINGS: The heart is enlarged. Mediastinum is normal.  Mild perihilar opacities centrally and a central pattern of mild ground-glass attenuation has developed in the lungs. No significant pleural fluid. No skeletal finding. Central pattern of ground-glass airspace change may represent vascular congestion or mild edema. A developing viral infection may be considered clinically. CBC:   Recent Labs     11/11/23  0956   WBC 7.0   HGB 13.5          BMP:    Recent Labs     11/11/23  0956   *   K 4.6   CL 93*   CO2 28   BUN 34*   CREATININE 1.6*   GLUCOSE 119*     Hepatic:   Recent Labs     11/11/23  0956   AST 41*   *   BILITOT 1.1*   ALKPHOS 95     Lipids:   Lab Results   Component Value Date/Time    CHOL 92 11/05/2023 06:02 AM    HDL 8 11/05/2023 06:02 AM    TRIG 133 11/05/2023 06:02 AM     Troponin:   Lab Results   Component Value Date/Time    TROPONINT 0.060 06/08/2022 12:34 PM    TROPONINT 0.046 06/07/2022 11:10 PM    TROPONINT 0.041 06/01/2022 05:30 AM     BNP:   No results for input(s): \"PROBNP\" in the last 72 hours.     UA:  Lab Results   Component Value Date/Time    NITRU NEGATIVE 11/08/2023 09:14 AM    NITRU NEGATIVE 08/18/2012 08:01 PM    COLORU YELLOW 11/08/2023 09:14 AM    WBCUA <1 11/08/2023 09:14 AM    RBCUA <1 11/08/2023 09:14 AM    MUCUS RARE 11/04/2023 11:25 AM

## 2023-11-12 NOTE — PROGRESS NOTES
V2.0    [facility] Progress Note      Name:  Shameka Ortega /Age/Sex: 1965  (60 y.o. female)   MRN & CSN:  [id number] & [id number] Encounter Date/Time: 2023 9:58 AM EST   Location:  [address] [place] [address] PCP: No primary care provider on file. Meghan Grijalva MD       Hospital Day: 9    Assessment and Recommendations   Shameka Ortega is a 60 y.o. female who presents with Acute on chronic systolic (congestive) heart failure ([address])    Patient is on medical hold at this time. Does not have capacity to leave AMA. Careful administration of medications in setting of impaired renal function and transaminitis. May use one time haldol overnight if required for agitation however pt appears to be redirectable thus encourage this technique first.    Plan: placement to [place] initiated, patient's daughter/mother agreeable    Acute metabolic encephalopathy  Delirium with behavioral disturbance  -Likely secondary to substance abuse and hypoxia.  -Pt still requiring sitter for agitation and behavioral disturbance  -Show support called on  for agitation in setting of impaired mentation with behavioral disturbance, pt ripped out her IV and attempting to leave AMA. Is alert and oriented to herself and place but not aware of why she is in the hospital or what she was treated for  -Psychiatry determined that pt does not have capacity, placed pt on medical hold for her safety  -Attempted to call pt's daughter several times however unsuccessful  -Discussed with pt's mother who is next of kin and agrees with medical hold  -CM able to reach pt's daughter who agrees with medical hold and with placement to [facility] at bedside    Acute hypoxia, troponin elevation secondary to acute on chronic systolic and diastolic heart failure  -Precipitant likely the cocaine abuse.   -Improved with IV diuresis, converted to p.o. torsemide  -Started on GDMT with low-dose Coreg, Aldactone and Jardiance added by

## 2023-11-13 LAB
ALBUMIN SERPL-MCNC: 3.7 GM/DL (ref 3.4–5)
ALP BLD-CCNC: 102 IU/L (ref 40–128)
ALT SERPL-CCNC: 86 U/L (ref 10–40)
ANION GAP SERPL CALCULATED.3IONS-SCNC: 12 MMOL/L (ref 4–16)
AST SERPL-CCNC: 31 IU/L (ref 15–37)
BILIRUB SERPL-MCNC: 0.9 MG/DL (ref 0–1)
BUN SERPL-MCNC: 35 MG/DL (ref 6–23)
CALCIUM SERPL-MCNC: 9.4 MG/DL (ref 8.3–10.6)
CHLORIDE BLD-SCNC: 93 MMOL/L (ref 99–110)
CO2: 30 MMOL/L (ref 21–32)
CREAT SERPL-MCNC: 1.5 MG/DL (ref 0.6–1.1)
GFR SERPL CREATININE-BSD FRML MDRD: 40 ML/MIN/1.73M2
GLUCOSE SERPL-MCNC: 95 MG/DL (ref 70–99)
POTASSIUM SERPL-SCNC: 4.6 MMOL/L (ref 3.5–5.1)
PRO-BNP: ABNORMAL PG/ML
SODIUM BLD-SCNC: 135 MMOL/L (ref 135–145)
TOTAL PROTEIN: 6.9 GM/DL (ref 6.4–8.2)

## 2023-11-13 PROCEDURE — 83880 ASSAY OF NATRIURETIC PEPTIDE: CPT

## 2023-11-13 PROCEDURE — 6370000000 HC RX 637 (ALT 250 FOR IP): Performed by: INTERNAL MEDICINE

## 2023-11-13 PROCEDURE — 36415 COLL VENOUS BLD VENIPUNCTURE: CPT

## 2023-11-13 PROCEDURE — 6370000000 HC RX 637 (ALT 250 FOR IP)

## 2023-11-13 PROCEDURE — 94761 N-INVAS EAR/PLS OXIMETRY MLT: CPT

## 2023-11-13 PROCEDURE — 80053 COMPREHEN METABOLIC PANEL: CPT

## 2023-11-13 PROCEDURE — 6370000000 HC RX 637 (ALT 250 FOR IP): Performed by: STUDENT IN AN ORGANIZED HEALTH CARE EDUCATION/TRAINING PROGRAM

## 2023-11-13 PROCEDURE — 2140000000 HC CCU INTERMEDIATE R&B

## 2023-11-13 RX ADMIN — EMPAGLIFLOZIN 10 MG: 10 TABLET, FILM COATED ORAL at 10:08

## 2023-11-13 RX ADMIN — TORSEMIDE 20 MG: 20 TABLET ORAL at 10:07

## 2023-11-13 RX ADMIN — CARVEDILOL 3.12 MG: 6.25 TABLET, FILM COATED ORAL at 16:59

## 2023-11-13 RX ADMIN — SPIRONOLACTONE 25 MG: 50 TABLET ORAL at 10:08

## 2023-11-13 RX ADMIN — APIXABAN 5 MG: 5 TABLET, FILM COATED ORAL at 21:29

## 2023-11-13 RX ADMIN — ASPIRIN 81 MG CHEWABLE TABLET 81 MG: 81 TABLET CHEWABLE at 10:10

## 2023-11-13 RX ADMIN — TORSEMIDE 20 MG: 20 TABLET ORAL at 21:29

## 2023-11-13 RX ADMIN — APIXABAN 5 MG: 5 TABLET, FILM COATED ORAL at 10:07

## 2023-11-13 ASSESSMENT — PAIN SCALES - GENERAL
PAINLEVEL_OUTOF10: 0

## 2023-11-13 NOTE — CARE COORDINATION
Chart reviewed. [id number] [facility] at [phone]; LVM for update on status.  Abner Hartley RN

## 2023-11-14 PROCEDURE — 94761 N-INVAS EAR/PLS OXIMETRY MLT: CPT

## 2023-11-14 PROCEDURE — 6370000000 HC RX 637 (ALT 250 FOR IP)

## 2023-11-14 PROCEDURE — 6370000000 HC RX 637 (ALT 250 FOR IP): Performed by: INTERNAL MEDICINE

## 2023-11-14 PROCEDURE — 1200000000 HC SEMI PRIVATE

## 2023-11-14 PROCEDURE — 6370000000 HC RX 637 (ALT 250 FOR IP): Performed by: STUDENT IN AN ORGANIZED HEALTH CARE EDUCATION/TRAINING PROGRAM

## 2023-11-14 RX ADMIN — ASPIRIN 81 MG CHEWABLE TABLET 81 MG: 81 TABLET CHEWABLE at 10:54

## 2023-11-14 RX ADMIN — TORSEMIDE 20 MG: 20 TABLET ORAL at 10:53

## 2023-11-14 RX ADMIN — CARVEDILOL 3.12 MG: 6.25 TABLET, FILM COATED ORAL at 10:56

## 2023-11-14 RX ADMIN — APIXABAN 5 MG: 5 TABLET, FILM COATED ORAL at 22:18

## 2023-11-14 RX ADMIN — EMPAGLIFLOZIN 10 MG: 10 TABLET, FILM COATED ORAL at 10:54

## 2023-11-14 RX ADMIN — TORSEMIDE 20 MG: 20 TABLET ORAL at 22:18

## 2023-11-14 RX ADMIN — SPIRONOLACTONE 25 MG: 50 TABLET ORAL at 10:53

## 2023-11-14 RX ADMIN — APIXABAN 5 MG: 5 TABLET, FILM COATED ORAL at 10:53

## 2023-11-14 NOTE — PROGRESS NOTES
1105 Pt up on sofa in room. Refused am blood pressure and vital signs.  Did allow assessment and took medicationssitter remains at bed side

## 2023-11-14 NOTE — PROGRESS NOTES
V2.0    [facility] Progress Note      Name:  Keegan Park /Age/Sex: 1965  (60 y.o. female)   MRN & CSN:  [id number] & [id number] Encounter Date/Time: 2023 9:58 AM EST   Location:  [id number]/4217-[id number] PCP: No primary care provider on file. Martín Rodriguez MD       Hospital Day: 10    Assessment and Recommendations   Keegan Park is a 60 y.o. female who presents with Acute on chronic systolic (congestive) heart failure ([address])    Unfortunately patient tried running away yesterday despite sitters efforts. She hopped into a stranger's car and was noted to be browsing his CD collection much to the 's surprise who was very cooperative and reported he did not know the pt. Patient was escorted back to her room in a wheelchair. Patient is being evaluated daily for capacity however still does not have capacity, she is able to describe basics such as her name and where she is and able to uphold conversation well to a certain degree however does not have judgement or insight and is high risk for unintentional harm to self. Daughter [facility]) initially considers whether pt would be safe to go home however as she spends more time with pt she agrees that pt is very confused and unsafe to go home to her mother who would be unable to take care of her (mother agrees). Daughter agrees with placement to [facility] and potentially LTC/SNF in [place] after that. Patient is on medical hold at this time. Does not have capacity to leave AMA. Careful administration of medications in setting of impaired renal function and transaminitis. May use one time haldol overnight if required for agitation however pt appears to be redirectable thus encourage this technique first. Please AVOID physical restraints.     Plan: placement to [facility] initiated, patient's daughter/mother agreeable    Acute metabolic encephalopathy  Delirium with behavioral disturbance  -Likely secondary to substance abuse and hypoxia.  -Pt

## 2023-11-14 NOTE — PLAN OF CARE
Problem: Discharge Planning  Goal: Discharge to home or other facility with appropriate resources  Outcome: Progressing     Problem: Pain  Goal: Verbalizes/displays adequate comfort level or baseline comfort level  Outcome: Progressing     Problem: Skin/Tissue Integrity  Goal: Absence of new skin breakdown  Description: 1. Monitor for areas of redness and/or skin breakdown  2. Assess vascular access sites hourly  3. Every 4-6 hours minimum:  Change oxygen saturation probe site  4. Every 4-6 hours:  If on nasal continuous positive airway pressure, respiratory therapy assess nares and determine need for appliance change or resting period.   Outcome: Progressing     Problem: Safety - Adult  Goal: Free from fall injury  Outcome: Progressing     Problem: ABCDS Injury Assessment  Goal: Absence of physical injury  Outcome: Progressing     Problem: Risk for Elopement  Goal: Patient will not exit the unit/facility without proper excort  Outcome: Progressing  Flowsheets  Taken 11/14/2023 1107 by Roger Ramos, RN  Nursing Interventions for Elopement Risk:   Reduce environmental triggers   Assist with personal care needs such as toileting, eating, dressing, as needed to reduce the risk of wandering   Make sure patient has all necessary personal care items  Taken 11/14/2023 0421 by Ernie Ohara, RN  Nursing Interventions for Elopement Risk:   Reduce environmental triggers   Assist with personal care needs such as toileting, eating, dressing, as needed to reduce the risk of wandering   Make sure patient has all necessary personal care items     Problem: Nutrition Deficit:  Goal: Optimize nutritional status  Outcome: Progressing

## 2023-11-14 NOTE — PROGRESS NOTES
V2.0    [facility] [place]      Name:  Major Andrews /Age/Sex: 1965  (60 y.o. female)   MRN & CSN:  [id number] & [id number] Encounter Date/Time: 2023 9:58 AM EST   Location:  [id number]- PCP: No primary care provider on file. Thelma Doshi MD       Hospital Day: 11    Assessment and Recommendations   Major Andrews is a 60 y.o. female who presents with Acute on chronic systolic (congestive) heart failure ([facility])      Patient is on medical hold at this time. Does not have capacity to leave AMA. Careful administration of medications in setting of impaired renal function and transaminitis. May use one time haldol overnight if required for agitation however pt appears to be redirectable thus encourage this technique first. Please AVOID physical restraints. Plan: placement to [place] initiated, patient's daughter and mother are both agreeable    Acute metabolic encephalopathy  Delirium with behavioral disturbance  -Likely secondary to substance abuse and hypoxia.  -Pt still requiring sitter for agitation and behavioral disturbance  -Show support called on  for agitation in setting of impaired mentation with behavioral disturbance, pt ripped out her IV and attempting to leave AMA. Is alert and oriented to herself and place but not aware of why she is in the hospital or what she was treated for  -Psychiatry determined that pt does not have capacity, placed pt on medical hold for her safety  -Attempted to call pt's daughter several times however unsuccessful  -Discussed with pt's mother who is next of kin and agrees with medical hold  -CM able to reach pt's daughter who agrees with medical hold and with placement to [facility] at bedside    Acute hypoxia, troponin elevation secondary to acute on chronic systolic and diastolic heart failure  -Precipitant likely the cocaine abuse.   -Improved with IV diuresis, converted to p.o. torsemide  -Started on GDMT with 09:14 AM    LEUKOCYTESUR NEGATIVE 11/08/2023 09:14 AM    UROBILINOGEN 0.2 11/08/2023 09:14 AM    BILIRUBINUR NEGATIVE 11/08/2023 09:14 AM    BLOODU SMALL NUMBER OR AMOUNT OBSERVED 11/08/2023 09:14 AM    GLUCOSEU NEGATIVE 08/18/2012 08:01 PM    KETUA NEGATIVE 11/08/2023 09:14 AM         Electronically signed by Sharon Tapia MD on 11/14/2023 at 11:43 AM

## 2023-11-14 NOTE — CARE COORDINATION
Called Herm[place] for decision for [facility]. She states that she will call [facility] for decision to accept or not and will notify This  asap. Pt still has a sitter. CM informed her that pt is medically ready.   TE

## 2023-11-14 NOTE — PROGRESS NOTES
1720 After speaking with daughter on the phone pt became agitated and began to walk the hallway. Sitter not far behind her. Sitter called charge nurse as pt went past double doors and sat in chair in lobby. Was not easily redirected to room, but did go back to room was swearing and grumbling under her breath. Will continue to monitor  [phone] new sitter as pt was getting verbally threatening the other one. New bedside sitter is out side of room with WOW at door side looking in on pt.

## 2023-11-15 PROCEDURE — 6370000000 HC RX 637 (ALT 250 FOR IP): Performed by: STUDENT IN AN ORGANIZED HEALTH CARE EDUCATION/TRAINING PROGRAM

## 2023-11-15 PROCEDURE — 94761 N-INVAS EAR/PLS OXIMETRY MLT: CPT

## 2023-11-15 PROCEDURE — 6370000000 HC RX 637 (ALT 250 FOR IP): Performed by: INTERNAL MEDICINE

## 2023-11-15 PROCEDURE — 97530 THERAPEUTIC ACTIVITIES: CPT

## 2023-11-15 PROCEDURE — 97116 GAIT TRAINING THERAPY: CPT

## 2023-11-15 PROCEDURE — 1200000000 HC SEMI PRIVATE

## 2023-11-15 PROCEDURE — 6370000000 HC RX 637 (ALT 250 FOR IP)

## 2023-11-15 RX ADMIN — TORSEMIDE 20 MG: 20 TABLET ORAL at 21:18

## 2023-11-15 RX ADMIN — APIXABAN 5 MG: 5 TABLET, FILM COATED ORAL at 21:18

## 2023-11-15 RX ADMIN — TORSEMIDE 20 MG: 20 TABLET ORAL at 09:09

## 2023-11-15 RX ADMIN — ASPIRIN 81 MG CHEWABLE TABLET 81 MG: 81 TABLET CHEWABLE at 09:09

## 2023-11-15 RX ADMIN — CARVEDILOL 3.12 MG: 6.25 TABLET, FILM COATED ORAL at 09:09

## 2023-11-15 RX ADMIN — EMPAGLIFLOZIN 10 MG: 10 TABLET, FILM COATED ORAL at 09:10

## 2023-11-15 RX ADMIN — SPIRONOLACTONE 25 MG: 50 TABLET ORAL at 09:09

## 2023-11-15 RX ADMIN — APIXABAN 5 MG: 5 TABLET, FILM COATED ORAL at 09:09

## 2023-11-15 RX ADMIN — CARVEDILOL 3.12 MG: 6.25 TABLET, FILM COATED ORAL at 17:28

## 2023-11-15 NOTE — PROGRESS NOTES
Physical Therapy Treatment Note  Name: Javier Paris MRN: [id number] :   1965   Date:  11/15/2023   Admission Date: 2023 Room:  [address] [place] [address]   Restrictions/Precautions:  Restrictions/Precautions  Restrictions/Precautions: General Precautions, Fall Risk, sitter   Communication with other providers:  Pt okay to see for therapy per RN   Subjective:  Patient states:  \"When are we Vidal Zayasches make this great escape? \"  Pain:   Location, Type, Intensity (0/10 to 10/10): Denies   Objective:    Observation:  Pt sitting EOB with sitting upon PTA arrival.   Objective Measures: Tele, stable. Treatment, including education/measures:    Therapeutic Activity Training:   Therapeutic activity training was instructed today. Cues were given for safety, sequence, UE/LE placement, awareness, and balance. Activities performed today included STS. Mobility:  STS: From EOB, SBA     Gait:  Pt ambulated 350ft SBA with cues to maintain ROBERT shoulder width apart to increase stability. Pt demos intermittent inconsistent foot placement, mild lateral instability dt periods of tandem walking. Exercises:  Pt performed standing heel raises, hip abd, hip ext, marching, and knee flexion 1 x 10 ea. With visual demo for form. Safety:   Pt returned safely to EOB with sitter, call light in reach, all needs met. Assessment / Impression:    Pt demos mild instability with ambulation this date. Patient's tolerance of treatment:  Good   Adverse Reaction: none  Significant change in status and impact:  none  Barriers to improvement:  none  Plan for Next Session:    Plan to initiate possible balance training.    Time in:  1509  Time out:  1519  Timed treatment minutes:  10  Total treatment time:  10    Previously filed items:  Social/Functional History  Lives With: Family  Bathroom Equipment: Shower chair  Home Equipment: Oxygen  Receives Help From: Family  Ambulation Assistance: Independent  Transfer Assistance: Independent  Active : Yes  Mode of Transportation: Car     Long Term Goals  Time Frame for Long Term Goals :  In one week:  Long Term Goal 1: Pt will complete all bed mobility and transfers independently  Long Term Goal 2: Pt will ambulate 400 feet independently  Long Term Goal 3: Pt will independently ascend/descend 12 steps with a handrail  Long Term Goal 4: Pt will independently complete 3 sets of 10 reps of BLE AROM exercises       Electronically signed by:    Clara Colby PTA  11/15/2023, 4:37 PM

## 2023-11-15 NOTE — PROGRESS NOTES
Echo (TTE) complete (PRN contrast/bubble/strain/3D)    Result Date: 11/6/2023    Contrast used: Definity. Left Ventricle: Reduced left ventricular systolic function with a visually estimated EF of 20 - 25%. Left ventricle is severely dilated. Global hypokinesis present. No significant valvular disease noted. Pericardium: No pericardial effusion. There is a mobile thrombus visualized along the apical inferior wall segment of the left ventricle. New frinding from previous echo. US ABDOMEN LIMITED Specify organ? LIVER    Result Date: 11/5/2023  EXAMINATION: RIGHT UPPER QUADRANT ULTRASOUND 11/5/2023 3:28 pm COMPARISON: None. HISTORY: ORDERING SYSTEM PROVIDED HISTORY: check for biliary ductal dilation TECHNOLOGIST PROVIDED HISTORY: Reason for exam:->check for biliary ductal dilation Specify organ?->LIVER Reason for Exam: abnormal labs FINDINGS: LIVER:  The liver demonstrates normal echogenicity without evidence of intrahepatic biliary ductal dilatation. BILIARY SYSTEM:  Prior cholecystectomy. Common bile duct is within normal limits measuring 2.2 mm. RIGHT KIDNEY: The right kidney is grossly unremarkable without evidence of hydronephrosis. PANCREAS:  Visualized portions of the pancreas are unremarkable. OTHER: No evidence of right upper quadrant ascites. Unremarkable right upper quadrant ultrasound. CT HEAD WO CONTRAST    Result Date: 11/4/2023  EXAMINATION: CT OF THE HEAD WITHOUT CONTRAST  11/4/2023 10:37 am TECHNIQUE: CT of the head was performed without the administration of intravenous contrast. Automated exposure control, iterative reconstruction, and/or weight based adjustment of the mA/kV was utilized to reduce the radiation dose to as low as reasonably achievable. COMPARISON: 06/12/2015 CT HISTORY: ORDERING SYSTEM PROVIDED HISTORY: [facility] TECHNOLOGIST PROVIDED HISTORY: Reason for exam:->AMS Has a \"code stroke\" or \"stroke alert\" been called? ->No Decision Support Exception - unselect if not a infection may be considered clinically. CBC:   No results for input(s): \"WBC\", \"HGB\", \"PLT\" in the last 72 hours.       BMP:    Recent Labs     11/13/23 0446      K 4.6   CL 93*   CO2 30   BUN 35*   CREATININE 1.5*   GLUCOSE 95       Hepatic:   Recent Labs     11/13/23 0446   AST 31   ALT 86*   BILITOT 0.9   ALKPHOS 102       Lipids:   Lab Results   Component Value Date/Time    CHOL 92 11/05/2023 06:02 AM    HDL 8 11/05/2023 06:02 AM    TRIG 133 11/05/2023 06:02 AM     Troponin:   Lab Results   Component Value Date/Time    TROPONINT 0.060 06/08/2022 12:34 PM    TROPONINT 0.046 06/07/2022 11:10 PM    TROPONINT 0.041 06/01/2022 05:30 AM     BNP:   Recent Labs     11/13/23 0446   PROBNP 11,612*         UA:  Lab Results   Component Value Date/Time    NITRU NEGATIVE 11/08/2023 09:14 AM    NITRU NEGATIVE 08/18/2012 08:01 PM    COLORU YELLOW 11/08/2023 09:14 AM    WBCUA <1 11/08/2023 09:14 AM    RBCUA <1 11/08/2023 09:14 AM    MUCUS RARE 11/04/2023 11:25 AM    TRICHOMONAS NONE SEEN 11/08/2023 09:14 AM    BACTERIA RARE 11/08/2023 09:14 AM    CLARITYU CLEAR 11/08/2023 09:14 AM    SPECGRAV <1.005 11/08/2023 09:14 AM    LEUKOCYTESUR NEGATIVE 11/08/2023 09:14 AM    UROBILINOGEN 0.2 11/08/2023 09:14 AM    BILIRUBINUR NEGATIVE 11/08/2023 09:14 AM    BLOODU SMALL NUMBER OR AMOUNT OBSERVED 11/08/2023 09:14 AM    GLUCOSEU NEGATIVE 08/18/2012 08:01 PM    KETUA NEGATIVE 11/08/2023 09:14 AM         Electronically signed by Kye Newman MD on 11/15/2023 at 1:37 PM

## 2023-11-15 NOTE — CARE COORDINATION
Pt is independent physically per PT/OT. Onel Sawyer will not approve a SNF d/t pt does not have any physical needs. SANDRA/Luanne notified CM that pt's family will need to call member services [phone] to change pt's insurance to [facility] or [facility] if they cannot change it to Medicaid so that pt can go to [facility] d/t her cognitive status. CM called pt's POA/daughter/Twila Manny العلي. No answer and VM not set up. Called pt's mother and asked her if she also could attempt to get a hold of Twila and have her call this CM asap so that CM can provide her with the information she needs to change pt's insurance d/t \"[employer]\". Case [id number]  Member ID# [id number] and needs pt's SS#.   TE

## 2023-11-15 NOTE — CARE COORDINATION
Still awaiting a decision from [place]. [facility] ([facility] to be [facility]) [phone] to see if they are able to accept [facility] pt's with behavior issues. The person I spoke with states that they do accept pt's with these issues. She transferred CM to their admissions contact/Herminia Carter. CM left a VM for her to call this CM asap so a referral can be made.   TE

## 2023-11-15 NOTE — PROGRESS NOTES
Occupational Therapy    Occupational Therapy Treatment Note    Name: Ileana Strickland MRN: [id number] :   1965   Date:  11/15/2023   Admission Date: 2023 Room:  [address] [place] [address]     D/c recommendation:  home with  supervision    Restrictions/Precautions:  Restrictions/Precautions  Restrictions/Precautions: General Precautions, Fall Risk       sitter    Communication with other providers: communicated d/t from OT with SANDRA RN cleared for therapy    Subjective:  Patient states:  \"I'm doing fine but ill go for a walk\"  Pain:   Location, Type, Intensity (0/10 to 10/10):  denies pain    Objective:    Observation: pt laying on couch with sitter in room upon arrival, agreeable to therapy   Objective Measures:  room air    Treatment, including education:  Therapeutic Activity Training:   Therapeutic activity training was instructed today. Cues were given for safety, sequence, UE/LE placement, awareness, and balance. Supine to sit on couch independently     Sit to stand from couch independently    200 ft functional mobility without DME independently    Ascended 2 steps and descended 3 steps with rails on B sides independently    Demonstrated ability to reach feet, back, and head for dressing/grooming/bathing but deferred practice at this time as she has been performing independently. Sitter in room confirmed functional status     Assessment / Impression:    Patient's tolerance of treatment: Well  Adverse Reaction: None  Significant change in status and impact: goals met  Barriers to improvement: None noted    Pt seen for OT treatment on this date. Pt demonstrated ability to transfer, ambulate, and perform ADLs independently on this date. Pt has met all OT goals at this time and appears to be back at baseline functional status. D/c pt from acute OT services at this time d/t no further skilled needs.     Plan for Next Session:    Continue OT POC    Time in:  1341  Time out:  1349  Timed treatment minutes:

## 2023-11-15 NOTE — PLAN OF CARE
Problem: Discharge Planning  Goal: Discharge to home or other facility with appropriate resources  Outcome: Not Progressing   Waiting for pre cert

## 2023-11-15 NOTE — CARE COORDINATION
Herminia/[place] notified that [address] [place] [address] is requesting updated PT/OT notes. Requested updated PT/OT notes via WB.   TE

## 2023-11-15 NOTE — PROGRESS NOTES
Comprehensive Nutrition Assessment    Type and Reason for Visit:  Reassess    Nutrition Recommendations/Plan:   Continue current diet  Continue standard oral nutrition supplement daily  Trial frozen oral nutrition supplement daily  Please continue to encourage and document po intakes at all meals  Monitor weights, intakes, labs, POC     Malnutrition Assessment:  Malnutrition Status: At risk for malnutrition (Comment) (11/10/23 1702)    Context:  Acute Illness       Nutrition Assessment:    Pt sleeping soundly at visit, sitter in room. Pt consuming >50% of most of her meals during stay, w/ po intakes improving since admit. However, wt continues to trend downward, no significant edema noted. Will continue sending standard oral supp, trial frozen daily as well. Last BM documented 11/8, recommend providiing PRN bowel meds if needed. Will continue to follow at moderate nutrition risk. Nutrition Related Findings:    Hx chronic tobacco dependence, cocaine abuse, anxiety, CKD. On torsemide. Wound Type: None       Current Nutrition Intake & Therapies:    Average Meal Intake: 26-50%, 51-75%, % (improving)  Average Supplements Intake: Unable to assess  ADULT DIET; Regular; No Added Salt (3-4 gm)  ADULT ORAL NUTRITION SUPPLEMENT; Lunch; Standard High Calorie/High Protein Oral Supplement    Anthropometric Measures:  Height: 170.2 cm (5' 7.01\")  Ideal Body Weight (IBW): 135 lbs (61 kg)    Admission Body Weight: 77.9 kg (171 lb 11.8 oz)  Current Body Weight: 69 kg (152 lb 1.9 oz),   IBW. Weight Source: Bed Scale  Current BMI (kg/m2): 23.8  Usual Body Weight: 78.9 kg (174 lb)  % Weight Change (Calculated): -9.9  Weight Adjustment For: No Adjustment                 BMI Categories: Normal Weight (BMI 18.5-24. 9)    Estimated Daily Nutrient Needs:  Energy Requirements Based On: Formula  Weight Used for Energy Requirements: Current  Energy (kcal/day): 8424-6461  Weight Used for Protein Requirements: Ideal  Protein (g/day):

## 2023-11-16 PROCEDURE — 94761 N-INVAS EAR/PLS OXIMETRY MLT: CPT

## 2023-11-16 PROCEDURE — 1200000000 HC SEMI PRIVATE

## 2023-11-16 PROCEDURE — 6370000000 HC RX 637 (ALT 250 FOR IP): Performed by: INTERNAL MEDICINE

## 2023-11-16 PROCEDURE — 6370000000 HC RX 637 (ALT 250 FOR IP)

## 2023-11-16 PROCEDURE — 6370000000 HC RX 637 (ALT 250 FOR IP): Performed by: STUDENT IN AN ORGANIZED HEALTH CARE EDUCATION/TRAINING PROGRAM

## 2023-11-16 RX ADMIN — SPIRONOLACTONE 25 MG: 50 TABLET ORAL at 08:27

## 2023-11-16 RX ADMIN — EMPAGLIFLOZIN 10 MG: 10 TABLET, FILM COATED ORAL at 08:27

## 2023-11-16 RX ADMIN — APIXABAN 5 MG: 5 TABLET, FILM COATED ORAL at 21:40

## 2023-11-16 RX ADMIN — TORSEMIDE 20 MG: 20 TABLET ORAL at 08:27

## 2023-11-16 RX ADMIN — TORSEMIDE 20 MG: 20 TABLET ORAL at 21:40

## 2023-11-16 RX ADMIN — ASPIRIN 81 MG CHEWABLE TABLET 81 MG: 81 TABLET CHEWABLE at 08:27

## 2023-11-16 RX ADMIN — APIXABAN 5 MG: 5 TABLET, FILM COATED ORAL at 08:27

## 2023-11-16 ASSESSMENT — ENCOUNTER SYMPTOMS
CHOKING: 0
DIARRHEA: 0
BACK PAIN: 0
SHORTNESS OF BREATH: 0
BLOOD IN STOOL: 0
ABDOMINAL PAIN: 0
CHEST TIGHTNESS: 0
CONSTIPATION: 0
VOMITING: 0
WHEEZING: 0
EYE PAIN: 0
COUGH: 0
ABDOMINAL DISTENTION: 0
NAUSEA: 0
STRIDOR: 0

## 2023-11-16 NOTE — CARE COORDINATION
Had a VM from daughter that she did not answer her phone d/t she had a migraine and had her phone turned off. CM called her at this time, no answer. Unable to leave a VM. CM will attempt again today.   TE

## 2023-11-16 NOTE — CARE COORDINATION
Notified daughter/POA of need to change pt's Cares[employer] to Medicaid or [facility] for placement. Provided her with the Member service's phone number to call [phone] M-F 7am-8pm.  Case # [id number]  Member ID #[id number]. She states that she works in a SNF in [place] and she has had her mother there before under [facility]. She is going to ask her mother if she want to go to a facility in [place] to be close to her or not. She states that they have facilities that are able to take pt's with behaviors & mental health issues. If pt tells her no then daughter will call to have pt's Caresource changed to Medicaid or [facility]. Daughter will notify CM of plan asap.  TE

## 2023-11-16 NOTE — PROGRESS NOTES
considered given differing vascular distributions. 3. No acute intracranial abnormality. XR CHEST PORTABLE    Result Date: 11/4/2023  EXAMINATION: ONE XRAY VIEW OF THE CHEST 11/4/2023 9:47 am COMPARISON: 06/07/2022 radiograph HISTORY: ORDERING SYSTEM PROVIDED HISTORY: Sepsis TECHNOLOGIST PROVIDED HISTORY: Reason for exam:->Sepsis Reason for Exam: ams/sob FINDINGS: The heart is enlarged. Mediastinum is normal.  Mild perihilar opacities centrally and a central pattern of mild ground-glass attenuation has developed in the lungs. No significant pleural fluid. No skeletal finding. Central pattern of ground-glass airspace change may represent vascular congestion or mild edema. A developing viral infection may be considered clinically. CBC:   No results for input(s): \"WBC\", \"HGB\", \"PLT\" in the last 72 hours. BMP:    No results for input(s): \"NA\", \"K\", \"CL\", \"CO2\", \"BUN\", \"CREATININE\", \"GLUCOSE\" in the last 72 hours. Hepatic:   No results for input(s): \"AST\", \"ALT\", \"ALB\", \"BILITOT\", \"ALKPHOS\" in the last 72 hours. Lipids:   Lab Results   Component Value Date/Time    CHOL 92 11/05/2023 06:02 AM    HDL 8 11/05/2023 06:02 AM    TRIG 133 11/05/2023 06:02 AM     Troponin:   Lab Results   Component Value Date/Time    TROPONINT 0.060 06/08/2022 12:34 PM    TROPONINT 0.046 06/07/2022 11:10 PM    TROPONINT 0.041 06/01/2022 05:30 AM     BNP:   No results for input(s): \"PROBNP\" in the last 72 hours.       UA:  Lab Results   Component Value Date/Time    NITRU NEGATIVE 11/08/2023 09:14 AM    NITRU NEGATIVE 08/18/2012 08:01 PM    COLORU YELLOW 11/08/2023 09:14 AM    WBCUA <1 11/08/2023 09:14 AM    RBCUA <1 11/08/2023 09:14 AM    MUCUS RARE 11/04/2023 11:25 AM    TRICHOMONAS NONE SEEN 11/08/2023 09:14 AM    BACTERIA RARE 11/08/2023 09:14 AM    CLARITYU CLEAR 11/08/2023 09:14 AM    SPECGRAV <1.005 11/08/2023 09:14 AM    LEUKOCYTESUR NEGATIVE 11/08/2023 09:14 AM    UROBILINOGEN 0.2 11/08/2023 09:14 AM BILIRUBINUR NEGATIVE 11/08/2023 09:14 AM    BLOODU SMALL NUMBER OR AMOUNT OBSERVED 11/08/2023 09:14 AM    GLUCOSEU NEGATIVE 08/18/2012 08:01 PM    KETUA NEGATIVE 11/08/2023 09:14 AM         Electronically signed by Awa Gaspar MD on 11/16/2023 at 1:32 PM

## 2023-11-17 PROCEDURE — 6370000000 HC RX 637 (ALT 250 FOR IP)

## 2023-11-17 PROCEDURE — 6370000000 HC RX 637 (ALT 250 FOR IP): Performed by: INTERNAL MEDICINE

## 2023-11-17 PROCEDURE — 6370000000 HC RX 637 (ALT 250 FOR IP): Performed by: STUDENT IN AN ORGANIZED HEALTH CARE EDUCATION/TRAINING PROGRAM

## 2023-11-17 PROCEDURE — 1200000000 HC SEMI PRIVATE

## 2023-11-17 PROCEDURE — 6370000000 HC RX 637 (ALT 250 FOR IP): Performed by: NURSE PRACTITIONER

## 2023-11-17 PROCEDURE — 99233 SBSQ HOSP IP/OBS HIGH 50: CPT | Performed by: NURSE PRACTITIONER

## 2023-11-17 RX ORDER — RISPERIDONE 0.5 MG/1
0.5 TABLET ORAL 2 TIMES DAILY
Status: DISCONTINUED | OUTPATIENT
Start: 2023-11-17 | End: 2023-12-06 | Stop reason: HOSPADM

## 2023-11-17 RX ORDER — RISPERIDONE 0.5 MG/1
0.5 TABLET ORAL ONCE
Status: COMPLETED | OUTPATIENT
Start: 2023-11-17 | End: 2023-11-17

## 2023-11-17 RX ADMIN — CARVEDILOL 3.12 MG: 6.25 TABLET, FILM COATED ORAL at 08:06

## 2023-11-17 RX ADMIN — APIXABAN 5 MG: 5 TABLET, FILM COATED ORAL at 08:06

## 2023-11-17 RX ADMIN — RISPERIDONE 0.5 MG: 0.5 TABLET ORAL at 21:28

## 2023-11-17 RX ADMIN — SPIRONOLACTONE 25 MG: 50 TABLET ORAL at 08:06

## 2023-11-17 RX ADMIN — TORSEMIDE 20 MG: 20 TABLET ORAL at 21:28

## 2023-11-17 RX ADMIN — APIXABAN 5 MG: 5 TABLET, FILM COATED ORAL at 21:28

## 2023-11-17 RX ADMIN — TORSEMIDE 20 MG: 20 TABLET ORAL at 08:06

## 2023-11-17 RX ADMIN — ASPIRIN 81 MG CHEWABLE TABLET 81 MG: 81 TABLET CHEWABLE at 08:06

## 2023-11-17 RX ADMIN — RISPERIDONE 0.5 MG: 0.5 TABLET ORAL at 12:00

## 2023-11-17 RX ADMIN — EMPAGLIFLOZIN 10 MG: 10 TABLET, FILM COATED ORAL at 08:05

## 2023-11-17 ASSESSMENT — ENCOUNTER SYMPTOMS
BACK PAIN: 0
VOMITING: 0
CONSTIPATION: 0
DIARRHEA: 0
EYE PAIN: 0
COUGH: 0
CHEST TIGHTNESS: 0
ABDOMINAL DISTENTION: 0
CHOKING: 0
BLOOD IN STOOL: 0
SHORTNESS OF BREATH: 0
NAUSEA: 0
ABDOMINAL PAIN: 0
WHEEZING: 0
STRIDOR: 0

## 2023-11-17 ASSESSMENT — PAIN SCALES - WONG BAKER
WONGBAKER_NUMERICALRESPONSE: 0
WONGBAKER_NUMERICALRESPONSE: 0

## 2023-11-17 ASSESSMENT — PAIN SCALES - GENERAL
PAINLEVEL_OUTOF10: 0
PAINLEVEL_OUTOF10: 0

## 2023-11-17 NOTE — PROGRESS NOTES
Physical Therapy  Att. Pt declined at this time. Sitter in room and reports pt evelin been amb with nursing.

## 2023-11-17 NOTE — CARE COORDINATION
Called pt's daughter/POA to see if she has called Member services to have pt's Caresource changed to Medicaid or [facility]. She states that she wants pt to go to one of the SNF's that the company that she works for owns [address] [place] [address] or to [facility] in [place]. She states that she will get pt on [facility] as she has done before. She states that they will not have to wait on a Medicaid pending number d/t pt already has a Medicaid product in [place]. Clinicals faxed to [facility] [phone] and to [address] [place] [address] [phone]. A CM will need to f/u with daughter/Twila [phone] on Monday to check on status of [facility] and for decisions from both facilities.   TE

## 2023-11-17 NOTE — PROGRESS NOTES
V2.0    [facility] [place]      Name:  Kira Frank /Age/Sex: 1965  (60 y.o. female)   MRN & CSN:  [id number] & [id number] Encounter Date/Time: 2023 9:58 AM EST   Location:  [address] [place] [address] PCP: No primary care provider on file. Isabel Mckeon MD       Hospital Day: 14    Assessment and Recommendations   Kira Frank is a 60 y.o. female who presents with Acute on chronic systolic (congestive) heart failure [facility])      Patient is on medical hold at this time. Does not have capacity to leave AMA. Careful administration of medications in setting of impaired renal function and transaminitis. Please AVOID physical restraints. Acute metabolic encephalopathy  Delirium with behavioral disturbance  -Likely secondary to substance abuse and hypoxia.  -Pt still requiring sitter for agitation and behavioral disturbance  -Show support called on  for agitation in setting of impaired mentation with behavioral disturbance, pt ripped out her IV and attempting to leave AMA. Is alert and oriented to herself and place but not aware of why she is in the hospital or what she was treated for  -Psychiatry determined that pt does not have capacity, placed pt on medical hold for her safety  -CM able to reach pt's daughter who agrees with medical hold and with placement  -Maintain sitter at bedside  - Risperdal 0.5mg BID started today     Acute hypoxia - Resolved   Troponin elevation  Acute on chronic systolic and diastolic heart failure  -Precipitant likely the cocaine abuse. -Improved with IV diuresis, converted to p.o. torsemide  -Started on GDMT with low-dose Coreg, Aldactone and Jardiance added by nephrology. Acute renal failure - Resolved   -Suspect cardiorenal syndrome   -Diuresis as above.   -Nephrology following    A-fib with RVR  Possible apical thrombus -on TTE  NICM -LVEF depressed  -Heart rate improved, continue digoxin.  -Continue full dose AC  -EP evaluated pt for ICD however pt

## 2023-11-17 NOTE — PLAN OF CARE
Problem: Discharge Planning  Goal: Discharge to home or other facility with appropriate resources  Outcome: Progressing     Problem: Pain  Goal: Verbalizes/displays adequate comfort level or baseline comfort level  Outcome: Progressing     Problem: Skin/Tissue Integrity  Goal: Absence of new skin breakdown  Description: 1. Monitor for areas of redness and/or skin breakdown  2. Assess vascular access sites hourly  3. Every 4-6 hours minimum:  Change oxygen saturation probe site  4. Every 4-6 hours:  If on nasal continuous positive airway pressure, respiratory therapy assess nares and determine need for appliance change or resting period.   Outcome: Progressing     Problem: Safety - Adult  Goal: Free from fall injury  Outcome: Progressing     Problem: ABCDS Injury Assessment  Goal: Absence of physical injury  Outcome: Progressing     Problem: Risk for Elopement  Goal: Patient will not exit the unit/facility without proper excort  Outcome: Progressing  Flowsheets  Taken 11/17/2023 0749 by Madhuri Rogers, RN  Nursing Interventions for Elopement Risk:   Reduce environmental triggers   Assist with personal care needs such as toileting, eating, dressing, as needed to reduce the risk of wandering   Make sure patient has all necessary personal care items  Taken 11/16/2023 2100 by Mansi Porter, RN  Nursing Interventions for Elopement Risk:   Reduce environmental triggers   Assist with personal care needs such as toileting, eating, dressing, as needed to reduce the risk of wandering   Make sure patient has all necessary personal care items     Problem: Nutrition Deficit:  Goal: Optimize nutritional status  Outcome: Progressing

## 2023-11-18 LAB
ALBUMIN SERPL-MCNC: 3.6 GM/DL (ref 3.4–5)
ALP BLD-CCNC: 100 IU/L (ref 40–128)
ALT SERPL-CCNC: 32 U/L (ref 10–40)
ANION GAP SERPL CALCULATED.3IONS-SCNC: 9 MMOL/L (ref 4–16)
AST SERPL-CCNC: 20 IU/L (ref 15–37)
BILIRUB SERPL-MCNC: 0.7 MG/DL (ref 0–1)
BUN SERPL-MCNC: 44 MG/DL (ref 6–23)
CALCIUM SERPL-MCNC: 9.4 MG/DL (ref 8.3–10.6)
CHLORIDE BLD-SCNC: 95 MMOL/L (ref 99–110)
CO2: 30 MMOL/L (ref 21–32)
CREAT SERPL-MCNC: 1.6 MG/DL (ref 0.6–1.1)
GFR SERPL CREATININE-BSD FRML MDRD: 37 ML/MIN/1.73M2
GLUCOSE SERPL-MCNC: 97 MG/DL (ref 70–99)
MAGNESIUM: 2.3 MG/DL (ref 1.8–2.4)
PHOSPHORUS: 4.6 MG/DL (ref 2.5–4.9)
POTASSIUM SERPL-SCNC: 4.9 MMOL/L (ref 3.5–5.1)
SODIUM BLD-SCNC: 134 MMOL/L (ref 135–145)
TOTAL PROTEIN: 6.6 GM/DL (ref 6.4–8.2)

## 2023-11-18 PROCEDURE — 6370000000 HC RX 637 (ALT 250 FOR IP): Performed by: STUDENT IN AN ORGANIZED HEALTH CARE EDUCATION/TRAINING PROGRAM

## 2023-11-18 PROCEDURE — 6370000000 HC RX 637 (ALT 250 FOR IP)

## 2023-11-18 PROCEDURE — 6370000000 HC RX 637 (ALT 250 FOR IP): Performed by: INTERNAL MEDICINE

## 2023-11-18 PROCEDURE — 80053 COMPREHEN METABOLIC PANEL: CPT

## 2023-11-18 PROCEDURE — 83735 ASSAY OF MAGNESIUM: CPT

## 2023-11-18 PROCEDURE — 1200000000 HC SEMI PRIVATE

## 2023-11-18 PROCEDURE — 6370000000 HC RX 637 (ALT 250 FOR IP): Performed by: NURSE PRACTITIONER

## 2023-11-18 PROCEDURE — 84100 ASSAY OF PHOSPHORUS: CPT

## 2023-11-18 PROCEDURE — 36415 COLL VENOUS BLD VENIPUNCTURE: CPT

## 2023-11-18 RX ADMIN — SPIRONOLACTONE 25 MG: 50 TABLET ORAL at 08:07

## 2023-11-18 RX ADMIN — RISPERIDONE 0.5 MG: 0.5 TABLET ORAL at 08:07

## 2023-11-18 RX ADMIN — APIXABAN 5 MG: 5 TABLET, FILM COATED ORAL at 22:20

## 2023-11-18 RX ADMIN — TORSEMIDE 20 MG: 20 TABLET ORAL at 22:20

## 2023-11-18 RX ADMIN — APIXABAN 5 MG: 5 TABLET, FILM COATED ORAL at 08:07

## 2023-11-18 RX ADMIN — EMPAGLIFLOZIN 10 MG: 10 TABLET, FILM COATED ORAL at 08:08

## 2023-11-18 RX ADMIN — CARVEDILOL 3.12 MG: 6.25 TABLET, FILM COATED ORAL at 08:07

## 2023-11-18 RX ADMIN — RISPERIDONE 0.5 MG: 0.5 TABLET ORAL at 22:20

## 2023-11-18 RX ADMIN — TORSEMIDE 20 MG: 20 TABLET ORAL at 08:07

## 2023-11-18 RX ADMIN — ASPIRIN 81 MG CHEWABLE TABLET 81 MG: 81 TABLET CHEWABLE at 08:07

## 2023-11-18 ASSESSMENT — ENCOUNTER SYMPTOMS
WHEEZING: 0
STRIDOR: 0
BLOOD IN STOOL: 0
BACK PAIN: 0
ABDOMINAL PAIN: 0
NAUSEA: 0
ABDOMINAL DISTENTION: 0
CONSTIPATION: 0
SHORTNESS OF BREATH: 0
CHOKING: 0
CHEST TIGHTNESS: 0
DIARRHEA: 0
EYE PAIN: 0
VOMITING: 0
COUGH: 0

## 2023-11-18 ASSESSMENT — PAIN SCALES - WONG BAKER: WONGBAKER_NUMERICALRESPONSE: 0

## 2023-11-18 ASSESSMENT — PAIN SCALES - GENERAL
PAINLEVEL_OUTOF10: 0
PAINLEVEL_OUTOF10: 0

## 2023-11-18 NOTE — PROGRESS NOTES
V2.0    [facility] [place]      Name:  Aga Martinez /Age/Sex: 1965  (60 y.o. female)   MRN & CSN:  [id number] & [id number] Encounter Date/Time: 2023 9:58 AM EST   Location:  [facility] PCP: No primary care provider on file. Wang Maguire MD       Hospital Day: 15    Assessment and Recommendations   Aga Martinez is a 60 y.o. female who presents with Acute on chronic systolic (congestive) heart failure [facility])      Patient is on medical hold at this time. Does not have capacity to leave AMA. Careful administration of medications in setting of impaired renal function and transaminitis. Please AVOID physical restraints. Acute metabolic encephalopathy  Delirium with behavioral disturbance  -Likely secondary to substance abuse and hypoxia.  -Pt still requiring sitter for agitation and behavioral disturbance  -Show support called on  for agitation in setting of impaired mentation with behavioral disturbance, pt ripped out her IV and attempting to leave AMA. Is alert and oriented to herself and place but not aware of why she is in the hospital or what she was treated for  -Psychiatry determined that pt does not have capacity, placed pt on medical hold for her safety  -CM able to reach pt's daughter who agrees with medical hold and with placement  -Maintain sitter at bedside  - Risperdal 0.5mg BID     Acute hypoxia - Resolved   Troponin elevation  Acute on chronic systolic and diastolic heart failure  -Precipitant likely the cocaine abuse. -Improved with IV diuresis, converted to p.o. torsemide  -Started on GDMT with low-dose Coreg, Aldactone and Jardiance added by nephrology. Acute renal failure - Resolved   -Suspect cardiorenal syndrome   -Diuresis as above.   -Nephrology following    A-fib with RVR  Possible apical thrombus -on TTE  NICM -LVEF depressed  -Heart rate improved, continue digoxin.  -Continue full dose AC  -EP evaluated pt for ICD however pt not a candidate

## 2023-11-18 NOTE — PROGRESS NOTES
Nephrology Progress Note  11/18/2023 8:44 AM  Subjective: Interval History: Shameka Ortega is a 60 y.o. female with appears doing better overall weak tired in setting of congestive heart failure      Data:   Scheduled Meds:   risperiDONE  0.5 mg Oral BID    torsemide  20 mg Oral BID    carvedilol  3.125 mg Oral BID WC    empagliflozin  10 mg Oral Daily    spironolactone  25 mg Oral Daily    apixaban  5 mg Oral BID    sodium chloride flush  5-40 mL IntraVENous 2 times per day    aspirin  81 mg Oral Daily     Continuous Infusions:   sodium chloride           CBC No results for input(s): \"WBC\", \"HGB\", \"HCT\", \"PLT\" in the last 72 hours. BMP   Recent Labs     11/18/23 0416   *   K 4.9   CL 95*   CO2 30   PHOS 4.6   BUN 44*   CREATININE 1.6*     Hepatic:   Recent Labs     11/18/23 0416   AST 20   ALT 32   BILITOT 0.7   ALKPHOS 100     Troponin: No results for input(s): \"TROPONINI\" in the last 72 hours. BNP: No results for input(s): \"BNP\" in the last 72 hours. Lipids: No results for input(s): \"CHOL\", \"HDL\" in the last 72 hours. Invalid input(s): \"LDLCALCU\"  ABGs: No results found for: \"PHART\", \"PO2ART\", \"MPR4OIX\"  INR: No results for input(s): \"INR\" in the last 72 hours.   Renal Labs  Albumin:    Lab Results   Component Value Date/Time    LABALBU 3.6 11/18/2023 04:16 AM     Calcium:    Lab Results   Component Value Date/Time    CALCIUM 9.4 11/18/2023 04:16 AM     Phosphorus:    Lab Results   Component Value Date/Time    PHOS 4.6 11/18/2023 04:16 AM     U/A:    Lab Results   Component Value Date/Time    NITRU NEGATIVE 11/08/2023 09:14 AM    NITRU NEGATIVE 08/18/2012 08:01 PM    COLORU YELLOW 11/08/2023 09:14 AM    WBCUA <1 11/08/2023 09:14 AM    RBCUA <1 11/08/2023 09:14 AM    MUCUS RARE 11/04/2023 11:25 AM    TRICHOMONAS NONE SEEN 11/08/2023 09:14 AM    BACTERIA RARE 11/08/2023 09:14 AM    CLARITYU CLEAR 11/08/2023 09:14 AM    SPECGRAV <1.005 11/08/2023 09:14 AM    UROBILINOGEN 0.2 11/08/2023 09:14 AM will follow           Clint Wild MD, MD

## 2023-11-18 NOTE — PLAN OF CARE
Problem: Discharge Planning  Goal: Discharge to home or other facility with appropriate resources  Outcome: Progressing     Problem: Pain  Goal: Verbalizes/displays adequate comfort level or baseline comfort level  Outcome: Progressing     Problem: Skin/Tissue Integrity  Goal: Absence of new skin breakdown  Description: 1. Monitor for areas of redness and/or skin breakdown  2. Assess vascular access sites hourly  3. Every 4-6 hours minimum:  Change oxygen saturation probe site  4. Every 4-6 hours:  If on nasal continuous positive airway pressure, respiratory therapy assess nares and determine need for appliance change or resting period.   Outcome: Progressing     Problem: Safety - Adult  Goal: Free from fall injury  Outcome: Progressing  Flowsheets (Taken 11/18/2023 1526)  Free From Fall Injury:   Instruct family/caregiver on patient safety   Based on caregiver fall risk screen, instruct family/caregiver to ask for assistance with transferring infant if caregiver noted to have fall risk factors     Problem: ABCDS Injury Assessment  Goal: Absence of physical injury  Outcome: Progressing  Flowsheets (Taken 11/18/2023 1526)  Absence of Physical Injury: Implement safety measures based on patient assessment     Problem: Risk for Elopement  Goal: Patient will not exit the unit/facility without proper excort  Outcome: Progressing  Flowsheets (Taken 11/18/2023 1232)  Nursing Interventions for Elopement Risk:   Reduce environmental triggers   Assist with personal care needs such as toileting, eating, dressing, as needed to reduce the risk of wandering   Make sure patient has all necessary personal care items     Problem: Nutrition Deficit:  Goal: Optimize nutritional status  Outcome: Progressing

## 2023-11-18 NOTE — PROGRESS NOTES
4 Eyes Skin Assessment     NAME:  Emelina Oliveros OF BIRTH:  1965  MEDICAL RECORD NUMBER:  [id number]    The patient is being assessed for  Transfer to New Unit    I agree that at least one RN has performed a thorough Head to Toe Skin Assessment on the patient. ALL assessment sites listed below have been assessed. Areas assessed by both nurses:    Head, Face, Ears, Shoulders, Back, Chest, Arms, Elbows, Hands, Sacrum. Buttock, Coccyx, Ischium, Legs. Feet and Heels, and Under Medical Devices         Does the Patient have a Wound?  No noted wound(s)       Gunnar Prevention initiated by RN: Yes  Wound Care Orders initiated by RN: No    Pressure Injury (Stage 3,4, Unstageable, DTI, NWPT, and Complex wounds) if present, place Wound referral order by RN under : No    New Ostomies, if present place, Ostomy referral order under : No     Nurse 1 eSignature: Electronically signed by Garfield Tariq LPN on 11/36/24 at 37:84 AM EST    **SHARE this note so that the co-signing nurse can place an eSignature**    Nurse 2 eSignature: Electronically signed by Tanna Le LPN on 55/35/93 at 65:63 AM EST

## 2023-11-19 PROCEDURE — 1200000000 HC SEMI PRIVATE

## 2023-11-19 PROCEDURE — 6370000000 HC RX 637 (ALT 250 FOR IP): Performed by: STUDENT IN AN ORGANIZED HEALTH CARE EDUCATION/TRAINING PROGRAM

## 2023-11-19 PROCEDURE — 6370000000 HC RX 637 (ALT 250 FOR IP): Performed by: NURSE PRACTITIONER

## 2023-11-19 PROCEDURE — 6370000000 HC RX 637 (ALT 250 FOR IP): Performed by: INTERNAL MEDICINE

## 2023-11-19 PROCEDURE — 94761 N-INVAS EAR/PLS OXIMETRY MLT: CPT

## 2023-11-19 PROCEDURE — 6370000000 HC RX 637 (ALT 250 FOR IP)

## 2023-11-19 RX ADMIN — TORSEMIDE 20 MG: 20 TABLET ORAL at 10:00

## 2023-11-19 RX ADMIN — APIXABAN 5 MG: 5 TABLET, FILM COATED ORAL at 20:38

## 2023-11-19 RX ADMIN — APIXABAN 5 MG: 5 TABLET, FILM COATED ORAL at 09:59

## 2023-11-19 RX ADMIN — RISPERIDONE 0.5 MG: 0.5 TABLET ORAL at 09:59

## 2023-11-19 RX ADMIN — RISPERIDONE 0.5 MG: 0.5 TABLET ORAL at 20:38

## 2023-11-19 RX ADMIN — EMPAGLIFLOZIN 10 MG: 10 TABLET, FILM COATED ORAL at 10:00

## 2023-11-19 RX ADMIN — TORSEMIDE 20 MG: 20 TABLET ORAL at 20:39

## 2023-11-19 RX ADMIN — ASPIRIN 81 MG CHEWABLE TABLET 81 MG: 81 TABLET CHEWABLE at 10:00

## 2023-11-19 RX ADMIN — SPIRONOLACTONE 25 MG: 50 TABLET ORAL at 10:00

## 2023-11-19 RX ADMIN — CARVEDILOL 3.12 MG: 6.25 TABLET, FILM COATED ORAL at 17:50

## 2023-11-19 ASSESSMENT — ENCOUNTER SYMPTOMS
STRIDOR: 0
ABDOMINAL DISTENTION: 0
ABDOMINAL PAIN: 0
COUGH: 0
CHOKING: 0
SHORTNESS OF BREATH: 0
WHEEZING: 0
DIARRHEA: 0
CHEST TIGHTNESS: 0
BACK PAIN: 0
VOMITING: 0
NAUSEA: 0
BLOOD IN STOOL: 0
EYE PAIN: 0
CONSTIPATION: 0

## 2023-11-19 ASSESSMENT — PAIN SCALES - GENERAL
PAINLEVEL_OUTOF10: 0

## 2023-11-19 NOTE — PLAN OF CARE
Problem: Discharge Planning  Goal: Discharge to home or other facility with appropriate resources  11/18/2023 2343 by Bennie Green LPN  Outcome: Progressing  11/18/2023 1528 by Zoran Young LPN  Outcome: Progressing     Problem: Pain  Goal: Verbalizes/displays adequate comfort level or baseline comfort level  11/18/2023 2343 by Bennie Green LPN  Outcome: Progressing  11/18/2023 1528 by Zoran Young LPN  Outcome: Progressing     Problem: Skin/Tissue Integrity  Goal: Absence of new skin breakdown  Description: 1. Monitor for areas of redness and/or skin breakdown  2. Assess vascular access sites hourly  3. Every 4-6 hours minimum:  Change oxygen saturation probe site  4. Every 4-6 hours:  If on nasal continuous positive airway pressure, respiratory therapy assess nares and determine need for appliance change or resting period.   11/18/2023 2343 by Bennie Green LPN  Outcome: Progressing  11/18/2023 1528 by Zoran Frank LPN  Outcome: Progressing     Problem: Safety - Adult  Goal: Free from fall injury  11/18/2023 2343 by Bennie Green LPN  Outcome: Progressing  11/18/2023 1528 by Zoran Young LPN  Outcome: Progressing  Flowsheets (Taken 11/18/2023 1526)  Free From Fall Injury:   Instruct family/caregiver on patient safety   Based on caregiver fall risk screen, instruct family/caregiver to ask for assistance with transferring infant if caregiver noted to have fall risk factors     Problem: ABCDS Injury Assessment  Goal: Absence of physical injury  11/18/2023 2343 by Bennie Green LPN  Outcome: Progressing  11/18/2023 1528 by Zoran Frank LPN  Outcome: Progressing  Flowsheets (Taken 11/18/2023 1526)  Absence of Physical Injury: Implement safety measures based on patient assessment     Problem: Risk for Elopement  Goal: Patient will not exit the unit/facility without proper excort  11/18/2023 2343 by Bennie Green LPN  Outcome: Progressing  Flowsheets (Taken 11/18/2023 2212)  Nursing Interventions

## 2023-11-19 NOTE — PROGRESS NOTES
V2.0    [facility] Progress Note      Name:  Augustina Cruz /Age/Sex: 1965  (60 y.o. female)   MRN & CSN:  [id number] & [id number] Encounter Date/Time: 2023 9:58 AM EST   Location:  [address] [place] [address] PCP: No primary care provider on file. Krista Elise MD       Hospital Day: 16    Assessment and Recommendations   Augustina Cruz is a 60 y.o. female who presents with Acute on chronic systolic (congestive) heart failure [facility])      Patient is on medical hold at this time. Does not have capacity to leave AMA. Please AVOID physical restraints. Acute metabolic encephalopathy  Delirium with behavioral disturbance  -Likely secondary to substance abuse and hypoxia.  -Pt still requiring sitter for agitation and behavioral disturbance  -Show support called on  for agitation in setting of impaired mentation with behavioral disturbance, pt ripped out her IV and attempting to leave AMA. Is alert and oriented to herself and place but not aware of why she is in the hospital or what she was treated for  -Psychiatry determined that pt does not have capacity, placed pt on medical hold for her safety  -CM able to reach pt's daughter who agrees with medical hold and with placement  -Maintain sitter at bedside  - Risperdal 0.5mg BID     Acute hypoxia - Resolved   Troponin elevation  Acute on chronic systolic and diastolic heart failure  -Precipitant likely the cocaine abuse. -Improved with IV diuresis, converted to p.o. torsemide  -Started on GDMT with low-dose Coreg, Aldactone and Jardiance added by nephrology. Acute renal failure - Resolved   -Suspect cardiorenal syndrome   -Diuresis as above.   -Nephrology following    A-fib with RVR  Possible apical thrombus -on TTE  NICM -LVEF depressed  -Heart rate improved, continue digoxin.  -Continue full dose AC  -EP evaluated pt for ICD however pt not a candidate at present as pt must be compliant with medications and stop drug use prior to consideration

## 2023-11-19 NOTE — PROGRESS NOTES
Nephrology Progress Note  11/19/2023 9:20 AM  Subjective: Interval History: Aimee Ortiz is a 60 y.o. female doing well in general sitting in a chair has a sitter    Data:   Scheduled Meds:   risperiDONE  0.5 mg Oral BID    torsemide  20 mg Oral BID    carvedilol  3.125 mg Oral BID WC    empagliflozin  10 mg Oral Daily    spironolactone  25 mg Oral Daily    apixaban  5 mg Oral BID    sodium chloride flush  5-40 mL IntraVENous 2 times per day    aspirin  81 mg Oral Daily     Continuous Infusions:   sodium chloride           CBC No results for input(s): \"WBC\", \"HGB\", \"HCT\", \"PLT\" in the last 72 hours. BMP   Recent Labs     11/18/23 0416   *   K 4.9   CL 95*   CO2 30   PHOS 4.6   BUN 44*   CREATININE 1.6*     Hepatic:   Recent Labs     11/18/23 0416   AST 20   ALT 32   BILITOT 0.7   ALKPHOS 100     Troponin: No results for input(s): \"TROPONINI\" in the last 72 hours. BNP: No results for input(s): \"BNP\" in the last 72 hours. Lipids: No results for input(s): \"CHOL\", \"HDL\" in the last 72 hours. Invalid input(s): \"LDLCALCU\"  ABGs: No results found for: \"PHART\", \"PO2ART\", \"KOV4DKO\"  INR: No results for input(s): \"INR\" in the last 72 hours.   Renal Labs  Albumin:    Lab Results   Component Value Date/Time    LABALBU 3.6 11/18/2023 04:16 AM     Calcium:    Lab Results   Component Value Date/Time    CALCIUM 9.4 11/18/2023 04:16 AM     Phosphorus:    Lab Results   Component Value Date/Time    PHOS 4.6 11/18/2023 04:16 AM     U/A:    Lab Results   Component Value Date/Time    NITRU NEGATIVE 11/08/2023 09:14 AM    NITRU NEGATIVE 08/18/2012 08:01 PM    COLORU YELLOW 11/08/2023 09:14 AM    WBCUA <1 11/08/2023 09:14 AM    RBCUA <1 11/08/2023 09:14 AM    MUCUS RARE 11/04/2023 11:25 AM    TRICHOMONAS NONE SEEN 11/08/2023 09:14 AM    BACTERIA RARE 11/08/2023 09:14 AM    CLARITYU CLEAR 11/08/2023 09:14 AM    SPECGRAV <1.005 11/08/2023 09:14 AM    UROBILINOGEN 0.2 11/08/2023 09:14 AM    BILIRUBINUR NEGATIVE

## 2023-11-19 NOTE — PLAN OF CARE
Problem: Discharge Planning  Goal: Discharge to home or other facility with appropriate resources  Outcome: Progressing  Flowsheets (Taken 11/19/2023 0800)  Discharge to home or other facility with appropriate resources:   Identify barriers to discharge with patient and caregiver   Arrange for needed discharge resources and transportation as appropriate   Identify discharge learning needs (meds, wound care, etc)   Arrange for interpreters to assist at discharge as needed   Refer to discharge planning if patient needs post-hospital services based on physician order or complex needs related to functional status, cognitive ability or social support system     Problem: Pain  Goal: Verbalizes/displays adequate comfort level or baseline comfort level  Outcome: Progressing     Problem: Skin/Tissue Integrity  Goal: Absence of new skin breakdown  Description: 1. Monitor for areas of redness and/or skin breakdown  2. Assess vascular access sites hourly  3. Every 4-6 hours minimum:  Change oxygen saturation probe site  4. Every 4-6 hours:  If on nasal continuous positive airway pressure, respiratory therapy assess nares and determine need for appliance change or resting period.   Outcome: Progressing     Problem: Safety - Adult  Goal: Free from fall injury  Outcome: Progressing     Problem: ABCDS Injury Assessment  Goal: Absence of physical injury  Outcome: Progressing     Problem: Risk for Elopement  Goal: Patient will not exit the unit/facility without proper excort  Outcome: Progressing  Flowsheets (Taken 11/19/2023 0800)  Nursing Interventions for Elopement Risk:   Reduce environmental triggers   Assist with personal care needs such as toileting, eating, dressing, as needed to reduce the risk of wandering   Make sure patient has all necessary personal care items   Collaborate with family members/caregivers to mitigate the elopement risk   Collaborate with treatment team for drug withdrawal symptoms treatment Collaborate with treatment team for nicotine replacement   Communicate/escalate to charge nurse the risk of elopement   Communicate/escalate to /other team member the risk of elopement   Communicate/escalate to nursing supervisor the risk of elopement   Communicate to physician the risk for elopement   Escort with two staff members if patient must leave the unit   Place patient in room far away from exits and [address] and clothing collected and placed in gown attire     Problem: Nutrition Deficit:  Goal: Optimize nutritional status  Outcome: Progressing

## 2023-11-20 PROCEDURE — 6370000000 HC RX 637 (ALT 250 FOR IP): Performed by: INTERNAL MEDICINE

## 2023-11-20 PROCEDURE — 97116 GAIT TRAINING THERAPY: CPT

## 2023-11-20 PROCEDURE — 6370000000 HC RX 637 (ALT 250 FOR IP): Performed by: NURSE PRACTITIONER

## 2023-11-20 PROCEDURE — 6370000000 HC RX 637 (ALT 250 FOR IP): Performed by: STUDENT IN AN ORGANIZED HEALTH CARE EDUCATION/TRAINING PROGRAM

## 2023-11-20 PROCEDURE — 6360000002 HC RX W HCPCS: Performed by: INTERNAL MEDICINE

## 2023-11-20 PROCEDURE — 6370000000 HC RX 637 (ALT 250 FOR IP)

## 2023-11-20 PROCEDURE — 1200000000 HC SEMI PRIVATE

## 2023-11-20 PROCEDURE — 94761 N-INVAS EAR/PLS OXIMETRY MLT: CPT

## 2023-11-20 RX ORDER — LORAZEPAM 2 MG/ML
0.5 INJECTION INTRAMUSCULAR ONCE
Status: DISCONTINUED | OUTPATIENT
Start: 2023-11-20 | End: 2023-11-20

## 2023-11-20 RX ORDER — HYDROXYZINE HYDROCHLORIDE 25 MG/1
25 TABLET, FILM COATED ORAL 4 TIMES DAILY PRN
Status: DISCONTINUED | OUTPATIENT
Start: 2023-11-20 | End: 2023-12-06 | Stop reason: HOSPADM

## 2023-11-20 RX ORDER — LORAZEPAM 2 MG/ML
0.5 INJECTION INTRAMUSCULAR ONCE
Status: COMPLETED | OUTPATIENT
Start: 2023-11-20 | End: 2023-11-20

## 2023-11-20 RX ADMIN — HYDROXYZINE HYDROCHLORIDE 25 MG: 25 TABLET, FILM COATED ORAL at 18:49

## 2023-11-20 RX ADMIN — TORSEMIDE 20 MG: 20 TABLET ORAL at 08:34

## 2023-11-20 RX ADMIN — RISPERIDONE 0.5 MG: 0.5 TABLET ORAL at 08:34

## 2023-11-20 RX ADMIN — APIXABAN 5 MG: 5 TABLET, FILM COATED ORAL at 08:34

## 2023-11-20 RX ADMIN — LORAZEPAM 0.5 MG: 2 INJECTION INTRAMUSCULAR; INTRAVENOUS at 18:59

## 2023-11-20 RX ADMIN — SPIRONOLACTONE 25 MG: 50 TABLET ORAL at 08:34

## 2023-11-20 RX ADMIN — CARVEDILOL 3.12 MG: 6.25 TABLET, FILM COATED ORAL at 08:34

## 2023-11-20 RX ADMIN — EMPAGLIFLOZIN 10 MG: 10 TABLET, FILM COATED ORAL at 08:34

## 2023-11-20 RX ADMIN — APIXABAN 5 MG: 5 TABLET, FILM COATED ORAL at 21:28

## 2023-11-20 RX ADMIN — RISPERIDONE 0.5 MG: 0.5 TABLET ORAL at 21:28

## 2023-11-20 RX ADMIN — ASPIRIN 81 MG CHEWABLE TABLET 81 MG: 81 TABLET CHEWABLE at 08:34

## 2023-11-20 ASSESSMENT — ENCOUNTER SYMPTOMS
CHOKING: 0
BLOOD IN STOOL: 0
SHORTNESS OF BREATH: 0
COUGH: 0
NAUSEA: 0
BACK PAIN: 0
CHEST TIGHTNESS: 0
STRIDOR: 0
EYE PAIN: 0
VOMITING: 0
ABDOMINAL PAIN: 0
WHEEZING: 0
DIARRHEA: 0
ABDOMINAL DISTENTION: 0
CONSTIPATION: 0

## 2023-11-20 NOTE — PROGRESS NOTES
V2.0    [facility] Progress Note      Name:  Osmnay Campbell /Age/Sex: 1965  (60 y.o. female)   MRN & CSN:  [id number] & [id number] Encounter Date/Time: 2023 9:58 AM EST   Location:  [facility] PCP: No primary care provider on file. Faby Ghosh, [address] Day: 17    Assessment and Recommendations   Osmany Campbell is a 60 y.o. female who presents with Acute on chronic systolic (congestive) heart failure ([address])      Patient is on medical hold at this time. Does not have capacity to leave AMA. Please AVOID physical restraints. CM team working on establishing placement/disposition. Medically stable. Acute metabolic encephalopathy  Delirium with behavioral disturbance  -Likely secondary to substance abuse and hypoxia.  -Pt still requiring sitter for agitation and behavioral disturbance  -Show support called on  for agitation in setting of impaired mentation with behavioral disturbance, pt ripped out her IV and attempting to leave AMA. Is alert and oriented to herself and place but not aware of why she is in the hospital or what she was treated for  -Psychiatry determined that pt does not have capacity, placed pt on medical hold for her safety  -CM able to reach pt's daughter who agrees with medical hold and with placement  -Maintain sitter at bedside  - Risperdal 0.5mg BID     Acute hypoxia - Resolved   Troponin elevation  Acute on chronic systolic and diastolic heart failure  -Precipitant likely the cocaine abuse. -Improved with IV diuresis, converted to p.o. torsemide  -Started on GDMT with low-dose Coreg, Aldactone and Jardiance added by nephrology. Acute renal failure - Resolved   -Suspect cardiorenal syndrome   -Diuresis as above.   -Nephrology following    A-fib with RVR  Possible apical thrombus -on TTE  NICM -LVEF depressed  -Heart rate improved, continue digoxin.  -Continue full dose AC  -EP evaluated pt for ICD however pt not a candidate at present as pt must be phenomenon could be considered given differing vascular distributions. 3. No acute intracranial abnormality. XR CHEST PORTABLE    Result Date: 11/4/2023  EXAMINATION: ONE XRAY VIEW OF THE CHEST 11/4/2023 9:47 am COMPARISON: 06/07/2022 radiograph HISTORY: ORDERING SYSTEM PROVIDED HISTORY: Sepsis TECHNOLOGIST PROVIDED HISTORY: Reason for exam:->Sepsis Reason for Exam: ams/sob FINDINGS: The heart is enlarged. Mediastinum is normal.  Mild perihilar opacities centrally and a central pattern of mild ground-glass attenuation has developed in the lungs. No significant pleural fluid. No skeletal finding. Central pattern of ground-glass airspace change may represent vascular congestion or mild edema. A developing viral infection may be considered clinically. CBC:   No results for input(s): \"WBC\", \"HGB\", \"PLT\" in the last 72 hours. BMP:    Recent Labs     11/18/23 0416   *   K 4.9   CL 95*   CO2 30   BUN 44*   CREATININE 1.6*   GLUCOSE 97       Hepatic:   Recent Labs     11/18/23 0416   AST 20   ALT 32   BILITOT 0.7   ALKPHOS 100       Lipids:   Lab Results   Component Value Date/Time    CHOL 92 11/05/2023 06:02 AM    HDL 8 11/05/2023 06:02 AM    TRIG 133 11/05/2023 06:02 AM     Troponin:   Lab Results   Component Value Date/Time    TROPONINT 0.060 06/08/2022 12:34 PM    TROPONINT 0.046 06/07/2022 11:10 PM    TROPONINT 0.041 06/01/2022 05:30 AM     BNP:   No results for input(s): \"PROBNP\" in the last 72 hours.       UA:  Lab Results   Component Value Date/Time    NITRU NEGATIVE 11/08/2023 09:14 AM    NITRU NEGATIVE 08/18/2012 08:01 PM    COLORU YELLOW 11/08/2023 09:14 AM    WBCUA <1 11/08/2023 09:14 AM    RBCUA <1 11/08/2023 09:14 AM    MUCUS RARE 11/04/2023 11:25 AM    TRICHOMONAS NONE SEEN 11/08/2023 09:14 AM    BACTERIA RARE 11/08/2023 09:14 AM    CLARITYU CLEAR 11/08/2023 09:14 AM    SPECGRAV <1.005 11/08/2023 09:14 AM    LEUKOCYTESUR NEGATIVE 11/08/2023 09:14 AM    UROBILINOGEN 0.2 11/08/2023

## 2023-11-20 NOTE — PROGRESS NOTES
Physical Therapy Treatment Note  Name: Shine Balbuena MRN: [id number] :   1965   Date:  2023   Admission Date: 2023 Room:  [address] [place] [address]   Restrictions/Precautions: Restrictions/Precautions  Restrictions/Precautions: General Precautions, Fall Risk, sitter   Communication with other providers:  Pt okay to see for therapy per RN   Subjective:  Patient states:  Apoorva Gonzalez do you want to do? \"   Pain:   Location, Type, Intensity (0/10 to 10/10): Denies   Objective:    Observation:  Pt sitting up on couch upon PTA arrival.   Objective Measures: Sitter, pocket tele   Treatment, including education/measures:    Therapeutic Activity Training:   Therapeutic activity training was instructed today. Cues were given for safety, sequence, UE/LE placement, awareness, and balance. Activities performed today included STS. Mobility:  STS: SBA    Gait:  Pt ambulated 400ft with no AD and SBA for safety and dt elopement risk. Pt demos decreased safety awareness, intermittent inconsistent foot placement, and decreased foot clearance. Pt tolerated well with  no fatigue. Dillon Craw Pt refusing exercises and further therapy at this time. Education:  PTA educated pt on importance of continued exercises outside of therapy and pt verbalizes understanding and agreeable to perform 2-3x a day. Safety:   Pt returned safely to couch with chair alarm activated, call light in reach, all needs met. Assessment / Impression:    Pt tolerated OOB activities well this date and remains SBA for all activities. Patient's tolerance of treatment:  Good   Adverse Reaction: none  Significant change in status and impact:  none  Barriers to improvement:  cognition, participation   Plan for Next Session:    Plan to continue [employer] activities.    Time in:  1139  Time out:  1446  Timed treatment minutes:  8  Total treatment time:  8    Previously filed items:  Social/Functional History  Lives With: Family  Bathroom Equipment: Shower chair  Home

## 2023-11-20 NOTE — PLAN OF CARE
Problem: Discharge Planning  Goal: Discharge to home or other facility with appropriate resources  11/19/2023 2136 by Guilherme Michael RN  Outcome: Progressing  Flowsheets (Taken 11/19/2023 1948)  Discharge to home or other facility with appropriate resources: Identify barriers to discharge with patient and caregiver  11/19/2023 1541 by Christina Villa RN  Outcome: Progressing  Flowsheets (Taken 11/19/2023 0800)  Discharge to home or other facility with appropriate resources:   Identify barriers to discharge with patient and caregiver   Arrange for needed discharge resources and transportation as appropriate   Identify discharge learning needs (meds, wound care, etc)   Arrange for interpreters to assist at discharge as needed   Refer to discharge planning if patient needs post-hospital services based on physician order or complex needs related to functional status, cognitive ability or social support system     Problem: Pain  Goal: Verbalizes/displays adequate comfort level or baseline comfort level  11/19/2023 2136 by Guilherme Michael RN  Outcome: Progressing  11/19/2023 1541 by Christina Villa RN  Outcome: Progressing     Problem: Skin/Tissue Integrity  Goal: Absence of new skin breakdown  Description: 1. Monitor for areas of redness and/or skin breakdown  2. Assess vascular access sites hourly  3. Every 4-6 hours minimum:  Change oxygen saturation probe site  4. Every 4-6 hours:  If on nasal continuous positive airway pressure, respiratory therapy assess nares and determine need for appliance change or resting period.   11/19/2023 2136 by Guilherme Michael RN  Outcome: Progressing  11/19/2023 1541 by Christina Villa RN  Outcome: Progressing     Problem: Safety - Adult  Goal: Free from fall injury  11/19/2023 2136 by Guilherme Michael RN  Outcome: Progressing  11/19/2023 1541 by Christina Villa RN  Outcome: Progressing     Problem: ABCDS Injury Assessment  Goal: Absence of physical

## 2023-11-20 NOTE — CARE COORDINATION
Reviewed chart, discussed in IDR,. Spoke with pt's daughter then Rachell Spencer. Daughter is willing to work on getting [address] changed to Medicaid here in [place]. She is also trying to find a facility in [place] where she lives to take pt under [place] pending medicaid, which pt has had in the past. Sridhar Owen to call daughter and will fax form she needs to Daughter's work [phone]. Also I left  for Herminia to check on [place] as a possiblity. [address] here to see pt . Rachell Spencer working with daughter to assist with [facility] Medicaid number.

## 2023-11-21 PROCEDURE — 94761 N-INVAS EAR/PLS OXIMETRY MLT: CPT

## 2023-11-21 PROCEDURE — 6370000000 HC RX 637 (ALT 250 FOR IP)

## 2023-11-21 PROCEDURE — 6370000000 HC RX 637 (ALT 250 FOR IP): Performed by: STUDENT IN AN ORGANIZED HEALTH CARE EDUCATION/TRAINING PROGRAM

## 2023-11-21 PROCEDURE — 6370000000 HC RX 637 (ALT 250 FOR IP): Performed by: INTERNAL MEDICINE

## 2023-11-21 PROCEDURE — 6370000000 HC RX 637 (ALT 250 FOR IP): Performed by: NURSE PRACTITIONER

## 2023-11-21 PROCEDURE — 1200000000 HC SEMI PRIVATE

## 2023-11-21 RX ORDER — TORSEMIDE 20 MG/1
20 TABLET ORAL DAILY
Status: DISCONTINUED | OUTPATIENT
Start: 2023-11-22 | End: 2023-11-26

## 2023-11-21 RX ADMIN — HYDROXYZINE HYDROCHLORIDE 25 MG: 25 TABLET, FILM COATED ORAL at 10:33

## 2023-11-21 RX ADMIN — RISPERIDONE 0.5 MG: 0.5 TABLET ORAL at 20:52

## 2023-11-21 RX ADMIN — CARVEDILOL 3.12 MG: 6.25 TABLET, FILM COATED ORAL at 16:57

## 2023-11-21 RX ADMIN — RISPERIDONE 0.5 MG: 0.5 TABLET ORAL at 10:32

## 2023-11-21 RX ADMIN — TORSEMIDE 20 MG: 20 TABLET ORAL at 10:32

## 2023-11-21 RX ADMIN — APIXABAN 5 MG: 5 TABLET, FILM COATED ORAL at 10:33

## 2023-11-21 RX ADMIN — ASPIRIN 81 MG CHEWABLE TABLET 81 MG: 81 TABLET CHEWABLE at 10:33

## 2023-11-21 RX ADMIN — APIXABAN 5 MG: 5 TABLET, FILM COATED ORAL at 20:52

## 2023-11-21 RX ADMIN — EMPAGLIFLOZIN 10 MG: 10 TABLET, FILM COATED ORAL at 10:33

## 2023-11-21 ASSESSMENT — ENCOUNTER SYMPTOMS
NAUSEA: 0
VOMITING: 0
STRIDOR: 0
ABDOMINAL DISTENTION: 0
EYE PAIN: 0
CHEST TIGHTNESS: 0
ABDOMINAL PAIN: 0
BACK PAIN: 0
BLOOD IN STOOL: 0
CONSTIPATION: 0
CHOKING: 0
SHORTNESS OF BREATH: 0
COUGH: 0
DIARRHEA: 0
WHEEZING: 0

## 2023-11-21 ASSESSMENT — PAIN SCALES - GENERAL: PAINLEVEL_OUTOF10: 0

## 2023-11-21 NOTE — CARE COORDINATION
Reviewed chart, discussed in [address] and spoke with Freeman Delatorre at [facility], gave her [facility] medicaid ref# is [id number].

## 2023-11-21 NOTE — PROGRESS NOTES
This nurse in to give pt her HS medications. After putting pills in medicine cup, pt asked what each pill was for and pointed to each of the three pills individually when asking. The patient then stated \"I know one of them pills is the one that makes me pee all night. Which one is it?!! Tell me, because I'm not taking it! \" This nurse told pt there was a pill in the cup called Torsemide and that it does make patients that take it urinate, but the frequent urinating is to get extra fluid off of patients' hearts. Pt yelled out again and said \"take that damn pill outta there, I'm not taking it and peeing all night! \" Torsemide returned to [facility]. Pt took Eliquis and Risperdal very well without incident after that.

## 2023-11-21 NOTE — PROGRESS NOTES
V2.0    [facility] [place] Progress Note      Name:  Tim Fajardo /Age/Sex: 1965  (60 y.o. female)   MRN & CSN:  [id number] & [id number] Encounter Date/Time: 2023 9:58 AM EST   Location:  [address] [place] [address] PCP: No primary care provider on file. Cyndi Menon, [address] Day: 18    Assessment and Recommendations   Tim Fajardo is a 60 y.o. female who presents with Acute on chronic systolic (congestive) heart failure ([address])      Patient is on medical hold at this time. Does not have capacity to leave AMA. Please AVOID physical restraints. CM team working on establishing placement/disposition. Medically stable. Acute metabolic encephalopathy  Delirium with behavioral disturbance  -Likely secondary to substance abuse and hypoxia.  -Pt still requiring sitter for agitation and behavioral disturbance  -Show support called on  for agitation in setting of impaired mentation with behavioral disturbance, pt ripped out her IV and attempting to leave AMA. Is alert and oriented to herself and place but not aware of why she is in the hospital or what she was treated for  -Psychiatry determined that pt does not have capacity, placed pt on medical hold for her safety  -CM able to reach pt's daughter who agrees with medical hold and with placement  -Maintain sitter at bedside  - Risperdal 0.5mg BID     Acute hypoxia - Resolved   Troponin elevation  Acute on chronic systolic and diastolic heart failure  -Precipitant likely the cocaine abuse. -Improved with IV diuresis, converted to p.o. torsemide  -Started on GDMT with low-dose Coreg, Aldactone and Jardiance added by nephrology. Acute renal failure - Resolved   -Suspect cardiorenal syndrome   -Diuresis as above.   -Nephrology following    A-fib with RVR  Possible apical thrombus -on TTE  NICM -LVEF depressed  -Heart rate improved, continue digoxin.  -Continue full dose AC  -EP evaluated pt for ICD however pt not a candidate at present as pt must be are favored to be remote. An embolic phenomenon could be considered given differing vascular distributions. 3. No acute intracranial abnormality. XR CHEST PORTABLE    Result Date: 11/4/2023  EXAMINATION: ONE XRAY VIEW OF THE CHEST 11/4/2023 9:47 am COMPARISON: 06/07/2022 radiograph HISTORY: ORDERING SYSTEM PROVIDED HISTORY: Sepsis TECHNOLOGIST PROVIDED HISTORY: Reason for exam:->Sepsis Reason for Exam: ams/sob FINDINGS: The heart is enlarged. Mediastinum is normal.  Mild perihilar opacities centrally and a central pattern of mild ground-glass attenuation has developed in the lungs. No significant pleural fluid. No skeletal finding. Central pattern of ground-glass airspace change may represent vascular congestion or mild edema. A developing viral infection may be considered clinically. CBC:   No results for input(s): \"WBC\", \"HGB\", \"PLT\" in the last 72 hours. BMP:    No results for input(s): \"NA\", \"K\", \"CL\", \"CO2\", \"BUN\", \"CREATININE\", \"GLUCOSE\" in the last 72 hours. Hepatic:   No results for input(s): \"AST\", \"ALT\", \"ALB\", \"BILITOT\", \"ALKPHOS\" in the last 72 hours. Lipids:   Lab Results   Component Value Date/Time    CHOL 92 11/05/2023 06:02 AM    HDL 8 11/05/2023 06:02 AM    TRIG 133 11/05/2023 06:02 AM     Troponin:   Lab Results   Component Value Date/Time    TROPONINT 0.060 06/08/2022 12:34 PM    TROPONINT 0.046 06/07/2022 11:10 PM    TROPONINT 0.041 06/01/2022 05:30 AM     BNP:   No results for input(s): \"PROBNP\" in the last 72 hours.       UA:  Lab Results   Component Value Date/Time    NITRU NEGATIVE 11/08/2023 09:14 AM    NITRU NEGATIVE 08/18/2012 08:01 PM    COLORU YELLOW 11/08/2023 09:14 AM    WBCUA <1 11/08/2023 09:14 AM    RBCUA <1 11/08/2023 09:14 AM    MUCUS RARE 11/04/2023 11:25 AM    TRICHOMONAS NONE SEEN 11/08/2023 09:14 AM    BACTERIA RARE 11/08/2023 09:14 AM    CLARITYU CLEAR 11/08/2023 09:14 AM    SPECGRAV <1.005 11/08/2023 09:14 AM    LEUKOCYTESUR NEGATIVE 11/08/2023

## 2023-11-22 PROCEDURE — 6370000000 HC RX 637 (ALT 250 FOR IP)

## 2023-11-22 PROCEDURE — 6370000000 HC RX 637 (ALT 250 FOR IP): Performed by: INTERNAL MEDICINE

## 2023-11-22 PROCEDURE — 6370000000 HC RX 637 (ALT 250 FOR IP): Performed by: STUDENT IN AN ORGANIZED HEALTH CARE EDUCATION/TRAINING PROGRAM

## 2023-11-22 PROCEDURE — 6370000000 HC RX 637 (ALT 250 FOR IP): Performed by: NURSE PRACTITIONER

## 2023-11-22 PROCEDURE — 94761 N-INVAS EAR/PLS OXIMETRY MLT: CPT

## 2023-11-22 PROCEDURE — 1200000000 HC SEMI PRIVATE

## 2023-11-22 RX ADMIN — APIXABAN 5 MG: 5 TABLET, FILM COATED ORAL at 08:41

## 2023-11-22 RX ADMIN — RISPERIDONE 0.5 MG: 0.5 TABLET ORAL at 08:41

## 2023-11-22 RX ADMIN — CARVEDILOL 3.12 MG: 6.25 TABLET, FILM COATED ORAL at 08:41

## 2023-11-22 RX ADMIN — APIXABAN 5 MG: 5 TABLET, FILM COATED ORAL at 21:13

## 2023-11-22 RX ADMIN — ONDANSETRON 4 MG: 4 TABLET, ORALLY DISINTEGRATING ORAL at 15:08

## 2023-11-22 RX ADMIN — ACETAMINOPHEN 650 MG: 325 TABLET ORAL at 15:08

## 2023-11-22 RX ADMIN — RISPERIDONE 0.5 MG: 0.5 TABLET ORAL at 21:13

## 2023-11-22 RX ADMIN — SPIRONOLACTONE 25 MG: 50 TABLET ORAL at 08:42

## 2023-11-22 RX ADMIN — EMPAGLIFLOZIN 10 MG: 10 TABLET, FILM COATED ORAL at 08:41

## 2023-11-22 RX ADMIN — ASPIRIN 81 MG CHEWABLE TABLET 81 MG: 81 TABLET CHEWABLE at 08:41

## 2023-11-22 RX ADMIN — CARVEDILOL 3.12 MG: 6.25 TABLET, FILM COATED ORAL at 17:56

## 2023-11-22 ASSESSMENT — ENCOUNTER SYMPTOMS
CONSTIPATION: 0
ABDOMINAL PAIN: 0
NAUSEA: 0
STRIDOR: 0
VOMITING: 0
BACK PAIN: 0
SHORTNESS OF BREATH: 0
EYE PAIN: 0
BLOOD IN STOOL: 0
CHEST TIGHTNESS: 0
WHEEZING: 0
ABDOMINAL DISTENTION: 0
COUGH: 0
CHOKING: 0
DIARRHEA: 0

## 2023-11-22 ASSESSMENT — PAIN DESCRIPTION - LOCATION
LOCATION: CHEST
LOCATION: HEAD

## 2023-11-22 ASSESSMENT — PAIN DESCRIPTION - PAIN TYPE: TYPE: ACUTE PAIN

## 2023-11-22 ASSESSMENT — PAIN - FUNCTIONAL ASSESSMENT: PAIN_FUNCTIONAL_ASSESSMENT: ACTIVITIES ARE NOT PREVENTED

## 2023-11-22 ASSESSMENT — PAIN SCALES - WONG BAKER
WONGBAKER_NUMERICALRESPONSE: 0
WONGBAKER_NUMERICALRESPONSE: 0

## 2023-11-22 ASSESSMENT — PAIN DESCRIPTION - FREQUENCY: FREQUENCY: CONTINUOUS

## 2023-11-22 ASSESSMENT — PAIN DESCRIPTION - ORIENTATION
ORIENTATION: RIGHT;LEFT
ORIENTATION: LEFT

## 2023-11-22 ASSESSMENT — PAIN DESCRIPTION - ONSET: ONSET: PROGRESSIVE

## 2023-11-22 ASSESSMENT — PAIN SCALES - GENERAL
PAINLEVEL_OUTOF10: 0
PAINLEVEL_OUTOF10: 7
PAINLEVEL_OUTOF10: 0
PAINLEVEL_OUTOF10: 7

## 2023-11-22 ASSESSMENT — PAIN DESCRIPTION - DESCRIPTORS: DESCRIPTORS: ACHING;DISCOMFORT

## 2023-11-22 NOTE — CARE COORDINATION
Reviewed chart, discussed in IDR and plan is to [facility] once insurance determined. CM will continue to follow.

## 2023-11-22 NOTE — PROGRESS NOTES
V2.0    [facility] Progress Note      Name:  Garrett Moses /Age/Sex: 1965  (60 y.o. female)   MRN & CSN:  [id number] & [id number] Encounter Date/Time: 2023 9:58 AM EST   Location:  0784/6033-J PCP: No primary care provider on file. Ciera Patterson, [address] Day: 19    Assessment and Recommendations   Garrett Moses is a 60 y.o. female who presents with Acute on chronic systolic (congestive) heart failure ([address] [facility])      Patient is on medical hold at this time. Does not have capacity to leave AMA. Please AVOID physical restraints. CM team working on establishing placement/disposition. Medically stable. Plan for  [facility] once insurance is determined. Acute metabolic encephalopathy  Delirium with behavioral disturbance  -Likely secondary to substance abuse and hypoxia.  -Pt still requiring sitter for agitation and behavioral disturbance  -Show support called on  for agitation in setting of impaired mentation with behavioral disturbance, pt ripped out her IV and attempting to leave AMA. Is alert and oriented to herself and place but not aware of why she is in the hospital or what she was treated for  -Psychiatry determined that pt does not have capacity, placed pt on medical hold for her safety  -CM able to reach pt's daughter who agrees with medical hold and with placement  -Maintain sitter at bedside  - Risperdal 0.5mg BID     Acute hypoxia - Resolved   Troponin elevation  Acute on chronic systolic and diastolic heart failure  -Precipitant likely the cocaine abuse. -Improved with IV diuresis, converted to p.o. torsemide  -Started on GDMT with low-dose Coreg, Aldactone and Jardiance added by nephrology. Acute renal failure - Resolved   -Suspect cardiorenal syndrome   -Diuresis as above.   -Nephrology following    A-fib with RVR  Possible apical thrombus -on TTE  NICM -LVEF depressed  -Heart rate improved, continue digoxin.  -Continue full dose AC  -EP evaluated pt for ICD of prior infarction that are favored to be remote. An embolic phenomenon could be considered given differing vascular distributions. 3. No acute intracranial abnormality. XR CHEST PORTABLE    Result Date: 11/4/2023  EXAMINATION: ONE XRAY VIEW OF THE CHEST 11/4/2023 9:47 am COMPARISON: 06/07/2022 radiograph HISTORY: ORDERING SYSTEM PROVIDED HISTORY: Sepsis TECHNOLOGIST PROVIDED HISTORY: Reason for exam:->Sepsis Reason for Exam: ams/sob FINDINGS: The heart is enlarged. Mediastinum is normal.  Mild perihilar opacities centrally and a central pattern of mild ground-glass attenuation has developed in the lungs. No significant pleural fluid. No skeletal finding. Central pattern of ground-glass airspace change may represent vascular congestion or mild edema. A developing viral infection may be considered clinically. CBC:   No results for input(s): \"WBC\", \"HGB\", \"PLT\" in the last 72 hours. BMP:    No results for input(s): \"NA\", \"K\", \"CL\", \"CO2\", \"BUN\", \"CREATININE\", \"GLUCOSE\" in the last 72 hours. Hepatic:   No results for input(s): \"AST\", \"ALT\", \"ALB\", \"BILITOT\", \"ALKPHOS\" in the last 72 hours. Lipids:   Lab Results   Component Value Date/Time    CHOL 92 11/05/2023 06:02 AM    HDL 8 11/05/2023 06:02 AM    TRIG 133 11/05/2023 06:02 AM     Troponin:   Lab Results   Component Value Date/Time    TROPONINT 0.060 06/08/2022 12:34 PM    TROPONINT 0.046 06/07/2022 11:10 PM    TROPONINT 0.041 06/01/2022 05:30 AM     BNP:   No results for input(s): \"PROBNP\" in the last 72 hours.       UA:  Lab Results   Component Value Date/Time    NITRU NEGATIVE 11/08/2023 09:14 AM    NITRU NEGATIVE 08/18/2012 08:01 PM    COLORU YELLOW 11/08/2023 09:14 AM    WBCUA <1 11/08/2023 09:14 AM    RBCUA <1 11/08/2023 09:14 AM    MUCUS RARE 11/04/2023 11:25 AM    TRICHOMONAS NONE SEEN 11/08/2023 09:14 AM    BACTERIA RARE 11/08/2023 09:14 AM    CLARITYU CLEAR 11/08/2023 09:14 AM    SPECGRAV <1.005 11/08/2023 09:14 AM

## 2023-11-22 NOTE — PLAN OF CARE
Problem: Discharge Planning  Goal: Discharge to home or other facility with appropriate resources  Outcome: Progressing     Problem: Pain  Goal: Verbalizes/displays adequate comfort level or baseline comfort level  Outcome: Progressing     Problem: Skin/Tissue Integrity  Goal: Absence of new skin breakdown  Description: 1. Monitor for areas of redness and/or skin breakdown  2. Assess vascular access sites hourly  3. Every 4-6 hours minimum:  Change oxygen saturation probe site  4. Every 4-6 hours:  If on nasal continuous positive airway pressure, respiratory therapy assess nares and determine need for appliance change or resting period.   Outcome: Progressing     Problem: Safety - Adult  Goal: Free from fall injury  Outcome: Progressing     Problem: ABCDS Injury Assessment  Goal: Absence of physical injury  Outcome: Progressing     Problem: Risk for Elopement  Goal: Patient will not exit the unit/facility without proper excort  Outcome: Progressing     Problem: Nutrition Deficit:  Goal: Optimize nutritional status  Outcome: Progressing

## 2023-11-23 PROCEDURE — 6370000000 HC RX 637 (ALT 250 FOR IP): Performed by: NURSE PRACTITIONER

## 2023-11-23 PROCEDURE — 6370000000 HC RX 637 (ALT 250 FOR IP): Performed by: INTERNAL MEDICINE

## 2023-11-23 PROCEDURE — 6370000000 HC RX 637 (ALT 250 FOR IP)

## 2023-11-23 PROCEDURE — 94761 N-INVAS EAR/PLS OXIMETRY MLT: CPT

## 2023-11-23 PROCEDURE — 1200000000 HC SEMI PRIVATE

## 2023-11-23 RX ADMIN — RISPERIDONE 0.5 MG: 0.5 TABLET ORAL at 22:09

## 2023-11-23 RX ADMIN — RISPERIDONE 0.5 MG: 0.5 TABLET ORAL at 09:03

## 2023-11-23 RX ADMIN — SPIRONOLACTONE 25 MG: 50 TABLET ORAL at 09:03

## 2023-11-23 RX ADMIN — APIXABAN 5 MG: 5 TABLET, FILM COATED ORAL at 22:09

## 2023-11-23 RX ADMIN — EMPAGLIFLOZIN 10 MG: 10 TABLET, FILM COATED ORAL at 09:03

## 2023-11-23 RX ADMIN — APIXABAN 5 MG: 5 TABLET, FILM COATED ORAL at 09:03

## 2023-11-23 RX ADMIN — ASPIRIN 81 MG CHEWABLE TABLET 81 MG: 81 TABLET CHEWABLE at 09:03

## 2023-11-23 ASSESSMENT — ENCOUNTER SYMPTOMS
BACK PAIN: 0
SHORTNESS OF BREATH: 0
WHEEZING: 0
DIARRHEA: 0
BLOOD IN STOOL: 0
CHEST TIGHTNESS: 0
STRIDOR: 0
CONSTIPATION: 0
EYE PAIN: 0
CHOKING: 0
NAUSEA: 0
ABDOMINAL DISTENTION: 0
ABDOMINAL PAIN: 0
VOMITING: 0
COUGH: 0

## 2023-11-23 NOTE — PROGRESS NOTES
V2.0    [facility] [place] Progress Note      Name:  Belinda Corrales /Age/Sex: 1965  (60 y.o. female)   MRN & CSN:  [id number] & [id number] Encounter Date/Time: 2023 9:58 AM EST   Location:  [id number]/1629-D PCP: No primary care provider on file. Radha Justicebeth, [address] Day: 20    Assessment and Recommendations   Belinda Corrales is a 60 y.o. female who presents with Acute on chronic systolic (congestive) heart failure ([address] [facility])      Patient is on medical hold at this time. Does not have capacity to leave AMA. Please AVOID physical restraints. CM team working on establishing placement/disposition. Medically stable. Plan for  W [place] once insurance is determined. Acute metabolic encephalopathy  Delirium with behavioral disturbance  -Likely secondary to substance abuse and hypoxia.  -Pt still requiring sitter for agitation and behavioral disturbance  -Show support called on  for agitation in setting of impaired mentation with behavioral disturbance, pt ripped out her IV and attempting to leave AMA. Is alert and oriented to herself and place but not aware of why she is in the hospital or what she was treated for  -Psychiatry determined that pt does not have capacity, placed pt on medical hold for her safety  -CM able to reach pt's daughter who agrees with medical hold and with placement  -Maintain sitter at bedside  - Risperdal 0.5mg BID     Acute hypoxia - Resolved   Troponin elevation  Acute on chronic systolic and diastolic heart failure  -Precipitant likely the cocaine abuse. -Improved with IV diuresis, converted to p.o. torsemide  -Started on GDMT with low-dose Coreg, Aldactone and Jardiance added by nephrology. Acute renal failure - Resolved   -Suspect cardiorenal syndrome   -Diuresis as above.   -Nephrology following    A-fib with RVR  Possible apical thrombus -on TTE  NICM -LVEF depressed  -Heart rate improved, continue digoxin.  -Continue full dose AC  -EP evaluated pt for ICD

## 2023-11-23 NOTE — PROGRESS NOTES
Comprehensive Nutrition Assessment    Type and Reason for Visit:  Reassess    Nutrition Recommendations/Plan:   Continue current diet and supplements     Malnutrition Assessment:  Malnutrition Status: At risk for malnutrition (Comment) (11/10/23 1702)    Context:  Acute Illness       Nutrition Assessment:    Pt is consuming 100% of thei meals and is at low nutritional risk    Nutrition Related Findings:    Hx chronic tobacco dependence, cocaine abuse, anxiety, CKD. On torsemide. Wound Type: None       Current Nutrition Intake & Therapies:    Average Meal Intake: 26-50%, 51-75%, % (improving)  Average Supplements Intake: Unable to assess  ADULT DIET; Regular; No Added Salt (3-4 gm)  ADULT ORAL NUTRITION SUPPLEMENT; Lunch; Standard High Calorie/High Protein Oral Supplement  ADULT ORAL NUTRITION SUPPLEMENT; Dinner; Frozen Oral Supplement    Anthropometric Measures:  Height: 170.2 cm (5' 7.01\")  Ideal Body Weight (IBW): 135 lbs (61 kg)    Admission Body Weight: 77.9 kg (171 lb 11.8 oz)  Current Body Weight: 72.1 kg (158 lb 15.2 oz),   IBW. Weight Source: Bed Scale  Current BMI (kg/m2): 24.9  Usual Body Weight: 78.9 kg (174 lb)  % Weight Change (Calculated): -9.9  Weight Adjustment For: No Adjustment                 BMI Categories: Normal Weight (BMI 18.5-24. 9)    Estimated Daily Nutrient Needs:  Energy Requirements Based On: Formula  Weight Used for Energy Requirements: Current  Energy (kcal/day): 5142-2948  Weight Used for Protein Requirements: Ideal  Protein (g/day): 61-74 (1.0-1.2g/kg IBW)  Method Used for Fluid Requirements: 1 ml/kcal  Fluid (ml/day): 6590-9266    Nutrition Diagnosis:   Predicted inadequate energy intake related to cognitive or neurological impairment as evidenced by intake 26-50%, intake 51-75%, weight loss    Nutrition Interventions:   Food and/or Nutrient Delivery: Continue Current Diet, Modify Oral Nutrition Supplement  Nutrition Education/Counseling: Education not

## 2023-11-24 ENCOUNTER — APPOINTMENT (OUTPATIENT)
Dept: GENERAL RADIOLOGY | Age: 58
DRG: 194 | End: 2023-11-24
Payer: COMMERCIAL

## 2023-11-24 ENCOUNTER — APPOINTMENT (OUTPATIENT)
Dept: CT IMAGING | Age: 58
DRG: 194 | End: 2023-11-24
Payer: COMMERCIAL

## 2023-11-24 LAB
ALBUMIN SERPL-MCNC: 3.6 GM/DL (ref 3.4–5)
ALP BLD-CCNC: 108 IU/L (ref 40–128)
ALT SERPL-CCNC: 24 U/L (ref 10–40)
ANION GAP SERPL CALCULATED.3IONS-SCNC: 12 MMOL/L (ref 4–16)
ANION GAP SERPL CALCULATED.3IONS-SCNC: 14 MMOL/L (ref 4–16)
AST SERPL-CCNC: 26 IU/L (ref 15–37)
BASOPHILS ABSOLUTE: 0 K/CU MM
BASOPHILS RELATIVE PERCENT: 0.6 % (ref 0–1)
BILIRUB SERPL-MCNC: 0.6 MG/DL (ref 0–1)
BUN SERPL-MCNC: 28 MG/DL (ref 6–23)
BUN SERPL-MCNC: 29 MG/DL (ref 6–23)
CALCIUM SERPL-MCNC: 8.8 MG/DL (ref 8.3–10.6)
CALCIUM SERPL-MCNC: 8.8 MG/DL (ref 8.3–10.6)
CHLORIDE BLD-SCNC: 101 MMOL/L (ref 99–110)
CHLORIDE BLD-SCNC: 102 MMOL/L (ref 99–110)
CO2: 17 MMOL/L (ref 21–32)
CO2: 18 MMOL/L (ref 21–32)
CREAT SERPL-MCNC: 1.3 MG/DL (ref 0.6–1.1)
CREAT SERPL-MCNC: 1.4 MG/DL (ref 0.6–1.1)
DIFFERENTIAL TYPE: ABNORMAL
EOSINOPHILS ABSOLUTE: 0 K/CU MM
EOSINOPHILS RELATIVE PERCENT: 0.6 % (ref 0–3)
GFR SERPL CREATININE-BSD FRML MDRD: 43 ML/MIN/1.73M2
GFR SERPL CREATININE-BSD FRML MDRD: 47 ML/MIN/1.73M2
GLUCOSE BLD-MCNC: 123 MG/DL (ref 70–99)
GLUCOSE SERPL-MCNC: 132 MG/DL (ref 70–99)
GLUCOSE SERPL-MCNC: 134 MG/DL (ref 70–99)
HCT VFR BLD CALC: 37.8 % (ref 37–47)
HEMOGLOBIN: 11.4 GM/DL (ref 12.5–16)
IMMATURE NEUTROPHIL %: 0.4 % (ref 0–0.43)
LYMPHOCYTES ABSOLUTE: 1.3 K/CU MM
LYMPHOCYTES RELATIVE PERCENT: 24.2 % (ref 24–44)
MCH RBC QN AUTO: 29.5 PG (ref 27–31)
MCHC RBC AUTO-ENTMCNC: 30.2 % (ref 32–36)
MCV RBC AUTO: 97.9 FL (ref 78–100)
MONOCYTES ABSOLUTE: 0.6 K/CU MM
MONOCYTES RELATIVE PERCENT: 11.6 % (ref 0–4)
NUCLEATED RBC %: 0 %
PDW BLD-RTO: 15.2 % (ref 11.7–14.9)
PLATELET # BLD: 223 K/CU MM (ref 140–440)
PMV BLD AUTO: 9.6 FL (ref 7.5–11.1)
POTASSIUM SERPL-SCNC: 5 MMOL/L (ref 3.5–5.1)
POTASSIUM SERPL-SCNC: 5.4 MMOL/L (ref 3.5–5.1)
POTASSIUM SERPL-SCNC: 5.5 MMOL/L (ref 3.5–5.1)
PRO-BNP: ABNORMAL PG/ML
PROCALCITONIN SERPL-MCNC: 0.17 NG/ML
RBC # BLD: 3.86 M/CU MM (ref 4.2–5.4)
SEGMENTED NEUTROPHILS ABSOLUTE COUNT: 3.4 K/CU MM
SEGMENTED NEUTROPHILS RELATIVE PERCENT: 62.6 % (ref 36–66)
SODIUM BLD-SCNC: 132 MMOL/L (ref 135–145)
SODIUM BLD-SCNC: 132 MMOL/L (ref 135–145)
TOTAL IMMATURE NEUTOROPHIL: 0.02 K/CU MM
TOTAL NUCLEATED RBC: 0 K/CU MM
TOTAL PROTEIN: 6.5 GM/DL (ref 6.4–8.2)
WBC # BLD: 5.5 K/CU MM (ref 4–10.5)

## 2023-11-24 PROCEDURE — 70450 CT HEAD/BRAIN W/O DYE: CPT

## 2023-11-24 PROCEDURE — 80051 ELECTROLYTE PANEL: CPT

## 2023-11-24 PROCEDURE — 85025 COMPLETE CBC W/AUTO DIFF WBC: CPT

## 2023-11-24 PROCEDURE — 2700000000 HC OXYGEN THERAPY PER DAY

## 2023-11-24 PROCEDURE — P9047 ALBUMIN (HUMAN), 25%, 50ML: HCPCS | Performed by: INTERNAL MEDICINE

## 2023-11-24 PROCEDURE — 6370000000 HC RX 637 (ALT 250 FOR IP): Performed by: INTERNAL MEDICINE

## 2023-11-24 PROCEDURE — APPSS30 APP SPLIT SHARED TIME 16-30 MINUTES

## 2023-11-24 PROCEDURE — 93005 ELECTROCARDIOGRAM TRACING: CPT | Performed by: INTERNAL MEDICINE

## 2023-11-24 PROCEDURE — 80053 COMPREHEN METABOLIC PANEL: CPT

## 2023-11-24 PROCEDURE — 2580000003 HC RX 258: Performed by: INTERNAL MEDICINE

## 2023-11-24 PROCEDURE — 2060000000 HC ICU INTERMEDIATE R&B

## 2023-11-24 PROCEDURE — 85027 COMPLETE CBC AUTOMATED: CPT

## 2023-11-24 PROCEDURE — 6370000000 HC RX 637 (ALT 250 FOR IP)

## 2023-11-24 PROCEDURE — 36415 COLL VENOUS BLD VENIPUNCTURE: CPT

## 2023-11-24 PROCEDURE — 99233 SBSQ HOSP IP/OBS HIGH 50: CPT | Performed by: INTERNAL MEDICINE

## 2023-11-24 PROCEDURE — 84145 PROCALCITONIN (PCT): CPT

## 2023-11-24 PROCEDURE — 6370000000 HC RX 637 (ALT 250 FOR IP): Performed by: NURSE PRACTITIONER

## 2023-11-24 PROCEDURE — 2500000003 HC RX 250 WO HCPCS: Performed by: NURSE PRACTITIONER

## 2023-11-24 PROCEDURE — 6360000002 HC RX W HCPCS

## 2023-11-24 PROCEDURE — 93005 ELECTROCARDIOGRAM TRACING: CPT | Performed by: NURSE PRACTITIONER

## 2023-11-24 PROCEDURE — 80048 BASIC METABOLIC PNL TOTAL CA: CPT

## 2023-11-24 PROCEDURE — 71045 X-RAY EXAM CHEST 1 VIEW: CPT

## 2023-11-24 PROCEDURE — 83880 ASSAY OF NATRIURETIC PEPTIDE: CPT

## 2023-11-24 PROCEDURE — 94761 N-INVAS EAR/PLS OXIMETRY MLT: CPT

## 2023-11-24 PROCEDURE — 84132 ASSAY OF SERUM POTASSIUM: CPT

## 2023-11-24 PROCEDURE — 6360000002 HC RX W HCPCS: Performed by: INTERNAL MEDICINE

## 2023-11-24 PROCEDURE — 82962 GLUCOSE BLOOD TEST: CPT

## 2023-11-24 RX ORDER — DIGOXIN 0.25 MG/ML
125 INJECTION INTRAMUSCULAR; INTRAVENOUS EVERY 4 HOURS
Status: DISCONTINUED | OUTPATIENT
Start: 2023-11-24 | End: 2023-11-24

## 2023-11-24 RX ORDER — MIDODRINE HYDROCHLORIDE 5 MG/1
5 TABLET ORAL ONCE
Status: COMPLETED | OUTPATIENT
Start: 2023-11-24 | End: 2023-11-24

## 2023-11-24 RX ORDER — CARVEDILOL 6.25 MG/1
3.12 TABLET ORAL 2 TIMES DAILY WITH MEALS
Status: DISCONTINUED | OUTPATIENT
Start: 2023-11-24 | End: 2023-12-06 | Stop reason: HOSPADM

## 2023-11-24 RX ORDER — DIGOXIN 0.25 MG/ML
250 INJECTION INTRAMUSCULAR; INTRAVENOUS ONCE
Status: COMPLETED | OUTPATIENT
Start: 2023-11-24 | End: 2023-11-24

## 2023-11-24 RX ORDER — HYDRALAZINE HYDROCHLORIDE 20 MG/ML
5 INJECTION INTRAMUSCULAR; INTRAVENOUS ONCE
Status: DISCONTINUED | OUTPATIENT
Start: 2023-11-24 | End: 2023-11-24

## 2023-11-24 RX ORDER — 0.9 % SODIUM CHLORIDE 0.9 %
500 INTRAVENOUS SOLUTION INTRAVENOUS ONCE
Status: COMPLETED | OUTPATIENT
Start: 2023-11-24 | End: 2023-11-24

## 2023-11-24 RX ORDER — IPRATROPIUM BROMIDE AND ALBUTEROL SULFATE 2.5; .5 MG/3ML; MG/3ML
1 SOLUTION RESPIRATORY (INHALATION)
Status: DISCONTINUED | OUTPATIENT
Start: 2023-11-24 | End: 2023-11-24

## 2023-11-24 RX ORDER — CARVEDILOL 25 MG/1
25 TABLET ORAL ONCE
Status: DISCONTINUED | OUTPATIENT
Start: 2023-11-24 | End: 2023-11-24

## 2023-11-24 RX ORDER — METOPROLOL TARTRATE 1 MG/ML
5 INJECTION, SOLUTION INTRAVENOUS ONCE
Status: DISCONTINUED | OUTPATIENT
Start: 2023-11-24 | End: 2023-11-24

## 2023-11-24 RX ORDER — ALBUMIN (HUMAN) 12.5 G/50ML
25 SOLUTION INTRAVENOUS EVERY 6 HOURS
Status: COMPLETED | OUTPATIENT
Start: 2023-11-24 | End: 2023-11-25

## 2023-11-24 RX ORDER — IPRATROPIUM BROMIDE AND ALBUTEROL SULFATE 2.5; .5 MG/3ML; MG/3ML
1 SOLUTION RESPIRATORY (INHALATION)
Status: DISCONTINUED | OUTPATIENT
Start: 2023-11-24 | End: 2023-11-26

## 2023-11-24 RX ADMIN — ASPIRIN 81 MG CHEWABLE TABLET 81 MG: 81 TABLET CHEWABLE at 09:51

## 2023-11-24 RX ADMIN — ALBUMIN (HUMAN) 25 G: 0.25 INJECTION, SOLUTION INTRAVENOUS at 21:37

## 2023-11-24 RX ADMIN — MIDODRINE HYDROCHLORIDE 5 MG: 5 TABLET ORAL at 09:51

## 2023-11-24 RX ADMIN — HYDROXYZINE HYDROCHLORIDE 25 MG: 25 TABLET, FILM COATED ORAL at 03:47

## 2023-11-24 RX ADMIN — METOPROLOL TARTRATE 5 MG: 1 INJECTION, SOLUTION INTRAVENOUS at 07:05

## 2023-11-24 RX ADMIN — ALBUMIN (HUMAN) 25 G: 0.25 INJECTION, SOLUTION INTRAVENOUS at 10:38

## 2023-11-24 RX ADMIN — RISPERIDONE 0.5 MG: 0.5 TABLET ORAL at 09:51

## 2023-11-24 RX ADMIN — EMPAGLIFLOZIN 10 MG: 10 TABLET, FILM COATED ORAL at 09:51

## 2023-11-24 RX ADMIN — DIGOXIN 250 MCG: 0.25 INJECTION INTRAMUSCULAR; INTRAVENOUS at 09:51

## 2023-11-24 RX ADMIN — SODIUM ZIRCONIUM CYCLOSILICATE 10 G: 10 POWDER, FOR SUSPENSION ORAL at 14:06

## 2023-11-24 RX ADMIN — APIXABAN 5 MG: 5 TABLET, FILM COATED ORAL at 09:51

## 2023-11-24 RX ADMIN — RISPERIDONE 0.5 MG: 0.5 TABLET ORAL at 21:33

## 2023-11-24 RX ADMIN — DIGOXIN 125 MCG: 0.25 INJECTION INTRAMUSCULAR; INTRAVENOUS at 14:06

## 2023-11-24 RX ADMIN — SODIUM CHLORIDE, PRESERVATIVE FREE 10 ML: 5 INJECTION INTRAVENOUS at 09:57

## 2023-11-24 RX ADMIN — SODIUM CHLORIDE 500 ML: 9 INJECTION, SOLUTION INTRAVENOUS at 10:04

## 2023-11-24 RX ADMIN — ALBUMIN (HUMAN) 25 G: 0.25 INJECTION, SOLUTION INTRAVENOUS at 16:31

## 2023-11-24 RX ADMIN — SODIUM CHLORIDE, PRESERVATIVE FREE 10 ML: 5 INJECTION INTRAVENOUS at 21:34

## 2023-11-24 RX ADMIN — APIXABAN 5 MG: 5 TABLET, FILM COATED ORAL at 21:32

## 2023-11-24 RX ADMIN — CARVEDILOL 3.12 MG: 6.25 TABLET, FILM COATED ORAL at 12:42

## 2023-11-24 ASSESSMENT — ENCOUNTER SYMPTOMS
WHEEZING: 0
CHOKING: 0
STRIDOR: 0
VOMITING: 0
BACK PAIN: 0
CONSTIPATION: 0
NAUSEA: 0
DIARRHEA: 0
EYE PAIN: 0
ABDOMINAL PAIN: 0
ABDOMINAL DISTENTION: 0
BLOOD IN STOOL: 0
SHORTNESS OF BREATH: 0
COUGH: 0
CHEST TIGHTNESS: 0

## 2023-11-24 ASSESSMENT — PAIN DESCRIPTION - PAIN TYPE: TYPE: ACUTE PAIN

## 2023-11-24 ASSESSMENT — PAIN DESCRIPTION - LOCATION: LOCATION: GENERALIZED

## 2023-11-24 ASSESSMENT — PAIN SCALES - GENERAL: PAINLEVEL_OUTOF10: 5

## 2023-11-24 ASSESSMENT — PAIN DESCRIPTION - DESCRIPTORS: DESCRIPTORS: ACHING;SORE

## 2023-11-24 ASSESSMENT — PAIN - FUNCTIONAL ASSESSMENT: PAIN_FUNCTIONAL_ASSESSMENT: PREVENTS OR INTERFERES SOME ACTIVE ACTIVITIES AND ADLS

## 2023-11-24 NOTE — PROGRESS NOTES
This nurse received phone call from [employer] pt's HR staying between 120s and 140s at this time and is very irregular. Tele tech advised this nurse that pt's HR jumping up to 150 at times, then back to 120s also. NP Mario Goel notified. NP then asked this nurse \"They have A fib? \" NP then told this nurse \"I put in an EKG order. \" This nurse then told NP pt does have A fib and this nurse knew this d/t progress note from hospitalist. This nurse also told NP pt is on Eliquis. NP then told this nurse, \"Lets see what EKG shows then I can treat. Let me know when it's done, I'll look. Is BP okay? \" This nurse then told NP \"BP is kind of everywhere and on lower side. \" This nurse then told NP \"EKG shows A fib with RVR with right BBB. \" NP told this nurse \"Man that's going to be hard. \" And asked this nurse if Rns can push IV Lopressor on our floor. This nurse told NP that an ICU nurse or rapid resource RN has to come push that med on our floor. This nurse asked NP if she wanted rapid RN messaged, but also told NP there had just been a rapid called on [facility]. NP then told this nurse Windy Fatima I have order in.\" (For iv lopressor).

## 2023-11-24 NOTE — PROGRESS NOTES
Patients primary nurse reported increased HR over night EKG shows afib RVR hx of afib on  low dose coreg and eliquis and asa. Patient could not get IV placed( no IV team hard stick), IV lopressor Dc'd cardio already cons. Will order increased po dose of coreg this am days will have to follow up.

## 2023-11-24 NOTE — SIGNIFICANT EVENT
Rapid response was called for patient ~ 730 am. Author of note arrived at bedside to assess patient. Patient reportedly having atrial fibrillation w RVR, for which she was ordered for lopressor 5mg IV by overnight team which she received at 705 am. Briefly after, patient experienced episode of brief LOC-few seconds  and slipping to floor with no head trauma, witnessed by staff, she slipped to floor reportedly and helped by staff down. On my eval, HR was fluctuating from 's, w pt perfusing well, awake and alert, responsive, at baseline MS and denying any complaints, BP w MAP ~70's, recent FSG `120's. Pt Sat > 90% on 2LPM. Brief Neuro exam unremarkable. Will get Labs, EKG and CTH, move partient to SDU for closer observation and consult cardiology for Afib.  Primary team given update

## 2023-11-24 NOTE — PROGRESS NOTES
At this time this nurse told NP Pedrito Fenton that pt still did not have IV access because at this time this nurse was notified by ICU RN Clem Lew there hadn't been a vascular access nurse on all night and there was no rapid resource RN on all night either. RN Clem Lew was called by this nurse to give IV Lopressor and MINE Lew attempted x3 to get iv access on pt and was not able to. This nurse then notified NP of all this information. Per NP \"Oh man, okay I'm going to D/C and write a note cardio will have to manage. \" (Time now 0700). NP then said \"I can do PO meds for now. \" This nurse then contacted dayshift physician at [address] [place] [address] and asked that IV Lopressor not be D/c'd as another AM ICU nurse had obtained vascular access in pt's RFA. Per physician Dr. Segundo Gandara at 5116 \"Hold Metoprolol and Coreg for now. \" During bedside shift report in pt's room however at 0720, pt sat up on side of couch pt had been sleeping on in pt room, grabbed her chest, and slid off couch onto her left side on floor in front of couch. Rapid response was then called.

## 2023-11-24 NOTE — PROGRESS NOTES
V2.0    [facility] [place]      Name:  Shira Toro /Age/Sex: 1965  (60 y.o. female)   MRN & CSN:  [id number] & [id number] Encounter Date/Time: 2023 9:58 AM EST   Location:  -A PCP: No primary care provider on file. [place] [address] Day: 21    Assessment and Recommendations   Shira Toro is a 60 y.o. female who presents with Acute on chronic systolic (congestive) heart failure ([address] [facility])      Patient is on medical hold at this time. Does not have capacity to leave AMA. Please AVOID physical restraints. CM team working on establishing placement/disposition. Medically stable. Plan for  [facility] once insurance is determined. Acute metabolic encephalopathy  Delirium with behavioral disturbance  -Likely secondary to substance abuse and hypoxia.  -Pt still requiring sitter for agitation and behavioral disturbance  -Show support called on  for agitation in setting of impaired mentation with behavioral disturbance, pt ripped out her IV and attempting to leave AMA. Is alert and oriented to herself and place but not aware of why she is in the hospital or what she was treated for  -Psychiatry determined that pt does not have capacity, placed pt on medical hold for her safety  -CM able to reach pt's daughter who agrees with medical hold and with placement  -Maintain sitter at bedside  - Risperdal 0.5mg BID     Acute hypoxia   Troponin elevation  Acute on chronic systolic and diastolic heart failure  -Precipitant likely the cocaine abuse. -Improved with IV diuresis, converted to p.o. torsemide = hold and resume as able  -Started on GDMT with low-dose Coreg, Aldactone and Jardiance added by nephrology. = hold and resume as able    Acute renal failure - Resolved   -Suspect cardiorenal syndrome   -Diuresis as above.   -Nephrology following    A-fib with RVR  Possible apical thrombus -on TTE  NICM -LVEF depressed  -Heart rate improved, continue digoxin.  -Continue full dose AC  -EP evaluated pt for ICD however pt not a candidate at present as pt must be compliant with medications and stop drug use prior to consideration of ICD  -GDMT as able  -Cardiology signed off    Transaminitis, hyperbilirubinemia  -RUQ U/S unremarkable. Acute hepatitis panel negative  -Suspect due to congestive hepatopathy versus cocaine.  -Trend levels    Substance abuse  -Counseled extensively on cessation. Diet ADULT DIET; Regular; No Added Salt (3-4 gm)  ADULT ORAL NUTRITION SUPPLEMENT; Lunch; Standard High Calorie/High Protein Oral Supplement  ADULT ORAL NUTRITION SUPPLEMENT; Dinner; Frozen Oral Supplement   DVT Prophylaxis [x] Lovenox, []  Heparin, [] SCDs, [] Ambulation,  [] Eliquis, [] Xarelto  [] Coumadin   Code Status Full Code   Disposition From: Home  Expected Disposition: Psych facility   Estimated Date of Discharge: awaiting placement. Medical stability is pending as well. Surrogate Decision Maker/ Trinidad Wallace (Parent)      Personally reviewed Lab Studies and Imaging     Discussed during IDR - pending placement to inpatient psych. Subjective:     Chief Complaint:   Chief Complaint   Patient presents with    Rectal Bleeding    Altered Mental Status    Shortness of Breath     85% at home     Patient seen and examined in the AM. Patient states she feels fine. No fatigue today. Review of Systems:      Review of Systems   Constitutional:  Negative for chills, fatigue, fever and unexpected weight change. Eyes:  Negative for pain and visual disturbance. Respiratory:  Negative for cough, choking, chest tightness, shortness of breath, wheezing and stridor. Cardiovascular:  Negative for chest pain, palpitations and leg swelling. Gastrointestinal:  Negative for abdominal distention, abdominal pain, blood in stool, constipation, diarrhea, nausea and vomiting. Genitourinary:  Negative for decreased urine volume, dysuria, frequency, hematuria and urgency.

## 2023-11-24 NOTE — PROGRESS NOTES
ATTEMPT  RR called this AM for fall and afib with RVR, pt not appropriate this AM. PTA to f/u as schedule allows.    Electronically signed by:    Tri Bravo, CLEMENTINA  11/24/2023, 8:17 AM

## 2023-11-24 NOTE — PROGRESS NOTES
Attempted to contact pt's daughter, Helen Wall, to give update, no answer and not able to leave message. Contacted pt's mother, Theodore Lane, and gave status update and new room number [id number].

## 2023-11-24 NOTE — PLAN OF CARE
Problem: Discharge Planning  Goal: Discharge to home or other facility with appropriate resources  11/24/2023 1431 by London Bloom RN  Outcome: Progressing  11/24/2023 0340 by Silvano Leyden, RN  Outcome: Progressing     Problem: Pain  Goal: Verbalizes/displays adequate comfort level or baseline comfort level  11/24/2023 1431 by London Bloom RN  Outcome: Progressing  11/24/2023 0340 by Silvano Leyden, RN  Outcome: Progressing     Problem: Skin/Tissue Integrity  Goal: Absence of new skin breakdown  Description: 1. Monitor for areas of redness and/or skin breakdown  2. Assess vascular access sites hourly  3. Every 4-6 hours minimum:  Change oxygen saturation probe site  4. Every 4-6 hours:  If on nasal continuous positive airway pressure, respiratory therapy assess nares and determine need for appliance change or resting period.   11/24/2023 1431 by London Bloom RN  Outcome: Progressing  11/24/2023 0340 by Silvano Leyden, RN  Outcome: Progressing     Problem: Safety - Adult  Goal: Free from fall injury  11/24/2023 1431 by London Bloom RN  Outcome: Progressing  11/24/2023 0340 by Silvano Leyden, RN  Outcome: Progressing     Problem: ABCDS Injury Assessment  Goal: Absence of physical injury  11/24/2023 1431 by London Bloom RN  Outcome: Progressing  11/24/2023 0340 by Silvano Leyden, RN  Outcome: Progressing     Problem: Risk for Elopement  Goal: Patient will not exit the unit/facility without proper excort  11/24/2023 1431 by London Bloom RN  Outcome: Progressing  11/24/2023 0340 by Silvano Leyden, RN  Outcome: Progressing     Problem: Nutrition Deficit:  Goal: Optimize nutritional status  11/24/2023 1431 by London Bloom RN  Outcome: Progressing  11/24/2023 0340 by Silvano Leyden, RN  Outcome: Progressing     Problem: Neurosensory - Adult  Goal: Achieves stable or improved neurological status  Outcome: Progressing  Goal: Absence of seizures  Outcome:

## 2023-11-24 NOTE — PROGRESS NOTES
[facility]  [address] [place] [address]  Phone: [phone]    Fax [phone]                  Butch Black MD, Js Riley MD, Chyna Moore MD, MD Milli Ho MD Julane Leos, MD Washington Ruiz, MD Dr. Levander Porch MD Delon Hammers, SHAQUILLE Alvarado, SHAQUILLE Sequeira, APROLU Mcpherson, APRN Helena Dancer, PA-C    CARDIOLOGY  NOTE      Name:  Peyton Dumont /Age/Sex: 1965  (60 y.o. female)   MRN & CSN:  [id number] & [id number] Admission Date/Time: 2023  9:26 AM   Location:  -A PCP: No primary care provider on file. Hospital Day: 24    - Cardiology consult is for:  Atrial fibrillation with Rapid ventricular response      ASSESSMENT/ PLAN:  Atrial fibrillation with rapid ventricular response  -Rate is high available today however mostly staying between 1 110-140.  -Due to cardiomyopathy, hypertension and hyperkalemia rate control options are limited at this time  -Patient received IV digoxin 250 mcg x 1 this morning. If potassium improved can consider additional digoxin  Nonischemic cardiomyopathy  Left ventricular ventricular thrombus  -Did not appear overloaded at this time  -GDMT: Jardiance 10. Coreg reduced to 3.125 mg due to hypotension this morning. Aldactone held in setting of hyperkalemia  -Continue Eliquis 5 mg twice daily  Acute metabolic encephalopathy with behavioral disturbance  -Per primary Care  Hyperkalemia  -Recheck potassium levels. -S/p Lokelma  Cocaine abuse  -Likely cause of cardiomyopathy and probably contributing to her atrial fibrillation          Subjective:  Jonnie Lobo is a 60 y. o.year old     Patient sitter at bedside. Discussed care with nursing staff. Patient does not have capacity make her own medical decisions at this time.   However she is complaining about discomfort of breath

## 2023-11-24 NOTE — PROGRESS NOTES
This nurse went into pt room to get routine vital signs per unit protocol. Upon obtaining vital signs while pt was sleeping peacefully RR noted to be 30. Respirations labored. O2 sat 91-92% on room air. Unable to obtain accurate HR via apical or radial pulse as pulse was very irregular and irratic. Upon listening to lungs, inspiratory and expiratory wheezes noted throughout. Pt c/o increased SOB and chest tightness. This nurse applied 2L O2 via NC as she had been on room air at baseline and [facility] gave pt her PRN Hydroxyzine as pt noted to routinely have increased anxiety. NP Elvi Wright notified at this time of all these new developments. Per NP \"I'm going to put some labs and X ray in. \" This nurse then asked NP for tele order so pt could be put back on the tele monitor as she had been \"refusing tele\" per day shift nurses. NP placed order for tele and continuous pulse ox. Pt then allowed this nurse to place tele and pulse ox on her. Tele placed on pt at 0410. NP made aware and told this nurse \"ok let me know how it goes. \"

## 2023-11-24 NOTE — PROGRESS NOTES
POST FALL MANAGEMENT    [employer]  MEDICAL RECORD NUMBER:  [id number]  AGE: 60 y.o. GENDER: female  : 1965  TODAYS DATE:  2023    Details     Fall Occurred: Yes    Was the Fall Witnessed:  Yes, staff in room at shift change      Brief Review of Event: Pt sat up from lying on couch, briefly passed out and rolled to floor        Who found the patient: myself, Tamara Hubbard LPN, Twila Michelle tech, [facility] Mallorie, tech      Where was the patient at the time of the fall: on couch      Patient Comments: 'I was sitting here and then I was on the floor'     Date Fall Occurred:  2023 . Time Fall Occurred: 7:20a.m.      Assessment     Post Fall Head to Toe Assessment Completed: Yes    Post Fall Predictive Analytic Score Reviewed: Yes     Post Fall Vitals Completed: Yes    Post Fall Neuro Checks Completed: Yes    Injury Occurred(if yes, describe injury):  unsure-CT pending           Did the Patient Experience:(Check Herbert Lay all that apply)    [x] Patient hit head  [x] Loss of consciousness  [] Change in mental status following the fall  [x] Patient is on an anticoagulant medication      CT Performed:  yes    Follow-up     Persons Notified of Fall:  (Provide names of persons notified)   [x] Physician:   [] ARABELLA:  [x] Nursing Supervisior:Miah  [x] Manager:Nu  [] Pharmacist:  [x] Family: Neel Mike, mother  [] Other:      Electronically signed by Pavithra Glover RN 2023 at 5:21 PM

## 2023-11-25 LAB
ALBUMIN SERPL-MCNC: 4.3 GM/DL (ref 3.4–5)
ALP BLD-CCNC: 98 IU/L (ref 40–129)
ALT SERPL-CCNC: 24 U/L (ref 10–40)
ANION GAP SERPL CALCULATED.3IONS-SCNC: 14 MMOL/L (ref 4–16)
AST SERPL-CCNC: 31 IU/L (ref 15–37)
BILIRUB SERPL-MCNC: 1.3 MG/DL (ref 0–1)
BUN SERPL-MCNC: 22 MG/DL (ref 6–23)
CALCIUM SERPL-MCNC: 8.5 MG/DL (ref 8.3–10.6)
CHLORIDE BLD-SCNC: 104 MMOL/L (ref 99–110)
CO2: 16 MMOL/L (ref 21–32)
CREAT SERPL-MCNC: 1.3 MG/DL (ref 0.6–1.1)
GFR SERPL CREATININE-BSD FRML MDRD: 47 ML/MIN/1.73M2
GLUCOSE SERPL-MCNC: 100 MG/DL (ref 70–99)
MAGNESIUM: 2.7 MG/DL (ref 1.8–2.4)
PHOSPHORUS: 4.3 MG/DL (ref 2.5–4.9)
POTASSIUM SERPL-SCNC: 5.5 MMOL/L (ref 3.5–5.1)
PRO-BNP: ABNORMAL PG/ML
SODIUM BLD-SCNC: 134 MMOL/L (ref 135–145)
TOTAL PROTEIN: 6.6 GM/DL (ref 6.4–8.2)

## 2023-11-25 PROCEDURE — 2580000003 HC RX 258: Performed by: INTERNAL MEDICINE

## 2023-11-25 PROCEDURE — 6370000000 HC RX 637 (ALT 250 FOR IP): Performed by: INTERNAL MEDICINE

## 2023-11-25 PROCEDURE — 84100 ASSAY OF PHOSPHORUS: CPT

## 2023-11-25 PROCEDURE — 83880 ASSAY OF NATRIURETIC PEPTIDE: CPT

## 2023-11-25 PROCEDURE — 94761 N-INVAS EAR/PLS OXIMETRY MLT: CPT

## 2023-11-25 PROCEDURE — 6370000000 HC RX 637 (ALT 250 FOR IP)

## 2023-11-25 PROCEDURE — 94640 AIRWAY INHALATION TREATMENT: CPT

## 2023-11-25 PROCEDURE — 2060000000 HC ICU INTERMEDIATE R&B

## 2023-11-25 PROCEDURE — 6370000000 HC RX 637 (ALT 250 FOR IP): Performed by: NURSE PRACTITIONER

## 2023-11-25 PROCEDURE — 6360000002 HC RX W HCPCS: Performed by: INTERNAL MEDICINE

## 2023-11-25 PROCEDURE — 83735 ASSAY OF MAGNESIUM: CPT

## 2023-11-25 PROCEDURE — APPSS60 APP SPLIT SHARED TIME 46-60 MINUTES: Performed by: NURSE PRACTITIONER

## 2023-11-25 PROCEDURE — 36415 COLL VENOUS BLD VENIPUNCTURE: CPT

## 2023-11-25 PROCEDURE — 80053 COMPREHEN METABOLIC PANEL: CPT

## 2023-11-25 PROCEDURE — P9047 ALBUMIN (HUMAN), 25%, 50ML: HCPCS | Performed by: INTERNAL MEDICINE

## 2023-11-25 RX ADMIN — IPRATROPIUM BROMIDE AND ALBUTEROL SULFATE 1 DOSE: 2.5; .5 SOLUTION RESPIRATORY (INHALATION) at 20:31

## 2023-11-25 RX ADMIN — APIXABAN 5 MG: 5 TABLET, FILM COATED ORAL at 21:18

## 2023-11-25 RX ADMIN — CARVEDILOL 3.12 MG: 6.25 TABLET, FILM COATED ORAL at 17:04

## 2023-11-25 RX ADMIN — SODIUM CHLORIDE, PRESERVATIVE FREE 10 ML: 5 INJECTION INTRAVENOUS at 21:18

## 2023-11-25 RX ADMIN — APIXABAN 5 MG: 5 TABLET, FILM COATED ORAL at 09:03

## 2023-11-25 RX ADMIN — RISPERIDONE 0.5 MG: 0.5 TABLET ORAL at 21:18

## 2023-11-25 RX ADMIN — SODIUM CHLORIDE, PRESERVATIVE FREE 10 ML: 5 INJECTION INTRAVENOUS at 09:05

## 2023-11-25 RX ADMIN — ASPIRIN 81 MG CHEWABLE TABLET 81 MG: 81 TABLET CHEWABLE at 09:03

## 2023-11-25 RX ADMIN — ALBUMIN (HUMAN) 25 G: 0.25 INJECTION, SOLUTION INTRAVENOUS at 04:24

## 2023-11-25 RX ADMIN — SODIUM ZIRCONIUM CYCLOSILICATE 10 G: 10 POWDER, FOR SUSPENSION ORAL at 17:05

## 2023-11-25 RX ADMIN — RISPERIDONE 0.5 MG: 0.5 TABLET ORAL at 09:03

## 2023-11-25 RX ADMIN — EMPAGLIFLOZIN 10 MG: 10 TABLET, FILM COATED ORAL at 09:03

## 2023-11-25 ASSESSMENT — PAIN SCALES - PAIN ASSESSMENT IN ADVANCED DEMENTIA (PAINAD)
NEGVOCALIZATION: 0
FACIALEXPRESSION: 0
CONSOLABILITY: 0
BODYLANGUAGE: 0
BODYLANGUAGE: 0
CONSOLABILITY: 0
TOTALSCORE: 0
FACIALEXPRESSION: 0
BREATHING: 0
BREATHING: 0
CONSOLABILITY: 0
BREATHING: 0
FACIALEXPRESSION: 0
BODYLANGUAGE: 0
BREATHING: 0
NEGVOCALIZATION: 0
BODYLANGUAGE: 0
CONSOLABILITY: 0
FACIALEXPRESSION: 0
TOTALSCORE: 0
CONSOLABILITY: 0
FACIALEXPRESSION: 0
BREATHING: 0
CONSOLABILITY: 0
NEGVOCALIZATION: 0
TOTALSCORE: 0
CONSOLABILITY: 0
TOTALSCORE: 0
BODYLANGUAGE: 0
CONSOLABILITY: 0
FACIALEXPRESSION: 0
BODYLANGUAGE: 0
FACIALEXPRESSION: 0
BREATHING: 0
FACIALEXPRESSION: 0
NEGVOCALIZATION: 0
CONSOLABILITY: 0
BODYLANGUAGE: 0
TOTALSCORE: 0
TOTALSCORE: 0
BREATHING: 0
NEGVOCALIZATION: 0
BODYLANGUAGE: 0
BREATHING: 0
CONSOLABILITY: 0
CONSOLABILITY: 0
FACIALEXPRESSION: 0
BODYLANGUAGE: 0
BREATHING: 0
NEGVOCALIZATION: 0
TOTALSCORE: 0
BREATHING: 0
CONSOLABILITY: 0
TOTALSCORE: 0
TOTALSCORE: 0
BODYLANGUAGE: 0
BREATHING: 0
TOTALSCORE: 0
FACIALEXPRESSION: 0
BREATHING: 0
BREATHING: 0
FACIALEXPRESSION: 0
BODYLANGUAGE: 0
CONSOLABILITY: 0
TOTALSCORE: 0
TOTALSCORE: 0
BODYLANGUAGE: 0
TOTALSCORE: 0
NEGVOCALIZATION: 0
BODYLANGUAGE: 0

## 2023-11-25 ASSESSMENT — ENCOUNTER SYMPTOMS
ABDOMINAL DISTENTION: 0
WHEEZING: 0
STRIDOR: 0
SHORTNESS OF BREATH: 0
CONSTIPATION: 0
VOMITING: 0
COUGH: 0
NAUSEA: 0
EYE PAIN: 0
ABDOMINAL PAIN: 0
BACK PAIN: 0
CHOKING: 0
BLOOD IN STOOL: 0
CHEST TIGHTNESS: 0
DIARRHEA: 0

## 2023-11-25 ASSESSMENT — PAIN SCALES - GENERAL: PAINLEVEL_OUTOF10: 0

## 2023-11-25 NOTE — FLOWSHEET NOTE
[facility] RN rounded on pt for DI score of 60. PT laying on the couch with eyes closed. Sitter at bedside. Nurse asked not to wake pt that she is doing fine and resting at this time.

## 2023-11-25 NOTE — PLAN OF CARE
Problem: Discharge Planning  Goal: Discharge to home or other facility with appropriate resources  11/25/2023 1414 by Daine Schirmer, RN  Outcome: Progressing  11/25/2023 1412 by Daine Schirmer, RN  Outcome: Progressing     Problem: Pain  Goal: Verbalizes/displays adequate comfort level or baseline comfort level  11/25/2023 1414 by Daine Schirmer, RN  Outcome: Progressing  11/25/2023 1412 by Daine Schirmer, RN  Outcome: Progressing     Problem: Skin/Tissue Integrity  Goal: Absence of new skin breakdown  Description: 1. Monitor for areas of redness and/or skin breakdown  2. Assess vascular access sites hourly  3. Every 4-6 hours minimum:  Change oxygen saturation probe site  4. Every 4-6 hours:  If on nasal continuous positive airway pressure, respiratory therapy assess nares and determine need for appliance change or resting period.   11/25/2023 1414 by Daine Schirmer, RN  Outcome: Progressing  11/25/2023 1412 by Daine Schirmer, RN  Outcome: Progressing     Problem: Safety - Adult  Goal: Free from fall injury  11/25/2023 1414 by Daine Schirmer, RN  Outcome: Progressing  11/25/2023 1412 by Daine Schirmer, RN  Outcome: Progressing     Problem: ABCDS Injury Assessment  Goal: Absence of physical injury  11/25/2023 1414 by Daine Schirmer, RN  Outcome: Progressing  11/25/2023 1412 by Daine Schirmer, RN  Outcome: Progressing     Problem: Risk for Elopement  Goal: Patient will not exit the unit/facility without proper excort  11/25/2023 1414 by Daine Schirmer, RN  Outcome: Progressing  11/25/2023 1412 by Daine Schirmer, RN  Outcome: Progressing     Problem: Nutrition Deficit:  Goal: Optimize nutritional status  11/25/2023 1414 by Daine Schirmer, RN  Outcome: Progressing  11/25/2023 1412 by Daine Schirmer, RN  Outcome: Progressing     Problem: Neurosensory - Adult  Goal: Achieves stable or improved neurological status  11/25/2023 1414 by Daine Schirmer, RN  Outcome: Progressing  11/25/2023 1412 by Devyn Woodward

## 2023-11-25 NOTE — PROGRESS NOTES
Cardiology Progress Note     Today's Plan: Continue present management    Admit Date:  11/4/2023    Consult reason/ Seen today for: A-fib RVR    Subjective and  Overnight Events:  Denies any chest pain, SOB, or palpations. Assessment / Plan / Recommendation:     A-fib RVR: Currently rate is controlled, soft BP. Continue with Coreg 3.125 mg twice daily. NICM: EF  15%, Denies any shortness of breath. Patient appears compensated. Due to hyperkalemia, Aldactone held. Diuretic managed by nephrology currently on 20 mg of torsemide. Continue with Jardiance and Coreg. Left ventricular ventricle thrombus: Stable, continue with Eliquis 5 mg twice daily. Cocaine abuse: Likely cause of cardiomyopathy. DVT prophylaxis if not contraindicated. CARDIOLOGY ATTENDING ADDENDUM    I have seen, spoken to and examined this patient personally, independent of the NP/PAC. I have reviewed the hospital care given to date and reviewed all pertinent labs and imaging. I have spoken with patient, nursing staff and provided written and verbal instructions . The above note has been reviewed. I have spent substantive (>50%) amount of time in formulating patient care. Physical Exam:       Head: normal  Eye: normal  Chest:   Clear, 0 Basilar crackles   Cardiovascular:  S1S2   Abdomen: soft   Extremities:   0 edema  Pulses :  palpable      MEDICAL DECISION MAKING :     A-fib RVR, rate control strategy. Continue with Coreg  Nonischemic cardiomyopathy, currently appears to be compensated  Continue with guideline directed medical therapy as outlined  Cocaine abuse: Patient was counseled against drug abuse. DVT prophylaxis  Outpatient follow-up             Dr. Elvin Scruggs MD      +++++++++++++++++++++++++++++++++++++++     History of Presenting Illness:    Chief complain on admission : 60 y. o.year old who is admitted for  Chief Complaint   Patient presents with

## 2023-11-25 NOTE — PLAN OF CARE
Problem: Discharge Planning  Goal: Discharge to home or other facility with appropriate resources  Outcome: Progressing     Problem: Pain  Goal: Verbalizes/displays adequate comfort level or baseline comfort level  Outcome: Progressing     Problem: Skin/Tissue Integrity  Goal: Absence of new skin breakdown  Description: 1. Monitor for areas of redness and/or skin breakdown  2. Assess vascular access sites hourly  3. Every 4-6 hours minimum:  Change oxygen saturation probe site  4. Every 4-6 hours:  If on nasal continuous positive airway pressure, respiratory therapy assess nares and determine need for appliance change or resting period.   Outcome: Progressing     Problem: Safety - Adult  Goal: Free from fall injury  Outcome: Progressing     Problem: ABCDS Injury Assessment  Goal: Absence of physical injury  Outcome: Progressing     Problem: Risk for Elopement  Goal: Patient will not exit the unit/facility without proper excort  Outcome: Progressing     Problem: Nutrition Deficit:  Goal: Optimize nutritional status  Outcome: Progressing     Problem: Neurosensory - Adult  Goal: Achieves stable or improved neurological status  Outcome: Progressing  Goal: Absence of seizures  Outcome: Progressing  Goal: Remains free of injury related to seizures activity  Outcome: Progressing  Goal: Achieves maximal functionality and self care  Outcome: Progressing     Problem: Respiratory - Adult  Goal: Achieves optimal ventilation and oxygenation  Outcome: Progressing     Problem: Cardiovascular - Adult  Goal: Maintains optimal cardiac output and hemodynamic stability  Outcome: Progressing  Goal: Absence of cardiac dysrhythmias or at baseline  Outcome: Progressing     Problem: Musculoskeletal - Adult  Goal: Return mobility to safest level of function  Outcome: Progressing  Goal: Maintain proper alignment of affected body part  Outcome: Progressing  Goal: Return ADL status to a safe level of function  Outcome: Progressing normal gait and station , no tenderness or deformities present Problem: Gastrointestinal - Adult  Goal: Minimal or absence of nausea and vomiting  Outcome: Progressing  Goal: Maintains or returns to baseline bowel function  Outcome: Progressing  Goal: Maintains adequate nutritional intake  Outcome: Progressing     Problem: Metabolic/Fluid and Electrolytes - Adult  Goal: Electrolytes maintained within normal limits  Outcome: Progressing  Goal: Hemodynamic stability and optimal renal function maintained  Outcome: Progressing     Problem: Behavior  Goal: Pt/Family maintain appropriate behavior and adhere to behavioral management agreement, if implemented  Description: INTERVENTIONS:  1. Assess patient/family's coping skills and  non-compliant behavior (including use of illegal substances)  2. Notify security of behavior or suspected illegal substances which indicate the need for search of the family and/or belongings  3. Encourage verbalization of thoughts and concerns in a socially appropriate manner  4. Utilize positive, consistent limit setting strategies supporting safety of patient, staff and others  5. Encourage participation in the decision making process about the behavioral management agreement  6. If a visitor's behavior poses a threat to safety call refer to organization policy.   7. Initiate consult with , Psychosocial CNS, Spiritual Care as appropriate  Outcome: Progressing     Problem: Skin/Tissue Integrity - Adult  Goal: Skin integrity remains intact  Outcome: Progressing  Goal: Incisions, wounds, or drain sites healing without S/S of infection  Outcome: Progressing  Goal: Oral mucous membranes remain intact  Outcome: Progressing

## 2023-11-26 LAB
BACTERIA: NEGATIVE /HPF
BASE EXCESS MIXED: 2.5 (ref 0–2.3)
BILIRUBIN URINE: NEGATIVE MG/DL
BLOOD, URINE: ABNORMAL
CLARITY: CLEAR
COLOR: YELLOW
COMMENT: ABNORMAL
CREATININE URINE: 34.7 MG/DL (ref 28–217)
EKG ATRIAL RATE: 101 BPM
EKG ATRIAL RATE: 125 BPM
EKG DIAGNOSIS: NORMAL
EKG DIAGNOSIS: NORMAL
EKG Q-T INTERVAL: 316 MS
EKG Q-T INTERVAL: 318 MS
EKG QRS DURATION: 132 MS
EKG QRS DURATION: 92 MS
EKG QTC CALCULATION (BAZETT): 433 MS
EKG QTC CALCULATION (BAZETT): 462 MS
EKG R AXIS: 0 DEGREES
EKG R AXIS: 9 DEGREES
EKG T AXIS: 49 DEGREES
EKG T AXIS: 98 DEGREES
EKG VENTRICULAR RATE: 113 BPM
EKG VENTRICULAR RATE: 127 BPM
GLUCOSE, URINE: >1000 MG/DL
HCO3 VENOUS: 29.3 MMOL/L (ref 19–25)
KETONES, URINE: NEGATIVE MG/DL
LEUKOCYTE ESTERASE, URINE: ABNORMAL
MUCUS: ABNORMAL HPF
NITRITE URINE, QUANTITATIVE: NEGATIVE
O2 SAT, VEN: 48.1 % (ref 50–70)
PCO2, VEN: 53 MMHG (ref 38–52)
PH VENOUS: 7.35 (ref 7.32–7.42)
PH, URINE: 5.5 (ref 5–8)
PO2, VEN: 32 MMHG (ref 28–48)
POTASSIUM, UR: 8.9 MMOL/L (ref 22–119)
PROT/CREAT RATIO, UR: 0.3
PROTEIN UA: NEGATIVE MG/DL
RBC URINE: 4 /HPF (ref 0–6)
SODIUM URINE: 58 MMOL/L (ref 35–167)
SPECIFIC GRAVITY UA: 1.01 (ref 1–1.03)
SQUAMOUS EPITHELIAL: <1 /HPF
TRICHOMONAS: ABNORMAL /HPF
URINE TOTAL PROTEIN: 10.4 MG/DL
UROBILINOGEN, URINE: 1 MG/DL (ref 0.2–1)
WBC UA: 2 /HPF (ref 0–5)

## 2023-11-26 PROCEDURE — 51798 US URINE CAPACITY MEASURE: CPT

## 2023-11-26 PROCEDURE — 93010 ELECTROCARDIOGRAM REPORT: CPT | Performed by: INTERNAL MEDICINE

## 2023-11-26 PROCEDURE — 36415 COLL VENOUS BLD VENIPUNCTURE: CPT

## 2023-11-26 PROCEDURE — 82570 ASSAY OF URINE CREATININE: CPT

## 2023-11-26 PROCEDURE — 84300 ASSAY OF URINE SODIUM: CPT

## 2023-11-26 PROCEDURE — APPSS60 APP SPLIT SHARED TIME 46-60 MINUTES: Performed by: NURSE PRACTITIONER

## 2023-11-26 PROCEDURE — 6370000000 HC RX 637 (ALT 250 FOR IP): Performed by: INTERNAL MEDICINE

## 2023-11-26 PROCEDURE — 81001 URINALYSIS AUTO W/SCOPE: CPT

## 2023-11-26 PROCEDURE — 84156 ASSAY OF PROTEIN URINE: CPT

## 2023-11-26 PROCEDURE — 6370000000 HC RX 637 (ALT 250 FOR IP): Performed by: NURSE PRACTITIONER

## 2023-11-26 PROCEDURE — 84133 ASSAY OF URINE POTASSIUM: CPT

## 2023-11-26 PROCEDURE — 82805 BLOOD GASES W/O2 SATURATION: CPT

## 2023-11-26 PROCEDURE — 94761 N-INVAS EAR/PLS OXIMETRY MLT: CPT

## 2023-11-26 PROCEDURE — 6370000000 HC RX 637 (ALT 250 FOR IP)

## 2023-11-26 PROCEDURE — 2060000000 HC ICU INTERMEDIATE R&B

## 2023-11-26 PROCEDURE — 2580000003 HC RX 258: Performed by: INTERNAL MEDICINE

## 2023-11-26 PROCEDURE — 99232 SBSQ HOSP IP/OBS MODERATE 35: CPT | Performed by: INTERNAL MEDICINE

## 2023-11-26 RX ORDER — ALBUTEROL SULFATE 90 UG/1
2 AEROSOL, METERED RESPIRATORY (INHALATION) EVERY 6 HOURS PRN
Status: DISCONTINUED | OUTPATIENT
Start: 2023-11-26 | End: 2023-12-06 | Stop reason: HOSPADM

## 2023-11-26 RX ORDER — TORSEMIDE 20 MG/1
40 TABLET ORAL DAILY
Status: DISCONTINUED | OUTPATIENT
Start: 2023-11-26 | End: 2023-12-06 | Stop reason: HOSPADM

## 2023-11-26 RX ADMIN — SODIUM ZIRCONIUM CYCLOSILICATE 10 G: 10 POWDER, FOR SUSPENSION ORAL at 10:38

## 2023-11-26 RX ADMIN — SODIUM ZIRCONIUM CYCLOSILICATE 10 G: 10 POWDER, FOR SUSPENSION ORAL at 20:18

## 2023-11-26 RX ADMIN — TORSEMIDE 40 MG: 20 TABLET ORAL at 09:58

## 2023-11-26 RX ADMIN — APIXABAN 5 MG: 5 TABLET, FILM COATED ORAL at 20:18

## 2023-11-26 RX ADMIN — APIXABAN 5 MG: 5 TABLET, FILM COATED ORAL at 08:51

## 2023-11-26 RX ADMIN — ASPIRIN 81 MG CHEWABLE TABLET 81 MG: 81 TABLET CHEWABLE at 08:51

## 2023-11-26 RX ADMIN — RISPERIDONE 0.5 MG: 0.5 TABLET ORAL at 08:51

## 2023-11-26 RX ADMIN — CARVEDILOL 3.12 MG: 6.25 TABLET, FILM COATED ORAL at 09:57

## 2023-11-26 RX ADMIN — CARVEDILOL 3.12 MG: 6.25 TABLET, FILM COATED ORAL at 17:25

## 2023-11-26 RX ADMIN — SODIUM CHLORIDE, PRESERVATIVE FREE 10 ML: 5 INJECTION INTRAVENOUS at 08:54

## 2023-11-26 RX ADMIN — RISPERIDONE 0.5 MG: 0.5 TABLET ORAL at 20:18

## 2023-11-26 RX ADMIN — EMPAGLIFLOZIN 10 MG: 10 TABLET, FILM COATED ORAL at 08:51

## 2023-11-26 ASSESSMENT — ENCOUNTER SYMPTOMS
DIARRHEA: 0
CHEST TIGHTNESS: 0
ABDOMINAL DISTENTION: 0
VOMITING: 0
WHEEZING: 0
BLOOD IN STOOL: 0
COUGH: 0
STRIDOR: 0
SHORTNESS OF BREATH: 0
BACK PAIN: 0
CHOKING: 0
ABDOMINAL PAIN: 0
NAUSEA: 0
CONSTIPATION: 0
EYE PAIN: 0

## 2023-11-26 ASSESSMENT — PAIN SCALES - GENERAL
PAINLEVEL_OUTOF10: 0

## 2023-11-26 NOTE — PROGRESS NOTES
V2.0    [facility] Progress Note      Name:  Peyton Dumont /Age/Sex: 1965  (60 y.o. female)   MRN & CSN:  [id number] & [id number] Encounter Date/Time: 2023 9:58 AM EST   Location:  -A PCP: No primary care provider on file. Monica Pierson, [address] [address] Day: 23    Assessment and Recommendations   Peyton Dumont is a 60 y.o. female who presents with Acute on chronic systolic (congestive) heart failure ([address] [facility])      Patient is on medical hold at this time. Does not have capacity to leave AMA. Please AVOID physical restraints. CM team working on establishing placement/disposition. Medically stable. Plan for  [facility] once insurance is determined. Acute metabolic encephalopathy  Delirium with behavioral disturbance  -Likely secondary to substance abuse and hypoxia.  -Pt still requiring sitter for agitation and behavioral disturbance  -Show support called on  for agitation in setting of impaired mentation with behavioral disturbance, pt ripped out her IV and attempting to leave AMA. Is alert and oriented to herself and place but not aware of why she is in the hospital or what she was treated for  -Psychiatry determined that pt does not have capacity, placed pt on medical hold for her safety  -CM able to reach pt's daughter who agrees with medical hold and with placement  -Maintain sitter at bedside  - Risperdal 0.5mg BID     Acute hypoxia   Troponin elevation  Acute on chronic systolic and diastolic heart failure  -Precipitant likely the cocaine abuse. -Improved with IV diuresis, converted to p.o. torsemide = hold and resume as able  -Started on GDMT with low-dose Coreg, Aldactone and Jardiance added by nephrology. = hold and resume as able    Acute renal failure - Resolved   -Suspect cardiorenal syndrome   -Diuresis as above.   -Nephrology following    A-fib with RVR  Possible apical thrombus -on TTE  NICM -LVEF depressed  -Heart rate improved, continue digoxin.  -Continue Encephalomalacia surrounding this area in the left frontal lobe with a separate focus also seen in the posterior left parietal lobe. Additional areas of prior infarction that are favored to be remote. An embolic phenomenon could be considered given differing vascular distributions. 3. No acute intracranial abnormality. XR CHEST PORTABLE    Result Date: 11/4/2023  EXAMINATION: ONE XRAY VIEW OF THE CHEST 11/4/2023 9:47 am COMPARISON: 06/07/2022 radiograph HISTORY: ORDERING SYSTEM PROVIDED HISTORY: Sepsis TECHNOLOGIST PROVIDED HISTORY: Reason for exam:->Sepsis Reason for Exam: ams/sob FINDINGS: The heart is enlarged. Mediastinum is normal.  Mild perihilar opacities centrally and a central pattern of mild ground-glass attenuation has developed in the lungs. No significant pleural fluid. No skeletal finding. Central pattern of ground-glass airspace change may represent vascular congestion or mild edema. A developing viral infection may be considered clinically.       CBC:   Recent Labs     11/24/23  1354   WBC 5.5   HGB 11.4*            BMP:    Recent Labs     11/24/23  0745 11/24/23  1737 11/25/23  1339   *  132*  --  134*   K 5.5*  5.4* 5.0 5.5*     102  --  104   CO2 17*  18*  --  16*   BUN 29*  28*  --  22   CREATININE 1.3*  1.4*  --  1.3*   GLUCOSE 134*  132*  --  100*       Hepatic:   Recent Labs     11/24/23  0745 11/25/23  1339   AST 26 31   ALT 24 24   BILITOT 0.6 1.3*   ALKPHOS 108 98       Lipids:   Lab Results   Component Value Date/Time    CHOL 92 11/05/2023 06:02 AM    HDL 8 11/05/2023 06:02 AM    TRIG 133 11/05/2023 06:02 AM     Troponin:   Lab Results   Component Value Date/Time    TROPONINT 0.060 06/08/2022 12:34 PM    TROPONINT 0.046 06/07/2022 11:10 PM    TROPONINT 0.041 06/01/2022 05:30 AM     BNP:   Recent Labs     11/24/23  0745 11/25/23  1339   PROBNP 15,125* 25,220*         UA:  Lab Results   Component Value Date/Time    NITRU NEGATIVE 11/26/2023 11:20 AM

## 2023-11-26 NOTE — PLAN OF CARE
Problem: Discharge Planning  Goal: Discharge to home or other facility with appropriate resources  11/26/2023 0202 by Thea Hamm RN  Outcome: Progressing  11/25/2023 1414 by Que Carolina RN  Outcome: Progressing  11/25/2023 1412 by Que Carolina RN  Outcome: Progressing     Problem: Pain  Goal: Verbalizes/displays adequate comfort level or baseline comfort level  11/26/2023 0202 by Thea Hamm RN  Outcome: Progressing  11/25/2023 1414 by Que Carolina RN  Outcome: Progressing  11/25/2023 1412 by Que Carolina RN  Outcome: Progressing     Problem: Skin/Tissue Integrity  Goal: Absence of new skin breakdown  Description: 1. Monitor for areas of redness and/or skin breakdown  2. Assess vascular access sites hourly  3. Every 4-6 hours minimum:  Change oxygen saturation probe site  4. Every 4-6 hours:  If on nasal continuous positive airway pressure, respiratory therapy assess nares and determine need for appliance change or resting period.   11/26/2023 0202 by Thea Hamm RN  Outcome: Progressing  11/25/2023 1414 by Que Carolina RN  Outcome: Progressing  11/25/2023 1412 by Que Carolina RN  Outcome: Progressing     Problem: Safety - Adult  Goal: Free from fall injury  11/26/2023 0202 by Thea Hamm RN  Outcome: Progressing  11/25/2023 1414 by Que Carolina RN  Outcome: Progressing  11/25/2023 1412 by Que Carolina RN  Outcome: Progressing     Problem: ABCDS Injury Assessment  Goal: Absence of physical injury  11/26/2023 0202 by Thea Hamm RN  Outcome: Progressing  11/25/2023 1414 by Que Carolina RN  Outcome: Progressing  11/25/2023 1412 by Que Carolina RN  Outcome: Progressing     Problem: Risk for Elopement  Goal: Patient will not exit the unit/facility without proper excort  11/26/2023 0202 by Thea Hamm RN  Outcome: Progressing  11/25/2023 1414 by Que Carolina

## 2023-11-26 NOTE — PLAN OF CARE
Problem: Discharge Planning  Goal: Discharge to home or other facility with appropriate resources  11/26/2023 1309 by Lloyd Ash RN  Outcome: Progressing  11/26/2023 0202 by Claudell Knack, RN  Outcome: Progressing     Problem: Pain  Goal: Verbalizes/displays adequate comfort level or baseline comfort level  11/26/2023 1309 by Lloyd Ash RN  Outcome: Progressing  11/26/2023 0202 by Claudell Knack, RN  Outcome: Progressing     Problem: Skin/Tissue Integrity  Goal: Absence of new skin breakdown  Description: 1. Monitor for areas of redness and/or skin breakdown  2. Assess vascular access sites hourly  3. Every 4-6 hours minimum:  Change oxygen saturation probe site  4. Every 4-6 hours:  If on nasal continuous positive airway pressure, respiratory therapy assess nares and determine need for appliance change or resting period.   11/26/2023 1309 by Lloyd Ash RN  Outcome: Progressing  11/26/2023 0202 by Claudell Knack, RN  Outcome: Progressing     Problem: Safety - Adult  Goal: Free from fall injury  11/26/2023 1309 by Lloyd Ash RN  Outcome: Progressing  11/26/2023 0202 by Claudell Knack, RN  Outcome: Progressing     Problem: ABCDS Injury Assessment  Goal: Absence of physical injury  11/26/2023 1309 by Lloyd Ash RN  Outcome: Progressing  11/26/2023 0202 by Claudell Knack, RN  Outcome: Progressing     Problem: Risk for Elopement  Goal: Patient will not exit the unit/facility without proper excort  11/26/2023 1309 by Lloyd Ash RN  Outcome: Progressing  Flowsheets (Taken 11/26/2023 1215)  Nursing Interventions for Elopement Risk: Make sure patient has all necessary personal care items  11/26/2023 0202 by Claudell Knack, RN  Outcome: Progressing     Problem: Nutrition Deficit:  Goal: Optimize nutritional status  11/26/2023 1309 by Lloyd Ash RN  Outcome: Progressing  11/26/2023 0202 by patient/family's coping skills and  non-compliant behavior (including use of illegal substances)  2. Notify security of behavior or suspected illegal substances which indicate the need for search of the family and/or belongings  3. Encourage verbalization of thoughts and concerns in a socially appropriate manner  4. Utilize positive, consistent limit setting strategies supporting safety of patient, staff and others  5. Encourage participation in the decision making process about the behavioral management agreement  6. If a visitor's behavior poses a threat to safety call refer to organization policy.   7. Initiate consult with , Psychosocial CNS, Spiritual Care as appropriate  11/26/2023 1309 by Lloyd Ash RN  Outcome: Progressing  11/26/2023 0202 by Claudell Knack, RN  Outcome: Progressing     Problem: Skin/Tissue Integrity - Adult  Goal: Skin integrity remains intact  11/26/2023 1309 by Lloyd Ash RN  Outcome: Progressing  11/26/2023 0202 by Claudell Knack, RN  Outcome: Progressing  Flowsheets (Taken 11/25/2023 2117)  Skin Integrity Remains Intact: Monitor for areas of redness and/or skin breakdown  Goal: Incisions, wounds, or drain sites healing without S/S of infection  11/26/2023 1309 by Lloyd Ash RN  Outcome: Progressing  11/26/2023 0202 by Claudell Knack, RN  Outcome: Progressing  Goal: Oral mucous membranes remain intact  11/26/2023 1309 by Lloyd Ash RN  Outcome: Progressing  11/26/2023 0202 by Claudell Knack, RN  Outcome: Progressing

## 2023-11-26 NOTE — PROGRESS NOTES
Dr Birgit Reyes ordered bladder scans on patient as she is complaining of some abdominal pain. Pt bladder scanned. Withholding 521. Dr Birgit Reyes informed and gave order for straight cath. Pt refused straight cath and promptly went to restroom and voided 200mls. Dr Birgit Reyes notified of refusal. RN clarified wether to escalate straight cath or continue to monitor voiding at this time. Dr Birgit Reyes gave order to bladder scan q6 hours and assess symptoms of urinary retention.

## 2023-11-26 NOTE — PROGRESS NOTES
Pt refusing tele at this time. Pt educated on importance and that \"Dr Ofelia Fletcher ordered that she be on tele. Pt still refused.

## 2023-11-27 LAB
ALBUMIN SERPL-MCNC: 3.9 GM/DL (ref 3.4–5)
ALP BLD-CCNC: 103 IU/L (ref 40–128)
ALT SERPL-CCNC: 24 U/L (ref 10–40)
ANION GAP SERPL CALCULATED.3IONS-SCNC: 12 MMOL/L (ref 4–16)
AST SERPL-CCNC: 20 IU/L (ref 15–37)
BASOPHILS ABSOLUTE: 0 K/CU MM
BASOPHILS RELATIVE PERCENT: 0.6 % (ref 0–1)
BILIRUB SERPL-MCNC: 0.7 MG/DL (ref 0–1)
BUN SERPL-MCNC: 20 MG/DL (ref 6–23)
CALCIUM SERPL-MCNC: 9 MG/DL (ref 8.3–10.6)
CHLORIDE BLD-SCNC: 99 MMOL/L (ref 99–110)
CO2: 28 MMOL/L (ref 21–32)
CREAT SERPL-MCNC: 1.3 MG/DL (ref 0.6–1.1)
DIFFERENTIAL TYPE: ABNORMAL
EOSINOPHILS ABSOLUTE: 0.1 K/CU MM
EOSINOPHILS RELATIVE PERCENT: 2.7 % (ref 0–3)
GFR SERPL CREATININE-BSD FRML MDRD: 47 ML/MIN/1.73M2
GLUCOSE SERPL-MCNC: 119 MG/DL (ref 70–99)
HCT VFR BLD CALC: 38.5 % (ref 37–47)
HEMOGLOBIN: 11.7 GM/DL (ref 12.5–16)
IMMATURE NEUTROPHIL %: 0.4 % (ref 0–0.43)
LYMPHOCYTES ABSOLUTE: 1.5 K/CU MM
LYMPHOCYTES RELATIVE PERCENT: 32.2 % (ref 24–44)
MCH RBC QN AUTO: 29.5 PG (ref 27–31)
MCHC RBC AUTO-ENTMCNC: 30.4 % (ref 32–36)
MCV RBC AUTO: 97.2 FL (ref 78–100)
MONOCYTES ABSOLUTE: 0.6 K/CU MM
MONOCYTES RELATIVE PERCENT: 11.5 % (ref 0–4)
NUCLEATED RBC %: 0 %
PDW BLD-RTO: 15.1 % (ref 11.7–14.9)
PLATELET # BLD: 272 K/CU MM (ref 140–440)
PMV BLD AUTO: 9.6 FL (ref 7.5–11.1)
POTASSIUM SERPL-SCNC: 3.4 MMOL/L (ref 3.5–5.1)
RBC # BLD: 3.96 M/CU MM (ref 4.2–5.4)
SEGMENTED NEUTROPHILS ABSOLUTE COUNT: 2.5 K/CU MM
SEGMENTED NEUTROPHILS RELATIVE PERCENT: 52.6 % (ref 36–66)
SODIUM BLD-SCNC: 139 MMOL/L (ref 135–145)
TOTAL IMMATURE NEUTOROPHIL: 0.02 K/CU MM
TOTAL NUCLEATED RBC: 0 K/CU MM
TOTAL PROTEIN: 6.1 GM/DL (ref 6.4–8.2)
WBC # BLD: 4.8 K/CU MM (ref 4–10.5)

## 2023-11-27 PROCEDURE — 85025 COMPLETE CBC W/AUTO DIFF WBC: CPT

## 2023-11-27 PROCEDURE — 6370000000 HC RX 637 (ALT 250 FOR IP): Performed by: NURSE PRACTITIONER

## 2023-11-27 PROCEDURE — 1200000000 HC SEMI PRIVATE

## 2023-11-27 PROCEDURE — 6370000000 HC RX 637 (ALT 250 FOR IP): Performed by: INTERNAL MEDICINE

## 2023-11-27 PROCEDURE — 6370000000 HC RX 637 (ALT 250 FOR IP)

## 2023-11-27 PROCEDURE — 36415 COLL VENOUS BLD VENIPUNCTURE: CPT

## 2023-11-27 PROCEDURE — 80053 COMPREHEN METABOLIC PANEL: CPT

## 2023-11-27 PROCEDURE — 2580000003 HC RX 258: Performed by: INTERNAL MEDICINE

## 2023-11-27 RX ORDER — LANOLIN ALCOHOL/MO/W.PET/CERES
3 CREAM (GRAM) TOPICAL NIGHTLY PRN
Status: DISCONTINUED | OUTPATIENT
Start: 2023-11-27 | End: 2023-12-06 | Stop reason: HOSPADM

## 2023-11-27 RX ADMIN — CARVEDILOL 3.12 MG: 6.25 TABLET, FILM COATED ORAL at 08:18

## 2023-11-27 RX ADMIN — CARVEDILOL 3.12 MG: 6.25 TABLET, FILM COATED ORAL at 18:17

## 2023-11-27 RX ADMIN — RISPERIDONE 0.5 MG: 0.5 TABLET ORAL at 08:18

## 2023-11-27 RX ADMIN — SODIUM CHLORIDE, PRESERVATIVE FREE 10 ML: 5 INJECTION INTRAVENOUS at 08:21

## 2023-11-27 RX ADMIN — SODIUM CHLORIDE, PRESERVATIVE FREE 10 ML: 5 INJECTION INTRAVENOUS at 20:43

## 2023-11-27 RX ADMIN — TORSEMIDE 40 MG: 20 TABLET ORAL at 08:18

## 2023-11-27 RX ADMIN — HYDROXYZINE HYDROCHLORIDE 25 MG: 25 TABLET, FILM COATED ORAL at 00:01

## 2023-11-27 RX ADMIN — SODIUM ZIRCONIUM CYCLOSILICATE 10 G: 10 POWDER, FOR SUSPENSION ORAL at 08:18

## 2023-11-27 RX ADMIN — RISPERIDONE 0.5 MG: 0.5 TABLET ORAL at 20:41

## 2023-11-27 RX ADMIN — ASPIRIN 81 MG CHEWABLE TABLET 81 MG: 81 TABLET CHEWABLE at 08:18

## 2023-11-27 RX ADMIN — EMPAGLIFLOZIN 10 MG: 10 TABLET, FILM COATED ORAL at 08:18

## 2023-11-27 RX ADMIN — APIXABAN 5 MG: 5 TABLET, FILM COATED ORAL at 20:41

## 2023-11-27 RX ADMIN — APIXABAN 5 MG: 5 TABLET, FILM COATED ORAL at 08:18

## 2023-11-27 ASSESSMENT — ENCOUNTER SYMPTOMS
CONSTIPATION: 0
SHORTNESS OF BREATH: 0
CHEST TIGHTNESS: 0
STRIDOR: 0
CHOKING: 0
COUGH: 0
VOMITING: 0
DIARRHEA: 0
ABDOMINAL PAIN: 0
NAUSEA: 0
BACK PAIN: 0
BLOOD IN STOOL: 0
EYE PAIN: 0
WHEEZING: 0
ABDOMINAL DISTENTION: 0

## 2023-11-27 ASSESSMENT — PAIN SCALES - GENERAL
PAINLEVEL_OUTOF10: 0
PAINLEVEL_OUTOF10: 0

## 2023-11-27 NOTE — PLAN OF CARE
Problem: Discharge Planning  Goal: Discharge to home or other facility with appropriate resources  11/27/2023 1030 by Javier Altman RN  Outcome: Progressing  11/27/2023 1029 by Javier Altman RN  Outcome: Progressing     Problem: Pain  Goal: Verbalizes/displays adequate comfort level or baseline comfort level  11/27/2023 1030 by Javier Altman RN  Outcome: Progressing  11/27/2023 1029 by Javier Almtan RN  Outcome: Progressing     Problem: Skin/Tissue Integrity  Goal: Absence of new skin breakdown  Description: 1. Monitor for areas of redness and/or skin breakdown  2. Assess vascular access sites hourly  3. Every 4-6 hours minimum:  Change oxygen saturation probe site  4. Every 4-6 hours:  If on nasal continuous positive airway pressure, respiratory therapy assess nares and determine need for appliance change or resting period.   11/27/2023 1030 by Javier Altman RN  Outcome: Progressing  11/27/2023 1029 by Javier Altman RN  Outcome: Progressing     Problem: Safety - Adult  Goal: Free from fall injury  11/27/2023 1030 by Javier Altman RN  Outcome: Progressing  11/27/2023 1029 by Javier Altman RN  Outcome: Progressing     Problem: ABCDS Injury Assessment  Goal: Absence of physical injury  11/27/2023 1030 by Javier Altman RN  Outcome: Progressing  11/27/2023 1029 by Javier Altman RN  Outcome: Progressing     Problem: Risk for Elopement  Goal: Patient will not exit the unit/facility without proper excort  11/27/2023 1030 by Javier Altman RN  Outcome: Progressing  11/27/2023 1029 by Javier Altman RN  Outcome: Progressing  Flowsheets  Taken 11/27/2023 0401 by Alexia aSlas RN  Nursing Interventions for Elopement Risk: Make sure patient has all necessary personal care items  Taken 11/27/2023 0009 by Alexia Salas, RN  Nursing Interventions for Elopement Risk: Make sure patient has all necessary personal care items     Problem: Nutrition Deficit:  Goal: Optimize

## 2023-11-27 NOTE — PLAN OF CARE
Problem: Discharge Planning  Goal: Discharge to home or other facility with appropriate resources  Outcome: Progressing     Problem: Pain  Goal: Verbalizes/displays adequate comfort level or baseline comfort level  Outcome: Progressing     Problem: Skin/Tissue Integrity  Goal: Absence of new skin breakdown  Description: 1. Monitor for areas of redness and/or skin breakdown  2. Assess vascular access sites hourly  3. Every 4-6 hours minimum:  Change oxygen saturation probe site  4. Every 4-6 hours:  If on nasal continuous positive airway pressure, respiratory therapy assess nares and determine need for appliance change or resting period.   Outcome: Progressing     Problem: Safety - Adult  Goal: Free from fall injury  Outcome: Progressing     Problem: ABCDS Injury Assessment  Goal: Absence of physical injury  Outcome: Progressing     Problem: Risk for Elopement  Goal: Patient will not exit the unit/facility without proper excort  Outcome: Progressing  Flowsheets  Taken 11/27/2023 0401 by Garima Reilly RN  Nursing Interventions for Elopement Risk: Make sure patient has all necessary personal care items  Taken 11/27/2023 0009 by Garima Reilly RN  Nursing Interventions for Elopement Risk: Make sure patient has all necessary personal care items     Problem: Nutrition Deficit:  Goal: Optimize nutritional status  Outcome: Progressing     Problem: Neurosensory - Adult  Goal: Achieves stable or improved neurological status  Outcome: Progressing  Goal: Absence of seizures  Outcome: Progressing  Goal: Remains free of injury related to seizures activity  Outcome: Progressing  Goal: Achieves maximal functionality and self care  Outcome: Progressing     Problem: Respiratory - Adult  Goal: Achieves optimal ventilation and oxygenation  Outcome: Progressing     Problem: Cardiovascular - Adult  Goal: Maintains optimal cardiac output and hemodynamic stability  Outcome: Progressing  Goal: Absence

## 2023-11-27 NOTE — CARE COORDINATION
Spoke with Saulo Rodriguez, [facility], regarding Medicaid. Saulo Rodriguez stated that it is always an HMO. CM will follow up with Saulo Rodriguez to determine which HMO it is.

## 2023-11-27 NOTE — PROGRESS NOTES
V2.0    [facility] [place]      Name:  Asia Bonner /Age/Sex: 1965  (60 y.o. female)   MRN & CSN:  [id number] & [id number] Encounter Date/Time: 2023 9:58 AM EST   Location:  -A PCP: No primary care provider on file. Curtis Verdugo, [address] Day: 24    Assessment and Recommendations   Asia Bonner is a 60 y.o. female who presents with Acute on chronic systolic (congestive) heart failure ([address] [facility])      Patient is on medical hold at this time. Does not have capacity to leave AMA. Please AVOID physical restraints. CM team working on establishing placement/disposition. Medically stable. Plan for  [facility] once insurance is determined. Acute metabolic encephalopathy  Delirium with behavioral disturbance  -Likely secondary to substance abuse and hypoxia.  -Pt still requiring sitter for agitation and behavioral disturbance  -Show support called on  for agitation in setting of impaired mentation with behavioral disturbance, pt ripped out her IV and attempting to leave AMA. Is alert and oriented to herself and place but not aware of why she is in the hospital or what she was treated for  -Psychiatry determined that pt does not have capacity, placed pt on medical hold for her safety  -CM able to reach pt's daughter who agrees with medical hold and with placement  -Maintain sitter at bedside  - Risperdal 0.5mg BID     Acute hypoxia   Troponin elevation  Acute on chronic systolic and diastolic heart failure  -Precipitant likely the cocaine abuse. -Improved with IV diuresis, converted to p.o. torsemide = hold and resume as able  -Started on GDMT with low-dose Coreg, Aldactone and Jardiance added by nephrology. = hold and resume as able    Acute renal failure - Resolved   Hyperkalemia, mild, stable  -Suspect cardiorenal syndrome   -Diuresis as above.   -Nephrology following  -potassium binder per nephrology    A-fib with RVR  Possible apical thrombus -on TTE  NICM -LVEF

## 2023-11-28 PROCEDURE — 6370000000 HC RX 637 (ALT 250 FOR IP): Performed by: NURSE PRACTITIONER

## 2023-11-28 PROCEDURE — 2580000003 HC RX 258: Performed by: INTERNAL MEDICINE

## 2023-11-28 PROCEDURE — 6370000000 HC RX 637 (ALT 250 FOR IP)

## 2023-11-28 PROCEDURE — 6370000000 HC RX 637 (ALT 250 FOR IP): Performed by: INTERNAL MEDICINE

## 2023-11-28 PROCEDURE — 94761 N-INVAS EAR/PLS OXIMETRY MLT: CPT

## 2023-11-28 PROCEDURE — 1200000000 HC SEMI PRIVATE

## 2023-11-28 RX ADMIN — ACETAMINOPHEN 650 MG: 325 TABLET ORAL at 14:05

## 2023-11-28 RX ADMIN — RISPERIDONE 0.5 MG: 0.5 TABLET ORAL at 08:44

## 2023-11-28 RX ADMIN — HYDROXYZINE HYDROCHLORIDE 25 MG: 25 TABLET, FILM COATED ORAL at 05:34

## 2023-11-28 RX ADMIN — SODIUM CHLORIDE, PRESERVATIVE FREE 10 ML: 5 INJECTION INTRAVENOUS at 21:03

## 2023-11-28 RX ADMIN — EMPAGLIFLOZIN 10 MG: 10 TABLET, FILM COATED ORAL at 08:44

## 2023-11-28 RX ADMIN — SODIUM CHLORIDE, PRESERVATIVE FREE 10 ML: 5 INJECTION INTRAVENOUS at 08:43

## 2023-11-28 RX ADMIN — RISPERIDONE 0.5 MG: 0.5 TABLET ORAL at 21:02

## 2023-11-28 RX ADMIN — ASPIRIN 81 MG CHEWABLE TABLET 81 MG: 81 TABLET CHEWABLE at 08:43

## 2023-11-28 RX ADMIN — CARVEDILOL 3.12 MG: 6.25 TABLET, FILM COATED ORAL at 08:43

## 2023-11-28 RX ADMIN — CARVEDILOL 3.12 MG: 6.25 TABLET, FILM COATED ORAL at 16:48

## 2023-11-28 RX ADMIN — HYDROXYZINE HYDROCHLORIDE 25 MG: 25 TABLET, FILM COATED ORAL at 14:05

## 2023-11-28 RX ADMIN — Medication 3 MG: at 21:03

## 2023-11-28 RX ADMIN — APIXABAN 5 MG: 5 TABLET, FILM COATED ORAL at 21:02

## 2023-11-28 RX ADMIN — HYDROXYZINE HYDROCHLORIDE 25 MG: 25 TABLET, FILM COATED ORAL at 21:02

## 2023-11-28 RX ADMIN — SPIRONOLACTONE 25 MG: 50 TABLET ORAL at 11:01

## 2023-11-28 RX ADMIN — APIXABAN 5 MG: 5 TABLET, FILM COATED ORAL at 08:43

## 2023-11-28 RX ADMIN — TORSEMIDE 40 MG: 20 TABLET ORAL at 11:00

## 2023-11-28 ASSESSMENT — ENCOUNTER SYMPTOMS
STRIDOR: 0
CHEST TIGHTNESS: 0
CHOKING: 0
ABDOMINAL PAIN: 0
VOMITING: 0
NAUSEA: 0
EYE PAIN: 0
ABDOMINAL DISTENTION: 0
BACK PAIN: 0
COUGH: 0
SHORTNESS OF BREATH: 0
BLOOD IN STOOL: 0
WHEEZING: 0
CONSTIPATION: 0
DIARRHEA: 0

## 2023-11-28 ASSESSMENT — PAIN SCALES - PAIN ASSESSMENT IN ADVANCED DEMENTIA (PAINAD)
BODYLANGUAGE: 0
NEGVOCALIZATION: 0
BREATHING: 0
CONSOLABILITY: 0
TOTALSCORE: 0
FACIALEXPRESSION: 0

## 2023-11-28 NOTE — PROGRESS NOTES
depressed  -Heart rate improved, continue digoxin.  -Continue full dose AC  -EP evaluated pt for ICD however pt not a candidate at present as pt must be compliant with medications and stop drug use prior to consideration of ICD  -GDMT as able  -Cardiology onboard  -patient keeps refusing medications at times causing intermittent RVR    Transaminitis, hyperbilirubinemia  -RUQ U/S unremarkable. Acute hepatitis panel negative  -Suspect due to congestive hepatopathy versus cocaine.  -Trend levels    Substance abuse  -Counseled extensively on cessation. Diet ADULT DIET; Regular; No Added Salt (3-4 gm)  ADULT ORAL NUTRITION SUPPLEMENT; Lunch; Standard High Calorie/High Protein Oral Supplement  ADULT ORAL NUTRITION SUPPLEMENT; Dinner; Frozen Oral Supplement   DVT Prophylaxis [x] Lovenox, []  Heparin, [] SCDs, [] Ambulation,  [] Eliquis, [] Xarelto  [] Coumadin   Code Status Full Code   Disposition From: Home  Expected Disposition: Psych facility   Estimated Date of Discharge: awaiting placement; medically stable   Surrogate Decision Maker/ Trinidad Wallace (Parent)      Personally reviewed Lab Studies and Imaging     Discussed during IDR - pending placement to inpatient psych. Subjective:     Chief Complaint:   Chief Complaint   Patient presents with    Rectal Bleeding    Altered Mental Status    Shortness of Breath     85% at home     Patient seen and examined in the AM. Patient states she feels fine. No fatigue today. Review of Systems:      Review of Systems   Constitutional:  Negative for chills, fatigue, fever and unexpected weight change. Eyes:  Negative for pain and visual disturbance. Respiratory:  Negative for cough, choking, chest tightness, shortness of breath, wheezing and stridor. Cardiovascular:  Negative for chest pain, palpitations and leg swelling. Gastrointestinal:  Negative for abdominal distention, abdominal pain, blood in stool, constipation, diarrhea, nausea and vomiting. NEGATIVE 08/18/2012 08:01 PM    COLORU YELLOW 11/26/2023 11:20 AM    WBCUA 2 11/26/2023 11:20 AM    RBCUA 4 11/26/2023 11:20 AM    MUCUS RARE 11/26/2023 11:20 AM    TRICHOMONAS NONE SEEN 11/26/2023 11:20 AM    BACTERIA NEGATIVE 11/26/2023 11:20 AM    CLARITYU CLEAR 11/26/2023 11:20 AM    SPECGRAV 1.010 11/26/2023 11:20 AM    LEUKOCYTESUR SMALL NUMBER OR AMOUNT OBSERVED 11/26/2023 11:20 AM    UROBILINOGEN 1.0 11/26/2023 11:20 AM    BILIRUBINUR NEGATIVE 11/26/2023 11:20 AM    BLOODU MODERATE NUMBER OR AMOUNT OBSERVED 11/26/2023 11:20 AM    GLUCOSEU NEGATIVE 08/18/2012 08:01 PM    KETUA NEGATIVE 11/26/2023 11:20 AM         Electronically signed by Jairon Ibarra MD on 11/28/2023 at 1:04 PM

## 2023-11-28 NOTE — PLAN OF CARE
Problem: Discharge Planning  Goal: Discharge to home or other facility with appropriate resources  Outcome: Progressing     Problem: Pain  Goal: Verbalizes/displays adequate comfort level or baseline comfort level  Outcome: Progressing     Problem: Skin/Tissue Integrity  Goal: Absence of new skin breakdown  Description: 1. Monitor for areas of redness and/or skin breakdown  2. Assess vascular access sites hourly  3. Every 4-6 hours minimum:  Change oxygen saturation probe site  4. Every 4-6 hours:  If on nasal continuous positive airway pressure, respiratory therapy assess nares and determine need for appliance change or resting period.   Outcome: Progressing     Problem: Safety - Adult  Goal: Free from fall injury  Outcome: Progressing     Problem: ABCDS Injury Assessment  Goal: Absence of physical injury  Outcome: Progressing     Problem: Risk for Elopement  Goal: Patient will not exit the unit/facility without proper excort  Outcome: Progressing  Flowsheets  Taken 11/28/2023 0005 by Felicita Watson RN  Nursing Interventions for Elopement Risk: Make sure patient has all necessary personal care items  Taken 11/27/2023 2035 by Felicita Watson RN  Nursing Interventions for Elopement Risk: Make sure patient has all necessary personal care items  Taken 11/27/2023 1600 by Sherril Councilman, RN  Nursing Interventions for Elopement Risk: Make sure patient has all necessary personal care items  Taken 11/27/2023 1200 by Sherril Councilman, RN  Nursing Interventions for Elopement Risk: Make sure patient has all necessary personal care items     Problem: Nutrition Deficit:  Goal: Optimize nutritional status  Outcome: Progressing     Problem: Neurosensory - Adult  Goal: Achieves stable or improved neurological status  Outcome: Progressing  Goal: Absence of seizures  Outcome: Progressing  Goal: Remains free of injury related to seizures activity  Outcome: Progressing  Goal: Achieves maximal

## 2023-11-29 PROCEDURE — 6370000000 HC RX 637 (ALT 250 FOR IP)

## 2023-11-29 PROCEDURE — 94761 N-INVAS EAR/PLS OXIMETRY MLT: CPT

## 2023-11-29 PROCEDURE — 6370000000 HC RX 637 (ALT 250 FOR IP): Performed by: INTERNAL MEDICINE

## 2023-11-29 PROCEDURE — 6370000000 HC RX 637 (ALT 250 FOR IP): Performed by: NURSE PRACTITIONER

## 2023-11-29 PROCEDURE — 2580000003 HC RX 258: Performed by: INTERNAL MEDICINE

## 2023-11-29 PROCEDURE — 1200000000 HC SEMI PRIVATE

## 2023-11-29 RX ADMIN — ASPIRIN 81 MG CHEWABLE TABLET 81 MG: 81 TABLET CHEWABLE at 08:19

## 2023-11-29 RX ADMIN — APIXABAN 5 MG: 5 TABLET, FILM COATED ORAL at 20:35

## 2023-11-29 RX ADMIN — SODIUM CHLORIDE, PRESERVATIVE FREE 10 ML: 5 INJECTION INTRAVENOUS at 20:36

## 2023-11-29 RX ADMIN — TORSEMIDE 40 MG: 20 TABLET ORAL at 08:19

## 2023-11-29 RX ADMIN — RISPERIDONE 0.5 MG: 0.5 TABLET ORAL at 08:19

## 2023-11-29 RX ADMIN — APIXABAN 5 MG: 5 TABLET, FILM COATED ORAL at 08:19

## 2023-11-29 RX ADMIN — EMPAGLIFLOZIN 10 MG: 10 TABLET, FILM COATED ORAL at 08:19

## 2023-11-29 RX ADMIN — Medication 3 MG: at 20:35

## 2023-11-29 RX ADMIN — RISPERIDONE 0.5 MG: 0.5 TABLET ORAL at 20:35

## 2023-11-29 RX ADMIN — SPIRONOLACTONE 25 MG: 50 TABLET ORAL at 08:20

## 2023-11-29 RX ADMIN — CARVEDILOL 3.12 MG: 6.25 TABLET, FILM COATED ORAL at 08:21

## 2023-11-29 RX ADMIN — HYDROXYZINE HYDROCHLORIDE 25 MG: 25 TABLET, FILM COATED ORAL at 20:35

## 2023-11-29 RX ADMIN — CARVEDILOL 3.12 MG: 6.25 TABLET, FILM COATED ORAL at 18:10

## 2023-11-29 RX ADMIN — SODIUM CHLORIDE, PRESERVATIVE FREE 10 ML: 5 INJECTION INTRAVENOUS at 08:23

## 2023-11-29 ASSESSMENT — ENCOUNTER SYMPTOMS
ABDOMINAL DISTENTION: 0
VOMITING: 0
COUGH: 0
DIARRHEA: 0
STRIDOR: 0
ABDOMINAL PAIN: 0
CONSTIPATION: 0
WHEEZING: 0
SHORTNESS OF BREATH: 0
BLOOD IN STOOL: 0
CHEST TIGHTNESS: 0
BACK PAIN: 0
CHOKING: 0
EYE PAIN: 0
NAUSEA: 0

## 2023-11-29 ASSESSMENT — PAIN SCALES - GENERAL: PAINLEVEL_OUTOF10: 0

## 2023-11-29 NOTE — PROGRESS NOTES
V2.0    [facility] [place] Note      Name:  Aga Martinez /Age/Sex: 1965  (60 y.o. female)   MRN & CSN:  [id number] & [id number] Encounter Date/Time: 2023 9:58 AM EST   Location:  -A PCP: No primary care provider on file. Jennifer Kathleen, [address] Day: 26    Assessment and Recommendations   Aga Martinez is a 60 y.o. female who presents with Acute on chronic systolic (congestive) heart failure ([address] [facility])      Patient is on medical hold at this time. Does not have capacity to leave AMA. Please AVOID physical restraints. CM team working on establishing placement/disposition. Medically stable. Plan for  [facility] once insurance is determined. Acute metabolic encephalopathy  Delirium with behavioral disturbance  -Likely secondary to substance abuse and hypoxia.  -Pt still requiring sitter for agitation and behavioral disturbance  -Show support called on  for agitation in setting of impaired mentation with behavioral disturbance, pt ripped out her IV and attempting to leave AMA. Is alert and oriented to herself and place but not aware of why she is in the hospital or what she was treated for  -Psychiatry determined that pt does not have capacity, placed pt on medical hold for her safety  -CM able to reach pt's daughter who agrees with medical hold and with placement  -Maintain sitter at bedside  - Risperdal 0.5mg BID     Acute hypoxia   Troponin elevation  Acute on chronic systolic and diastolic heart failure  -Precipitant likely the cocaine abuse. -Improved with IV diuresis, converted to p.o. torsemide = hold and resume as able  -Started on GDMT with low-dose Coreg, Aldactone and Jardiance added by nephrology. = hold and resume as able    Acute renal failure - Resolved   Hyperkalemia, mild, stable  -Suspect cardiorenal syndrome   -Diuresis as above.   -Nephrology following  -potassium binder per nephrology    A-fib with RVR  Possible apical thrombus -on TTE  NICM -LVEF hernia is present. Musculoskeletal:      Right lower leg: No edema. Left lower leg: No edema. Skin:     General: Skin is warm. Capillary Refill: Capillary refill takes less than 2 seconds. Neurological:      Mental Status: She is easily aroused. Psychiatric:         Behavior: Behavior is cooperative. Medications:   Medications:    torsemide  40 mg Oral Daily    carvedilol  3.125 mg Oral BID WC    risperiDONE  0.5 mg Oral BID    empagliflozin  10 mg Oral Daily    spironolactone  25 mg Oral Daily    apixaban  5 mg Oral BID    sodium chloride flush  5-40 mL IntraVENous 2 times per day    aspirin  81 mg Oral Daily      Infusions:    sodium chloride       PRN Meds: melatonin, 3 mg, Nightly PRN  albuterol sulfate HFA, 2 puff, Q6H PRN  hydrOXYzine HCl, 25 mg, 4x Daily PRN  benzonatate, 100 mg, TID PRN  sodium chloride flush, 5-40 mL, PRN  sodium chloride, , PRN  ondansetron, 4 mg, Q8H PRN  polyethylene glycol, 17 g, Daily PRN  acetaminophen, 650 mg, Q6H PRN   Or  acetaminophen, 650 mg, Q6H PRN        Labs and Imaging   Echo (TTE) complete (PRN contrast/bubble/strain/3D)    Result Date: 11/6/2023    Contrast used: Definity. Left Ventricle: Reduced left ventricular systolic function with a visually estimated EF of 20 - 25%. Left ventricle is severely dilated. Global hypokinesis present. No significant valvular disease noted. Pericardium: No pericardial effusion. There is a mobile thrombus visualized along the apical inferior wall segment of the left ventricle. New frinding from previous echo. US ABDOMEN LIMITED Specify organ? LIVER    Result Date: 11/5/2023  EXAMINATION: RIGHT UPPER QUADRANT ULTRASOUND 11/5/2023 3:28 pm COMPARISON: None.  HISTORY: ORDERING SYSTEM PROVIDED HISTORY: check for biliary ductal dilation TECHNOLOGIST PROVIDED HISTORY: Reason for exam:->check for biliary ductal dilation Specify organ?->LIVER Reason for Exam: abnormal labs FINDINGS: LIVER:  The liver demonstrates

## 2023-11-29 NOTE — PROGRESS NOTES
11/29/23 1154   Encounter Summary   Encounter Overview/Reason  Follow-up   Service Provided For: Patient   Referral/Consult From:  [address] Parent; Children   Last Encounter  11/29/23  (Patient has sitter in room. She has large family; support in place; declined prayer, no needs. Patient instructed how to contact .)   Complexity of Encounter Low   Begin Time 1145   End Time  1200   Total Time Calculated 15 min   Spiritual/Emotional needs   Type Spiritual Support   Assessment/Intervention/Outcome   Assessment Coping   Intervention Sustaining Presence/Ministry of presence;Nurtured Hope   Outcome Comfort;Coping;Encouraged   Plan and Referrals   Plan/Referrals No future visits requested        11/29/23 1154   Encounter Summary   Encounter Overview/Reason  Follow-up   Service Provided For: Patient   Referral/Consult From:  [address] Parent; Children   Last Encounter  11/29/23  (Patient has sitter in room. She has large family; support in place; declined prayer, no needs.   Patient instructed how to contact .)   Complexity of Encounter Low   Begin Time 1145   End Time  1200   Total Time Calculated 15 min   Spiritual/Emotional needs   Type Spiritual Support   Assessment/Intervention/Outcome   Assessment Coping   Intervention Sustaining Presence/Ministry of presence;Nurtured Hope   Outcome Comfort;Coping;Encouraged   Plan and Referrals   Plan/Referrals No future visits requested

## 2023-11-29 NOTE — PROGRESS NOTES
Comprehensive Nutrition Assessment    Type and Reason for Visit:  Reassess    Nutrition Recommendations/Plan:   Continue current diet, no added salt  Will continue to offer oral nutrition supplements during stay as patient will accept  Will continue to follow up during stay      Malnutrition Assessment:  Malnutrition Status: At risk for malnutrition (Comment) (11/10/23 1702)    Context:  Acute Illness       Nutrition Assessment:    Remains on no added salt diet with supplements. Meal intake usually % during stay. Reasonable/adequate meal orders. Patient not appropriate for diet ed at this time. Will continue to follow at low nutrition risk. Nutrition Related Findings:    not available on visit,  discharge pending   meds noted: jardiance, demadex, aldactone Wound Type: None       Current Nutrition Intake & Therapies:    Average Meal Intake: %  Average Supplements Intake: Unable to assess  ADULT DIET; Regular; No Added Salt (3-4 gm)  ADULT ORAL NUTRITION SUPPLEMENT; Lunch; Standard High Calorie/High Protein Oral Supplement  ADULT ORAL NUTRITION SUPPLEMENT; Dinner; Frozen Oral Supplement    Anthropometric Measures:  Height: 170.2 cm (5' 7.01\")  Ideal Body Weight (IBW): 135 lbs (61 kg)    Admission Body Weight: 77.9 kg (171 lb 11.8 oz)  Current Body Weight: 71.7 kg (158 lb 1.1 oz),   IBW. Weight Source: Stated  Current BMI (kg/m2): 24.8  Usual Body Weight: 78.9 kg (174 lb)  % Weight Change (Calculated): -9.9  Weight Adjustment For: No Adjustment                 BMI Categories: Normal Weight (BMI 18.5-24. 9)    Estimated Daily Nutrient Needs:  Energy Requirements Based On: Formula  Weight Used for Energy Requirements: Current  Energy (kcal/day): 7293-3132  Weight Used for Protein Requirements: Ideal  Protein (g/day): 61-74 (1.0-1.2g/kg IBW)  Method Used for Fluid Requirements: 1 ml/kcal  Fluid (ml/day): 2872-1099    Nutrition Diagnosis:   Predicted inadequate energy intake related to cognitive or neurological impairment as evidenced by intake 26-50%, intake 51-75%, weight loss    Nutrition Interventions:   Food and/or Nutrient Delivery: Continue Current Diet, Continue Oral Nutrition Supplement  Nutrition Education/Counseling: Education not appropriate  Coordination of Nutrition Care: Continue to monitor while inpatient       Goals:  Previous Goal Met: Progressing toward Goal(s)  Goals: PO intake 75% or greater, prior to discharge       Nutrition Monitoring and Evaluation:   Behavioral-Environmental Outcomes: None Identified  Food/Nutrient Intake Outcomes: Food and Nutrient Intake, Supplement Intake  Physical Signs/Symptoms Outcomes: Biochemical Data, Meal Time Behavior, Skin, Weight    Discharge Planning:    Continue current diet     Jose D Drake RD, LD  Contact: [phone]

## 2023-11-29 NOTE — PLAN OF CARE
Problem: Discharge Planning  Goal: Discharge to home or other facility with appropriate resources  Outcome: Progressing     Problem: Pain  Goal: Verbalizes/displays adequate comfort level or baseline comfort level  Outcome: Progressing     Problem: Skin/Tissue Integrity  Goal: Absence of new skin breakdown  Description: 1. Monitor for areas of redness and/or skin breakdown  2. Assess vascular access sites hourly  3. Every 4-6 hours minimum:  Change oxygen saturation probe site  4. Every 4-6 hours:  If on nasal continuous positive airway pressure, respiratory therapy assess nares and determine need for appliance change or resting period.   Outcome: Progressing     Problem: Safety - Adult  Goal: Free from fall injury  Outcome: Progressing     Problem: ABCDS Injury Assessment  Goal: Absence of physical injury  Outcome: Progressing     Problem: Risk for Elopement  Goal: Patient will not exit the unit/facility without proper excort  Outcome: Progressing  Flowsheets (Taken 11/28/2023 2030)  Nursing Interventions for Elopement Risk: Make sure patient has all necessary personal care items     Problem: Nutrition Deficit:  Goal: Optimize nutritional status  Outcome: Progressing     Problem: Neurosensory - Adult  Goal: Achieves stable or improved neurological status  Outcome: Progressing  Goal: Absence of seizures  Outcome: Progressing  Goal: Remains free of injury related to seizures activity  Outcome: Progressing  Goal: Achieves maximal functionality and self care  Outcome: Progressing     Problem: Respiratory - Adult  Goal: Achieves optimal ventilation and oxygenation  Outcome: Progressing     Problem: Cardiovascular - Adult  Goal: Maintains optimal cardiac output and hemodynamic stability  Outcome: Progressing  Goal: Absence of cardiac dysrhythmias or at baseline  Outcome: Progressing     Problem: Musculoskeletal - Adult  Goal: Return mobility to safest level of function  Outcome: Progressing  Goal: Maintain proper Discharge Planning  Goal: Discharge to home or other facility with appropriate resources  Outcome: Progressing     Problem: Pain  Goal: Verbalizes/displays adequate comfort level or baseline comfort level  Outcome: Progressing     Problem: Skin/Tissue Integrity  Goal: Absence of new skin breakdown  Description: 1. Monitor for areas of redness and/or skin breakdown  2. Assess vascular access sites hourly  3. Every 4-6 hours minimum:  Change oxygen saturation probe site  4. Every 4-6 hours:  If on nasal continuous positive airway pressure, respiratory therapy assess nares and determine need for appliance change or resting period.   Outcome: Progressing     Problem: Safety - Adult  Goal: Free from fall injury  Outcome: Progressing     Problem: ABCDS Injury Assessment  Goal: Absence of physical injury  Outcome: Progressing     Problem: Risk for Elopement  Goal: Patient will not exit the unit/facility without proper excort  Outcome: Progressing  Flowsheets (Taken 11/28/2023 2030)  Nursing Interventions for Elopement Risk: Make sure patient has all necessary personal care items     Problem: Nutrition Deficit:  Goal: Optimize nutritional status  Outcome: Progressing     Problem: Neurosensory - Adult  Goal: Achieves stable or improved neurological status  Outcome: Progressing  Goal: Absence of seizures  Outcome: Progressing  Goal: Remains free of injury related to seizures activity  Outcome: Progressing  Goal: Achieves maximal functionality and self care  Outcome: Progressing     Problem: Respiratory - Adult  Goal: Achieves optimal ventilation and oxygenation  Outcome: Progressing     Problem: Cardiovascular - Adult  Goal: Maintains optimal cardiac output and hemodynamic stability  Outcome: Progressing  Goal: Absence of cardiac dysrhythmias or at baseline  Outcome: Progressing     Problem: Musculoskeletal - Adult  Goal: Return mobility to safest level of function  Outcome: Progressing  Goal: Maintain proper alignment of

## 2023-11-29 NOTE — CARE COORDINATION
SANDRA spoke with Urmila in Public benefits. At this time there is no insurance update. SANDRA spoke with Edward Henderson with [facility] [address] who stated that she will speak with [place] to see if they have a determination.

## 2023-11-30 LAB
ALBUMIN SERPL-MCNC: 3.9 GM/DL (ref 3.4–5)
ALP BLD-CCNC: 93 IU/L (ref 40–128)
ALT SERPL-CCNC: 16 U/L (ref 10–40)
ANION GAP SERPL CALCULATED.3IONS-SCNC: 9 MMOL/L (ref 4–16)
AST SERPL-CCNC: 16 IU/L (ref 15–37)
BILIRUB SERPL-MCNC: 0.5 MG/DL (ref 0–1)
BUN SERPL-MCNC: 27 MG/DL (ref 6–23)
CALCIUM SERPL-MCNC: 9.5 MG/DL (ref 8.3–10.6)
CHLORIDE BLD-SCNC: 99 MMOL/L (ref 99–110)
CO2: 31 MMOL/L (ref 21–32)
CREAT SERPL-MCNC: 1.4 MG/DL (ref 0.6–1.1)
GFR SERPL CREATININE-BSD FRML MDRD: 43 ML/MIN/1.73M2
GLUCOSE SERPL-MCNC: 89 MG/DL (ref 70–99)
MAGNESIUM: 2.2 MG/DL (ref 1.8–2.4)
POTASSIUM SERPL-SCNC: 4.2 MMOL/L (ref 3.5–5.1)
SODIUM BLD-SCNC: 139 MMOL/L (ref 135–145)
TOTAL PROTEIN: 6.5 GM/DL (ref 6.4–8.2)

## 2023-11-30 PROCEDURE — 6370000000 HC RX 637 (ALT 250 FOR IP): Performed by: INTERNAL MEDICINE

## 2023-11-30 PROCEDURE — 36415 COLL VENOUS BLD VENIPUNCTURE: CPT

## 2023-11-30 PROCEDURE — 94761 N-INVAS EAR/PLS OXIMETRY MLT: CPT

## 2023-11-30 PROCEDURE — 6370000000 HC RX 637 (ALT 250 FOR IP): Performed by: NURSE PRACTITIONER

## 2023-11-30 PROCEDURE — 97116 GAIT TRAINING THERAPY: CPT

## 2023-11-30 PROCEDURE — 1200000000 HC SEMI PRIVATE

## 2023-11-30 PROCEDURE — 6370000000 HC RX 637 (ALT 250 FOR IP)

## 2023-11-30 PROCEDURE — 2580000003 HC RX 258: Performed by: INTERNAL MEDICINE

## 2023-11-30 PROCEDURE — 80053 COMPREHEN METABOLIC PANEL: CPT

## 2023-11-30 PROCEDURE — 83735 ASSAY OF MAGNESIUM: CPT

## 2023-11-30 RX ADMIN — RISPERIDONE 0.5 MG: 0.5 TABLET ORAL at 09:17

## 2023-11-30 RX ADMIN — SODIUM CHLORIDE, PRESERVATIVE FREE 10 ML: 5 INJECTION INTRAVENOUS at 09:35

## 2023-11-30 RX ADMIN — ASPIRIN 81 MG CHEWABLE TABLET 81 MG: 81 TABLET CHEWABLE at 09:17

## 2023-11-30 RX ADMIN — EMPAGLIFLOZIN 10 MG: 10 TABLET, FILM COATED ORAL at 09:15

## 2023-11-30 RX ADMIN — SODIUM CHLORIDE, PRESERVATIVE FREE 10 ML: 5 INJECTION INTRAVENOUS at 20:37

## 2023-11-30 RX ADMIN — RISPERIDONE 0.5 MG: 0.5 TABLET ORAL at 20:37

## 2023-11-30 RX ADMIN — APIXABAN 5 MG: 5 TABLET, FILM COATED ORAL at 20:37

## 2023-11-30 RX ADMIN — CARVEDILOL 3.12 MG: 6.25 TABLET, FILM COATED ORAL at 11:47

## 2023-11-30 RX ADMIN — APIXABAN 5 MG: 5 TABLET, FILM COATED ORAL at 09:15

## 2023-11-30 ASSESSMENT — ENCOUNTER SYMPTOMS
CONSTIPATION: 0
EYE PAIN: 0
STRIDOR: 0
ABDOMINAL DISTENTION: 0
COUGH: 0
SHORTNESS OF BREATH: 0
CHEST TIGHTNESS: 0
BACK PAIN: 0
BLOOD IN STOOL: 0
DIARRHEA: 0
CHOKING: 0
VOMITING: 0
NAUSEA: 0
ABDOMINAL PAIN: 0
WHEEZING: 0

## 2023-11-30 NOTE — PROGRESS NOTES
improved, continue digoxin.  -Continue full dose AC  -EP evaluated pt for ICD however pt not a candidate at present as pt must be compliant with medications and stop drug use prior to consideration of ICD  -GDMT as able  -Cardiology onboard  -patient keeps refusing medications at times causing intermittent RVR    Transaminitis, hyperbilirubinemia  -RUQ U/S unremarkable. Acute hepatitis panel negative  -Suspect due to congestive hepatopathy versus cocaine.  -Trend levels    Substance abuse  -Counseled extensively on cessation. Diet ADULT DIET; Regular; No Added Salt (3-4 gm)  ADULT ORAL NUTRITION SUPPLEMENT; Lunch; Standard High Calorie/High Protein Oral Supplement  ADULT ORAL NUTRITION SUPPLEMENT; Dinner; Frozen Oral Supplement   DVT Prophylaxis [x] Lovenox, []  Heparin, [] SCDs, [] Ambulation,  [] Eliquis, [] Xarelto  [] Coumadin   Code Status Full Code   Disposition From: Home  Expected Disposition: Psych facility   Estimated Date of Discharge: awaiting placement; medically stable   Surrogate Decision Maker/ Trinidad Wallace (Parent)      Personally reviewed Lab Studies and Imaging     Discussed during IDR - pending placement to inpatient psych. Subjective:     Chief Complaint:   Chief Complaint   Patient presents with    Rectal Bleeding    Altered Mental Status    Shortness of Breath     85% at home     Patient seen and examined in the AM. Patient states she feels fine. No fatigue today. No new complaints. Awaiting placement. Review of Systems:      Review of Systems   Constitutional:  Negative for chills, fatigue, fever and unexpected weight change. Eyes:  Negative for pain and visual disturbance. Respiratory:  Negative for cough, choking, chest tightness, shortness of breath, wheezing and stridor. Cardiovascular:  Negative for chest pain, palpitations and leg swelling.    Gastrointestinal:  Negative for abdominal distention, abdominal pain, blood in stool, constipation, diarrhea, surrounding this area in the left frontal lobe with a separate focus also seen in the posterior left parietal lobe. Additional areas of prior infarction that are favored to be remote. An embolic phenomenon could be considered given differing vascular distributions. 3. No acute intracranial abnormality. XR CHEST PORTABLE    Result Date: 11/4/2023  EXAMINATION: ONE XRAY VIEW OF THE CHEST 11/4/2023 9:47 am COMPARISON: 06/07/2022 radiograph HISTORY: ORDERING SYSTEM PROVIDED HISTORY: Sepsis TECHNOLOGIST PROVIDED HISTORY: Reason for exam:->Sepsis Reason for Exam: ams/sob FINDINGS: The heart is enlarged. Mediastinum is normal.  Mild perihilar opacities centrally and a central pattern of mild ground-glass attenuation has developed in the lungs. No significant pleural fluid. No skeletal finding. Central pattern of ground-glass airspace change may represent vascular congestion or mild edema. A developing viral infection may be considered clinically. CBC:   No results for input(s): \"WBC\", \"HGB\", \"PLT\" in the last 72 hours. BMP:    Recent Labs     11/30/23 0352      K 4.2   CL 99   CO2 31   BUN 27*   CREATININE 1.4*   GLUCOSE 89       Hepatic:   Recent Labs     11/30/23 0352   AST 16   ALT 16   BILITOT 0.5   ALKPHOS 93       Lipids:   Lab Results   Component Value Date/Time    CHOL 92 11/05/2023 06:02 AM    HDL 8 11/05/2023 06:02 AM    TRIG 133 11/05/2023 06:02 AM     Troponin:   Lab Results   Component Value Date/Time    TROPONINT 0.060 06/08/2022 12:34 PM    TROPONINT 0.046 06/07/2022 11:10 PM    TROPONINT 0.041 06/01/2022 05:30 AM     BNP:   No results for input(s): \"PROBNP\" in the last 72 hours.       UA:  Lab Results   Component Value Date/Time    NITRU NEGATIVE 11/26/2023 11:20 AM    NITRU NEGATIVE 08/18/2012 08:01 PM    COLORU YELLOW 11/26/2023 11:20 AM    WBCUA 2 11/26/2023 11:20 AM    RBCUA 4 11/26/2023 11:20 AM    MUCUS RARE 11/26/2023 11:20 AM    TRICHOMONAS NONE SEEN 11/26/2023 11:20

## 2023-11-30 NOTE — PROGRESS NOTES
Physical Therapy  Name: Kelsea Castle MRN: [id number] :   1965   Date:  2023   Admission Date: 2023 Room:  A   Restrictions/Precautions:  Restrictions/Precautions  Restrictions/Precautions: General Precautions, Fall Risk         Recommended discharge to 24 hour supervision home with [facility] from eval on 11/10    Communication with other providers:  Lamont Hussein RN states pt is ok to see for therapy  Subjective:  Patient states:  she would like to walk  Pain:   Location, Type, Intensity (0/10 to 10/10):  0/10  Objective:    Observation:  pt was sitting on the couch with a sitter in the room  Treatment, including education/measures:  Transfers with line management of tele, pt disconnected her tele, writer asked pt to reconnect,for monitoring during her walk, pt insisted that she could walk without it. Pt also refused the gt belt  Scooting :Ind  Sit to stand : Ind  Stand to sit : Ind  Gait:  Pt amb with no AD for 450 ft x 2 with SBA  Pt needed VC's for pathway  offered pt ex, pt said she did them this morning because she was bored and does them throughout the day  Safety  Patient left safely in the couch, with call light/phone in reach with sitter in the room. Assessment / Impression:     Patient's tolerance of treatment:  good   Adverse Reaction: none  Significant change in status and impact:  progressing  Barriers to improvement:   at rest,   Plan for Next Session:    Will cont to work towards pt's goals per her tolerance  Time in:  1300  Time out:  1323  Timed treatment minutes:  23  Total treatment time:  23  Previously filed items:  Social/Functional History  Lives With: Family  Bathroom Equipment: Shower chair  Home Equipment: Oxygen  Receives Help From: Family  Ambulation Assistance: Independent  Transfer Assistance: Independent  Active : Yes  Mode of Transportation: Car     Long Term Goals  Time Frame for Long Term Goals :  In one week:  Long Term Goal 1: Pt will complete all bed

## 2023-12-01 PROCEDURE — 6370000000 HC RX 637 (ALT 250 FOR IP)

## 2023-12-01 PROCEDURE — 1200000000 HC SEMI PRIVATE

## 2023-12-01 PROCEDURE — 6370000000 HC RX 637 (ALT 250 FOR IP): Performed by: STUDENT IN AN ORGANIZED HEALTH CARE EDUCATION/TRAINING PROGRAM

## 2023-12-01 PROCEDURE — 6370000000 HC RX 637 (ALT 250 FOR IP): Performed by: INTERNAL MEDICINE

## 2023-12-01 PROCEDURE — 6370000000 HC RX 637 (ALT 250 FOR IP): Performed by: NURSE PRACTITIONER

## 2023-12-01 PROCEDURE — 94761 N-INVAS EAR/PLS OXIMETRY MLT: CPT

## 2023-12-01 PROCEDURE — 2580000003 HC RX 258: Performed by: INTERNAL MEDICINE

## 2023-12-01 RX ADMIN — RISPERIDONE 0.5 MG: 0.5 TABLET ORAL at 20:59

## 2023-12-01 RX ADMIN — APIXABAN 5 MG: 5 TABLET, FILM COATED ORAL at 08:16

## 2023-12-01 RX ADMIN — CARVEDILOL 3.12 MG: 6.25 TABLET, FILM COATED ORAL at 08:34

## 2023-12-01 RX ADMIN — BENZONATATE 100 MG: 100 CAPSULE ORAL at 20:59

## 2023-12-01 RX ADMIN — SODIUM CHLORIDE, PRESERVATIVE FREE 10 ML: 5 INJECTION INTRAVENOUS at 08:18

## 2023-12-01 RX ADMIN — ASPIRIN 81 MG CHEWABLE TABLET 81 MG: 81 TABLET CHEWABLE at 08:16

## 2023-12-01 RX ADMIN — CARVEDILOL 3.12 MG: 6.25 TABLET, FILM COATED ORAL at 16:41

## 2023-12-01 RX ADMIN — Medication 3 MG: at 20:59

## 2023-12-01 RX ADMIN — APIXABAN 5 MG: 5 TABLET, FILM COATED ORAL at 20:59

## 2023-12-01 RX ADMIN — EMPAGLIFLOZIN 10 MG: 10 TABLET, FILM COATED ORAL at 08:16

## 2023-12-01 RX ADMIN — SODIUM CHLORIDE, PRESERVATIVE FREE 10 ML: 5 INJECTION INTRAVENOUS at 20:59

## 2023-12-01 RX ADMIN — RISPERIDONE 0.5 MG: 0.5 TABLET ORAL at 08:16

## 2023-12-01 RX ADMIN — HYDROXYZINE HYDROCHLORIDE 25 MG: 25 TABLET, FILM COATED ORAL at 20:59

## 2023-12-01 ASSESSMENT — ENCOUNTER SYMPTOMS
DIARRHEA: 0
ABDOMINAL PAIN: 0
CHEST TIGHTNESS: 0
BLOOD IN STOOL: 0
SHORTNESS OF BREATH: 0
BACK PAIN: 0
WHEEZING: 0
CONSTIPATION: 0
NAUSEA: 0
ABDOMINAL DISTENTION: 0
STRIDOR: 0
COUGH: 0
EYE PAIN: 0
VOMITING: 0
CHOKING: 0

## 2023-12-01 NOTE — CARE COORDINATION
CM called Herminia with [facility] for an update on insurance. CM called Miguel Camilo with public benefits for an update as well. Left secure VMs for both. Received a call from Miguel Camilo who stated that she does not understand what is taking so long and that CM should call pts daughter, Twila. CM called Twila and left a VM asking for a return call. Received call from [facility] who stated that she can attempt to get [facility] to accept the pt. She stated that she will need two reasons why pt needs LTC. CM returned Herminia's call for details of what she needs. Left VM asking for a call back. Received call from Miguel Camilo who spoke with someone [facility] called her as they were unable to get in touch with pts daughter. [employer] stated they are processing the pts insurance at this time. PS to Dr Tu Masters asking him to write in a note two reasons why pt needs long term care. CM will place this pt on the weekend list in case the insurance is worked out. Pt will be going to [facility] in [place]. [facility] with [facility] is also liaison for [employer].

## 2023-12-01 NOTE — PLAN OF CARE
Problem: Discharge Planning  Goal: Discharge to home or other facility with appropriate resources  Outcome: Progressing  Flowsheets (Taken 11/28/2023 0836 by Adriano Parkinson RN)  Discharge to home or other facility with appropriate resources:   Identify barriers to discharge with patient and caregiver   Arrange for needed discharge resources and transportation as appropriate   Identify discharge learning needs (meds, wound care, etc)   Arrange for interpreters to assist at discharge as needed   Refer to discharge planning if patient needs post-hospital services based on physician order or complex needs related to functional status, cognitive ability or social support system     Problem: Pain  Goal: Verbalizes/displays adequate comfort level or baseline comfort level  Outcome: Progressing  Flowsheets (Taken 11/28/2023 0836 by Adriano Parkinson RN)  Verbalizes/displays adequate comfort level or baseline comfort level:   Encourage patient to monitor pain and request assistance   Assess pain using appropriate pain scale   Administer analgesics based on type and severity of pain and evaluate response   Implement non-pharmacological measures as appropriate and evaluate response   Consider cultural and social influences on pain and pain management   Notify Licensed Independent Practitioner if interventions unsuccessful or patient reports new pain     Problem: Skin/Tissue Integrity  Goal: Absence of new skin breakdown  Description: 1. Monitor for areas of redness and/or skin breakdown  2. Assess vascular access sites hourly  3. Every 4-6 hours minimum:  Change oxygen saturation probe site  4. Every 4-6 hours:  If on nasal continuous positive airway pressure, respiratory therapy assess nares and determine need for appliance change or resting period.   Outcome: Progressing     Problem: Safety - Adult  Goal: Free from fall injury  Outcome: Progressing  Flowsheets (Taken 11/18/2023 1526 by Dae Young LPN)  Free From

## 2023-12-01 NOTE — PROGRESS NOTES
V2.0    [facility] [place]      Name:  Ileana Strickland /Age/Sex: 1965  (60 y.o. female)   MRN & CSN:  [id number] & [id number] Encounter Date/Time: 2023 9:58 AM EST   Location:  -A PCP: No primary care provider on file. Axel Parr, [address] Day: 28    Assessment and Recommendations   Ileana Strickland is a 60 y.o. female who presents with Acute on chronic systolic (congestive) heart failure ([address] [facility])      Patient is on medical hold at this time. Does not have capacity to leave AMA. Please AVOID physical restraints. CM team working on establishing placement/disposition. Medically stable. Plan for  [facility] once insurance is determined. Acute metabolic encephalopathy  Delirium with behavioral disturbance  Need for placement  -Likely secondary to substance abuse and hypoxia.  -Pt does not have capacity to make her own decision. For the safety of patient, a sitter has been continued. -Psychiatry has also determined that pt does not have capacity, placed pt on medical hold for her safety  -CM able to reach pt's daughter who agrees with medical hold and with placement  -Maintain sitter at bedside  -Risperdal 0.5mg BID   -Patient lacks capacity to take care of herself and will not be able to live by herself. She does not have the mental capacity or the understanding of the importance of the medication she needs to be on. She lacks family or external support who could provide the care that she needs. She needs to be placed at a long term care facility for her wellbeing, health, and safety. Acute hypoxia, resolved  Troponin elevation, stable  Acute on chronic systolic and diastolic heart failure, stable  -Precipitant likely the cocaine abuse. -Improved with IV diuresis, converted to p.o. torsemide = hold and resume as able  -Started on GDMT with low-dose Coreg, Aldactone and Jardiance added by nephrology.      Acute renal failure - Resolved   Hyperkalemia, mild, Results   Component Value Date/Time    NITRU NEGATIVE 11/26/2023 11:20 AM    NITRU NEGATIVE 08/18/2012 08:01 PM    COLORU YELLOW 11/26/2023 11:20 AM    WBCUA 2 11/26/2023 11:20 AM    RBCUA 4 11/26/2023 11:20 AM    MUCUS RARE 11/26/2023 11:20 AM    TRICHOMONAS NONE SEEN 11/26/2023 11:20 AM    BACTERIA NEGATIVE 11/26/2023 11:20 AM    CLARITYU CLEAR 11/26/2023 11:20 AM    SPECGRAV 1.010 11/26/2023 11:20 AM    LEUKOCYTESUR SMALL NUMBER OR AMOUNT OBSERVED 11/26/2023 11:20 AM    UROBILINOGEN 1.0 11/26/2023 11:20 AM    BILIRUBINUR NEGATIVE 11/26/2023 11:20 AM    BLOODU MODERATE NUMBER OR AMOUNT OBSERVED 11/26/2023 11:20 AM    GLUCOSEU NEGATIVE 08/18/2012 08:01 PM    KETUA NEGATIVE 11/26/2023 11:20 AM         Electronically signed by Jorge L Ornelas MD on 12/1/2023 at 12:09 PM

## 2023-12-02 PROCEDURE — 6370000000 HC RX 637 (ALT 250 FOR IP): Performed by: INTERNAL MEDICINE

## 2023-12-02 PROCEDURE — 2700000000 HC OXYGEN THERAPY PER DAY

## 2023-12-02 PROCEDURE — 6370000000 HC RX 637 (ALT 250 FOR IP)

## 2023-12-02 PROCEDURE — 6370000000 HC RX 637 (ALT 250 FOR IP): Performed by: NURSE PRACTITIONER

## 2023-12-02 PROCEDURE — 99232 SBSQ HOSP IP/OBS MODERATE 35: CPT | Performed by: INTERNAL MEDICINE

## 2023-12-02 PROCEDURE — 94761 N-INVAS EAR/PLS OXIMETRY MLT: CPT

## 2023-12-02 PROCEDURE — 1200000000 HC SEMI PRIVATE

## 2023-12-02 PROCEDURE — 2580000003 HC RX 258: Performed by: INTERNAL MEDICINE

## 2023-12-02 RX ADMIN — APIXABAN 5 MG: 5 TABLET, FILM COATED ORAL at 20:29

## 2023-12-02 RX ADMIN — SODIUM CHLORIDE, PRESERVATIVE FREE 10 ML: 5 INJECTION INTRAVENOUS at 20:29

## 2023-12-02 RX ADMIN — SPIRONOLACTONE 25 MG: 50 TABLET ORAL at 10:03

## 2023-12-02 RX ADMIN — CARVEDILOL 3.12 MG: 6.25 TABLET, FILM COATED ORAL at 10:01

## 2023-12-02 RX ADMIN — TORSEMIDE 40 MG: 20 TABLET ORAL at 10:03

## 2023-12-02 RX ADMIN — RISPERIDONE 0.5 MG: 0.5 TABLET ORAL at 20:29

## 2023-12-02 RX ADMIN — SODIUM CHLORIDE, PRESERVATIVE FREE 10 ML: 5 INJECTION INTRAVENOUS at 10:04

## 2023-12-02 RX ADMIN — APIXABAN 5 MG: 5 TABLET, FILM COATED ORAL at 10:02

## 2023-12-02 RX ADMIN — CARVEDILOL 3.12 MG: 6.25 TABLET, FILM COATED ORAL at 16:39

## 2023-12-02 RX ADMIN — ASPIRIN 81 MG CHEWABLE TABLET 81 MG: 81 TABLET CHEWABLE at 10:02

## 2023-12-02 RX ADMIN — RISPERIDONE 0.5 MG: 0.5 TABLET ORAL at 10:01

## 2023-12-02 RX ADMIN — EMPAGLIFLOZIN 10 MG: 10 TABLET, FILM COATED ORAL at 10:02

## 2023-12-02 ASSESSMENT — ENCOUNTER SYMPTOMS
SHORTNESS OF BREATH: 0
CHOKING: 0
EYE PAIN: 0
BLOOD IN STOOL: 0
COUGH: 0
DIARRHEA: 0
STRIDOR: 0
CONSTIPATION: 0
WHEEZING: 0
ABDOMINAL DISTENTION: 0
VOMITING: 0
NAUSEA: 0
ABDOMINAL PAIN: 0
BACK PAIN: 0
CHEST TIGHTNESS: 0

## 2023-12-02 ASSESSMENT — PAIN SCALES - GENERAL: PAINLEVEL_OUTOF10: 0

## 2023-12-02 NOTE — CARE COORDINATION
Pt on the weekend follow up list due to pt insurance still pending. [facility] is not open on the weekend. Pt insurance was still being processed on Friday. CM will need to follow up on Monday when S is open.

## 2023-12-02 NOTE — PROGRESS NOTES
V2.0    [facility] Progress Note      Name:  Shine Balbuena /Age/Sex: 1965  (60 y.o. female)   MRN & CSN:  [id number] & [id number] Encounter Date/Time: 2023 9:58 AM EST   Location:  -A PCP: No primary care provider on file. Amy Cobb, [address] Day: 29    Assessment and Recommendations   Shine Balbuena is a 60 y.o. female who presents with Acute on chronic systolic (congestive) heart failure ([address] [facility])      Patient is on medical hold at this time. Does not have capacity to leave AMA. Please AVOID physical restraints. CM team working on establishing placement/disposition. Medically stable. Plan for  [facility] once insurance is determined. Acute metabolic encephalopathy  Delirium with behavioral disturbance  Need for placement  -Likely secondary to substance abuse and hypoxia.  -Pt does not have capacity to make her own decision. For the safety of patient, a sitter has been continued. -Psychiatry has also determined that pt does not have capacity, placed pt on medical hold for her safety  -CM able to reach pt's daughter who agrees with medical hold and with placement  -Maintain sitter at bedside  -Risperdal 0.5mg BID   -Patient lacks capacity to take care of herself and will not be able to live by herself. She does not have the mental capacity or the understanding of the importance of the medication she needs to be on. She lacks family or external support who could provide the care that she needs. She needs to be placed at a long term care facility for her wellbeing, health, and safety. Acute hypoxia, resolved  Troponin elevation, stable  Acute on chronic systolic and diastolic heart failure, stable  -Precipitant likely the cocaine abuse. -Improved with IV diuresis, converted to p.o. torsemide = hold and resume as able  -Started on GDMT with low-dose Coreg, Aldactone and Jardiance added by nephrology.      Acute renal failure - Resolved   Hyperkalemia, mild, last 72 hours.       UA:  Lab Results   Component Value Date/Time    NITRU NEGATIVE 11/26/2023 11:20 AM    NITRU NEGATIVE 08/18/2012 08:01 PM    COLORU YELLOW 11/26/2023 11:20 AM    WBCUA 2 11/26/2023 11:20 AM    RBCUA 4 11/26/2023 11:20 AM    MUCUS RARE 11/26/2023 11:20 AM    TRICHOMONAS NONE SEEN 11/26/2023 11:20 AM    BACTERIA NEGATIVE 11/26/2023 11:20 AM    CLARITYU CLEAR 11/26/2023 11:20 AM    SPECGRAV 1.010 11/26/2023 11:20 AM    LEUKOCYTESUR SMALL NUMBER OR AMOUNT OBSERVED 11/26/2023 11:20 AM    UROBILINOGEN 1.0 11/26/2023 11:20 AM    BILIRUBINUR NEGATIVE 11/26/2023 11:20 AM    BLOODU MODERATE NUMBER OR AMOUNT OBSERVED 11/26/2023 11:20 AM    GLUCOSEU NEGATIVE 08/18/2012 08:01 PM    KETUA NEGATIVE 11/26/2023 11:20 AM         Electronically signed by Bibiana Mckinley MD on 12/2/2023 at 11:56 AM

## 2023-12-02 NOTE — PROGRESS NOTES
Nephrology Progress Note  12/2/2023 10:21 AM  Subjective: Interval History: Garnetta Aase is a 60 y.o. female with doing okay but weak and tired denies other new issues        Data:   Scheduled Meds:   torsemide  40 mg Oral Daily    carvedilol  3.125 mg Oral BID WC    risperiDONE  0.5 mg Oral BID    empagliflozin  10 mg Oral Daily    spironolactone  25 mg Oral Daily    apixaban  5 mg Oral BID    sodium chloride flush  5-40 mL IntraVENous 2 times per day    aspirin  81 mg Oral Daily     Continuous Infusions:   sodium chloride           CBC No results for input(s): \"WBC\", \"HGB\", \"HCT\", \"PLT\" in the last 72 hours. BMP   Recent Labs     11/30/23 0352      K 4.2   CL 99   CO2 31   BUN 27*   CREATININE 1.4*     Hepatic:   Recent Labs     11/30/23 0352   AST 16   ALT 16   BILITOT 0.5   ALKPHOS 93     Troponin: No results for input(s): \"TROPONINI\" in the last 72 hours. BNP: No results for input(s): \"BNP\" in the last 72 hours. Lipids: No results for input(s): \"CHOL\", \"HDL\" in the last 72 hours. Invalid input(s): \"LDLCALCU\"  ABGs: No results found for: \"PHART\", \"PO2ART\", \"SZG8YUA\"  INR: No results for input(s): \"INR\" in the last 72 hours.   Renal Labs  Albumin:    Lab Results   Component Value Date/Time    LABALBU 3.9 11/30/2023 03:52 AM     Calcium:    Lab Results   Component Value Date/Time    CALCIUM 9.5 11/30/2023 03:52 AM     Phosphorus:    Lab Results   Component Value Date/Time    PHOS 4.3 11/25/2023 01:39 PM     U/A:    Lab Results   Component Value Date/Time    NITRU NEGATIVE 11/26/2023 11:20 AM    NITRU NEGATIVE 08/18/2012 08:01 PM    COLORU YELLOW 11/26/2023 11:20 AM    WBCUA 2 11/26/2023 11:20 AM    RBCUA 4 11/26/2023 11:20 AM    MUCUS RARE 11/26/2023 11:20 AM    TRICHOMONAS NONE SEEN 11/26/2023 11:20 AM    BACTERIA NEGATIVE 11/26/2023 11:20 AM    CLARITYU CLEAR 11/26/2023 11:20 AM    SPECGRAV 1.010 11/26/2023 11:20 AM    UROBILINOGEN 1.0 11/26/2023 11:20 AM    BILIRUBINUR NEGATIVE 11/26/2023

## 2023-12-02 NOTE — PLAN OF CARE
Problem: Discharge Planning  Goal: Discharge to home or other facility with appropriate resources  Outcome: Progressing     Problem: Pain  Goal: Verbalizes/displays adequate comfort level or baseline comfort level  Outcome: Progressing     Problem: Skin/Tissue Integrity  Goal: Absence of new skin breakdown  Description: 1. Monitor for areas of redness and/or skin breakdown  2. Assess vascular access sites hourly  3. Every 4-6 hours minimum:  Change oxygen saturation probe site  4. Every 4-6 hours:  If on nasal continuous positive airway pressure, respiratory therapy assess nares and determine need for appliance change or resting period.   Outcome: Progressing     Problem: Safety - Adult  Goal: Free from fall injury  Outcome: Progressing     Problem: ABCDS Injury Assessment  Goal: Absence of physical injury  Outcome: Progressing     Problem: Risk for Elopement  Goal: Patient will not exit the unit/facility without proper excort  Outcome: Progressing     Problem: Nutrition Deficit:  Goal: Optimize nutritional status  Outcome: Progressing     Problem: Neurosensory - Adult  Goal: Achieves stable or improved neurological status  Outcome: Progressing  Goal: Absence of seizures  Outcome: Progressing  Goal: Remains free of injury related to seizures activity  Outcome: Progressing  Goal: Achieves maximal functionality and self care  Outcome: Progressing     Problem: Respiratory - Adult  Goal: Achieves optimal ventilation and oxygenation  Outcome: Progressing     Problem: Cardiovascular - Adult  Goal: Maintains optimal cardiac output and hemodynamic stability  Outcome: Progressing  Goal: Absence of cardiac dysrhythmias or at baseline  Outcome: Progressing     Problem: Musculoskeletal - Adult  Goal: Return mobility to safest level of function  Outcome: Progressing  Goal: Maintain proper alignment of affected body part  Outcome: Progressing  Goal: Return ADL status to a safe level of function  Outcome: Progressing Problem: Gastrointestinal - Adult  Goal: Minimal or absence of nausea and vomiting  Outcome: Progressing  Goal: Maintains or returns to baseline bowel function  Outcome: Progressing  Goal: Maintains adequate nutritional intake  Outcome: Progressing     Problem: Metabolic/Fluid and Electrolytes - Adult  Goal: Electrolytes maintained within normal limits  Outcome: Progressing  Goal: Hemodynamic stability and optimal renal function maintained  Outcome: Progressing     Problem: Behavior  Goal: Pt/Family maintain appropriate behavior and adhere to behavioral management agreement, if implemented  Description: INTERVENTIONS:  1. Assess patient/family's coping skills and  non-compliant behavior (including use of illegal substances)  2. Notify security of behavior or suspected illegal substances which indicate the need for search of the family and/or belongings  3. Encourage verbalization of thoughts and concerns in a socially appropriate manner  4. Utilize positive, consistent limit setting strategies supporting safety of patient, staff and others  5. Encourage participation in the decision making process about the behavioral management agreement  6. If a visitor's behavior poses a threat to safety call refer to organization policy.   7. Initiate consult with , Psychosocial CNS, Spiritual Care as appropriate  Outcome: Progressing     Problem: Skin/Tissue Integrity - Adult  Goal: Skin integrity remains intact  Outcome: Progressing  Goal: Incisions, wounds, or drain sites healing without S/S of infection  Outcome: Progressing  Goal: Oral mucous membranes remain intact  Outcome: Progressing     Problem: Chronic Conditions and Co-morbidities  Goal: Patient's chronic conditions and co-morbidity symptoms are monitored and maintained or improved  Outcome: Progressing

## 2023-12-03 VITALS
HEART RATE: 64 BPM | SYSTOLIC BLOOD PRESSURE: 129 MMHG | TEMPERATURE: 97.6 F | RESPIRATION RATE: 28 BRPM | DIASTOLIC BLOOD PRESSURE: 110 MMHG | WEIGHT: 158 LBS | OXYGEN SATURATION: 96 % | BODY MASS INDEX: 24.8 KG/M2 | HEIGHT: 67 IN

## 2023-12-03 PROCEDURE — 94761 N-INVAS EAR/PLS OXIMETRY MLT: CPT

## 2023-12-03 PROCEDURE — 6370000000 HC RX 637 (ALT 250 FOR IP): Performed by: INTERNAL MEDICINE

## 2023-12-03 PROCEDURE — 2700000000 HC OXYGEN THERAPY PER DAY

## 2023-12-03 PROCEDURE — 1200000000 HC SEMI PRIVATE

## 2023-12-03 PROCEDURE — 6370000000 HC RX 637 (ALT 250 FOR IP)

## 2023-12-03 PROCEDURE — 6370000000 HC RX 637 (ALT 250 FOR IP): Performed by: NURSE PRACTITIONER

## 2023-12-03 PROCEDURE — 2580000003 HC RX 258: Performed by: INTERNAL MEDICINE

## 2023-12-03 RX ADMIN — ASPIRIN 81 MG CHEWABLE TABLET 81 MG: 81 TABLET CHEWABLE at 09:17

## 2023-12-03 RX ADMIN — TORSEMIDE 40 MG: 20 TABLET ORAL at 09:17

## 2023-12-03 RX ADMIN — RISPERIDONE 0.5 MG: 0.5 TABLET ORAL at 21:07

## 2023-12-03 RX ADMIN — CARVEDILOL 3.12 MG: 6.25 TABLET, FILM COATED ORAL at 17:22

## 2023-12-03 RX ADMIN — EMPAGLIFLOZIN 10 MG: 10 TABLET, FILM COATED ORAL at 09:16

## 2023-12-03 RX ADMIN — SPIRONOLACTONE 25 MG: 50 TABLET ORAL at 09:17

## 2023-12-03 RX ADMIN — APIXABAN 5 MG: 5 TABLET, FILM COATED ORAL at 21:07

## 2023-12-03 RX ADMIN — APIXABAN 5 MG: 5 TABLET, FILM COATED ORAL at 09:16

## 2023-12-03 RX ADMIN — RISPERIDONE 0.5 MG: 0.5 TABLET ORAL at 09:17

## 2023-12-03 RX ADMIN — SODIUM CHLORIDE, PRESERVATIVE FREE 5 ML: 5 INJECTION INTRAVENOUS at 09:20

## 2023-12-03 RX ADMIN — CARVEDILOL 3.12 MG: 6.25 TABLET, FILM COATED ORAL at 09:16

## 2023-12-03 ASSESSMENT — ENCOUNTER SYMPTOMS
DIARRHEA: 0
STRIDOR: 0
SHORTNESS OF BREATH: 0
ABDOMINAL DISTENTION: 0
BACK PAIN: 0
CHEST TIGHTNESS: 0
VOMITING: 0
NAUSEA: 0
ABDOMINAL PAIN: 0
WHEEZING: 0
EYE PAIN: 0
BLOOD IN STOOL: 0
COUGH: 0
CHOKING: 0
CONSTIPATION: 0

## 2023-12-03 ASSESSMENT — PAIN SCALES - GENERAL: PAINLEVEL_OUTOF10: 0

## 2023-12-03 NOTE — PROGRESS NOTES
V2.0    [facility] [place] Note      Name:  Sudhakar Gonzalez /Age/Sex: 1965  (60 y.o. female)   MRN & CSN:  [id number] & [id number] Encounter Date/Time: 12/3/2023 9:58 AM EST   Location:  -A PCP: No primary care provider on file. Anna Castelan, [address] Day: 30    Assessment and Recommendations   Sudhakar Gonzalez is a 60 y.o. female who presents with Acute on chronic systolic (congestive) heart failure ([address] [facility])      Patient is on medical hold at this time. Does not have capacity to leave AMA. Please AVOID physical restraints. CM team working on establishing placement/disposition. Medically stable. Plan for  [facility] once insurance is determined. Acute metabolic encephalopathy  Delirium with behavioral disturbance  Need for placement  -Likely secondary to substance abuse and hypoxia.  -Pt does not have capacity to make her own decision. For the safety of patient, a sitter has been continued. -Psychiatry has also determined that pt does not have capacity, placed pt on medical hold for her safety  -CM able to reach pt's daughter who agrees with medical hold and with placement  -Maintain sitter at bedside  -Risperdal 0.5mg BID   -Patient lacks capacity to take care of herself and will not be able to live by herself. She does not have the mental capacity or the understanding of the importance of the medication she needs to be on. She lacks family or external support who could provide the care that she needs. She needs to be placed at a long term care facility for her wellbeing, health, and safety. Acute hypoxia, resolved  Troponin elevation, stable  Acute on chronic systolic and diastolic heart failure, stable  -Precipitant likely the cocaine abuse. -Improved with IV diuresis, converted to p.o. torsemide = hold and resume as able  -Started on GDMT with low-dose Coreg, Aldactone and Jardiance added by nephrology.      Acute renal failure - Resolved   Hyperkalemia, mild,

## 2023-12-03 NOTE — PROGRESS NOTES
Pt's current /96, HR 62. Pt has Carvedilol scheduled. Attendee notified. Per attendee, ok to give Carvedilol.

## 2023-12-03 NOTE — PROGRESS NOTES
Nephrology Progress Note  12/3/2023 11:11 AM  Subjective: Interval History: Kira Frank is a 60 y.o. female appears doing okay lying on the sofa has a sitter denies complaints      Data:   Scheduled Meds:   torsemide  40 mg Oral Daily    carvedilol  3.125 mg Oral BID WC    risperiDONE  0.5 mg Oral BID    empagliflozin  10 mg Oral Daily    spironolactone  25 mg Oral Daily    apixaban  5 mg Oral BID    sodium chloride flush  5-40 mL IntraVENous 2 times per day    aspirin  81 mg Oral Daily     Continuous Infusions:   sodium chloride           CBC No results for input(s): \"WBC\", \"HGB\", \"HCT\", \"PLT\" in the last 72 hours. BMP   No results for input(s): \"NA\", \"K\", \"CL\", \"CO2\", \"PHOS\", \"BUN\", \"CREATININE\", \"CA\" in the last 72 hours. Hepatic:   No results for input(s): \"AST\", \"ALT\", \"ALB\", \"BILITOT\", \"ALKPHOS\" in the last 72 hours. Troponin: No results for input(s): \"TROPONINI\" in the last 72 hours. BNP: No results for input(s): \"BNP\" in the last 72 hours. Lipids: No results for input(s): \"CHOL\", \"HDL\" in the last 72 hours. Invalid input(s): \"LDLCALCU\"  ABGs: No results found for: \"PHART\", \"PO2ART\", \"UFV5SGI\"  INR: No results for input(s): \"INR\" in the last 72 hours.   Renal Labs  Albumin:    Lab Results   Component Value Date/Time    LABALBU 3.9 11/30/2023 03:52 AM     Calcium:    Lab Results   Component Value Date/Time    CALCIUM 9.5 11/30/2023 03:52 AM     Phosphorus:    Lab Results   Component Value Date/Time    PHOS 4.3 11/25/2023 01:39 PM     U/A:    Lab Results   Component Value Date/Time    NITRU NEGATIVE 11/26/2023 11:20 AM    NITRU NEGATIVE 08/18/2012 08:01 PM    COLORU YELLOW 11/26/2023 11:20 AM    WBCUA 2 11/26/2023 11:20 AM    RBCUA 4 11/26/2023 11:20 AM    MUCUS RARE 11/26/2023 11:20 AM    TRICHOMONAS NONE SEEN 11/26/2023 11:20 AM    BACTERIA NEGATIVE 11/26/2023 11:20 AM    CLARITYU CLEAR 11/26/2023 11:20 AM    SPECGRAV 1.010 11/26/2023 11:20 AM    UROBILINOGEN 1.0 11/26/2023 11:20 AM

## 2023-12-04 LAB
ALBUMIN SERPL-MCNC: 3.6 GM/DL (ref 3.4–5)
ANION GAP SERPL CALCULATED.3IONS-SCNC: 12 MMOL/L (ref 4–16)
BASOPHILS ABSOLUTE: 0 K/CU MM
BASOPHILS RELATIVE PERCENT: 0.6 % (ref 0–1)
BUN SERPL-MCNC: 32 MG/DL (ref 6–23)
CALCIUM SERPL-MCNC: 9.1 MG/DL (ref 8.3–10.6)
CHLORIDE BLD-SCNC: 100 MMOL/L (ref 99–110)
CO2: 26 MMOL/L (ref 21–32)
CREAT SERPL-MCNC: 1.2 MG/DL (ref 0.6–1.1)
DIFFERENTIAL TYPE: ABNORMAL
EOSINOPHILS ABSOLUTE: 0.1 K/CU MM
EOSINOPHILS RELATIVE PERCENT: 2.1 % (ref 0–3)
GFR SERPL CREATININE-BSD FRML MDRD: 52 ML/MIN/1.73M2
GLUCOSE SERPL-MCNC: 91 MG/DL (ref 70–99)
HCT VFR BLD CALC: 37.2 % (ref 37–47)
HEMOGLOBIN: 11.8 GM/DL (ref 12.5–16)
IMMATURE NEUTROPHIL %: 0.5 % (ref 0–0.43)
LYMPHOCYTES ABSOLUTE: 2.3 K/CU MM
LYMPHOCYTES RELATIVE PERCENT: 37 % (ref 24–44)
MCH RBC QN AUTO: 29.5 PG (ref 27–31)
MCHC RBC AUTO-ENTMCNC: 31.7 % (ref 32–36)
MCV RBC AUTO: 93 FL (ref 78–100)
MONOCYTES ABSOLUTE: 0.8 K/CU MM
MONOCYTES RELATIVE PERCENT: 13.1 % (ref 0–4)
NUCLEATED RBC %: 0 %
PDW BLD-RTO: 14.5 % (ref 11.7–14.9)
PHOSPHORUS: 5.3 MG/DL (ref 2.5–4.9)
PLATELET # BLD: 254 K/CU MM (ref 140–440)
PMV BLD AUTO: 9.3 FL (ref 7.5–11.1)
POTASSIUM SERPL-SCNC: 4.4 MMOL/L (ref 3.5–5.1)
RBC # BLD: 4 M/CU MM (ref 4.2–5.4)
SEGMENTED NEUTROPHILS ABSOLUTE COUNT: 2.9 K/CU MM
SEGMENTED NEUTROPHILS RELATIVE PERCENT: 46.7 % (ref 36–66)
SODIUM BLD-SCNC: 138 MMOL/L (ref 135–145)
TOTAL IMMATURE NEUTOROPHIL: 0.03 K/CU MM
TOTAL NUCLEATED RBC: 0 K/CU MM
WBC # BLD: 6.2 K/CU MM (ref 4–10.5)

## 2023-12-04 PROCEDURE — 6370000000 HC RX 637 (ALT 250 FOR IP): Performed by: INTERNAL MEDICINE

## 2023-12-04 PROCEDURE — 1200000000 HC SEMI PRIVATE

## 2023-12-04 PROCEDURE — 85025 COMPLETE CBC W/AUTO DIFF WBC: CPT

## 2023-12-04 PROCEDURE — 6370000000 HC RX 637 (ALT 250 FOR IP): Performed by: NURSE PRACTITIONER

## 2023-12-04 PROCEDURE — 6370000000 HC RX 637 (ALT 250 FOR IP)

## 2023-12-04 PROCEDURE — 94761 N-INVAS EAR/PLS OXIMETRY MLT: CPT

## 2023-12-04 PROCEDURE — 2700000000 HC OXYGEN THERAPY PER DAY

## 2023-12-04 PROCEDURE — 80069 RENAL FUNCTION PANEL: CPT

## 2023-12-04 PROCEDURE — 36415 COLL VENOUS BLD VENIPUNCTURE: CPT

## 2023-12-04 RX ADMIN — APIXABAN 5 MG: 5 TABLET, FILM COATED ORAL at 21:12

## 2023-12-04 RX ADMIN — RISPERIDONE 0.5 MG: 0.5 TABLET ORAL at 08:41

## 2023-12-04 RX ADMIN — Medication 3 MG: at 21:11

## 2023-12-04 RX ADMIN — CARVEDILOL 3.12 MG: 6.25 TABLET, FILM COATED ORAL at 17:56

## 2023-12-04 RX ADMIN — ASPIRIN 81 MG CHEWABLE TABLET 81 MG: 81 TABLET CHEWABLE at 08:41

## 2023-12-04 RX ADMIN — APIXABAN 5 MG: 5 TABLET, FILM COATED ORAL at 08:41

## 2023-12-04 RX ADMIN — EMPAGLIFLOZIN 10 MG: 10 TABLET, FILM COATED ORAL at 08:41

## 2023-12-04 RX ADMIN — CARVEDILOL 3.12 MG: 6.25 TABLET, FILM COATED ORAL at 08:40

## 2023-12-04 RX ADMIN — SPIRONOLACTONE 25 MG: 50 TABLET ORAL at 08:40

## 2023-12-04 RX ADMIN — RISPERIDONE 0.5 MG: 0.5 TABLET ORAL at 21:12

## 2023-12-04 RX ADMIN — TORSEMIDE 40 MG: 20 TABLET ORAL at 08:40

## 2023-12-04 ASSESSMENT — ENCOUNTER SYMPTOMS
WHEEZING: 0
ABDOMINAL PAIN: 0
BLOOD IN STOOL: 0
EYE PAIN: 0
DIARRHEA: 0
BACK PAIN: 0
SHORTNESS OF BREATH: 0
ABDOMINAL DISTENTION: 0
COUGH: 0
NAUSEA: 0
STRIDOR: 0
VOMITING: 0
CONSTIPATION: 0
CHOKING: 0
CHEST TIGHTNESS: 0

## 2023-12-04 NOTE — CARE COORDINATION
CM called Antonella De La O for an update on insurance. Left VM asking for a return call. 13:06 CM spoke with Antonella De La O with [employer] who stated that she doesn't have an update and is going to ask her insurance team to run benefits again. 13:08 CM called Valente Ahuja with public benefits for an update, left VM. 13:18 CM received call from Valente Ahuja who stated that she did not have an update but provided CM with a number to [facility] for LTC pts, [phone].  13:30 CM called Antonella De La O regarding above information from Valente Ahuja and provided Antonella De La O with the phone number for [facility] on a secure VM. 15:30 received call from Valente Ahuja in public benefits who gave CM the phonr number for [facility] with [facility], [phone]. SANDRA called Ms Jordan  and left a VM asking for a return call.

## 2023-12-04 NOTE — PLAN OF CARE
Problem: Discharge Planning  Goal: Discharge to home or other facility with appropriate resources  12/4/2023 1124 by Shine Krishnamurthy, RN  Outcome: Progressing  Flowsheets (Taken 12/4/2023 0800)  Discharge to home or other facility with appropriate resources:   Identify barriers to discharge with patient and caregiver   Arrange for needed discharge resources and transportation as appropriate   Identify discharge learning needs (meds, wound care, etc)   Refer to discharge planning if patient needs post-hospital services based on physician order or complex needs related to functional status, cognitive ability or social support system  12/3/2023 2320 by Amanda Bray, RN  Outcome: Progressing  [address] (Taken 11/28/2023 0836 by Parker Walton RN)  Discharge to home or other facility with appropriate resources:   Identify barriers to discharge with patient and caregiver   Arrange for needed discharge resources and transportation as appropriate   Identify discharge learning needs (meds, wound care, etc)   Arrange for interpreters to assist at discharge as needed   Refer to discharge planning if patient needs post-hospital services based on physician order or complex needs related to functional status, cognitive ability or social support system     Problem: Pain  Goal: Verbalizes/displays adequate comfort level or baseline comfort level  12/4/2023 1124 by Shine Krishnamurthy, RN  Outcome: Progressing  Flowsheets (Taken 12/4/2023 0835)  Verbalizes/displays adequate comfort level or baseline comfort level:   Encourage patient to monitor pain and request assistance   Assess pain using appropriate pain scale   Administer analgesics based on type and severity of pain and evaluate response   Implement non-pharmacological measures as appropriate and evaluate response   Consider cultural and social influences on pain and pain management   Notify Licensed Independent Practitioner if interventions unsuccessful or patient

## 2023-12-04 NOTE — PROGRESS NOTES
of the orbits demonstrate no acute abnormality. SINUSES: The visualized paranasal sinuses and mastoid air cells demonstrate no acute abnormality. SOFT TISSUES/SKULL:  No acute abnormality of the visualized skull or soft tissues. 1. 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 2. Encephalomalacia surrounding this area in the left frontal lobe with a separate focus also seen in the posterior left parietal lobe. Additional areas of prior infarction that are favored to be remote. An embolic phenomenon could be considered given differing vascular distributions. 3. No acute intracranial abnormality. XR CHEST PORTABLE    Result Date: 11/4/2023  EXAMINATION: ONE XRAY VIEW OF THE CHEST 11/4/2023 9:47 am COMPARISON: 06/07/2022 radiograph HISTORY: ORDERING SYSTEM PROVIDED HISTORY: Sepsis TECHNOLOGIST PROVIDED HISTORY: Reason for exam:->Sepsis Reason for Exam: ams/sob FINDINGS: The heart is enlarged. Mediastinum is normal.  Mild perihilar opacities centrally and a central pattern of mild ground-glass attenuation has developed in the lungs. No significant pleural fluid. No skeletal finding. Central pattern of ground-glass airspace change may represent vascular congestion or mild edema. A developing viral infection may be considered clinically. CBC:   Recent Labs     12/04/23 0442   WBC 6.2   HGB 11.8*            BMP:    Recent Labs     12/04/23 0442      K 4.4      CO2 26   BUN 32*   CREATININE 1.2*   GLUCOSE 91       Hepatic:   No results for input(s): \"AST\", \"ALT\", \"ALB\", \"BILITOT\", \"ALKPHOS\" in the last 72 hours.     Lipids:   Lab Results   Component Value Date/Time    CHOL 92 11/05/2023 06:02 AM    HDL 8 11/05/2023 06:02 AM    TRIG 133 11/05/2023 06:02 AM     Troponin:   Lab Results   Component Value Date/Time    TROPONINT 0.060 06/08/2022 12:34 PM    TROPONINT 0.046 06/07/2022 11:10 PM    TROPONINT 0.041 06/01/2022 05:30 AM     BNP:   No results for input(s):

## 2023-12-05 PROCEDURE — 6370000000 HC RX 637 (ALT 250 FOR IP): Performed by: INTERNAL MEDICINE

## 2023-12-05 PROCEDURE — 94761 N-INVAS EAR/PLS OXIMETRY MLT: CPT

## 2023-12-05 PROCEDURE — 2700000000 HC OXYGEN THERAPY PER DAY

## 2023-12-05 PROCEDURE — 6370000000 HC RX 637 (ALT 250 FOR IP)

## 2023-12-05 PROCEDURE — 1200000000 HC SEMI PRIVATE

## 2023-12-05 PROCEDURE — 6370000000 HC RX 637 (ALT 250 FOR IP): Performed by: NURSE PRACTITIONER

## 2023-12-05 RX ADMIN — CARVEDILOL 3.12 MG: 6.25 TABLET, FILM COATED ORAL at 08:22

## 2023-12-05 RX ADMIN — RISPERIDONE 0.5 MG: 0.5 TABLET ORAL at 08:22

## 2023-12-05 RX ADMIN — ASPIRIN 81 MG CHEWABLE TABLET 81 MG: 81 TABLET CHEWABLE at 08:23

## 2023-12-05 RX ADMIN — EMPAGLIFLOZIN 10 MG: 10 TABLET, FILM COATED ORAL at 08:23

## 2023-12-05 RX ADMIN — RISPERIDONE 0.5 MG: 0.5 TABLET ORAL at 20:31

## 2023-12-05 RX ADMIN — APIXABAN 5 MG: 5 TABLET, FILM COATED ORAL at 20:31

## 2023-12-05 RX ADMIN — APIXABAN 5 MG: 5 TABLET, FILM COATED ORAL at 08:22

## 2023-12-05 RX ADMIN — Medication 3 MG: at 20:31

## 2023-12-05 ASSESSMENT — ENCOUNTER SYMPTOMS
SHORTNESS OF BREATH: 0
CONSTIPATION: 0
STRIDOR: 0
ABDOMINAL PAIN: 0
CHEST TIGHTNESS: 0
WHEEZING: 0
VOMITING: 0
DIARRHEA: 0
BACK PAIN: 0
BLOOD IN STOOL: 0
NAUSEA: 0
COUGH: 0
EYE PAIN: 0
CHOKING: 0
ABDOMINAL DISTENTION: 0

## 2023-12-05 NOTE — CARE COORDINATION
SANDRA spoke with Osmany Marx with [employer] who stated that pt is approved for [facility] and the level of care is established. SANDRA called Herminia with [employer] the above information. Left secure VM. CM called pts daughter, Cordelia London, who stated that she agrees with pt going to [facility]. SANDRA called Osmany Marx to inform her that daughter is in agreement.

## 2023-12-05 NOTE — VIRTUAL HEALTH
Psychiatric Consult     Shira Toro  [phone]  11/4/2023 12/04/23      Communication: The patient is currently able to express a treatment choice  the patient was able to explain the rationale behind the diagnosis and treatment plan. Frequent changes back and forth in the decision-making and comprhension. Understanding: The patient was able to recall conversations about treatment, to make the link between causal relationships, and to process probabilities for outcomes. PT continues to display inpaired memory, attention span, and neurocognition. Appreciation: The patient was able to identify part of her illness, but continues to struggle with treatment options, and likely outcomes as things that will affect the pt directly. Rationalization or reasoning: The patient was  unable to weigh the risks and benefits of the treatment options presented to come to a conclusion in keeping with their goals and best interests, as defined by the pt's own personal set of values. Pt was alert and oriented to person and place only. Pt continues to lack capacity at this time. ID: Patient is a 60 y.o. female    CC: I is 45. ... is it 1966? HPI:  Pt is a 61 yo  female who presents for exacerbation of confusion and delirium. Pt continues to forget things and is unable to recall the past.  Pt was unsure of the year and the month noting it was 1933 then 1966. When told it was 2023 pt seemed very surprised. Pt believed it was march until she saw the date on the wall. Pt noted she currently feels safe and comfortable on the unit. Pt was in agreement with treatment team.  Pt was polite and cordial during the interview process. Pt noted she is doing \"better today. \"  Pt noted she is sleeping \"okay. ..about 6 hours last night. \"  Pt noted her apptetite is \"better. \"  Pt rated her depresssion a \"2,\" on a scale of zero to ten with ten being the worst and zero being none.   Pt rated her anxiety a \"2,\" on the same

## 2023-12-05 NOTE — PROGRESS NOTES
Physical Therapy  Name: Dearkristian Moses MRN: [id number] :   1965   Date:  2023   Admission Date: 2023 Room:  A   Restrictions/Precautions:  Restrictions/Precautions  Restrictions/Precautions: General Precautions, Fall Risk     flight risk,  Recommended discharge to [address] with 24 hr supervision from laura on 11/10, SANDRA trying to place for LTC in [facility] [address] with other providers:   RN states pt is ok to see for therapy  Subjective:  Patient states:  she is bored and tired of nothing to do  Pain:   Location, Type, Intensity (0/10 to 10/10):  0/10  Objective:    Observation:  pt was resting on the couch in her room  Treatment, including education/measures:  Transfers   Supine to sit : Ind  Sit to supine :Ind  Scooting :Ind  Sit to stand :CGA  Stand to sit :CGA  Gait:  Pt amb with no AD for 600 ft with SBA   Pt needed VC's for PLB and rest breaks as needed  Sitting Exercises: Ankle pumps x 10  LAQ's x 10  Marching x 10   Therapeutic Exercise:  Therapeutic exercises were instructed today. Cues were given for technique, safety, recruitment, and rationale. Cues were verbal and/or tactile. Safety  Patient left safely in the couch, with sitter in the room.  Pt resistant to the gt belt  Assessment / Impression:     Patient's tolerance of treatment:  good   Adverse Reaction: a little sob with walking  Significant change in status and impact:  progressing  Barriers to improvement:  sob, limited mobility d/t flight risk  Plan for Next Session:    Will cont to work towards pt's goals per her tolerance  Time in:  1335  Time out:  1400  Timed treatment minutes:  25  Total treatment time:  25  Previously filed items:  Social/Functional History  Lives With: Family  Bathroom Equipment: Shower chair  Home Equipment: Oxygen  Receives Help From: Family  Ambulation Assistance: Independent  Transfer Assistance: Independent  Active : Yes  Mode of Transportation: Car     Long Term Goals  Time

## 2023-12-05 NOTE — PROGRESS NOTES
hypokinesis present. No significant valvular disease noted. Pericardium: No pericardial effusion. There is a mobile thrombus visualized along the apical inferior wall segment of the left ventricle. New frinding from previous echo. US ABDOMEN LIMITED Specify organ? LIVER    Result Date: 11/5/2023  EXAMINATION: RIGHT UPPER QUADRANT ULTRASOUND 11/5/2023 3:28 pm COMPARISON: None. HISTORY: ORDERING SYSTEM PROVIDED HISTORY: check for biliary ductal dilation TECHNOLOGIST PROVIDED HISTORY: Reason for exam:->check for biliary ductal dilation Specify organ?->LIVER Reason for Exam: abnormal labs FINDINGS: LIVER:  The liver demonstrates normal echogenicity without evidence of intrahepatic biliary ductal dilatation. BILIARY SYSTEM:  Prior cholecystectomy. Common bile duct is within normal limits measuring 2.2 mm. RIGHT KIDNEY: The right kidney is grossly unremarkable without evidence of hydronephrosis. PANCREAS:  Visualized portions of the pancreas are unremarkable. OTHER: No evidence of right upper quadrant ascites. Unremarkable right upper quadrant ultrasound. CT HEAD WO CONTRAST    Result Date: 11/4/2023  EXAMINATION: CT OF THE HEAD WITHOUT CONTRAST  11/4/2023 10:37 am TECHNIQUE: CT of the head was performed without the administration of intravenous contrast. Automated exposure control, iterative reconstruction, and/or weight based adjustment of the mA/kV was utilized to reduce the radiation dose to as low as reasonably achievable. COMPARISON: 06/12/2015 CT HISTORY: ORDERING SYSTEM PROVIDED HISTORY: [facility] TECHNOLOGIST PROVIDED HISTORY: Reason for exam:->AMS Has a \"code stroke\" or \"stroke alert\" been called? ->No Decision Support Exception - unselect if not a suspected or confirmed emergency medical condition->Emergency Medical Condition (MA) Reason for Exam: AMS FINDINGS: BRAIN/VENTRICLES: There is an area of prior infarct within the left basal ganglia with a more diffuse pattern of decreased attenuation in the

## 2023-12-06 VITALS
RESPIRATION RATE: 17 BRPM | SYSTOLIC BLOOD PRESSURE: 62 MMHG | WEIGHT: 158 LBS | HEIGHT: 67 IN | TEMPERATURE: 97.6 F | HEART RATE: 60 BPM | OXYGEN SATURATION: 98 % | BODY MASS INDEX: 24.8 KG/M2 | DIASTOLIC BLOOD PRESSURE: 48 MMHG

## 2023-12-06 PROCEDURE — 2700000000 HC OXYGEN THERAPY PER DAY

## 2023-12-06 PROCEDURE — 6370000000 HC RX 637 (ALT 250 FOR IP): Performed by: INTERNAL MEDICINE

## 2023-12-06 PROCEDURE — 6370000000 HC RX 637 (ALT 250 FOR IP): Performed by: NURSE PRACTITIONER

## 2023-12-06 PROCEDURE — 94761 N-INVAS EAR/PLS OXIMETRY MLT: CPT

## 2023-12-06 PROCEDURE — 6370000000 HC RX 637 (ALT 250 FOR IP)

## 2023-12-06 RX ORDER — RISPERIDONE 0.5 MG/1
0.5 TABLET ORAL 2 TIMES DAILY
Qty: 60 TABLET | Refills: 3 | Status: SHIPPED | OUTPATIENT
Start: 2023-12-06

## 2023-12-06 RX ADMIN — EMPAGLIFLOZIN 10 MG: 10 TABLET, FILM COATED ORAL at 09:18

## 2023-12-06 RX ADMIN — RISPERIDONE 0.5 MG: 0.5 TABLET ORAL at 09:18

## 2023-12-06 RX ADMIN — ASPIRIN 81 MG CHEWABLE TABLET 81 MG: 81 TABLET CHEWABLE at 09:19

## 2023-12-06 RX ADMIN — CARVEDILOL 3.12 MG: 6.25 TABLET, FILM COATED ORAL at 09:19

## 2023-12-06 RX ADMIN — APIXABAN 5 MG: 5 TABLET, FILM COATED ORAL at 09:19

## 2023-12-06 ASSESSMENT — ENCOUNTER SYMPTOMS
VOMITING: 0
CHOKING: 0
CHEST TIGHTNESS: 0
ABDOMINAL PAIN: 0
BLOOD IN STOOL: 0
DIARRHEA: 0
BACK PAIN: 0
EYE PAIN: 0
COUGH: 0
WHEEZING: 0
SHORTNESS OF BREATH: 0
STRIDOR: 0
ABDOMINAL DISTENTION: 0
CONSTIPATION: 0
NAUSEA: 0

## 2023-12-06 NOTE — CARE COORDINATION
Pt is being discharged to:      [facility]     Call report to [phone]     Complete & sign the [address] then fax to Efax # [phone]. If unable to send Efax please fax to [phone]    Place AVS & any scripts in the envelope that is in the soft chart.   This is to go with the pt to the facility    [address] [place] [address],  at 15:30   [phone]  After 5 pm & weekends - [phone]    Notified daughter by     Covid test not required unless pt having symptoms

## 2023-12-06 NOTE — DISCHARGE INSTR - COC
Continuity of Care Form    Patient Name: Emelyn Conner   :    MRN:  [id number]    Admit date:  2023  Discharge date:  2023    Code Status Order: Full Code   Advance Directives:     Admitting Physician:  No admitting provider for patient encounter. PCP: No primary care provider on file.     Discharging Nurse: Viviana Wild [facility] Unit/Room#: -A  Discharging Unit Phone Number: [phone]    Emergency Contact:   Extended Emergency Contact Information  Primary Emergency Contact: Trinidad Adorno  Address: [address], [address] [place] [address] of [address] Phone: [phone]  Relation: Parent  Secondary Emergency Contact: [address] [place] [address] Phone: [phone]  Mobile Phone: [phone]  Relation: Child  Preferred language: English    Past Surgical History:  Past Surgical History:   Procedure Laterality Date    APPENDECTOMY       SECTION      CHOLECYSTECTOMY      KNEE SURGERY         Immunization History:   Immunization History   Administered Date(s) Administered    COVID-19, J&J, (age 18y+), IM, 0.5 mL 2021       Active Problems:  Patient Active Problem List   Diagnosis Code    Non-healing surgical wound T81.89XA    Fx boxers, closed, initial encounter A23.421T    New onset of congestive heart failure ([address]) I50.9    Chest pain R07.9    Dyspnea and respiratory abnormalities R06.00, R06.89    Troponin I above reference range R79.89    Heart failure (HCC) I50.9    Acute on chronic systolic (congestive) heart failure (HCC) I50.23    Acute combined systolic and diastolic CHF, NYHA class 1 (HCC) I50.41    Non-ischemic cardiomyopathy (HCC) I42.8    SOB (shortness of breath) R06.02    Acute respiratory distress R06.03    CHF (congestive heart failure), NYHA class I, acute on chronic, combined ([address]) H96.51    Systolic CHF, acute on chronic (HCC) I50.23    Acute pulmonary edema (HCC) J81.0    Persistent atrial

## 2023-12-06 NOTE — PROGRESS NOTES
Outpatient Pharmacy Progress Note for Meds-to-Beds    The outpatient pharmacy received prescription(s) for the patient, however, the patient is going to an assisted living facility or SNF. The facility will provide the patient's home medications. The outpatient pharmacy will profile the prescriptions and have them on file. A medication list and facesheet should be provided to facility so their staff can provide the appropriate medications. Thank you for letting us serve your patients.   [facility] [address], [address] [place] [address]    Phone: [phone]    Fax: [phone]

## 2023-12-06 NOTE — CARE COORDINATION
Pt has been approved to go to [facility]. Dr William Farfan informed through PS. CM called daughter, Miriam Lynn, to inform her of the discharge. Left secure VM asking for a return call.

## 2023-12-06 NOTE — PLAN OF CARE
nutritional status  12/6/2023 1041 by Rozina Malcolm RN  Outcome: Progressing  12/6/2023 0956 by Yolanda Latif RD  Outcome: Progressing     Problem: Neurosensory - Adult  Goal: Achieves stable or improved neurological status  Outcome: Progressing  Goal: Absence of seizures  Outcome: Progressing  Goal: Remains free of injury related to seizures activity  Outcome: Progressing  Goal: Achieves maximal functionality and self care  Outcome: Progressing     Problem: Respiratory - Adult  Goal: Achieves optimal ventilation and oxygenation  Outcome: Progressing     Problem: Cardiovascular - Adult  Goal: Maintains optimal cardiac output and hemodynamic stability  Outcome: Progressing     Problem: Musculoskeletal - Adult  Goal: Return mobility to safest level of function  Outcome: Progressing  Goal: Maintain proper alignment of affected body part  Outcome: Progressing  Goal: Return ADL status to a safe level of function  Outcome: Progressing     Problem: Gastrointestinal - Adult  Goal: Minimal or absence of nausea and vomiting  Outcome: Progressing  Goal: Maintains or returns to baseline bowel function  Outcome: Progressing  Goal: Maintains adequate nutritional intake  Outcome: Progressing     Problem: Metabolic/Fluid and Electrolytes - Adult  Goal: Electrolytes maintained within normal limits  Outcome: Progressing

## 2023-12-06 NOTE — PROGRESS NOTES
dilated. Global hypokinesis present. No significant valvular disease noted. Pericardium: No pericardial effusion. There is a mobile thrombus visualized along the apical inferior wall segment of the left ventricle. New frinding from previous echo. US ABDOMEN LIMITED Specify organ? LIVER    Result Date: 11/5/2023  EXAMINATION: RIGHT UPPER QUADRANT ULTRASOUND 11/5/2023 3:28 pm COMPARISON: None. HISTORY: ORDERING SYSTEM PROVIDED HISTORY: check for biliary ductal dilation TECHNOLOGIST PROVIDED HISTORY: Reason for exam:->check for biliary ductal dilation Specify organ?->LIVER Reason for Exam: abnormal labs FINDINGS: LIVER:  The liver demonstrates normal echogenicity without evidence of intrahepatic biliary ductal dilatation. BILIARY SYSTEM:  Prior cholecystectomy. Common bile duct is within normal limits measuring 2.2 mm. RIGHT KIDNEY: The right kidney is grossly unremarkable without evidence of hydronephrosis. PANCREAS:  Visualized portions of the pancreas are unremarkable. OTHER: No evidence of right upper quadrant ascites. Unremarkable right upper quadrant ultrasound. CT HEAD WO CONTRAST    Result Date: 11/4/2023  EXAMINATION: CT OF THE HEAD WITHOUT CONTRAST  11/4/2023 10:37 am TECHNIQUE: CT of the head was performed without the administration of intravenous contrast. Automated exposure control, iterative reconstruction, and/or weight based adjustment of the mA/kV was utilized to reduce the radiation dose to as low as reasonably achievable. COMPARISON: 06/12/2015 CT HISTORY: ORDERING SYSTEM PROVIDED HISTORY: [facility] TECHNOLOGIST PROVIDED HISTORY: Reason for exam:->AMS Has a \"code stroke\" or \"stroke alert\" been called? ->No Decision Support Exception - unselect if not a suspected or confirmed emergency medical condition->Emergency Medical Condition (MA) Reason for Exam: AMS FINDINGS: BRAIN/VENTRICLES: There is an area of prior infarct within the left basal ganglia with a more diffuse pattern of decreased

## 2023-12-06 NOTE — PROGRESS NOTES
Had a three way call with the director, DON and [facility] from [facility], explained why patient had a sitter in detail. HEENA said that this nurse told her patient was suicidal, which is not what was said. Director said to send patient and I told him I would call report. I tried to call report to [phone] and no answer, tried several times. [facility] went ahead and took the patient.   I will try again to call report

## 2023-12-06 NOTE — PLAN OF CARE
Problem: Discharge Planning  Goal: Discharge to home or other facility with appropriate resources  12/6/2023 1500 by Florette Curling., RN  Outcome: Completed  12/6/2023 1041 by Florette Curling., RN  Outcome: Progressing     Problem: Pain  Goal: Verbalizes/displays adequate comfort level or baseline comfort level  12/6/2023 1500 by Florette Curling., RN  Outcome: Completed  12/6/2023 1041 by Florette Curling., RN  Outcome: Progressing     Problem: Skin/Tissue Integrity  Goal: Absence of new skin breakdown  Description: 1. Monitor for areas of redness and/or skin breakdown  2. Assess vascular access sites hourly  3. Every 4-6 hours minimum:  Change oxygen saturation probe site  4. Every 4-6 hours:  If on nasal continuous positive airway pressure, respiratory therapy assess nares and determine need for appliance change or resting period.   12/6/2023 1500 by Florette Curling., RN  Outcome: Completed  12/6/2023 1041 by Florette Curling., RN  Outcome: Progressing     Problem: Safety - Adult  Goal: Free from fall injury  12/6/2023 1500 by Florette Curling., RN  Outcome: Completed  12/6/2023 1041 by Florette Curling., RN  Outcome: Progressing     Problem: ABCDS Injury Assessment  Goal: Absence of physical injury  12/6/2023 1500 by Florette Curling., RN  Outcome: Completed  12/6/2023 1041 by Florette Curling., RN  Outcome: Progressing     Problem: Risk for Elopement  Goal: Patient will not exit the unit/facility without proper excort  12/6/2023 1500 by Florette Curling., RN  Outcome: Completed  12/6/2023 1041 by Florette Curling., RN  Outcome: Progressing  Flowsheets (Taken 12/6/2023 1038)  Nursing Interventions for Elopement Risk:   Assist with personal care needs such as toileting, eating, dressing, as needed to reduce the risk of wandering   Collaborate with family

## 2023-12-06 NOTE — DISCHARGE SUMMARY
tender  Genitourinary: no suprapubic tenderness  Musculoskeletal: No edema  Skin: warm, dry  Neuro: Alert. Psych: Mood appropriate. Labs and Imaging   No results found. CBC:   Recent Labs     12/04/23 0442   WBC 6.2   HGB 11.8*        BMP:    Recent Labs     12/04/23 0442      K 4.4      CO2 26   BUN 32*   CREATININE 1.2*   GLUCOSE 91     Hepatic: No results for input(s): \"AST\", \"ALT\", \"ALB\", \"BILITOT\", \"ALKPHOS\" in the last 72 hours. Lipids:   Lab Results   Component Value Date/Time    CHOL 92 11/05/2023 06:02 AM    HDL 8 11/05/2023 06:02 AM    TRIG 133 11/05/2023 06:02 AM     Hemoglobin A1C: No results found for: \"LABA1C\"  TSH: No results found for: \"TSH\"  Troponin:   Lab Results   Component Value Date/Time    TROPONINT 0.060 06/08/2022 12:34 PM    TROPONINT 0.046 06/07/2022 11:10 PM    TROPONINT 0.041 06/01/2022 05:30 AM     Lactic Acid: No results for input(s): \"LACTA\" in the last 72 hours. BNP: No results for input(s): \"PROBNP\" in the last 72 hours.   UA:  Lab Results   Component Value Date/Time    NITRU NEGATIVE 11/26/2023 11:20 AM    NITRU NEGATIVE 08/18/2012 08:01 PM    COLORU YELLOW 11/26/2023 11:20 AM    WBCUA 2 11/26/2023 11:20 AM    RBCUA 4 11/26/2023 11:20 AM    MUCUS RARE 11/26/2023 11:20 AM    TRICHOMONAS NONE SEEN 11/26/2023 11:20 AM    BACTERIA NEGATIVE 11/26/2023 11:20 AM    CLARITYU CLEAR 11/26/2023 11:20 AM    SPECGRAV 1.010 11/26/2023 11:20 AM    LEUKOCYTESUR SMALL NUMBER OR AMOUNT OBSERVED 11/26/2023 11:20 AM    UROBILINOGEN 1.0 11/26/2023 11:20 AM    BILIRUBINUR NEGATIVE 11/26/2023 11:20 AM    BLOODU MODERATE NUMBER OR AMOUNT OBSERVED 11/26/2023 11:20 AM    GLUCOSEU NEGATIVE 08/18/2012 08:01 PM    KETUA NEGATIVE 11/26/2023 11:20 AM     Urine Cultures: No results found for: \"LABURIN\"  Blood Cultures: No results found for: \"BC\"  No results found for: \"BLOODCULT2\"  Organism: No results found for: \"ORG\"    Time Spent Discharging patient 35

## 2023-12-06 NOTE — PROGRESS NOTES
Comprehensive Nutrition Assessment    Type and Reason for Visit:  Reassess    Nutrition Recommendations/Plan:   Obtain updated, measured weight   Continue current diet  D/c standard oral nutrition supplement; increase frozen oral nutrition supplement to BID  Monitor weights, po intakes, labs, POC     Malnutrition Assessment:  Malnutrition Status: At risk for malnutrition (Comment) (12/06/23 8035)    Context:  Acute Illness       Nutrition Assessment:    Pt consuming % of most of her meals, 26-50% x2 meals. Stated \"fine\" when asked how she has been eating. Ordering adequate meals. Remains on low salt diet w/ supplements; dislikes Ensure but was agreeable to increase frozen oral sup to BID. Unable to assess wt changes, as CBW remains stated from over 1 week ago. Remains w/ sitter at bedside. Noted plan to transfer to [facility]. Follow at moderate nutrition risk. Nutrition Related Findings:    torsemide, spironolactone, jardiance; last BM 12/3. Wound Type: None       Current Nutrition Intake & Therapies:    Average Meal Intake: %, 26-50% (% most meals)  Average Supplements Intake: 26-50%, 51-75%  ADULT DIET; Regular; No Added Salt (3-4 gm)  ADULT ORAL NUTRITION SUPPLEMENT; Lunch; Standard High Calorie/High Protein Oral Supplement  ADULT ORAL NUTRITION SUPPLEMENT; Dinner; Frozen Oral Supplement    Anthropometric Measures:  Height: 170.2 cm (5' 7.01\")  Ideal Body Weight (IBW): 135 lbs (61 kg)    Admission Body Weight: 77.9 kg (171 lb 11.8 oz)  Current Body Weight: 71.7 kg (158 lb 1.1 oz),   IBW. Weight Source: Stated  Current BMI (kg/m2): 24.8  Usual Body Weight: 78.9 kg (174 lb)  % Weight Change (Calculated): -9.9  Weight Adjustment For: No Adjustment                 BMI Categories: Normal Weight (BMI 18.5-24. 9)    Estimated Daily Nutrient Needs:  Energy Requirements Based On: Formula  Weight Used for Energy Requirements: Current  Energy (kcal/day): 5287-0216  Weight Used for Protein Requirements:

## 2023-12-17 PROBLEM — F14.10 NONDEPENDENT COCAINE ABUSE (HCC): Status: ACTIVE | Noted: 2023-12-17

## 2023-12-17 PROBLEM — I48.91 ATRIAL FIBRILLATION WITH RVR (HCC): Status: ACTIVE | Noted: 2023-12-17

## 2023-12-26 ENCOUNTER — HOSPITAL ENCOUNTER (INPATIENT)
Age: 58
LOS: 4 days | Discharge: HOSPICE | End: 2023-12-30
Attending: EMERGENCY MEDICINE | Admitting: STUDENT IN AN ORGANIZED HEALTH CARE EDUCATION/TRAINING PROGRAM
Payer: COMMERCIAL

## 2023-12-26 ENCOUNTER — APPOINTMENT (OUTPATIENT)
Dept: GENERAL RADIOLOGY | Age: 58
End: 2023-12-26
Payer: COMMERCIAL

## 2023-12-26 ENCOUNTER — APPOINTMENT (OUTPATIENT)
Dept: CT IMAGING | Age: 58
End: 2023-12-26
Payer: COMMERCIAL

## 2023-12-26 DIAGNOSIS — I48.91 ATRIAL FIBRILLATION, UNSPECIFIED TYPE (HCC): ICD-10-CM

## 2023-12-26 DIAGNOSIS — J81.0 ACUTE PULMONARY EDEMA (HCC): ICD-10-CM

## 2023-12-26 DIAGNOSIS — E87.1 HYPONATREMIA: ICD-10-CM

## 2023-12-26 DIAGNOSIS — I50.9 ACUTE ON CHRONIC CONGESTIVE HEART FAILURE, UNSPECIFIED HEART FAILURE TYPE (HCC): ICD-10-CM

## 2023-12-26 DIAGNOSIS — J96.01 ACUTE RESPIRATORY FAILURE WITH HYPOXEMIA (HCC): Primary | ICD-10-CM

## 2023-12-26 DIAGNOSIS — I50.23 SYSTOLIC CHF, ACUTE ON CHRONIC (HCC): ICD-10-CM

## 2023-12-26 LAB
ALBUMIN SERPL-MCNC: 3.6 GM/DL (ref 3.4–5)
ALP BLD-CCNC: 83 IU/L (ref 40–129)
ALT SERPL-CCNC: 30 U/L (ref 10–40)
AMPHETAMINES: NEGATIVE
ANION GAP SERPL CALCULATED.3IONS-SCNC: 10 MMOL/L (ref 7–16)
AST SERPL-CCNC: 35 IU/L (ref 15–37)
B PARAP IS1001 DNA NPH QL NAA+NON-PROBE: NOT DETECTED
B PERT.PT PRMT NPH QL NAA+NON-PROBE: NOT DETECTED
BACTERIA: ABNORMAL /HPF
BARBITURATE SCREEN URINE: NEGATIVE
BASE EXCESS MIXED: 1 (ref 0–3)
BASE EXCESS MIXED: 1.3 (ref 0–3)
BASOPHILS ABSOLUTE: 0 K/CU MM
BASOPHILS RELATIVE PERCENT: 0.4 % (ref 0–1)
BENZODIAZEPINE SCREEN, URINE: NEGATIVE
BILIRUB SERPL-MCNC: 0.8 MG/DL (ref 0–1)
BILIRUBIN URINE: NEGATIVE MG/DL
BLOOD, URINE: ABNORMAL
BUN SERPL-MCNC: 23 MG/DL (ref 6–23)
C PNEUM DNA NPH QL NAA+NON-PROBE: NOT DETECTED
CALCIUM SERPL-MCNC: 8.4 MG/DL (ref 8.3–10.6)
CANNABINOID SCREEN URINE: NEGATIVE
CHLORIDE BLD-SCNC: 99 MMOL/L (ref 99–110)
CLARITY: CLEAR
CO2: 24 MMOL/L (ref 21–32)
COCAINE METABOLITE: ABNORMAL
COLOR: YELLOW
COMMENT: ABNORMAL
COMMENT: ABNORMAL
CREAT SERPL-MCNC: 1.1 MG/DL (ref 0.6–1.1)
D DIMER: 1.47 UG/ML (FEU)
DIFFERENTIAL TYPE: ABNORMAL
DIGOXIN LEVEL: 3.7 NG/ML (ref 0.8–2)
DOSE AMOUNT: ABNORMAL
DOSE TIME: ABNORMAL
EKG ATRIAL RATE: 129 BPM
EKG DIAGNOSIS: NORMAL
EKG Q-T INTERVAL: 336 MS
EKG QRS DURATION: 134 MS
EKG QTC CALCULATION (BAZETT): 481 MS
EKG R AXIS: 11 DEGREES
EKG T AXIS: 243 DEGREES
EKG VENTRICULAR RATE: 123 BPM
EOSINOPHILS ABSOLUTE: 0.1 K/CU MM
EOSINOPHILS RELATIVE PERCENT: 0.8 % (ref 0–3)
FENTANYL URINE: NEGATIVE
FLUAV H1 2009 PAN RNA NPH NAA+NON-PROBE: NOT DETECTED
FLUAV H1 RNA NPH QL NAA+NON-PROBE: NOT DETECTED
FLUAV H3 RNA NPH QL NAA+NON-PROBE: NOT DETECTED
FLUAV RNA NPH QL NAA+NON-PROBE: NOT DETECTED
FLUBV RNA NPH QL NAA+NON-PROBE: NOT DETECTED
GFR SERPL CREATININE-BSD FRML MDRD: 58 ML/MIN/1.73M2
GLUCOSE SERPL-MCNC: 113 MG/DL (ref 70–99)
GLUCOSE, URINE: NEGATIVE MG/DL
HADV DNA NPH QL NAA+NON-PROBE: NOT DETECTED
HCO3 VENOUS: 25 MMOL/L (ref 22–29)
HCO3 VENOUS: 25.2 MMOL/L (ref 22–29)
HCOV 229E RNA NPH QL NAA+NON-PROBE: NOT DETECTED
HCOV HKU1 RNA NPH QL NAA+NON-PROBE: NOT DETECTED
HCOV NL63 RNA NPH QL NAA+NON-PROBE: NOT DETECTED
HCOV OC43 RNA NPH QL NAA+NON-PROBE: NOT DETECTED
HCT VFR BLD CALC: 36.9 % (ref 37–47)
HEMOGLOBIN: 11.3 GM/DL (ref 12.5–16)
HMPV RNA NPH QL NAA+NON-PROBE: NOT DETECTED
HPIV1 RNA NPH QL NAA+NON-PROBE: NOT DETECTED
HPIV2 RNA NPH QL NAA+NON-PROBE: NOT DETECTED
HPIV3 RNA NPH QL NAA+NON-PROBE: NOT DETECTED
HPIV4 RNA NPH QL NAA+NON-PROBE: NOT DETECTED
IMMATURE NEUTROPHIL %: 0.2 % (ref 0–0.43)
KETONES, URINE: NEGATIVE MG/DL
LACTATE: 1.4 MMOL/L (ref 0.5–1.9)
LACTIC ACID, SEPSIS: 2.3 MMOL/L (ref 0.4–2)
LEUKOCYTE ESTERASE, URINE: ABNORMAL
LYMPHOCYTES ABSOLUTE: 1.5 K/CU MM
LYMPHOCYTES RELATIVE PERCENT: 16.4 % (ref 24–44)
M PNEUMO DNA NPH QL NAA+NON-PROBE: NOT DETECTED
MAGNESIUM: 2.2 MG/DL (ref 1.8–2.4)
MCH RBC QN AUTO: 28.5 PG (ref 27–31)
MCHC RBC AUTO-ENTMCNC: 30.6 % (ref 32–36)
MCV RBC AUTO: 93.2 FL (ref 78–100)
MONOCYTES ABSOLUTE: 0.8 K/CU MM
MONOCYTES RELATIVE PERCENT: 9.2 % (ref 0–4)
MUCUS: ABNORMAL HPF
NITRITE URINE, QUANTITATIVE: NEGATIVE
NUCLEATED RBC %: 0 %
O2 SAT, VEN: 81.2 % (ref 50–70)
O2 SAT, VEN: 94.5 % (ref 50–70)
OPIATES, URINE: NEGATIVE
OXYCODONE: NEGATIVE
PCO2, VEN: 36 MMHG (ref 41–51)
PCO2, VEN: 38 MMHG (ref 41–51)
PDW BLD-RTO: 16 % (ref 11.7–14.9)
PH VENOUS: 7.43 (ref 7.32–7.43)
PH VENOUS: 7.45 (ref 7.32–7.43)
PH, URINE: 6 (ref 5–8)
PLATELET # BLD: 223 K/CU MM (ref 140–440)
PMV BLD AUTO: 9.3 FL (ref 7.5–11.1)
PO2, VEN: 132 MMHG (ref 28–48)
PO2, VEN: 46 MMHG (ref 28–48)
POTASSIUM SERPL-SCNC: 4.6 MMOL/L (ref 3.5–5.1)
PRO-BNP: ABNORMAL PG/ML
PROCALCITONIN SERPL-MCNC: 0.08 NG/ML
PROTEIN UA: NEGATIVE MG/DL
RBC # BLD: 3.96 M/CU MM (ref 4.2–5.4)
RBC URINE: <1 /HPF (ref 0–6)
RSV RNA NPH QL NAA+NON-PROBE: NOT DETECTED
RV+EV RNA NPH QL NAA+NON-PROBE: NOT DETECTED
SARS-COV-2 RNA NPH QL NAA+NON-PROBE: NOT DETECTED
SEGMENTED NEUTROPHILS ABSOLUTE COUNT: 6.6 K/CU MM
SEGMENTED NEUTROPHILS RELATIVE PERCENT: 73 % (ref 36–66)
SODIUM BLD-SCNC: 133 MMOL/L (ref 135–145)
SPECIFIC GRAVITY UA: <1.005 (ref 1–1.03)
SQUAMOUS EPITHELIAL: 1 /HPF
TOTAL IMMATURE NEUTOROPHIL: 0.02 K/CU MM
TOTAL NUCLEATED RBC: 0 K/CU MM
TOTAL PROTEIN: 6.7 GM/DL (ref 6.4–8.2)
TRICHOMONAS: ABNORMAL /HPF
TROPONIN, HIGH SENSITIVITY: 41 NG/L (ref 0–14)
TROPONIN, HIGH SENSITIVITY: 45 NG/L (ref 0–14)
TSH SERPL DL<=0.005 MIU/L-ACNC: 2.37 UIU/ML (ref 0.27–4.2)
UROBILINOGEN, URINE: 0.2 MG/DL (ref 0.2–1)
WBC # BLD: 9 K/CU MM (ref 4–10.5)
WBC UA: <1 /HPF (ref 0–5)

## 2023-12-26 PROCEDURE — 94644 CONT INHLJ TX 1ST HOUR: CPT

## 2023-12-26 PROCEDURE — 71275 CT ANGIOGRAPHY CHEST: CPT

## 2023-12-26 PROCEDURE — 84145 PROCALCITONIN (PCT): CPT

## 2023-12-26 PROCEDURE — 80053 COMPREHEN METABOLIC PANEL: CPT

## 2023-12-26 PROCEDURE — 2580000003 HC RX 258: Performed by: EMERGENCY MEDICINE

## 2023-12-26 PROCEDURE — 2000000000 HC ICU R&B

## 2023-12-26 PROCEDURE — 93010 ELECTROCARDIOGRAM REPORT: CPT | Performed by: INTERNAL MEDICINE

## 2023-12-26 PROCEDURE — 6360000002 HC RX W HCPCS: Performed by: EMERGENCY MEDICINE

## 2023-12-26 PROCEDURE — 36415 COLL VENOUS BLD VENIPUNCTURE: CPT

## 2023-12-26 PROCEDURE — 5A09457 ASSISTANCE WITH RESPIRATORY VENTILATION, 24-96 CONSECUTIVE HOURS, CONTINUOUS POSITIVE AIRWAY PRESSURE: ICD-10-PCS | Performed by: STUDENT IN AN ORGANIZED HEALTH CARE EDUCATION/TRAINING PROGRAM

## 2023-12-26 PROCEDURE — 96365 THER/PROPH/DIAG IV INF INIT: CPT

## 2023-12-26 PROCEDURE — 83880 ASSAY OF NATRIURETIC PEPTIDE: CPT

## 2023-12-26 PROCEDURE — 84443 ASSAY THYROID STIM HORMONE: CPT

## 2023-12-26 PROCEDURE — 94660 CPAP INITIATION&MGMT: CPT

## 2023-12-26 PROCEDURE — 82805 BLOOD GASES W/O2 SATURATION: CPT

## 2023-12-26 PROCEDURE — 87040 BLOOD CULTURE FOR BACTERIA: CPT

## 2023-12-26 PROCEDURE — 2580000003 HC RX 258: Performed by: STUDENT IN AN ORGANIZED HEALTH CARE EDUCATION/TRAINING PROGRAM

## 2023-12-26 PROCEDURE — 80162 ASSAY OF DIGOXIN TOTAL: CPT

## 2023-12-26 PROCEDURE — 85025 COMPLETE CBC W/AUTO DIFF WBC: CPT

## 2023-12-26 PROCEDURE — 6370000000 HC RX 637 (ALT 250 FOR IP): Performed by: STUDENT IN AN ORGANIZED HEALTH CARE EDUCATION/TRAINING PROGRAM

## 2023-12-26 PROCEDURE — 80307 DRUG TEST PRSMV CHEM ANLYZR: CPT

## 2023-12-26 PROCEDURE — 99285 EMERGENCY DEPT VISIT HI MDM: CPT

## 2023-12-26 PROCEDURE — 96372 THER/PROPH/DIAG INJ SC/IM: CPT

## 2023-12-26 PROCEDURE — 83735 ASSAY OF MAGNESIUM: CPT

## 2023-12-26 PROCEDURE — 96375 TX/PRO/DX INJ NEW DRUG ADDON: CPT

## 2023-12-26 PROCEDURE — 96368 THER/DIAG CONCURRENT INF: CPT

## 2023-12-26 PROCEDURE — 0202U NFCT DS 22 TRGT SARS-COV-2: CPT

## 2023-12-26 PROCEDURE — 2500000003 HC RX 250 WO HCPCS: Performed by: EMERGENCY MEDICINE

## 2023-12-26 PROCEDURE — 71045 X-RAY EXAM CHEST 1 VIEW: CPT

## 2023-12-26 PROCEDURE — 85379 FIBRIN DEGRADATION QUANT: CPT

## 2023-12-26 PROCEDURE — 6360000002 HC RX W HCPCS

## 2023-12-26 PROCEDURE — 84484 ASSAY OF TROPONIN QUANT: CPT

## 2023-12-26 PROCEDURE — 93005 ELECTROCARDIOGRAM TRACING: CPT

## 2023-12-26 PROCEDURE — 2580000003 HC RX 258

## 2023-12-26 PROCEDURE — 6370000000 HC RX 637 (ALT 250 FOR IP)

## 2023-12-26 PROCEDURE — 83605 ASSAY OF LACTIC ACID: CPT

## 2023-12-26 PROCEDURE — 81001 URINALYSIS AUTO W/SCOPE: CPT

## 2023-12-26 PROCEDURE — 6360000002 HC RX W HCPCS: Performed by: STUDENT IN AN ORGANIZED HEALTH CARE EDUCATION/TRAINING PROGRAM

## 2023-12-26 PROCEDURE — 96366 THER/PROPH/DIAG IV INF ADDON: CPT

## 2023-12-26 PROCEDURE — 6360000004 HC RX CONTRAST MEDICATION

## 2023-12-26 PROCEDURE — 99253 IP/OBS CNSLTJ NEW/EST LOW 45: CPT | Performed by: INTERNAL MEDICINE

## 2023-12-26 RX ORDER — LORAZEPAM 2 MG/ML
2 INJECTION INTRAMUSCULAR ONCE
Status: COMPLETED | OUTPATIENT
Start: 2023-12-26 | End: 2023-12-26

## 2023-12-26 RX ORDER — LORAZEPAM 2 MG/ML
1 INJECTION INTRAMUSCULAR EVERY 6 HOURS PRN
Status: COMPLETED | OUTPATIENT
Start: 2023-12-26 | End: 2023-12-27

## 2023-12-26 RX ORDER — DIGOXIN 125 MCG
125 TABLET ORAL DAILY
Status: DISCONTINUED | OUTPATIENT
Start: 2023-12-27 | End: 2023-12-29

## 2023-12-26 RX ORDER — POLYETHYLENE GLYCOL 3350 17 G/17G
17 POWDER, FOR SOLUTION ORAL DAILY PRN
Status: DISCONTINUED | OUTPATIENT
Start: 2023-12-26 | End: 2023-12-29

## 2023-12-26 RX ORDER — ONDANSETRON 4 MG/1
4 TABLET, ORALLY DISINTEGRATING ORAL EVERY 8 HOURS PRN
Status: DISCONTINUED | OUTPATIENT
Start: 2023-12-26 | End: 2023-12-30 | Stop reason: HOSPADM

## 2023-12-26 RX ORDER — CARVEDILOL 6.25 MG/1
6.25 TABLET ORAL 2 TIMES DAILY
Status: DISCONTINUED | OUTPATIENT
Start: 2023-12-26 | End: 2023-12-29

## 2023-12-26 RX ORDER — MAGNESIUM SULFATE IN WATER 40 MG/ML
2000 INJECTION, SOLUTION INTRAVENOUS ONCE
Status: COMPLETED | OUTPATIENT
Start: 2023-12-26 | End: 2023-12-26

## 2023-12-26 RX ORDER — LORAZEPAM 2 MG/ML
1 INJECTION INTRAMUSCULAR ONCE
Status: DISCONTINUED | OUTPATIENT
Start: 2023-12-26 | End: 2023-12-26

## 2023-12-26 RX ORDER — ASPIRIN 81 MG/1
81 TABLET, CHEWABLE ORAL DAILY
Status: DISCONTINUED | OUTPATIENT
Start: 2023-12-27 | End: 2023-12-27 | Stop reason: SDUPTHER

## 2023-12-26 RX ORDER — RISPERIDONE 0.5 MG/1
0.5 TABLET ORAL 2 TIMES DAILY
Status: DISCONTINUED | OUTPATIENT
Start: 2023-12-26 | End: 2023-12-29

## 2023-12-26 RX ORDER — METHYLPREDNISOLONE SODIUM SUCCINATE 125 MG/2ML
125 INJECTION, POWDER, LYOPHILIZED, FOR SOLUTION INTRAMUSCULAR; INTRAVENOUS ONCE
Status: COMPLETED | OUTPATIENT
Start: 2023-12-26 | End: 2023-12-26

## 2023-12-26 RX ORDER — ONDANSETRON 2 MG/ML
4 INJECTION INTRAMUSCULAR; INTRAVENOUS EVERY 6 HOURS PRN
Status: DISCONTINUED | OUTPATIENT
Start: 2023-12-26 | End: 2023-12-30 | Stop reason: HOSPADM

## 2023-12-26 RX ORDER — MAGNESIUM SULFATE IN WATER 40 MG/ML
2000 INJECTION, SOLUTION INTRAVENOUS PRN
Status: DISCONTINUED | OUTPATIENT
Start: 2023-12-26 | End: 2023-12-29

## 2023-12-26 RX ORDER — NICOTINE 21 MG/24HR
1 PATCH, TRANSDERMAL 24 HOURS TRANSDERMAL DAILY PRN
Status: DISCONTINUED | OUTPATIENT
Start: 2023-12-26 | End: 2023-12-28

## 2023-12-26 RX ORDER — ACETAMINOPHEN 325 MG/1
650 TABLET ORAL EVERY 6 HOURS PRN
Status: DISCONTINUED | OUTPATIENT
Start: 2023-12-26 | End: 2023-12-29

## 2023-12-26 RX ORDER — ASPIRIN 325 MG
325 TABLET ORAL ONCE
Status: DISCONTINUED | OUTPATIENT
Start: 2023-12-26 | End: 2023-12-28

## 2023-12-26 RX ORDER — ENOXAPARIN SODIUM 100 MG/ML
1 INJECTION SUBCUTANEOUS ONCE
Status: COMPLETED | OUTPATIENT
Start: 2023-12-26 | End: 2023-12-26

## 2023-12-26 RX ORDER — SODIUM CHLORIDE 0.9 % (FLUSH) 0.9 %
5-40 SYRINGE (ML) INJECTION EVERY 12 HOURS SCHEDULED
Status: DISCONTINUED | OUTPATIENT
Start: 2023-12-26 | End: 2023-12-29

## 2023-12-26 RX ORDER — FUROSEMIDE 10 MG/ML
40 INJECTION INTRAMUSCULAR; INTRAVENOUS 2 TIMES DAILY
Status: DISCONTINUED | OUTPATIENT
Start: 2023-12-27 | End: 2023-12-29

## 2023-12-26 RX ORDER — IPRATROPIUM BROMIDE AND ALBUTEROL SULFATE 2.5; .5 MG/3ML; MG/3ML
1 SOLUTION RESPIRATORY (INHALATION)
Status: DISCONTINUED | OUTPATIENT
Start: 2023-12-27 | End: 2023-12-27

## 2023-12-26 RX ORDER — FUROSEMIDE 10 MG/ML
40 INJECTION INTRAMUSCULAR; INTRAVENOUS ONCE
Status: COMPLETED | OUTPATIENT
Start: 2023-12-26 | End: 2023-12-26

## 2023-12-26 RX ORDER — DIGOXIN 0.25 MG/ML
500 INJECTION INTRAMUSCULAR; INTRAVENOUS ONCE
Status: COMPLETED | OUTPATIENT
Start: 2023-12-26 | End: 2023-12-26

## 2023-12-26 RX ORDER — POTASSIUM CHLORIDE 7.45 MG/ML
10 INJECTION INTRAVENOUS PRN
Status: DISCONTINUED | OUTPATIENT
Start: 2023-12-26 | End: 2023-12-29

## 2023-12-26 RX ORDER — SODIUM CHLORIDE 0.9 % (FLUSH) 0.9 %
5-40 SYRINGE (ML) INJECTION PRN
Status: DISCONTINUED | OUTPATIENT
Start: 2023-12-26 | End: 2023-12-30 | Stop reason: HOSPADM

## 2023-12-26 RX ORDER — NITROGLYCERIN 20 MG/100ML
5-200 INJECTION INTRAVENOUS CONTINUOUS
Status: DISCONTINUED | OUTPATIENT
Start: 2023-12-26 | End: 2023-12-26

## 2023-12-26 RX ORDER — IPRATROPIUM BROMIDE AND ALBUTEROL SULFATE 2.5; .5 MG/3ML; MG/3ML
3 SOLUTION RESPIRATORY (INHALATION) ONCE
Status: COMPLETED | OUTPATIENT
Start: 2023-12-26 | End: 2023-12-26

## 2023-12-26 RX ORDER — SODIUM CHLORIDE 9 MG/ML
INJECTION, SOLUTION INTRAVENOUS PRN
Status: DISCONTINUED | OUTPATIENT
Start: 2023-12-26 | End: 2023-12-30 | Stop reason: HOSPADM

## 2023-12-26 RX ADMIN — ENOXAPARIN SODIUM 80 MG: 100 INJECTION SUBCUTANEOUS at 18:45

## 2023-12-26 RX ADMIN — LORAZEPAM 2 MG: 2 INJECTION INTRAMUSCULAR; INTRAVENOUS at 18:42

## 2023-12-26 RX ADMIN — SODIUM CHLORIDE, PRESERVATIVE FREE 10 ML: 5 INJECTION INTRAVENOUS at 22:25

## 2023-12-26 RX ADMIN — METHYLPREDNISOLONE SODIUM SUCCINATE 125 MG: 125 INJECTION INTRAMUSCULAR; INTRAVENOUS at 17:09

## 2023-12-26 RX ADMIN — IOPAMIDOL 80 ML: 755 INJECTION, SOLUTION INTRAVENOUS at 19:50

## 2023-12-26 RX ADMIN — VANCOMYCIN HYDROCHLORIDE 1000 MG: 1 INJECTION, POWDER, LYOPHILIZED, FOR SOLUTION INTRAVENOUS at 16:49

## 2023-12-26 RX ADMIN — RISPERIDONE 0.5 MG: 0.5 TABLET ORAL at 22:11

## 2023-12-26 RX ADMIN — IPRATROPIUM BROMIDE AND ALBUTEROL SULFATE 3 DOSE: 2.5; .5 SOLUTION RESPIRATORY (INHALATION) at 16:13

## 2023-12-26 RX ADMIN — SODIUM CHLORIDE 5 MG/HR: 900 INJECTION, SOLUTION INTRAVENOUS at 18:50

## 2023-12-26 RX ADMIN — CEFEPIME 2000 MG: 2 INJECTION, POWDER, FOR SOLUTION INTRAVENOUS at 18:44

## 2023-12-26 RX ADMIN — CARVEDILOL 6.25 MG: 6.25 TABLET, FILM COATED ORAL at 22:11

## 2023-12-26 RX ADMIN — FUROSEMIDE 40 MG: 10 INJECTION, SOLUTION INTRAMUSCULAR; INTRAVENOUS at 16:39

## 2023-12-26 RX ADMIN — MAGNESIUM SULFATE HEPTAHYDRATE 2000 MG: 40 INJECTION, SOLUTION INTRAVENOUS at 16:45

## 2023-12-26 RX ADMIN — LORAZEPAM 2 MG: 2 INJECTION INTRAMUSCULAR; INTRAVENOUS at 16:39

## 2023-12-26 RX ADMIN — LORAZEPAM 1 MG: 2 INJECTION, SOLUTION INTRAMUSCULAR; INTRAVENOUS at 22:06

## 2023-12-26 RX ADMIN — DIGOXIN 500 MCG: 0.25 INJECTION INTRAMUSCULAR; INTRAVENOUS at 18:45

## 2023-12-26 ASSESSMENT — HEART SCORE: ECG: 1

## 2023-12-26 NOTE — ED PROVIDER NOTES
HPI: In brief, patient presents with shortness of breath. She states that she has been feeling short of breath for the last couple days. She admits to using cocaine recently and states that she used right before Hanna. She knows she is not supposed to as this is going to further exacerbate her cardiac symptoms. She reports that she is having some chest discomfort as well. Denies any abdominal pain. She has not noticed any productive cough or congestion. She feels extremely anxious right now. .    Focused exam revealed     General appearance:  Severe acute distress. Skin:  Warm. Dry. Eye:  Extraocular movements intact. Ears, nose, mouth and throat:  Oral mucosa moist   Neck:  Trachea midline. Extremity:  Pedal edema. Normal ROM     Heart: Irregularly irregular  Perfusion:  intact  Respiratory:  Rales bilateral.   Tachypnea and moderate respiratory distress  Abdominal:  Normal bowel sounds. Soft. Nontender. Non distended. Back:  No CVA tenderness to palpation     Neurological:  Alert and oriented times 3. Motor and sensory grossly intact, coordination intact          Psychiatric: Anxious  . ED course/MDM: Patient did appear to be in moderate respiratory distress. Concerned is that this may be related to heart failure exacerbation given her symptoms. Have started her on BiPAP and give her anxiolytic given that she has also used cocaine. She has denied taking her home medications including digoxin recently so we did load her with digoxin here to help her heart rate especially given the most recent ejection fraction which was 20%. Ordered another dose of Ativan to help keep her calm and keep the BiPAP on. Discussed the case with cardiologist and the plan is to continue current therapy and diuresis and he will follow along as the patient is hospitalized. Discussed with HM team for hospitalization.     Diagnostics: I independently interpreted the following study(s): CXR which shows

## 2023-12-26 NOTE — CARE COORDINATION
Patient possible readmission. Patient recently admitted 11/17/23 - 11/22/23 for Hyponatremia. Patient presents to ED today with C/O Shortness of Breath and Chest Pain.      Patient being admitted for Atrial Fibrillation With Rvr.

## 2023-12-26 NOTE — H&P
History and Physical      Name:  Courtney Adorno /Age/Sex: 1965  (58 y.o. female)   MRN & CSN:  [id number] & [id number] Encounter Date/Time: 2023 6:35 PM   Location:  ED19/ED-19 PCP: No primary care provider on file.       Hospital Day: 1    Assessment and Plan:     Patient is a 58-year-old female who presented with SOB.    # Acute decompensated HFrEF / NICM  # Acute hypoxemic respiratory failure  - Endorsed worsening SOB and orthopnea. Questionable medication complaint. Continues to use cocaine. Last TTE in 2023 with LVEF of 20-25%. Negative LHC in 2020  - Clinically hypervolemic, requiring PAP in ED. Pro-BNP >23k. CXR with pulmonary congestion. Has over 12 lbs weight gain in 1 month per chart review.   - Started on IV diuresis. Cardiology consulted. Strict I/O's. Daily weights. Telemetry. Continue Coreg and Jardiance, consider Entresto if BP tolerates.    # Atrial fibrillation with RVR  - Recently admitted for similar presentation, questionable medication compliance.   - Found to have HR in 140's in ED.   - Resume Digoxin with minimal response. Started on Amiodarone gtt in setting of HFrEF.  - Likely secondary to above. Continue Eliquis and Coreg. Digoxin level obtained shortly after giving IV Digoxin.    # LV thrombus  - TTE in 2023 showed mobile thrombus in LV.   - Resume Eliquis.     # Illicit substance abuse  - Continue to actively use cocaine.   - UDS positive for cocaine.   - Monitor for sxs of withdrawal.     # Acute toxic encephalopathy  - Had similar issue during recent presentation.   - Very inattentive.   - Continue delirium precautions.     # Non-MI troponin elevation  - Denied any typical CP.  - Initial Tn mildly elevated. ECG without acute ischemic changes.  - Likely secondary to ADHF. Follow-up repeat Tn.    # CKD3a  - Labs stable, avoid nephrotoxic medications.     Checklist:  Advanced directive: full  Diet: NPO while on PAP  DVT ppx: Eliquis    Disposition: admit to  Lipids:   Lab Results   Component Value Date/Time    CHOL 92 11/05/2023 06:02 AM    HDL 8 11/05/2023 06:02 AM    TRIG 133 11/05/2023 06:02 AM     Hemoglobin A1C: No results found for: \"LABA1C\"  TSH: No results found for: \"TSH\"  Troponin:   Lab Results   Component Value Date/Time    TROPONINT 0.060 06/08/2022 12:34 PM    TROPONINT 0.046 06/07/2022 11:10 PM    TROPONINT 0.041 06/01/2022 05:30 AM     BNP:   Recent Labs     12/26/23  1646   PROBNP 23,013*     Lactic Acid: No results for input(s): \"LACTA\" in the last 72 hours. UA:  Lab Results   Component Value Date/Time    NITRU NEGATIVE 12/19/2023 03:27 PM    NITRU NEGATIVE 08/18/2012 08:01 PM    COLORU YELLOW 12/19/2023 03:27 PM    [address] [facility] 3 12/19/2023 03:27 PM    RBCUA 2 12/19/2023 03:27 PM    MUCUS RARE 11/26/2023 11:20 AM    TRICHOMONAS NONE SEEN 12/19/2023 03:27 PM    BACTERIA NEGATIVE 12/19/2023 03:27 PM    CLARITYU CLEAR 12/19/2023 03:27 PM    SPECGRAV <1.005 12/19/2023 03:27 PM    LEUKOCYTESUR SMALL NUMBER OR AMOUNT OBSERVED 12/19/2023 03:27 PM    UROBILINOGEN 2.0 12/19/2023 03:27 PM    BILIRUBINUR NEGATIVE 12/19/2023 03:27 PM    BLOODU SMALL NUMBER OR AMOUNT OBSERVED 12/19/2023 03:27 PM    GLUCOSEU NEGATIVE 08/18/2012 08:01 PM    KETUA NEGATIVE 12/19/2023 03:27 PM     Urine Cultures: No results found for: \"LABURIN\"  Blood Cultures: No results found for: \"BC\"  No results found for: \"BLOODCULT2\"  Organism: No results found for: \"ORG\"    Radiology results:  XR CHEST PORTABLE   Final Result   No acute abnormality.          CTA PULMONARY W CONTRAST    (Results Pending)       Deborah Wright MD  12/26/23 6:35 PM

## 2023-12-27 ENCOUNTER — APPOINTMENT (OUTPATIENT)
Dept: GENERAL RADIOLOGY | Age: 58
End: 2023-12-27
Attending: STUDENT IN AN ORGANIZED HEALTH CARE EDUCATION/TRAINING PROGRAM
Payer: COMMERCIAL

## 2023-12-27 LAB
ALBUMIN SERPL-MCNC: 3.3 GM/DL (ref 3.4–5)
ALBUMIN SERPL-MCNC: 3.6 GM/DL (ref 3.4–5)
ALP BLD-CCNC: 70 IU/L (ref 40–128)
ALP BLD-CCNC: 82 IU/L (ref 40–129)
ALT SERPL-CCNC: 23 U/L (ref 10–40)
ALT SERPL-CCNC: 27 U/L (ref 10–40)
AMPHETAMINES: NEGATIVE
ANION GAP SERPL CALCULATED.3IONS-SCNC: 11 MMOL/L (ref 7–16)
ANION GAP SERPL CALCULATED.3IONS-SCNC: 13 MMOL/L (ref 7–16)
AST SERPL-CCNC: 17 IU/L (ref 15–37)
AST SERPL-CCNC: 19 IU/L (ref 15–37)
BARBITURATE SCREEN URINE: NEGATIVE
BASE EXCESS MIXED: 1 (ref 0–3)
BASE EXCESS MIXED: 1.3 (ref 0–3)
BASE EXCESS MIXED: 2.2 (ref 0–3)
BASOPHILS ABSOLUTE: 0 K/CU MM
BASOPHILS ABSOLUTE: 0 K/CU MM
BASOPHILS RELATIVE PERCENT: 0.1 % (ref 0–1)
BASOPHILS RELATIVE PERCENT: 0.2 % (ref 0–1)
BENZODIAZEPINE SCREEN, URINE: NEGATIVE
BILIRUB SERPL-MCNC: 0.7 MG/DL (ref 0–1)
BILIRUB SERPL-MCNC: 0.8 MG/DL (ref 0–1)
BILIRUBIN DIRECT: 0.4 MG/DL (ref 0–0.3)
BILIRUBIN, INDIRECT: 0.3 MG/DL (ref 0–0.7)
BUN SERPL-MCNC: 23 MG/DL (ref 6–23)
BUN SERPL-MCNC: 26 MG/DL (ref 6–23)
CALCIUM IONIZED: 4.76 MG/DL (ref 4.48–5.28)
CALCIUM SERPL-MCNC: 8.4 MG/DL (ref 8.3–10.6)
CALCIUM SERPL-MCNC: 8.9 MG/DL (ref 8.3–10.6)
CANNABINOID SCREEN URINE: NEGATIVE
CARBON MONOXIDE, BLOOD: 1.9 % (ref 0–5)
CARBON MONOXIDE, BLOOD: 2 % (ref 0–5)
CHLORIDE BLD-SCNC: 102 MMOL/L (ref 99–110)
CHLORIDE BLD-SCNC: 99 MMOL/L (ref 99–110)
CHLORIDE URINE RANDOM: 55 MMOL/L (ref 43–210)
CO2 CONTENT: 27.3 MMOL/L (ref 21–32)
CO2 CONTENT: 28.6 MMOL/L (ref 21–32)
CO2: 23 MMOL/L (ref 21–32)
CO2: 26 MMOL/L (ref 21–32)
COCAINE METABOLITE: ABNORMAL
COMMENT: ABNORMAL
CREAT SERPL-MCNC: 1.1 MG/DL (ref 0.6–1.1)
CREAT SERPL-MCNC: 1.4 MG/DL (ref 0.6–1.1)
DIFFERENTIAL TYPE: ABNORMAL
DIFFERENTIAL TYPE: ABNORMAL
DIGOXIN LEVEL: 1.1 NG/ML (ref 0.8–2)
DOSE AMOUNT: NORMAL
DOSE TIME: NORMAL
EOSINOPHILS ABSOLUTE: 0 K/CU MM
EOSINOPHILS ABSOLUTE: 0 K/CU MM
EOSINOPHILS RELATIVE PERCENT: 0 % (ref 0–3)
EOSINOPHILS RELATIVE PERCENT: 0 % (ref 0–3)
ERYTHROCYTE SEDIMENTATION RATE: 17 MM/HR (ref 0–30)
FENTANYL URINE: NEGATIVE
GFR SERPL CREATININE-BSD FRML MDRD: 43 ML/MIN/1.73M2
GFR SERPL CREATININE-BSD FRML MDRD: 58 ML/MIN/1.73M2
GLUCOSE SERPL-MCNC: 127 MG/DL (ref 70–99)
GLUCOSE SERPL-MCNC: 151 MG/DL (ref 70–99)
HCO3 ARTERIAL: 26 MMOL/L (ref 21–28)
HCO3 ARTERIAL: 27.3 MMOL/L (ref 21–28)
HCO3 VENOUS: 25.9 MMOL/L (ref 22–29)
HCT VFR BLD CALC: 35.4 % (ref 37–47)
HCT VFR BLD CALC: 37.9 % (ref 37–47)
HEMOGLOBIN: 10.9 GM/DL (ref 12.5–16)
HEMOGLOBIN: 11.9 GM/DL (ref 12.5–16)
IMMATURE NEUTROPHIL %: 0.5 % (ref 0–0.43)
IMMATURE NEUTROPHIL %: 0.6 % (ref 0–0.43)
INR BLD: 1.3 INDEX
IONIZED CA: 1.19 MMOL/L (ref 1.12–1.32)
LACTATE: 2 MMOL/L (ref 0.5–1.9)
LYMPHOCYTES ABSOLUTE: 0.5 K/CU MM
LYMPHOCYTES ABSOLUTE: 0.5 K/CU MM
LYMPHOCYTES RELATIVE PERCENT: 4.9 % (ref 24–44)
LYMPHOCYTES RELATIVE PERCENT: 8.3 % (ref 24–44)
MAGNESIUM: 2.2 MG/DL (ref 1.8–2.4)
MCH RBC QN AUTO: 28.7 PG (ref 27–31)
MCH RBC QN AUTO: 28.7 PG (ref 27–31)
MCHC RBC AUTO-ENTMCNC: 30.8 % (ref 32–36)
MCHC RBC AUTO-ENTMCNC: 31.4 % (ref 32–36)
MCV RBC AUTO: 91.3 FL (ref 78–100)
MCV RBC AUTO: 93.2 FL (ref 78–100)
METHEMOGLOBIN ARTERIAL: 1.3 %
METHEMOGLOBIN ARTERIAL: 1.5 %
MONOCYTES ABSOLUTE: 0.1 K/CU MM
MONOCYTES ABSOLUTE: 0.2 K/CU MM
MONOCYTES RELATIVE PERCENT: 1.8 % (ref 0–4)
MONOCYTES RELATIVE PERCENT: 2.2 % (ref 0–4)
NUCLEATED RBC %: 0 %
NUCLEATED RBC %: 0 %
O2 SAT, VEN: 93.8 % (ref 50–70)
O2 SATURATION: 94.1 % (ref 94–98)
O2 SATURATION: 96.2 % (ref 94–98)
OPIATES, URINE: NEGATIVE
OXYCODONE: NEGATIVE
PCO2 ARTERIAL: 42 MMHG (ref 35–48)
PCO2 ARTERIAL: 43 MMHG (ref 35–48)
PCO2, VEN: 40 MMHG (ref 41–51)
PDW BLD-RTO: 15.8 % (ref 11.7–14.9)
PDW BLD-RTO: 15.8 % (ref 11.7–14.9)
PH BLOOD: 7.4 (ref 7.35–7.45)
PH BLOOD: 7.41 (ref 7.35–7.45)
PH VENOUS: 7.42 (ref 7.32–7.43)
PHOSPHORUS: 4.8 MG/DL (ref 2.5–4.9)
PLATELET # BLD: 208 K/CU MM (ref 140–440)
PLATELET # BLD: 221 K/CU MM (ref 140–440)
PMV BLD AUTO: 9.3 FL (ref 7.5–11.1)
PMV BLD AUTO: 9.3 FL (ref 7.5–11.1)
PO2 ARTERIAL: 204 MMHG (ref 83–108)
PO2 ARTERIAL: 93 MMHG (ref 83–108)
PO2, VEN: 155 MMHG (ref 28–48)
POTASSIUM SERPL-SCNC: 3.4 MMOL/L (ref 3.5–5.1)
POTASSIUM SERPL-SCNC: 3.8 MMOL/L (ref 3.5–5.1)
POTASSIUM, UR: 5.9 MMOL/L (ref 22–119)
PRO-BNP: ABNORMAL PG/ML
PROTHROMBIN TIME: 16.7 SECONDS (ref 11.7–14.5)
RBC # BLD: 3.8 M/CU MM (ref 4.2–5.4)
RBC # BLD: 4.15 M/CU MM (ref 4.2–5.4)
SEGMENTED NEUTROPHILS ABSOLUTE COUNT: 5.6 K/CU MM
SEGMENTED NEUTROPHILS ABSOLUTE COUNT: 9.7 K/CU MM
SEGMENTED NEUTROPHILS RELATIVE PERCENT: 89.2 % (ref 36–66)
SEGMENTED NEUTROPHILS RELATIVE PERCENT: 92.2 % (ref 36–66)
SODIUM BLD-SCNC: 136 MMOL/L (ref 135–145)
SODIUM BLD-SCNC: 138 MMOL/L (ref 135–145)
SODIUM URINE: 62 MMOL/L (ref 35–167)
TOTAL IMMATURE NEUTOROPHIL: 0.03 K/CU MM
TOTAL IMMATURE NEUTOROPHIL: 0.06 K/CU MM
TOTAL NUCLEATED RBC: 0 K/CU MM
TOTAL NUCLEATED RBC: 0 K/CU MM
TOTAL PROTEIN: 5.2 GM/DL (ref 6.4–8.2)
TOTAL PROTEIN: 6.3 GM/DL (ref 6.4–8.2)
WBC # BLD: 10.5 K/CU MM (ref 4–10.5)
WBC # BLD: 6.3 K/CU MM (ref 4–10.5)

## 2023-12-27 PROCEDURE — 31500 INSERT EMERGENCY AIRWAY: CPT

## 2023-12-27 PROCEDURE — 6360000002 HC RX W HCPCS: Performed by: STUDENT IN AN ORGANIZED HEALTH CARE EDUCATION/TRAINING PROGRAM

## 2023-12-27 PROCEDURE — 6370000000 HC RX 637 (ALT 250 FOR IP): Performed by: STUDENT IN AN ORGANIZED HEALTH CARE EDUCATION/TRAINING PROGRAM

## 2023-12-27 PROCEDURE — 36415 COLL VENOUS BLD VENIPUNCTURE: CPT

## 2023-12-27 PROCEDURE — 87070 CULTURE OTHR SPECIMN AEROBIC: CPT

## 2023-12-27 PROCEDURE — 82803 BLOOD GASES ANY COMBINATION: CPT

## 2023-12-27 PROCEDURE — 87449 NOS EACH ORGANISM AG IA: CPT

## 2023-12-27 PROCEDURE — 89220 SPUTUM SPECIMEN COLLECTION: CPT

## 2023-12-27 PROCEDURE — 0202U NFCT DS 22 TRGT SARS-COV-2: CPT

## 2023-12-27 PROCEDURE — 99233 SBSQ HOSP IP/OBS HIGH 50: CPT | Performed by: INTERNAL MEDICINE

## 2023-12-27 PROCEDURE — 83880 ASSAY OF NATRIURETIC PEPTIDE: CPT

## 2023-12-27 PROCEDURE — 83605 ASSAY OF LACTIC ACID: CPT

## 2023-12-27 PROCEDURE — 80162 ASSAY OF DIGOXIN TOTAL: CPT

## 2023-12-27 PROCEDURE — 36600 WITHDRAWAL OF ARTERIAL BLOOD: CPT

## 2023-12-27 PROCEDURE — 80053 COMPREHEN METABOLIC PANEL: CPT

## 2023-12-27 PROCEDURE — 87205 SMEAR GRAM STAIN: CPT

## 2023-12-27 PROCEDURE — 71045 X-RAY EXAM CHEST 1 VIEW: CPT

## 2023-12-27 PROCEDURE — 80307 DRUG TEST PRSMV CHEM ANLYZR: CPT

## 2023-12-27 PROCEDURE — 86430 RHEUMATOID FACTOR TEST QUAL: CPT

## 2023-12-27 PROCEDURE — 83516 IMMUNOASSAY NONANTIBODY: CPT

## 2023-12-27 PROCEDURE — 85025 COMPLETE CBC W/AUTO DIFF WBC: CPT

## 2023-12-27 PROCEDURE — 2500000003 HC RX 250 WO HCPCS: Performed by: STUDENT IN AN ORGANIZED HEALTH CARE EDUCATION/TRAINING PROGRAM

## 2023-12-27 PROCEDURE — 84145 PROCALCITONIN (PCT): CPT

## 2023-12-27 PROCEDURE — 94761 N-INVAS EAR/PLS OXIMETRY MLT: CPT

## 2023-12-27 PROCEDURE — 85610 PROTHROMBIN TIME: CPT

## 2023-12-27 PROCEDURE — 2580000003 HC RX 258: Performed by: STUDENT IN AN ORGANIZED HEALTH CARE EDUCATION/TRAINING PROGRAM

## 2023-12-27 PROCEDURE — 83735 ASSAY OF MAGNESIUM: CPT

## 2023-12-27 PROCEDURE — 87899 AGENT NOS ASSAY W/OPTIC: CPT

## 2023-12-27 PROCEDURE — 2000000000 HC ICU R&B

## 2023-12-27 PROCEDURE — 84133 ASSAY OF URINE POTASSIUM: CPT

## 2023-12-27 PROCEDURE — 86038 ANTINUCLEAR ANTIBODIES: CPT

## 2023-12-27 PROCEDURE — 82248 BILIRUBIN DIRECT: CPT

## 2023-12-27 PROCEDURE — 82805 BLOOD GASES W/O2 SATURATION: CPT

## 2023-12-27 PROCEDURE — 87081 CULTURE SCREEN ONLY: CPT

## 2023-12-27 PROCEDURE — 85652 RBC SED RATE AUTOMATED: CPT

## 2023-12-27 PROCEDURE — 94660 CPAP INITIATION&MGMT: CPT

## 2023-12-27 PROCEDURE — 84100 ASSAY OF PHOSPHORUS: CPT

## 2023-12-27 PROCEDURE — 51702 INSERT TEMP BLADDER CATH: CPT

## 2023-12-27 PROCEDURE — 84300 ASSAY OF URINE SODIUM: CPT

## 2023-12-27 PROCEDURE — 80048 BASIC METABOLIC PNL TOTAL CA: CPT

## 2023-12-27 PROCEDURE — 82330 ASSAY OF CALCIUM: CPT

## 2023-12-27 PROCEDURE — 94640 AIRWAY INHALATION TREATMENT: CPT

## 2023-12-27 PROCEDURE — 82436 ASSAY OF URINE CHLORIDE: CPT

## 2023-12-27 PROCEDURE — 2500000003 HC RX 250 WO HCPCS

## 2023-12-27 PROCEDURE — 6360000002 HC RX W HCPCS

## 2023-12-27 PROCEDURE — 2700000000 HC OXYGEN THERAPY PER DAY

## 2023-12-27 PROCEDURE — 02HV33Z INSERTION OF INFUSION DEVICE INTO SUPERIOR VENA CAVA, PERCUTANEOUS APPROACH: ICD-10-PCS | Performed by: STUDENT IN AN ORGANIZED HEALTH CARE EDUCATION/TRAINING PROGRAM

## 2023-12-27 PROCEDURE — 94002 VENT MGMT INPAT INIT DAY: CPT

## 2023-12-27 PROCEDURE — 6360000002 HC RX W HCPCS: Performed by: INTERNAL MEDICINE

## 2023-12-27 PROCEDURE — 87641 MR-STAPH DNA AMP PROBE: CPT

## 2023-12-27 RX ORDER — CHLORHEXIDINE GLUCONATE ORAL RINSE 1.2 MG/ML
15 SOLUTION DENTAL 2 TIMES DAILY
Status: DISCONTINUED | OUTPATIENT
Start: 2023-12-27 | End: 2023-12-29

## 2023-12-27 RX ORDER — SPIRONOLACTONE 50 MG/1
25 TABLET, FILM COATED ORAL DAILY
Status: DISCONTINUED | OUTPATIENT
Start: 2023-12-27 | End: 2023-12-29

## 2023-12-27 RX ORDER — DEXMEDETOMIDINE HYDROCHLORIDE 4 UG/ML
.1-1.5 INJECTION, SOLUTION INTRAVENOUS CONTINUOUS
Status: DISCONTINUED | OUTPATIENT
Start: 2023-12-27 | End: 2023-12-27

## 2023-12-27 RX ORDER — METHYLPREDNISOLONE SODIUM SUCCINATE 125 MG/2ML
60 INJECTION, POWDER, LYOPHILIZED, FOR SOLUTION INTRAMUSCULAR; INTRAVENOUS EVERY 6 HOURS
Status: DISCONTINUED | OUTPATIENT
Start: 2023-12-27 | End: 2023-12-28

## 2023-12-27 RX ORDER — MINERAL OIL AND WHITE PETROLATUM 150; 830 MG/G; MG/G
OINTMENT OPHTHALMIC EVERY 4 HOURS
Status: DISCONTINUED | OUTPATIENT
Start: 2023-12-27 | End: 2023-12-29

## 2023-12-27 RX ORDER — CARBOXYMETHYLCELLULOSE SODIUM 10 MG/ML
1 GEL OPHTHALMIC EVERY 4 HOURS
Status: DISCONTINUED | OUTPATIENT
Start: 2023-12-27 | End: 2023-12-29

## 2023-12-27 RX ORDER — ASPIRIN 81 MG/1
81 TABLET, CHEWABLE ORAL DAILY
Status: DISCONTINUED | OUTPATIENT
Start: 2023-12-28 | End: 2023-12-29

## 2023-12-27 RX ORDER — IPRATROPIUM BROMIDE AND ALBUTEROL SULFATE 2.5; .5 MG/3ML; MG/3ML
1 SOLUTION RESPIRATORY (INHALATION)
Status: DISCONTINUED | OUTPATIENT
Start: 2023-12-27 | End: 2023-12-28

## 2023-12-27 RX ORDER — METOPROLOL TARTRATE 1 MG/ML
5 INJECTION, SOLUTION INTRAVENOUS EVERY 5 MIN PRN
Status: DISCONTINUED | OUTPATIENT
Start: 2023-12-27 | End: 2023-12-28

## 2023-12-27 RX ORDER — FENTANYL CITRATE 50 UG/ML
50 INJECTION, SOLUTION INTRAMUSCULAR; INTRAVENOUS
Status: DISCONTINUED | OUTPATIENT
Start: 2023-12-27 | End: 2023-12-29

## 2023-12-27 RX ORDER — BUDESONIDE AND FORMOTEROL FUMARATE DIHYDRATE 160; 4.5 UG/1; UG/1
2 AEROSOL RESPIRATORY (INHALATION)
Status: DISCONTINUED | OUTPATIENT
Start: 2023-12-27 | End: 2023-12-29

## 2023-12-27 RX ORDER — PANTOPRAZOLE SODIUM 40 MG/10ML
40 INJECTION, POWDER, LYOPHILIZED, FOR SOLUTION INTRAVENOUS DAILY
Status: DISCONTINUED | OUTPATIENT
Start: 2023-12-27 | End: 2023-12-29

## 2023-12-27 RX ORDER — FUROSEMIDE 10 MG/ML
40 INJECTION INTRAMUSCULAR; INTRAVENOUS ONCE
Status: COMPLETED | OUTPATIENT
Start: 2023-12-27 | End: 2023-12-27

## 2023-12-27 RX ORDER — PROPOFOL 10 MG/ML
5-50 INJECTION, EMULSION INTRAVENOUS CONTINUOUS
Status: DISCONTINUED | OUTPATIENT
Start: 2023-12-27 | End: 2023-12-29

## 2023-12-27 RX ADMIN — EMPAGLIFLOZIN 10 MG: 10 TABLET, FILM COATED ORAL at 08:29

## 2023-12-27 RX ADMIN — IPRATROPIUM BROMIDE AND ALBUTEROL SULFATE 1 DOSE: 2.5; .5 SOLUTION RESPIRATORY (INHALATION) at 23:55

## 2023-12-27 RX ADMIN — FENTANYL CITRATE 50 MCG: 50 INJECTION INTRAMUSCULAR; INTRAVENOUS at 22:10

## 2023-12-27 RX ADMIN — DEXMEDETOMIDINE HYDROCHLORIDE 1.3 MCG/KG/HR: 4 INJECTION, SOLUTION INTRAVENOUS at 18:05

## 2023-12-27 RX ADMIN — ACETAMINOPHEN 650 MG: 325 TABLET ORAL at 09:26

## 2023-12-27 RX ADMIN — PROPOFOL 20 MCG/KG/MIN: 10 INJECTION, EMULSION INTRAVENOUS at 16:06

## 2023-12-27 RX ADMIN — CARVEDILOL 6.25 MG: 6.25 TABLET, FILM COATED ORAL at 08:29

## 2023-12-27 RX ADMIN — RISPERIDONE 0.5 MG: 0.5 TABLET ORAL at 08:29

## 2023-12-27 RX ADMIN — FUROSEMIDE 40 MG: 10 INJECTION, SOLUTION INTRAMUSCULAR; INTRAVENOUS at 15:08

## 2023-12-27 RX ADMIN — LORAZEPAM 1 MG: 2 INJECTION, SOLUTION INTRAMUSCULAR; INTRAVENOUS at 09:26

## 2023-12-27 RX ADMIN — APIXABAN 5 MG: 5 TABLET, FILM COATED ORAL at 21:44

## 2023-12-27 RX ADMIN — IPRATROPIUM BROMIDE AND ALBUTEROL SULFATE 1 DOSE: 2.5; .5 SOLUTION RESPIRATORY (INHALATION) at 11:28

## 2023-12-27 RX ADMIN — FUROSEMIDE 40 MG: 10 INJECTION, SOLUTION INTRAMUSCULAR; INTRAVENOUS at 08:28

## 2023-12-27 RX ADMIN — SACUBITRIL AND VALSARTAN 1 TABLET: 24; 26 TABLET, FILM COATED ORAL at 22:13

## 2023-12-27 RX ADMIN — DEXMEDETOMIDINE HYDROCHLORIDE 0.4 MCG/KG/HR: 4 INJECTION, SOLUTION INTRAVENOUS at 14:53

## 2023-12-27 RX ADMIN — DIGOXIN 125 MCG: 125 TABLET ORAL at 08:29

## 2023-12-27 RX ADMIN — SODIUM CHLORIDE, PRESERVATIVE FREE 10 ML: 5 INJECTION INTRAVENOUS at 08:29

## 2023-12-27 RX ADMIN — METHYLPREDNISOLONE SODIUM SUCCINATE 40 MG: 40 INJECTION, POWDER, FOR SOLUTION INTRAMUSCULAR; INTRAVENOUS at 11:30

## 2023-12-27 RX ADMIN — IPRATROPIUM BROMIDE AND ALBUTEROL SULFATE 1 DOSE: 2.5; .5 SOLUTION RESPIRATORY (INHALATION) at 17:06

## 2023-12-27 RX ADMIN — CEFTRIAXONE 1000 MG: 1 INJECTION, POWDER, FOR SOLUTION INTRAMUSCULAR; INTRAVENOUS at 11:33

## 2023-12-27 RX ADMIN — APIXABAN 5 MG: 5 TABLET, FILM COATED ORAL at 08:29

## 2023-12-27 RX ADMIN — CARBOXYMETHYLCELLULOSE SODIUM 1 DROP: 10 GEL OPHTHALMIC at 21:43

## 2023-12-27 RX ADMIN — SODIUM CHLORIDE, PRESERVATIVE FREE 10 ML: 5 INJECTION INTRAVENOUS at 22:32

## 2023-12-27 RX ADMIN — CHLORHEXIDINE GLUCONATE 0.12% ORAL RINSE 15 ML: 1.2 LIQUID ORAL at 21:44

## 2023-12-27 RX ADMIN — RISPERIDONE 0.5 MG: 0.5 TABLET ORAL at 21:44

## 2023-12-27 RX ADMIN — BUDESONIDE AND FORMOTEROL FUMARATE DIHYDRATE 2 PUFF: 160; 4.5 AEROSOL RESPIRATORY (INHALATION) at 20:50

## 2023-12-27 RX ADMIN — IPRATROPIUM BROMIDE AND ALBUTEROL SULFATE 1 DOSE: 2.5; .5 SOLUTION RESPIRATORY (INHALATION) at 07:38

## 2023-12-27 RX ADMIN — IPRATROPIUM BROMIDE AND ALBUTEROL SULFATE 1 DOSE: 2.5; .5 SOLUTION RESPIRATORY (INHALATION) at 20:49

## 2023-12-27 RX ADMIN — METOPROLOL TARTRATE 5 MG: 5 INJECTION INTRAVENOUS at 16:07

## 2023-12-27 RX ADMIN — ASPIRIN 81 MG: 81 TABLET, CHEWABLE ORAL at 08:29

## 2023-12-27 ASSESSMENT — PAIN SCALES - PAIN ASSESSMENT IN ADVANCED DEMENTIA (PAINAD)
CONSOLABILITY: 0
BODYLANGUAGE: 0
BREATHING: 0
FACIALEXPRESSION: 0
TOTALSCORE: 0
NEGVOCALIZATION: 0
FACIALEXPRESSION: 0
NEGVOCALIZATION: 0
BREATHING: 0
CONSOLABILITY: 0
BODYLANGUAGE: 0
TOTALSCORE: 0

## 2023-12-27 ASSESSMENT — PAIN SCALES - WONG BAKER
WONGBAKER_NUMERICALRESPONSE: 0
WONGBAKER_NUMERICALRESPONSE: 0

## 2023-12-27 ASSESSMENT — PULMONARY FUNCTION TESTS
PIF_VALUE: 24
PIF_VALUE: 16
PIF_VALUE: 17
PIF_VALUE: 21
PIF_VALUE: 16
PIF_VALUE: 16
PIF_VALUE: 18
PIF_VALUE: 16
PIF_VALUE: 15
PIF_VALUE: 41
PIF_VALUE: 15

## 2023-12-27 ASSESSMENT — PAIN SCALES - GENERAL: PAINLEVEL_OUTOF10: 8

## 2023-12-27 NOTE — CONSULTS
[place] [address] [facility] [place] [id number]                                  CONSULTATION    PATIENT NAME: MEL BLAND                     :        1965  MED REC NO:   [id number]                          ROOM:       2106  ACCOUNT NO:   [id number]                           ADMIT DATE: 2023  PROVIDER:     Ignacio Almeida MD    CONSULT DATE:  2023    HISTORY OF PRESENT ILLNESS:  The patient is a 58-year-old lady with  multiple medical problems including COPD, history of tobacco abuse,  history of cocaine abuse, congestive heart failure who was admitted  through the emergency room with complaints of increasing shortness of  breath and altered mental status.  The patient continues to use cocaine.  In the emergency room, she was found to be in congestive heart failure.   She was admitted to the intensive care unit.  While in the intensive  care unit, the patient was short of breath and was placed on the BiPAP.   Her urine drug screen was positive for cocaine.  She is complaining of  some cough.  She denies any hemoptysis.  She denies any hematemesis.    PAST MEDICAL HISTORY:  Significant for COPD, anxiety disorder,  environmental allergies, restless legs syndrome.    PAST SURGICAL HISTORY:  Remarkable for appendectomy,  section,  cholecystectomy and knee surgery.    FAMILY HISTORY:  Noncontributory.    SOCIAL HISTORY:  Reveals that she smokes about half pack per day and has  been smoking for several years.  No history of alcohol abuse.  She uses  cocaine and has been actively using.    MEDICATIONS:  Reviewed.    ALLERGIES:  She is allergic to ADDERALL, MORPHINE, TRAMADOL, ULTRAM,  CODEINE AND PENICILLIN.    REVIEW OF SYSTEMS:  A 10 to 14-point review of systems were reviewed and  are negative except for what is mentioned in the history of present  illness.    PHYSICAL EXAMINATION:  GENERAL:  The patient is awake but  goes back to sleep easily, on BiPAP,  lying comfortably in bed, in no acute respiratory distress. VITAL SIGNS:  Her blood pressure is 115/88 mmHg, pulse of 83 per minute  and respiratory rate of 25 per minute. She is afebrile. Her saturation  is 100% on BiPAP. HEENT:  Exam revealed positive JVD, no lymphadenopathy. NECK:  Supple. LUNGS:  Exam revealed diminished breath sounds and occasional basilar  crackles. HEART:  Exam showed normal S1 and S2.  ABDOMEN:  Exam is benign. There is no evidence of any organomegaly. The bowel sounds are present. NEUROLOGIC:  Exam reveals that she is pleasantly confused, but moving  her extremities. LABORATORY DATA AND DIAGNOSTIC STUDIES:  Her venous blood gases showed a  pH of 7.42, pCO2 of 40, pO2 of 155 with 93% saturation. Her CAT scan of  the chest showed no evidence of PE ______ cardiomegaly, evidence of  pulmonary hypertension with dilated pulmonary arteries and reflux of  contrast into the IVC. Her lactate was 1.4. Her urine drug screen was  positive for cocaine. Her respiratory disease panel PCR was negative. Her CBC showed a white count of 6.3, hemoglobin 10.9, hematocrit 35.4. Her electrolytes were unremarkable. IMPRESSION:  1. Acute respiratory failure secondary to #2.  2.  Acute exacerbation of COPD. 3.  Decompensated congestive heart failure. 4.  History of active cocaine abuse. 5.  Pulmonary hypertension. PLAN:  1. Continue present antibiotic therapy. 2.  We will start Solu-Medrol 40 q 12 hours. 3.  DuoNeb q 4 hours while awake. 4.  Continue BiPAP and the patient can be on cannula while eating. 5.  Check mag and phos. 6.  Once the patient is better, echocardiogram to assess the degree of  pulmonary hypertension. 7.  Check BNP level. 8.  Check collagen vascular profile. 9.  As per orders.         Walter Gonzalez MD    D: 12/27/2023 11:12:15       T: 12/27/2023 11:15:09     [email or web address]  Job#: [id number]     Doc#: [id number]    CC:

## 2023-12-27 NOTE — PROGRESS NOTES
4 Eyes Skin Assessment     NAME:  Aman Harmon OF BIRTH:  1965  MEDICAL RECORD NUMBER:  [id number]    The patient is being assessed for  Admission    I agree that at least one RN has performed a thorough Head to Toe Skin Assessment on the patient. ALL assessment sites listed below have been assessed. Areas assessed by both nurses:    Other Full body        Does the Patient have a Wound?  No noted wound(s)       [place]ar Prevention initiated by RN: No  Wound Care Orders initiated by RN: No    Pressure Injury (Stage 3,4, Unstageable, DTI, NWPT, and Complex wounds) if present, place Wound referral order by RN under : No    New Ostomies, if present place, Ostomy referral order under : No     Nurse 1 eSignature: {Esignature:[id number]}    **SHARE this note so that the co-signing nurse can place an eSignature**    Nurse 2 eSignature: Electronically signed by Monica Anderson RN on 12/27/23 at 7:18 AM EST

## 2023-12-27 NOTE — CONSULTS
bilateral pedal pulses intact  Skin: warm, dry  Neuro: Patient is on mechanical ventilator, sedated, no focal neurological deficits  Psych: Tolerating vent      Past Medical History:   PMHx   Past Medical History:   Diagnosis Date    Anxiety     Environmental allergies     Non-healing surgical wound     RLS (restless legs syndrome)      PSHX:  has a past surgical history that includes Cholecystectomy; Appendectomy;  section; and knee surgery (). Allergies: Allergies   Allergen Reactions    Adderall [Amphetamine-Dextroamphetamine] Itching    Morphine And Related     Tramadol     Ultram [Tramadol Hcl] Itching    Codeine Itching    Pcn [Penicillins] Swelling and Dermatitis     Fam HX: family history includes Depression in an other family member.   Soc HX:   Social History     Socioeconomic History    Marital status:      Spouse name: None    Number of children: None    Years of education: None    Highest education level: None   Tobacco Use    Smoking status: Every Day     Current packs/day: 0.50     Types: Cigarettes    Smokeless tobacco: Never   Vaping Use    Vaping Use: Never used   Substance and Sexual Activity    Alcohol use: No    Drug use: Yes     Frequency: 5.0 times per week     Types: Cocaine     Comment: smokes crack cocaine    Sexual activity: Not Currently     Social Determinants of Health     Food Insecurity: Patient Declined (2023)    Hunger Vital Sign     Worried About Running Out of Food in the Last Year: Patient declined     [address] in the Last Year: Patient declined   Transportation Needs: Patient Declined (2023)    PRAPARE - Transportation     Lack of Transportation (Medical): Patient declined     Lack of Transportation (Non-Medical): Patient declined   Housing Stability: Patient Declined (2023)    Housing Stability Vital Sign     Unable to Pay for Housing in the Last Year: Patient declined     Number of [address] in the Last Year: 1     Unstable na FINDINGS: The endotracheal tube terminates approximately 5 cm above the anthony.  The tip of the enteric tube is below the diaphragm but not included in the field of view.  The cardiac silhouette is enlarged but stable.  New left basilar opacities.  No pneumothorax or vascular congestion identified.  No acute osseous abnormality.     1. New left basilar opacities may represent atelectasis versus developing airspace disease. 2. Stable cardiomegaly. 3. Supportive devices as above.     CTA PULMONARY W CONTRAST    Result Date: 12/26/2023  EXAMINATION: CTA OF THE CHEST 12/26/2023 7:50 pm TECHNIQUE: CTA of the chest was performed after the administration of intravenous contrast.  Multiplanar reformatted images are provided for review.  MIP images are provided for review. Automated exposure control, iterative reconstruction, and/or weight based adjustment of the mA/kV was utilized to reduce the radiation dose to as low as reasonably achievable. COMPARISON: CT chest 03/24/2022 HISTORY: ORDERING SYSTEM PROVIDED HISTORY: rule out PE TECHNOLOGIST PROVIDED HISTORY: Reason for exam:->rule out PE Additional Contrast?->None Reason for Exam: rule out PE FINDINGS: Pulmonary Arteries: No central pulmonary embolism is identified.  The distal segmental and subsegmental arteries are not well visualized due to suboptimal contrast bolus..  Main pulmonary artery is dilated, measuring up to 4.2 cm.. Mediastinum: No evidence of mediastinal lymphadenopathy.  The heart is enlarged.  There is no pericardial effusion.  There is reflux of contrast into the IVC and hepatic veins.  No pericardial effusion..  There is no acute abnormality of the thoracic aorta. Lungs/pleura: The lungs are without acute process.  No focal pulmonary consolidation.  Mild dependent changes are noted bilaterally..  No evidence of pleural effusion or pneumothorax. Upper Abdomen: Limited images of the upper abdomen are unremarkable. Soft Tissues/Bones: No acute bone or

## 2023-12-27 NOTE — PLAN OF CARE
Problem: Discharge Planning  Goal: Discharge to home or other facility with appropriate resources  Outcome: Progressing  Flowsheets (Taken 12/27/2023 0700)  Discharge to home or other facility with appropriate resources: Identify barriers to discharge with patient and caregiver     Problem: Safety - Adult  Goal: Free from fall injury  Outcome: Progressing     Problem: Skin/Tissue Integrity  Goal: Absence of new skin breakdown  Description: 1. Monitor for areas of redness and/or skin breakdown  2. Assess vascular access sites hourly  3. Every 4-6 hours minimum:  Change oxygen saturation probe site  4. Every 4-6 hours:  If on nasal continuous positive airway pressure, respiratory therapy assess nares and determine need for appliance change or resting period.   Outcome: Progressing

## 2023-12-27 NOTE — PROGRESS NOTES
Pt taken off bipap for CT. Pt ABG good, with some hyperventilation noted. Pt continues to hyperventilate while on bipap and is pulling at mask. Pt appears in pain, wants her back rubbed to help. Pt placed on 4L NC for CT.

## 2023-12-27 NOTE — ED PROVIDER NOTES
ALBUTEROL SULFATE  (90 BASE) MCG/ACT INHALER    Inhale 2 puffs into the lungs every 6 hours as needed for Wheezing    APIXABAN (ELIQUIS) 5 MG TABS TABLET    Take 1 tablet by mouth 2 times daily    ASPIRIN 81 MG CHEWABLE TABLET    Take 1 tablet by mouth daily    CARVEDILOL (COREG) 6.25 MG TABLET    Take 1 tablet by mouth 2 times daily    DIGOXIN (LANOXIN) 125 MCG TABLET    Take 1 tablet by mouth daily    EMPAGLIFLOZIN (JARDIANCE) 10 MG TABLET    Take 1 tablet by mouth daily    NITROGLYCERIN (NITROSTAT) 0.4 MG SL TABLET    up to max of 3 total doses. If no relief after 1 dose, call 911.     RISPERIDONE (RISPERDAL) 0.5 MG TABLET    Take 1 tablet by mouth 2 times daily    TORSEMIDE (DEMADEX) 10 MG TABLET    Take 1 tablet by mouth daily    UREA (URE-NA) 15 G PACK PACKET    Take 15 g by mouth daily for 7 days       ALLERGIES     Adderall [amphetamine-dextroamphetamine], Morphine and related, Tramadol, Ultram [tramadol hcl], Codeine, and Pcn [penicillins]    FAMILYHISTORY       Family History   Problem Relation Age of Onset    Depression Other         SOCIAL HISTORY       Social History     Tobacco Use    Smoking status: Every Day     Current packs/day: 0.50     Types: Cigarettes    Smokeless tobacco: Never   Vaping Use    Vaping Use: Never used   Substance Use Topics    Alcohol use: No    Drug use: Yes     Frequency: 5.0 times per week     Types: Cocaine     Comment: smokes crack cocaine       SCREENINGS                Heart Score for chest pain patients  History: Slightly Suspicious  ECG: Non-Specifc repolarization disturbance/LBTB/PM  Patient Age: > 39 and < 65 years  *Risk factors for Atherosclerotic disease: Cigarette smoking, Cocaine abuse, Hypertension, Coronary Artery Disease  Risk Factors: > 3 Risk factors or history of atherosclerotic disease*  Troponin: > 1 and < 3X normal limit  Heart Score Total: 5        CIWA Assessment  BP: (!) 140/95  Pulse: 87             PHYSICAL EXAM  1 or more Elements     ED Triage Vitals [12/26/23 1539]   BP Temp Temp src Pulse Respirations SpO2 Height Weight   (!) 157/129 98.4 °F (36.9 °C) -- (!) 118 (!) 38 100 % -- --       Physical Exam  Constitutional:       General: She is in acute distress.      Appearance: Normal appearance. She is not ill-appearing or diaphoretic.   HENT:      Head: Normocephalic and atraumatic.      Right Ear: External ear normal.      Left Ear: External ear normal.      Nose: Congestion and rhinorrhea present.      Mouth/Throat:      Mouth: Mucous membranes are moist.      Pharynx: No pharyngeal swelling, oropharyngeal exudate or posterior oropharyngeal erythema.   Eyes:      General:         Right eye: No discharge.         Left eye: No discharge.      Pupils: Pupils are equal, round, and reactive to light.   Cardiovascular:      Rate and Rhythm: Tachycardia present. Rhythm irregular.      Heart sounds: Murmur heard.   Pulmonary:      Effort: Tachypnea, accessory muscle usage and respiratory distress present.      Breath sounds: Decreased air movement present. Decreased breath sounds, wheezing and rhonchi present.   Chest:      Chest wall: No mass, tenderness or edema.   Abdominal:      General: There is no distension.      Palpations: Abdomen is soft. There is no mass.      Tenderness: There is no abdominal tenderness. There is no right CVA tenderness, left CVA tenderness or guarding.   Musculoskeletal:      Cervical back: Normal range of motion. No rigidity.      Right lower leg: Edema present.      Left lower leg: No edema.   Lymphadenopathy:      Cervical: No cervical adenopathy.   Skin:     General: Skin is warm and dry.      Capillary Refill: Capillary refill takes less than 2 seconds.      Findings: No rash.   Neurological:      General: No focal deficit present.      Mental Status: She is alert and oriented to person, place, and time.      Motor: No weakness.   Psychiatric:         Mood and Affect: Mood is anxious.         Behavior: Behavior is

## 2023-12-27 NOTE — ED NOTES
ED TO INPATIENT SBAR HANDOFF    Patient Name: Rc Briones   :  735  60 y.o. Preferred Name  [facility]  Family/Caregiver Present no   Restraints no   C-SSRS:    Sitter no   Sepsis Risk Score Sepsis Risk Score: 2.66      Situation  Chief Complaint   Patient presents with    Shortness of Breath     SOB for past few days    Chest Pain     Occasional CP. States that it hurts to take a deep breath     Brief Description of Patient's Condition: Patient arrived from home with c/o shortness of breath. Upon arrival patient oxygen was 100% on room air but was hyperventilating. Patient was placed on bipap. Since then patient has been taken off bipap and placed on 4L NC. Patient has been restless off and on. Pure wick placed on patient but she would not stay still and took it off. Patient drug screen positive for cocaine. Patient resting comfortably at this time. Mental Status: alert  Arrived from: home    Imaging:   XR CHEST PORTABLE   Final Result   No acute abnormality.          CTA PULMONARY W CONTRAST    (Results Pending)     Abnormal labs:   Abnormal Labs Reviewed   CBC WITH AUTO DIFFERENTIAL - Abnormal; Notable for the following components:       Result Value    RBC 3.96 (*)     Hemoglobin 11.3 (*)     Hematocrit 36.9 (*)     MCHC 30.6 (*)     RDW 16.0 (*)     Segs Relative 73.0 (*)     Lymphocytes % 16.4 (*)     Monocytes % 9.2 (*)     All other components within normal limits   COMPREHENSIVE METABOLIC PANEL - Abnormal; Notable for the following components:    Sodium 133 (*)     Glucose 113 (*)     Est, Glom Filt Rate 58 (*)     All other components within normal limits   LACTATE, SEPSIS - Abnormal; Notable for the following components:    Lactic Acid, Sepsis 2.3 (*)     All other components within normal limits   URINALYSIS - Abnormal; Notable for the following components:    Blood, Urine TRACE (*)     Leukocyte Esterase, Urine TRACE (*)     All other components within normal limits   TROPONIN - Abnormal; Notable for the following components:    Troponin, High Sensitivity 41 (*)     All other components within normal limits   BRAIN NATRIURETIC PEPTIDE - Abnormal; Notable for the following components:    Pro-BNP 23,013 (*)     All other components within normal limits   BLOOD GAS, VENOUS - Abnormal; Notable for the following components:    pCO2, Gurjit 38 (*)     O2 Sat, Gurjit 81.2 (*)     All other components within normal limits   D-DIMER, RAPID - Abnormal; Notable for the following components:    D-Dimer, Quant 1.47 (*)     All other components within normal limits   URINE DRUG SCREEN - Abnormal; Notable for the following components:    Cocaine Metabolite UNCONFIRMED POSITIVE (*)     All other components within normal limits   DIGOXIN LEVEL - Abnormal; Notable for the following components:    Digoxin Lvl 3.7 (*)     All other components within normal limits   TROPONIN - Abnormal; Notable for the following components:    Troponin, High Sensitivity 45 (*)     All other components within normal limits   MICROSCOPIC URINALYSIS - Abnormal; Notable for the following components:    Bacteria, UA RARE (*)     Mucus, UA RARE (*)     All other components within normal limits       Background  History:   Past Medical History:   Diagnosis Date    Anxiety     Environmental allergies     Non-healing surgical wound     RLS (restless legs syndrome)        Assessment    Vitals:    Level of Consciousness: Alert (0)   Vitals:    12/26/23 1619 12/26/23 1643 12/26/23 1845 12/26/23 1940   BP:   (!) 122/92    Pulse: (!) 111 (!) 141 (!) 115 (!) 123   Resp: (!) 35 (!) 41  (!) 37   Temp:       SpO2: 100% 100%       PO Status: Regular  O2 Flow Rate: O2 Device: PAP (positive airway pressure)    Cardiac Rhythm: a-fib  Last documented pain medication administered: n/a  NIH Score: NIH     Active LDA's:   Peripheral IV Right Antecubital (Active)   Site Assessment Clean, dry & intact 12/26/23 1633   Line Status Blood return noted;Brisk blood return

## 2023-12-27 NOTE — CONSULTS
CARDIOLOGY CONSULT NOTE   Reason for consultation:  afib /CHF    Referring physician:  Cassius Rodriguez MD     Primary care physician: No primary care provider on file. Dear  Dr. Cassius Rodriguez MD   Thanks for the consult. Chief Complaints :  Chief Complaint   Patient presents with    Shortness of Breath     SOB for past few days    Chest Pain     Occasional CP. States that it hurts to take a deep breath        History of present illness:Cuortney is a 60 y. o.year old who presents with shortness of breath hypoxia lethargy she was actually discharged a few weeks back from the hospital she is well-known to cardiology service due to history of nonischemic cardiomyopathy thought to be actively abusing cocaine her EF of 15% she has had several admissions to decompensated heart failure and atrial fibrillation she is back in [facility] and appears to be decompensated with elevated BNP levels she is noncompliant with medication has elevated lactic acid level on admission cardiology asked to evaluate for comanagement    Past medical history:    has a past medical history of Anxiety, Environmental allergies, Non-healing surgical wound, and RLS (restless legs syndrome). Past surgical history:   has a past surgical history that includes Cholecystectomy; Appendectomy;  section; and knee surgery (). Social History:   reports that she has been smoking cigarettes. She has never used smokeless tobacco. She reports current drug use. Frequency: 5.00 times per week. Drug: Cocaine. She reports that she does not drink alcohol.   Family history:   no family history of CAD, STROKE of DM at early age    Allergies   Allergen Reactions    Adderall [Amphetamine-Dextroamphetamine] Itching    Morphine And Related     Tramadol     Ultram [Tramadol Hcl] Itching    Codeine Itching    Pcn [Penicillins] Swelling and Dermatitis       aspirin tablet 325 mg, Once  amiodarone (CORDARONE) 150 mg in dextrose 5 % 100 mL bolus, Once cardiomyopathy ejection fraction of 15% we will repeat echo  CHF: Acute Systolic/ Diastolic decompensated heart failure. Appears to be volume overloaded . Plan IV  diuresis. We can try IV Lasix 40 mg BID. And  titrate diuretics accordingly.   Overall her prognosis is very poor social work assistance possible placement drug rehab?  We keep repeating the same process every few weeks  Please continue / start  Gudelines recommended medical thearpy including beta blockers  Coreg/ Toprol XL  , ACE/ARB / Entresto ,SGLT2 inhibitors   and Aldactone 25 mg daily if renal function and BP allows   No dobutamine will make her more tachycardic in setting of A-fib  Atrial Fibrillation: Chads Vasc score is ,  Plan rate control / attempt rhythm control .  Noncompliant with medication can start anticoagulation at this time we will use digoxin for rate control however difficult to use beta-blockers with given her history of active cocaine use and want to avoid Cardizem due to negative inotropic agents can start amiodarone drip  DVT prophylaxis if no contraindication  6.   Dyslipidemia: continue statins              Thank you  much for consult and giving us the opportunity in contributing in the care of this patient. Please feel free to call me for any questions.       Sayed Jaime Corona MD, 12/26/2023 8:40 PM

## 2023-12-27 NOTE — PROGRESS NOTES
Pt back on bi pap, RR 60-70's Dr Arpan Aranda made aware. Precedex ordered and started, see MAR. Dr Sarah Gaspar at bedside to assess pt and pt gave Dr Sarah Gaspar permission to intubate her and trach her if needed. 1630-Pt's sister at bedside with pt daughter Eloina Santillan on speaker phone, Eloina Santillan stated she is her POA and has paperwork from when she was in the hospital in [place] stating pt is a DNR-DNI wanted to be a DNR-CC. I explained to the sister and daughter that pt was a full code in our system and gave permission to be intubated.      A copy of Advanced Directives are in pts chart

## 2023-12-27 NOTE — PROGRESS NOTES
V2.0    [facility] Progress Note      Name:  Shine Balbuena /Age/Sex: 1965  (60 y.o. female)   MRN & CSN:  [id number] & [id number] Encounter Date/Time: 2023 2:20 PM EST   Location:  -A PCP: No primary care provider on file. Attending:Francoise [facility] Day: 2    Assessment and Recommendations   Shine Balbuena is a 60 y.o. female  who presents with Atrial fibrillation with RVR ([address])      Plan:     # Acute decompensated HFrEF / NICM  # Acute hypoxemic respiratory failure  - Endorsed worsening SOB and orthopnea. Questionable medication complaint. Continues to use cocaine. Last TTE in 2023 with LVEF of 20-25%. Negative LHC in 2020  - Clinically hypervolemic, requiring PAP in ED. Pro-BNP >23k. CXR with pulmonary congestion. Has over 12 lbs weight gain in 1 month per chart review. - on IV diuresis. Monitor renal function   CTA reviewed - no PE. Suggestive of PHTN  Cardiology consulted. Strict I/O's. Daily weights. Telemetry. Continue Coreg and Jardiance, consider Entresto if BP tolerates. Currently needing continuous biPAP. Pulm consulted     # Atrial fibrillation with RVR - now rate controlled  - Recently admitted for similar presentation, questionable medication compliance. - Found to have HR in 140's in ED.   - Resume Digoxin with minimal response. Started on Amiodarone gtt in setting of HFrEF.  - Likely secondary to above. Continue Eliquis and Coreg. Monitor for bleeding. Digoxin level obtained shortly after giving IV Digoxin. # LV thrombus  - TTE in 2023 showed mobile thrombus in LV.   - Resume Eliquis. # Illicit substance abuse  - Continue to actively use cocaine.   - UDS positive for cocaine.   - Monitor for sxs of withdrawal.      # Acute toxic encephalopathy  - Had similar issue during recent presentation.   - Very inattentive. - Continue delirium precautions.       # Non-MI troponin elevation  - Denied any typical CP.  - Initial Tn mildly 12/27/23  0516   AST 35 17   ALT 30 23   BILITOT 0.8 0.8   ALKPHOS 83 70     Lipids:   Lab Results   Component Value Date/Time    CHOL 92 11/05/2023 06:02 AM    HDL 8 11/05/2023 06:02 AM    TRIG 133 11/05/2023 06:02 AM     Hemoglobin A1C: No results found for: \"LABA1C\"  TSH: No results found for: \"TSH\"  Troponin:   Lab Results   Component Value Date/Time    TROPONINT 0.060 06/08/2022 12:34 PM    TROPONINT 0.046 06/07/2022 11:10 PM    TROPONINT 0.041 06/01/2022 05:30 AM     Lactic Acid: No results for input(s): \"LACTA\" in the last 72 hours.  BNP:   Recent Labs     12/26/23  1646   PROBNP 23,013*     UA:  Lab Results   Component Value Date/Time    NITRU NEGATIVE 12/26/2023 07:00 PM    NITRU NEGATIVE 08/18/2012 08:01 PM    COLORU YELLOW 12/26/2023 07:00 PM    WBCUA <1 12/26/2023 07:00 PM    RBCUA <1 12/26/2023 07:00 PM    MUCUS RARE 12/26/2023 07:00 PM    TRICHOMONAS NONE SEEN 12/26/2023 07:00 PM    BACTERIA RARE 12/26/2023 07:00 PM    CLARITYU CLEAR 12/26/2023 07:00 PM    SPECGRAV <1.005 12/26/2023 07:00 PM    LEUKOCYTESUR TRACE 12/26/2023 07:00 PM    UROBILINOGEN 0.2 12/26/2023 07:00 PM    BILIRUBINUR NEGATIVE 12/26/2023 07:00 PM    BLOODU TRACE 12/26/2023 07:00 PM    GLUCOSEU NEGATIVE 08/18/2012 08:01 PM    KETUA NEGATIVE 12/26/2023 07:00 PM     Urine Cultures: No results found for: \"LABURIN\"  Blood Cultures: No results found for: \"BC\"  No results found for: \"BLOODCULT2\"  Organism: No results found for: \"ORG\"      Electronically signed by Carly Beltre MD on 12/27/2023 at 2:20 PM

## 2023-12-27 NOTE — CARE COORDINATION
Chart reviewed. Patient is a readmission (D/C 12/22) She is from home; IPA. Has insurance but no PCP. PCP list added to AVS for discharge needs. Substance Abuse Recovery info added as patient was cocaine + on admission.  Nitin Zavala RN

## 2023-12-28 ENCOUNTER — APPOINTMENT (OUTPATIENT)
Dept: NON INVASIVE DIAGNOSTICS | Age: 58
End: 2023-12-28
Attending: STUDENT IN AN ORGANIZED HEALTH CARE EDUCATION/TRAINING PROGRAM
Payer: COMMERCIAL

## 2023-12-28 ENCOUNTER — APPOINTMENT (OUTPATIENT)
Dept: GENERAL RADIOLOGY | Age: 58
End: 2023-12-28
Payer: COMMERCIAL

## 2023-12-28 LAB
ANION GAP SERPL CALCULATED.3IONS-SCNC: 16 MMOL/L (ref 7–16)
B PARAP IS1001 DNA NPH QL NAA+NON-PROBE: NOT DETECTED
B PERT.PT PRMT NPH QL NAA+NON-PROBE: NOT DETECTED
BASOPHILS ABSOLUTE: 0 K/CU MM
BASOPHILS RELATIVE PERCENT: 0.1 % (ref 0–1)
BUN SERPL-MCNC: 26 MG/DL (ref 6–23)
C PNEUM DNA NPH QL NAA+NON-PROBE: NOT DETECTED
CALCIUM IONIZED: 4.48 MG/DL (ref 4.48–5.28)
CALCIUM SERPL-MCNC: 8.5 MG/DL (ref 8.3–10.6)
CHLORIDE BLD-SCNC: 98 MMOL/L (ref 99–110)
CO2: 21 MMOL/L (ref 21–32)
CREAT SERPL-MCNC: 1.3 MG/DL (ref 0.6–1.1)
DIFFERENTIAL TYPE: ABNORMAL
ECHO AO ROOT DIAM: 3.2 CM
ECHO AO ROOT INDEX: 1.69 CM/M2
ECHO BSA: 1.91 M2
ECHO EST RA PRESSURE: 3 MMHG
ECHO IVC PROX: 2.1 CM
ECHO LA AREA 4C: 32.8 CM2
ECHO LA DIAMETER INDEX: 2.49 CM/M2
ECHO LA DIAMETER: 4.7 CM
ECHO LA MAJOR AXIS: 8.2 CM
ECHO LA TO AORTIC ROOT RATIO: 1.47
ECHO LA VOL MOD A4C: 106 ML (ref 22–52)
ECHO LA VOLUME INDEX MOD A4C: 56 ML/M2 (ref 16–34)
ECHO LEFT PULMONARY ARTERY DIAMETER: 2 CM
ECHO LV EDV A4C: 171 ML
ECHO LV EDV INDEX A4C: 90 ML/M2
ECHO LV EJECTION FRACTION A4C: 35 %
ECHO LV ESV A4C: 112 ML
ECHO LV ESV INDEX A4C: 59 ML/M2
ECHO LV FRACTIONAL SHORTENING: 5 % (ref 28–44)
ECHO LV INTERNAL DIMENSION DIASTOLE INDEX: 3.49 CM/M2
ECHO LV INTERNAL DIMENSION DIASTOLIC: 6.6 CM (ref 3.9–5.3)
ECHO LV INTERNAL DIMENSION SYSTOLIC INDEX: 3.33 CM/M2
ECHO LV INTERNAL DIMENSION SYSTOLIC: 6.3 CM
ECHO LV IVSD: 1.3 CM (ref 0.6–0.9)
ECHO LV MASS 2D: 388.4 G (ref 67–162)
ECHO LV MASS INDEX 2D: 205.5 G/M2 (ref 43–95)
ECHO LV POSTERIOR WALL DIASTOLIC: 1.2 CM (ref 0.6–0.9)
ECHO LV RELATIVE WALL THICKNESS RATIO: 0.36
ECHO LVOT AREA: 4.9 CM2
ECHO LVOT DIAM: 2.5 CM
ECHO MAIN PULMONARY ARTERY DIAMETER: 3.7 CM
ECHO RIGHT PULMONARY ARTERY DIAMETER: 1.4 CM
ECHO RIGHT VENTRICULAR SYSTOLIC PRESSURE (RVSP): 15 MMHG
ECHO RV MID DIMENSION: 3.8 CM
ECHO TV REGURGITANT MAX VELOCITY: 1.72 M/S
ECHO TV REGURGITANT PEAK GRADIENT: 12 MMHG
EOSINOPHILS ABSOLUTE: 0 K/CU MM
EOSINOPHILS RELATIVE PERCENT: 0 % (ref 0–3)
FLUAV H1 2009 PAN RNA NPH NAA+NON-PROBE: NOT DETECTED
FLUAV H1 RNA NPH QL NAA+NON-PROBE: NOT DETECTED
FLUAV H3 RNA NPH QL NAA+NON-PROBE: NOT DETECTED
FLUAV RNA NPH QL NAA+NON-PROBE: NOT DETECTED
FLUBV RNA NPH QL NAA+NON-PROBE: NOT DETECTED
GFR SERPL CREATININE-BSD FRML MDRD: 47 ML/MIN/1.73M2
GLUCOSE BLD-MCNC: 129 MG/DL (ref 70–99)
GLUCOSE BLD-MCNC: 155 MG/DL (ref 70–99)
GLUCOSE BLD-MCNC: 199 MG/DL (ref 70–99)
GLUCOSE SERPL-MCNC: 135 MG/DL (ref 70–99)
HADV DNA NPH QL NAA+NON-PROBE: NOT DETECTED
HCOV 229E RNA NPH QL NAA+NON-PROBE: NOT DETECTED
HCOV HKU1 RNA NPH QL NAA+NON-PROBE: NOT DETECTED
HCOV NL63 RNA NPH QL NAA+NON-PROBE: NOT DETECTED
HCOV OC43 RNA NPH QL NAA+NON-PROBE: NOT DETECTED
HCT VFR BLD CALC: 39.6 % (ref 37–47)
HEMOGLOBIN: 12.1 GM/DL (ref 12.5–16)
HMPV RNA NPH QL NAA+NON-PROBE: NOT DETECTED
HPIV1 RNA NPH QL NAA+NON-PROBE: NOT DETECTED
HPIV2 RNA NPH QL NAA+NON-PROBE: NOT DETECTED
HPIV3 RNA NPH QL NAA+NON-PROBE: NOT DETECTED
HPIV4 RNA NPH QL NAA+NON-PROBE: NOT DETECTED
IMMATURE NEUTROPHIL %: 0.4 % (ref 0–0.43)
IONIZED CA: 1.12 MMOL/L (ref 1.12–1.32)
L PNEUMO AG UR QL IA: NEGATIVE
LACTATE: 2.1 MMOL/L (ref 0.5–1.9)
LYMPHOCYTES ABSOLUTE: 0.4 K/CU MM
LYMPHOCYTES RELATIVE PERCENT: 3 % (ref 24–44)
M PNEUMO DNA NPH QL NAA+NON-PROBE: NOT DETECTED
MAGNESIUM: 2.1 MG/DL (ref 1.8–2.4)
MCH RBC QN AUTO: 28.7 PG (ref 27–31)
MCHC RBC AUTO-ENTMCNC: 30.6 % (ref 32–36)
MCV RBC AUTO: 93.8 FL (ref 78–100)
MONOCYTES ABSOLUTE: 0.2 K/CU MM
MONOCYTES RELATIVE PERCENT: 1.5 % (ref 0–4)
MRSA, DNA, NASAL: NEGATIVE
NUCLEATED RBC %: 0 %
PDW BLD-RTO: 15.9 % (ref 11.7–14.9)
PHOSPHORUS: 4.9 MG/DL (ref 2.5–4.9)
PLATELET # BLD: 227 K/CU MM (ref 140–440)
PMV BLD AUTO: 9.6 FL (ref 7.5–11.1)
POTASSIUM SERPL-SCNC: 3.5 MMOL/L (ref 3.5–5.1)
PROCALCITONIN SERPL-MCNC: 0.07 NG/ML
PROCALCITONIN SERPL-MCNC: 0.08 NG/ML
RBC # BLD: 4.22 M/CU MM (ref 4.2–5.4)
RHEUMATOID FACTOR: 19 IU/ML (ref 0–14)
RSV RNA NPH QL NAA+NON-PROBE: NOT DETECTED
RV+EV RNA NPH QL NAA+NON-PROBE: NOT DETECTED
S PNEUM AG CSF QL: NORMAL
SARS-COV-2 RNA NPH QL NAA+NON-PROBE: NOT DETECTED
SEGMENTED NEUTROPHILS ABSOLUTE COUNT: 12.6 K/CU MM
SEGMENTED NEUTROPHILS RELATIVE PERCENT: 95 % (ref 36–66)
SODIUM BLD-SCNC: 135 MMOL/L (ref 135–145)
SPECIMEN DESCRIPTION: NORMAL
TOTAL IMMATURE NEUTOROPHIL: 0.05 K/CU MM
TOTAL NUCLEATED RBC: 0 K/CU MM
WBC # BLD: 13.2 K/CU MM (ref 4–10.5)

## 2023-12-28 PROCEDURE — 93321 DOPPLER ECHO F-UP/LMTD STD: CPT | Performed by: INTERNAL MEDICINE

## 2023-12-28 PROCEDURE — 6360000002 HC RX W HCPCS

## 2023-12-28 PROCEDURE — 71045 X-RAY EXAM CHEST 1 VIEW: CPT

## 2023-12-28 PROCEDURE — C9113 INJ PANTOPRAZOLE SODIUM, VIA: HCPCS | Performed by: STUDENT IN AN ORGANIZED HEALTH CARE EDUCATION/TRAINING PROGRAM

## 2023-12-28 PROCEDURE — 93325 DOPPLER ECHO COLOR FLOW MAPG: CPT | Performed by: INTERNAL MEDICINE

## 2023-12-28 PROCEDURE — 94640 AIRWAY INHALATION TREATMENT: CPT

## 2023-12-28 PROCEDURE — 2700000000 HC OXYGEN THERAPY PER DAY

## 2023-12-28 PROCEDURE — 2580000003 HC RX 258: Performed by: STUDENT IN AN ORGANIZED HEALTH CARE EDUCATION/TRAINING PROGRAM

## 2023-12-28 PROCEDURE — 82330 ASSAY OF CALCIUM: CPT

## 2023-12-28 PROCEDURE — 1200000000 HC SEMI PRIVATE

## 2023-12-28 PROCEDURE — 6370000000 HC RX 637 (ALT 250 FOR IP): Performed by: STUDENT IN AN ORGANIZED HEALTH CARE EDUCATION/TRAINING PROGRAM

## 2023-12-28 PROCEDURE — 0BH17EZ INSERTION OF ENDOTRACHEAL AIRWAY INTO TRACHEA, VIA NATURAL OR ARTIFICIAL OPENING: ICD-10-PCS | Performed by: STUDENT IN AN ORGANIZED HEALTH CARE EDUCATION/TRAINING PROGRAM

## 2023-12-28 PROCEDURE — 83605 ASSAY OF LACTIC ACID: CPT

## 2023-12-28 PROCEDURE — 2500000003 HC RX 250 WO HCPCS: Performed by: STUDENT IN AN ORGANIZED HEALTH CARE EDUCATION/TRAINING PROGRAM

## 2023-12-28 PROCEDURE — 99233 SBSQ HOSP IP/OBS HIGH 50: CPT | Performed by: INTERNAL MEDICINE

## 2023-12-28 PROCEDURE — 89220 SPUTUM SPECIMEN COLLECTION: CPT

## 2023-12-28 PROCEDURE — 6360000002 HC RX W HCPCS: Performed by: STUDENT IN AN ORGANIZED HEALTH CARE EDUCATION/TRAINING PROGRAM

## 2023-12-28 PROCEDURE — 5A1945Z RESPIRATORY VENTILATION, 24-96 CONSECUTIVE HOURS: ICD-10-PCS | Performed by: STUDENT IN AN ORGANIZED HEALTH CARE EDUCATION/TRAINING PROGRAM

## 2023-12-28 PROCEDURE — 80048 BASIC METABOLIC PNL TOTAL CA: CPT

## 2023-12-28 PROCEDURE — 93308 TTE F-UP OR LMTD: CPT

## 2023-12-28 PROCEDURE — 94761 N-INVAS EAR/PLS OXIMETRY MLT: CPT

## 2023-12-28 PROCEDURE — 2000000000 HC ICU R&B

## 2023-12-28 PROCEDURE — 83735 ASSAY OF MAGNESIUM: CPT

## 2023-12-28 PROCEDURE — 94003 VENT MGMT INPAT SUBQ DAY: CPT

## 2023-12-28 PROCEDURE — 84145 PROCALCITONIN (PCT): CPT

## 2023-12-28 PROCEDURE — 93308 TTE F-UP OR LMTD: CPT | Performed by: INTERNAL MEDICINE

## 2023-12-28 PROCEDURE — 84100 ASSAY OF PHOSPHORUS: CPT

## 2023-12-28 PROCEDURE — 82962 GLUCOSE BLOOD TEST: CPT

## 2023-12-28 PROCEDURE — 2500000003 HC RX 250 WO HCPCS

## 2023-12-28 PROCEDURE — 85025 COMPLETE CBC W/AUTO DIFF WBC: CPT

## 2023-12-28 RX ORDER — MORPHINE SULFATE 4 MG/ML
10 INJECTION, SOLUTION INTRAMUSCULAR; INTRAVENOUS ONCE
Status: COMPLETED | OUTPATIENT
Start: 2023-12-28 | End: 2023-12-28

## 2023-12-28 RX ORDER — DIAZEPAM 5 MG/1
5 TABLET ORAL EVERY 6 HOURS
Status: DISCONTINUED | OUTPATIENT
Start: 2023-12-28 | End: 2023-12-29

## 2023-12-28 RX ORDER — NOREPINEPHRINE BITARTRATE 0.06 MG/ML
1-100 INJECTION, SOLUTION INTRAVENOUS CONTINUOUS
Status: DISCONTINUED | OUTPATIENT
Start: 2023-12-28 | End: 2023-12-29

## 2023-12-28 RX ORDER — DEXMEDETOMIDINE HYDROCHLORIDE 4 UG/ML
.1-1.5 INJECTION, SOLUTION INTRAVENOUS CONTINUOUS
Status: DISCONTINUED | OUTPATIENT
Start: 2023-12-28 | End: 2023-12-29

## 2023-12-28 RX ORDER — INSULIN LISPRO 100 [IU]/ML
0-8 INJECTION, SOLUTION INTRAVENOUS; SUBCUTANEOUS EVERY 4 HOURS
Status: DISCONTINUED | OUTPATIENT
Start: 2023-12-28 | End: 2023-12-29

## 2023-12-28 RX ORDER — POTASSIUM CHLORIDE 7.45 MG/ML
10 INJECTION INTRAVENOUS
Status: COMPLETED | OUTPATIENT
Start: 2023-12-28 | End: 2023-12-28

## 2023-12-28 RX ORDER — OXYCODONE HYDROCHLORIDE 5 MG/1
5 TABLET ORAL EVERY 6 HOURS
Status: DISCONTINUED | OUTPATIENT
Start: 2023-12-28 | End: 2023-12-29

## 2023-12-28 RX ORDER — DIAZEPAM 2 MG/1
2 TABLET ORAL EVERY 6 HOURS
Status: DISCONTINUED | OUTPATIENT
Start: 2023-12-28 | End: 2023-12-28

## 2023-12-28 RX ORDER — MIDAZOLAM HYDROCHLORIDE 2 MG/2ML
2 INJECTION, SOLUTION INTRAMUSCULAR; INTRAVENOUS
Status: DISCONTINUED | OUTPATIENT
Start: 2023-12-28 | End: 2023-12-29

## 2023-12-28 RX ORDER — PREDNISONE 20 MG/1
40 TABLET ORAL DAILY
Status: DISCONTINUED | OUTPATIENT
Start: 2023-12-29 | End: 2023-12-29

## 2023-12-28 RX ORDER — GLYCOPYRROLATE 0.2 MG/ML
0.2 INJECTION INTRAMUSCULAR; INTRAVENOUS EVERY 4 HOURS PRN
Status: DISCONTINUED | OUTPATIENT
Start: 2023-12-28 | End: 2023-12-30 | Stop reason: HOSPADM

## 2023-12-28 RX ORDER — LORAZEPAM 2 MG/ML
0.5 INJECTION INTRAMUSCULAR
Status: DISCONTINUED | OUTPATIENT
Start: 2023-12-28 | End: 2023-12-30

## 2023-12-28 RX ORDER — IPRATROPIUM BROMIDE AND ALBUTEROL SULFATE 2.5; .5 MG/3ML; MG/3ML
1 SOLUTION RESPIRATORY (INHALATION) EVERY 4 HOURS PRN
Status: DISCONTINUED | OUTPATIENT
Start: 2023-12-28 | End: 2023-12-29

## 2023-12-28 RX ORDER — LORAZEPAM 2 MG/ML
1 INJECTION INTRAMUSCULAR
Status: DISCONTINUED | OUTPATIENT
Start: 2023-12-28 | End: 2023-12-30

## 2023-12-28 RX ADMIN — APIXABAN 5 MG: 5 TABLET, FILM COATED ORAL at 21:13

## 2023-12-28 RX ADMIN — SODIUM CHLORIDE, PRESERVATIVE FREE 10 ML: 5 INJECTION INTRAVENOUS at 21:09

## 2023-12-28 RX ADMIN — PROPOFOL 15 MCG/KG/MIN: 10 INJECTION, EMULSION INTRAVENOUS at 21:24

## 2023-12-28 RX ADMIN — ASPIRIN 81 MG: 81 TABLET, CHEWABLE ORAL at 09:17

## 2023-12-28 RX ADMIN — CHLORHEXIDINE GLUCONATE 0.12% ORAL RINSE 15 ML: 1.2 LIQUID ORAL at 21:13

## 2023-12-28 RX ADMIN — PANTOPRAZOLE SODIUM 40 MG: 40 INJECTION, POWDER, FOR SOLUTION INTRAVENOUS at 09:17

## 2023-12-28 RX ADMIN — CARBOXYMETHYLCELLULOSE SODIUM 1 DROP: 10 GEL OPHTHALMIC at 00:40

## 2023-12-28 RX ADMIN — IPRATROPIUM BROMIDE AND ALBUTEROL SULFATE 1 DOSE: 2.5; .5 SOLUTION RESPIRATORY (INHALATION) at 10:55

## 2023-12-28 RX ADMIN — BUDESONIDE AND FORMOTEROL FUMARATE DIHYDRATE 2 PUFF: 160; 4.5 AEROSOL RESPIRATORY (INHALATION) at 07:53

## 2023-12-28 RX ADMIN — POTASSIUM CHLORIDE 10 MEQ: 7.46 INJECTION, SOLUTION INTRAVENOUS at 02:00

## 2023-12-28 RX ADMIN — FENTANYL CITRATE 50 MCG: 50 INJECTION INTRAMUSCULAR; INTRAVENOUS at 09:05

## 2023-12-28 RX ADMIN — MIDAZOLAM 2 MG: 1 INJECTION INTRAMUSCULAR; INTRAVENOUS at 18:19

## 2023-12-28 RX ADMIN — OXYCODONE HYDROCHLORIDE 5 MG: 5 TABLET ORAL at 18:19

## 2023-12-28 RX ADMIN — POTASSIUM CHLORIDE 10 MEQ: 7.46 INJECTION, SOLUTION INTRAVENOUS at 00:46

## 2023-12-28 RX ADMIN — MORPHINE SULFATE 1 MG/HR: 10 INJECTION INTRAVENOUS at 22:20

## 2023-12-28 RX ADMIN — IPRATROPIUM BROMIDE AND ALBUTEROL SULFATE 1 DOSE: 2.5; .5 SOLUTION RESPIRATORY (INHALATION) at 19:15

## 2023-12-28 RX ADMIN — BUDESONIDE AND FORMOTEROL FUMARATE DIHYDRATE 2 PUFF: 160; 4.5 AEROSOL RESPIRATORY (INHALATION) at 19:16

## 2023-12-28 RX ADMIN — IPRATROPIUM BROMIDE AND ALBUTEROL SULFATE 1 DOSE: 2.5; .5 SOLUTION RESPIRATORY (INHALATION) at 07:52

## 2023-12-28 RX ADMIN — RISPERIDONE 0.5 MG: 0.5 TABLET ORAL at 21:13

## 2023-12-28 RX ADMIN — RISPERIDONE 0.5 MG: 0.5 TABLET ORAL at 09:18

## 2023-12-28 RX ADMIN — APIXABAN 5 MG: 5 TABLET, FILM COATED ORAL at 09:17

## 2023-12-28 RX ADMIN — METHYLPREDNISOLONE SODIUM SUCCINATE 60 MG: 125 INJECTION, POWDER, LYOPHILIZED, FOR SOLUTION INTRAMUSCULAR; INTRAVENOUS at 00:39

## 2023-12-28 RX ADMIN — CARBOXYMETHYLCELLULOSE SODIUM 1 DROP: 10 GEL OPHTHALMIC at 18:23

## 2023-12-28 RX ADMIN — OXYCODONE HYDROCHLORIDE 5 MG: 5 TABLET ORAL at 12:04

## 2023-12-28 RX ADMIN — CHLORHEXIDINE GLUCONATE 0.12% ORAL RINSE 15 ML: 1.2 LIQUID ORAL at 09:18

## 2023-12-28 RX ADMIN — FENTANYL CITRATE 50 MCG: 50 INJECTION INTRAMUSCULAR; INTRAVENOUS at 01:05

## 2023-12-28 RX ADMIN — CARBOXYMETHYLCELLULOSE SODIUM 1 DROP: 10 GEL OPHTHALMIC at 03:45

## 2023-12-28 RX ADMIN — MIDAZOLAM 2 MG: 1 INJECTION INTRAMUSCULAR; INTRAVENOUS at 16:14

## 2023-12-28 RX ADMIN — DIAZEPAM 2 MG: 2 TABLET ORAL at 15:43

## 2023-12-28 RX ADMIN — DIAZEPAM 2 MG: 2 TABLET ORAL at 09:17

## 2023-12-28 RX ADMIN — DEXMEDETOMIDINE HYDROCHLORIDE 0.2 MCG/KG/HR: 4 INJECTION, SOLUTION INTRAVENOUS at 09:31

## 2023-12-28 RX ADMIN — FUROSEMIDE 40 MG: 10 INJECTION, SOLUTION INTRAMUSCULAR; INTRAVENOUS at 18:19

## 2023-12-28 RX ADMIN — IPRATROPIUM BROMIDE AND ALBUTEROL SULFATE 1 DOSE: 2.5; .5 SOLUTION RESPIRATORY (INHALATION) at 03:53

## 2023-12-28 RX ADMIN — MORPHINE SULFATE 10 MG: 4 INJECTION, SOLUTION INTRAMUSCULAR; INTRAVENOUS at 22:04

## 2023-12-28 RX ADMIN — METHYLPREDNISOLONE SODIUM SUCCINATE 60 MG: 125 INJECTION, POWDER, LYOPHILIZED, FOR SOLUTION INTRAMUSCULAR; INTRAVENOUS at 06:46

## 2023-12-28 RX ADMIN — CEFTRIAXONE SODIUM 2000 MG: 2 INJECTION, POWDER, FOR SOLUTION INTRAMUSCULAR; INTRAVENOUS at 12:21

## 2023-12-28 RX ADMIN — LORAZEPAM 0.5 MG: 2 INJECTION, SOLUTION INTRAMUSCULAR; INTRAVENOUS at 22:05

## 2023-12-28 RX ADMIN — PROPOFOL 15 MCG/KG/MIN: 10 INJECTION, EMULSION INTRAVENOUS at 04:39

## 2023-12-28 RX ADMIN — DIAZEPAM 5 MG: 5 TABLET ORAL at 21:16

## 2023-12-28 RX ADMIN — NOREPINEPHRINE BITARTRATE 5 MCG/MIN: 0.06 INJECTION, SOLUTION INTRAVENOUS at 06:31

## 2023-12-28 RX ADMIN — DIGOXIN 125 MCG: 125 TABLET ORAL at 09:17

## 2023-12-28 RX ADMIN — IPRATROPIUM BROMIDE AND ALBUTEROL SULFATE 1 DOSE: 2.5; .5 SOLUTION RESPIRATORY (INHALATION) at 15:49

## 2023-12-28 RX ADMIN — SODIUM CHLORIDE, PRESERVATIVE FREE 10 ML: 5 INJECTION INTRAVENOUS at 09:18

## 2023-12-28 RX ADMIN — MIDAZOLAM 2 MG: 1 INJECTION INTRAMUSCULAR; INTRAVENOUS at 12:04

## 2023-12-28 RX ADMIN — FUROSEMIDE 40 MG: 10 INJECTION, SOLUTION INTRAMUSCULAR; INTRAVENOUS at 09:17

## 2023-12-28 ASSESSMENT — PULMONARY FUNCTION TESTS
PIF_VALUE: 6
PIF_VALUE: 20
PIF_VALUE: 16
PIF_VALUE: 15
PIF_VALUE: 16
PIF_VALUE: 16
PIF_VALUE: 17
PIF_VALUE: 16
PIF_VALUE: 27
PIF_VALUE: 18
PIF_VALUE: 17
PIF_VALUE: 16
PIF_VALUE: 16
PIF_VALUE: 17
PIF_VALUE: 40
PIF_VALUE: 17
PIF_VALUE: 22
PIF_VALUE: 16
PIF_VALUE: 22
PIF_VALUE: 16
PIF_VALUE: 17
PIF_VALUE: 16

## 2023-12-28 ASSESSMENT — PAIN SCALES - GENERAL
PAINLEVEL_OUTOF10: 0

## 2023-12-28 NOTE — PROGRESS NOTES
Pt daughter called and update given on plan of care and application of soft wrist restraints. Pt daughter expressed concern regarding code status and that she is is POA. Per daughter, pt expressed that pt had expressed to adult kids prior to hospitalization that she does not wish to be a full code. RN concluded conversation with pt daughter who states she plans to visit in a day or two and would like to contact CM/MD to pursue comfort care measures.

## 2023-12-28 NOTE — PROGRESS NOTES
12/28/23 1147   Patient Observation   Pulse (!) 101   Respirations (!) 49   SpO2 100 %   Observations (S)  failed SBT, placed back on full support   Vent Information   Vent Mode CPAP/PS   Ventilator Settings   FiO2  40 %   PEEP/CPAP (cmH2O) 5   Pressure Support (cm H2O) 10 cm H2O   Vent Patient Data (Readings)   Vt (Measured) 301 mL   Peak Inspiratory Pressure (cmH2O) 16 cmH2O   Rate Measured 49 br/min   Minute Volume (L/min) 15 Liters   Mean Airway Pressure (cmH2O) 9.4 cmH20   Plateau Pressure (cm H2O) 0 cm H2O   Driving Pressure -5   I:E Ratio 1:1.50   Flow Sensitivity 3 L/min   Backup Apnea On   Backup Rate 18 Breaths Per Minute   Backup Vt 450   Vent Alarm Settings   High Pressure (cmH2O) 40 cmH2O   Low Minute Volume (lpm) 2.5 L/min   High Minute Volume (lpm) 20 L/min   Low Exhaled Vt (ml) 250 mL   High Exhaled Vt (ml) 1000 mL   RR High (bpm) 40 br/min   Apnea (secs) 20 secs   Additional Respiratoray Assessments   Humidification Source HME   Ambu Bag With Mask At Bedside Yes   ETT    Placement Date/Time: 12/27/23 1600   Placed By: (c) Licensed provider  Airway Tube Size: 8 mm  Location: Oral  Secured At: 23 cm  Measured From: Lips   Secured At 23 cm   Measured From Lips   ETT Placement Right   Secured By Commercial tube stanton   Site Assessment Dry   Cuff Pressure   (mov)   B: Both Spontaneous Awakening and Breathing Trials   Spontaneous  mL   RSBI Calculated 162.79   Spontaneous Breathing Trial (SBT) Outcome RSBI>105 - SBT failure   Patient Meet Extubation Criteria No   Reasons Failed Extubation Criteria Failed SAT and/or SBT   Weaning Parameters   Spontaneous Breathing Trial Complete Yes   Respiratory Rate Observed 49

## 2023-12-28 NOTE — PROGRESS NOTES
12/28/23 1151   Patient Observation   Pulse (!) 106   Respirations 30   SpO2 100 %   Vent Information   Vent Mode AC/VC   Ventilator Settings   Vt (Set, mL) 450 mL   FiO2  40 %   Resp Rate (Set) 18 bpm   PEEP/CPAP (cmH2O) 5   Vent Patient Data (Readings)   Vt (Measured) 482 mL   Peak Inspiratory Pressure (cmH2O) 22 cmH2O   Rate Measured 30 br/min   Minute Volume (L/min) 13.6 Liters   Mean Airway Pressure (cmH2O) 13 cmH20   Plateau Pressure (cm H2O) 0 cm H2O   Driving Pressure -5   I:E Ratio 1.00:1   Flow Sensitivity 3 L/min   Vent Alarm Settings   High Pressure (cmH2O) 40 cmH2O   Low Minute Volume (lpm) 2.5 L/min   High Minute Volume (lpm) 20 L/min   Low Exhaled Vt (ml) 250 mL   High Exhaled Vt (ml) 1000 mL   RR High (bpm) 40 br/min   Apnea (secs) 20 secs

## 2023-12-28 NOTE — PROGRESS NOTES
Inpatient Progress Note 12/28/2023        Ronny Perales  1965  [id number]      Assessment/Plan:  Ronny Perales is a 60 y.o. female with pmh of congestive heart failure with reduced EF, nonischemic cardiomyopathy, active cocaine abuse, atrial fibrillation, left ventricular thrombus and CKD stage IIIa who presents with Atrial fibrillation with RVR ([address])      Problem list  Acute hypoxic respiratory failure  CHF exacerabtion  Afibwith RVR  COPD exacerbation  LV thrombus on eliquis  Polysusbtance abuse - cocaine  NICM  Troponinemia        Neuro:  intubated, sedated, titrate to RASS goal of 0 to -1. Pain controlled on current multimodal regimen. Adjust as needed. Cardio:Presented with A-fib with RVR underlying etiology remains unclear, CHF exacerbation with volume overload versus COPD exacerbation? Heart rate now in slow A-fib on digoxin, may need to initiate amiodarone? .  Known CHF, EF of 15% on repeat echo. Severe global hypokinesis. Previous concern for mobile thrombus for which she has been on anticoagulation. Will benefit from goal-directed therapy once more stable. Cardiology following, recs appreciated. Resp: Acute hypoxic respiratory failure and respiratory distress requiring mechanical ventilation. Adjust vent to optimize acid-base status. Continue home nebs and inhalers for COPD including systemic steroids. Aggressive pulmonary toileting. SAT with SBT with intent to extubate when able. GI: N.p.o. at this time, tube feeds, GI prophylaxis. : DOROTHY creatinine 1.3,  strict I's and O's with Lamb catheter, Lasix twice. Monitor electrolytes and replenish as needed. Heme: Hemoglobin of 12.1, platelets 435. No evidence of bleeding. DVT prophylaxis with Lovenox. ID: WBC of up to 13.2, no gross evidence of infection started on ceftriaxone for UTI? Radiographic imaging shows new left bibasilar opacity represent atelectasis versus developing airspace disease, stable cardiomegaly.   On CAP Ionized    Collection Time: 12/27/23 10:25 PM   Result Value Ref Range    Ionized Ca 1.19 1.12 - 1.32 mMOL/L    Calcium, Ionized 4.76 4.48 - 5.28 MG/DL   Critical Care Panel    Collection Time: 12/27/23 10:25 PM   Result Value Ref Range    Sodium 138 135 - 145 MMOL/L    Potassium 3.4 (L) 3.5 - 5.1 MMOL/L    Chloride 99 99 - 110 mMol/L    CO2 26 21 - 32 MMOL/L    Anion Gap 13 7 - 16    Glucose 151 (H) 70 - 99 MG/DL    BUN 26 (H) 6 - 23 MG/DL    Creatinine 1.4 (H) 0.6 - 1.1 MG/DL    Est, Glom Filt Rate 43 (L) >60 mL/min/1.73m2    Calcium 8.9 8.3 - 10.6 MG/DL    Phosphorus 4.8 2.5 - 4.9 MG/DL    Magnesium 2.2 1.8 - 2.4 mg/dl   Digoxin Level    Collection Time: 12/27/23 10:25 PM   Result Value Ref Range    Digoxin Lvl 1.1 0.8 - 2.0 ng/mL    DOSE AMOUNT DOSE AMT. GIVEN - UNKNOWN     DOSE TIME DOSE TIME GIVEN - UNKNOWN    Electrolytes urine random    Collection Time: 12/27/23 10:25 PM   Result Value Ref Range    Sodium, Ur 62 35 - 167 MMOL/L    Potassium, Ur 5.9 (L) 22 - 119 MMOL/L    Chloride 55 43 - 210 MMOL/L   MRSA by PCR    Collection Time: 12/27/23 10:25 PM   Result Value Ref Range    Specimen Description NASAL     MRSA, DNA, Nasal NEGATIVE NEGATIVE   Lactic Acid    Collection Time: 12/27/23 10:35 PM   Result Value Ref Range    Lactate 2.0 (HH) 0.5 - 1.9 mMOL/L   Lactic Acid    Collection Time: 12/28/23  5:00 AM   Result Value Ref Range    Lactate 2.1 (HH) 0.5 - 1.9 mMOL/L   CBC with Auto Differential    Collection Time: 12/28/23  6:00 AM   Result Value Ref Range    WBC 13.2 (H) 4.0 - 10.5 K/CU MM    RBC 4.22 4.2 - 5.4 M/CU MM    Hemoglobin 12.1 (L) 12.5 - 16.0 GM/DL    Hematocrit 39.6 37 - 47 %    MCV 93.8 78 - 100 FL    MCH 28.7 27 - 31 PG    MCHC 30.6 (L) 32.0 - 36.0 %    RDW 15.9 (H) 11.7 - 14.9 %    Platelets 227 140 - 440 K/CU MM    MPV 9.6 7.5 - 11.1 FL    Differential Type AUTOMATED DIFFERENTIAL     Segs Relative 95.0 (H) 36 - 66 %    Lymphocytes % 3.0 (L) 24 - 44 %    Monocytes % 1.5 0 - 4 %    Eosinophils

## 2023-12-28 NOTE — PROGRESS NOTES
12/28/23 0752   Patient Observation   Pulse 96   Respirations 18   SpO2 99 %   Vent Information   Vent Mode AC/VC   $Ventilation $Subsequent Day   Ventilator Settings   Vt (Set, mL) 450 mL   FiO2  40 %   Resp Rate (Set) 18 bpm   PEEP/CPAP (cmH2O) 5   Vent Patient Data (Readings)   Vt (Measured) 440 mL   Peak Inspiratory Pressure (cmH2O) 16 cmH2O   Rate Measured 18 br/min   Minute Volume (L/min) 8.22 Liters   Mean Airway Pressure (cmH2O) 8.8 cmH20   Plateau Pressure (cm H2O) 0 cm H2O   Driving Pressure -5   I:E Ratio 1:2.00   Flow Sensitivity 3 L/min   Backup Apnea On   Backup Rate 18 Breaths Per Minute   Backup Vt 450   Vent Alarm Settings   High Pressure (cmH2O) 40 cmH2O   Low Minute Volume (lpm) 2.5 L/min   High Minute Volume (lpm) 20 L/min   Low Exhaled Vt (ml) 250 mL   High Exhaled Vt (ml) 1000 mL   RR High (bpm) 40 br/min   Apnea (secs) 20 secs   Additional Respiratoray Assessments   Humidification Source HME   Ambu Bag With Mask At Bedside Yes   ETT    Placement Date/Time: 12/27/23 1600   Placed By: (c) Licensed provider  Airway Tube Size: 8 mm  Location: Oral  Secured At: 23 cm  Measured From: Lips   Secured At 23 cm   Measured From Lips   ETT Placement Right   Secured By Commercial tube stanton   Site Assessment Dry   Cuff Pressure   (mov)

## 2023-12-28 NOTE — PROGRESS NOTES
12/28/23 1202   Encounter Summary   Encounter Overview/Reason  Initial Encounter   Service Provided For: Patient   Referral/Consult From: [address] Parent; Children;Family members   Last Encounter  12/28/23  (Patient was intubated.  No family present in the room at the time of visit.)   Complexity of Encounter Low   Begin Time 1145   End Time  1150   Total Time Calculated 5 min   Assessment/Intervention/Outcome   Assessment Unable to assess   Intervention Sustaining Presence/Ministry of presence   Plan and Referrals   Plan/Referrals Continue Support (comment)

## 2023-12-28 NOTE — PROGRESS NOTES
V2.0    [facility] [place]      Name:  Shine Balbuena /Age/Sex: 1965  (60 y.o. female)   MRN & CSN:  [id number] & [id number] Encounter Date/Time: 2023 2:20 PM EST   Location:  -A PCP: No primary care provider on file. Karl Ribeiro MD       Hospital Day: 3    Assessment and Recommendations   Shine Balbuena is a 60 y.o. female  who presents with Atrial fibrillation with RVR ([address])      Plan:     # Acute decompensated HFrEF / NICM  # Acute hypoxemic respiratory failure  - Endorsed worsening SOB and orthopnea. Questionable medication complaint. Continues to use cocaine. Last TTE in 2023 with LVEF of 20-25%. Negative LHC in 2020  - Clinically hypervolemic, requiring PAP in ED. Pro-BNP >23k. CXR with pulmonary congestion. Has over 12 lbs weight gain in 1 month per chart review. - on IV diuresis. Monitor renal function   CTA reviewed - no PE. Suggestive of PHTN  Cardiology consulted. Strict I/O's. Daily weights. Telemetry. Continue Coreg and Jardiance, consider Entresto if BP tolerates. Patient got intubated and upgraded to ICU for worsening respiratory status. On vanc and rocephin for PNA   Started on Levophed for Hypotension     Per family at the bedside (per RN) patient's daughter is the POA-she has DNR/DNI paperwork from the [facility]. Patient requested mechanical ventilatory support and was intubated per her request, she was asking for tracheostomy    # Atrial fibrillation with RVR - now rate controlled  - Recently admitted for similar presentation, questionable medication compliance. - Found to have HR in 140's in ED.   - Resume Digoxin with minimal response. Started on Amiodarone gtt in setting of HFrEF.  - Likely secondary to above. Continue Eliquis and Coreg. Monitor for bleeding. Digoxin level obtained shortly after giving IV Digoxin. # LV thrombus  - TTE in 2023 showed mobile thrombus in LV.   - Resume Eliquis.       # Illicit substance 07:00 PM    SPECGRAV <1.005 12/26/2023 07:00 PM    LEUKOCYTESUR TRACE 12/26/2023 07:00 PM    UROBILINOGEN 0.2 12/26/2023 07:00 PM    BILIRUBINUR NEGATIVE 12/26/2023 07:00 PM    BLOODU TRACE 12/26/2023 07:00 PM    GLUCOSEU NEGATIVE 08/18/2012 08:01 PM    KETUA NEGATIVE 12/26/2023 07:00 PM     Urine Cultures: No results found for: \"LABURIN\"  Blood Cultures: No results found for: \"BC\"  No results found for: \"BLOODCULT2\"  Organism: No results found for: \"ORG\"      Electronically signed by Carly Beltre MD on 12/28/2023 at 1:39 PM

## 2023-12-28 NOTE — PROGRESS NOTES
Cardiology Progress Note     Admit Date:  12/26/2023    Consult reason/ Seen today for :       Subjective and  Overnight Events : She got intubated overnight due to hypoxia and more respiratory distress continues to be in A-fib    Chief complain on admission : 60 y. o.year old who is admitted for  Chief Complaint   Patient presents with    Shortness of Breath     SOB for past few days    Chest Pain     Occasional CP. States that it hurts to take a deep breath      Assessment / Plan:  Elevated Troponin : In setting of A-fib and decompensated heart failure cocaine use  Non Ischemic cardiomyopathy ejection fraction of 15% we will repeat echo  CHF: Acute Systolic/ Diastolic decompensated heart failure. Appears to be volume overloaded . Plan IV  diuresis. We can try IV Lasix 40 mg BID. And  titrate diuretics accordingly. Echo concerning for possible pulmonary valve associated thrombus/ vegetation? Overall her prognosis is very poor social work assistance possible placement drug rehab? We keep repeating the same process every few weeks  Please continue / start  Gudelines recommended medical thearpy including beta blockers  Coreg/ Toprol XL  , ACE/ARB / Entresto ,SGLT2 inhibitors   and Aldactone 25 mg daily if renal function and BP allows   No dobutamine will make her more tachycardic in setting of A-fib  Atrial Fibrillation: Chads Vasc score is ,  Plan rate control / attempt rhythm control . Noncompliant with medication can start anticoagulation at this time we will use digoxin for rate control however difficult to use beta-blockers with given her history of active cocaine use and want to avoid Cardizem due to negative inotropic agents can start amiodarone drip  DVT prophylaxis if no contraindication  6.    Dyslipidemia: continue statins        Past medical history:    has a past medical history of Anxiety, Environmental allergies,   Integument:  Warm, Dry, No erythema, No rash.   Lymphatic:  No lymphadenopathy noted.   Neurologic: Sedated  Psychiatric: Sedated    Medications:    diazePAM  2 mg Oral Q6H    oxyCODONE  5 mg Oral Q6H    [START ON 12/29/2023] predniSONE  40 mg Oral Daily    insulin lispro  0-8 Units SubCUTAneous Q4H    cefTRIAXone (ROCEPHIN) IV  2,000 mg IntraVENous Q24H    chlorhexidine  15 mL Mouth/Throat BID    carboxymethylcellulose  1 drop Both Eyes Q4H    Or    artificial tears   Both Eyes Q4H    pantoprazole  40 mg IntraVENous Daily    aspirin  81 mg Oral Daily    [Held by provider] sacubitril-valsartan  1 tablet Oral BID    [Held by provider] spironolactone  25 mg Oral Daily    ipratropium 0.5 mg-albuterol 2.5 mg  1 Dose Inhalation Q4H RT    budesonide-formoterol  2 puff Inhalation BID RT    apixaban  5 mg Oral BID    [Held by provider] carvedilol  6.25 mg Oral BID    digoxin  125 mcg Oral Daily    [Held by provider] empagliflozin  10 mg Oral Daily    risperiDONE  0.5 mg Oral BID    furosemide  40 mg IntraVENous BID    sodium chloride flush  5-40 mL IntraVENous 2 times per day      norepinephrine 1 mcg/min (12/28/23 1535)    dexmedetomidine 0.2 mcg/kg/hr (12/28/23 1212)    propofol 20 mcg/kg/min (12/28/23 1212)    sodium chloride       midazolam, fentanNYL, sodium chloride flush, sodium chloride, potassium chloride, magnesium sulfate, ondansetron **OR** ondansetron, polyethylene glycol, acetaminophen **OR** acetaminophen    Lab Data:  CBC:   Recent Labs     12/27/23  0516 12/27/23 2225 12/28/23  0600   WBC 6.3 10.5 13.2*   HGB 10.9* 11.9* 12.1*   HCT 35.4* 37.9 39.6   MCV 93.2 91.3 93.8    221 227     BMP:   Recent Labs     12/27/23  0516 12/27/23  2225 12/28/23  0600    138 135   K 3.8 3.4* 3.5    99 98*   CO2 23 26 21   PHOS  --  4.8 4.9   BUN 23 26* 26*   CREATININE 1.1 1.4* 1.3*     PT/INR:   Recent Labs     12/27/23  0516   PROTIME 16.7*   INR 1.3     BNP:    Recent Labs     12/26/23  1646

## 2023-12-28 NOTE — CONSULTS
Comprehensive Nutrition Assessment    Type and Reason for Visit:  Initial, Consult (TF order/management; vent)    Nutrition Recommendations/Plan:   Modify TF- Vital HP (peptide based high protein formula) @ 45mL/hr continuous w/ 100mL FWF Q4H to provide 1386kcal (including kcal from propofol), 94g PRO, and 1500mL fluids per day   Monitor weights, GI status, glucose, lytes, HOB, POC     Malnutrition Assessment:  Malnutrition Status: At risk for malnutrition (Comment) (12/28/23 1413)    Context:  Acute Illness       Nutrition Assessment:    Admitted w/ SOB, a-fib, with a PMHx of HFrEF, NICM, active cocaine abuse, AF, LV thrombus and CKD3a. Pt intubated yesterday d/t worsening respiratory function; failed SBT today. Sedated on propofol,+1 pressor, MAP 75. Known to dept from extended admit last mo. She was eating fair then, >50% of most of her meals. CBW up from 1mo ago. Discussion of changing pt code status to CC. Standard w/ fiber formula running at 10ml in room, order canceled; will modify/re-order to better meet estimated needs. NFPE performed, no significant wasting noted, at risk for malnutrition due to critical illness. Follow at high nutrition risk.     Nutrition Related Findings:    +levo, precedex, propofol; lasix, oxycodone, prednisone; lactate 2.1, POC glucose 135-199, GFR 47 Wound Type: None       Current Nutrition Intake & Therapies:    Average Meal Intake: NPO  Average Supplements Intake: NPO  Diet NPO Exceptions are: Ice Chips, Sips of Water with Meds  Current Tube Feeding (TF) Orders:  Feeding Route: Orogastric  Formula: Standard with Fiber  Schedule: Continuous  Feeding Regimen: 10  Additives/Modulars: None  Water Flushes: standard  Current TF & Flush Orders Provides: 360kcal, 15g PRO, 362ml fluid  Goal TF & Flush Orders Provides: Vital HP (peptide based high protein formula) @ 45mL/hr continuous w/ 100mL FWF Q4H to provide 1386kcal (including kcal from propofol), 94g PRO, and 1500mL fluids per

## 2023-12-28 NOTE — PROCEDURES
Critical Care Intubation Note   Pre-Intubation     Reason for consultation: Acute respiratory failure     Interventions prior to Anesthesiologist's arrival: IV access, AmbuBag     Procedure: Endotracheal intubation   [X] Airway equipment was checked. [X] Suction available.   [X] Monitors including pulse oximetry, NIBP, and ECG monitoring in place or applied. [X] The patient was preoxygenated. Ventilation   Ventilation: Easy   Cricoid Pressure: Yes   Rapid sequence induction: Yes   Cervical collar present: No   In line stabilization or cervical collar left in place: No     Induction   Induction was performed with 10 mg of Etomidate, and 100 mg of Rocuronium for paralysis. Induction was smooth with minimal changes in vital signs     Endotracheal Intubation   Intubation: was successful on 2 attempt. First attempt ETT in the esophagus  Route: Oral   Blade:  Mac 3   Adjuncts used: None   View of cords: Grade 1 view   ETT Size: 8.0 cuffed   Depth: 24 cm at the teeth   Confirmation: Colormetric change on ETCO2 detector x6, quantitative ETCO2   Secured with: ETT [place]   Complications: None   Intubation performed by: Joaquin Smyth MD, Critical Care Attending

## 2023-12-29 PROBLEM — I50.9 ACUTE ON CHRONIC CONGESTIVE HEART FAILURE (HCC): Status: ACTIVE | Noted: 2022-07-09

## 2023-12-29 PROBLEM — S62.339A: Status: RESOLVED | Noted: 2017-11-06 | Resolved: 2023-12-29

## 2023-12-29 PROBLEM — J44.9 CHRONIC OBSTRUCTIVE LUNG DISEASE (HCC): Status: ACTIVE | Noted: 2022-11-18

## 2023-12-29 PROBLEM — F41.9 ANXIETY: Status: ACTIVE | Noted: 2022-11-18

## 2023-12-29 PROBLEM — J96.11 CHRONIC HYPOXEMIC RESPIRATORY FAILURE (HCC): Status: ACTIVE | Noted: 2022-11-18

## 2023-12-29 PROBLEM — I50.43 CHF (CONGESTIVE HEART FAILURE), NYHA CLASS I, ACUTE ON CHRONIC, COMBINED (HCC): Status: RESOLVED | Noted: 2022-06-01 | Resolved: 2023-12-29

## 2023-12-29 PROBLEM — I50.9 ACUTE ON CHRONIC CONGESTIVE HEART FAILURE (HCC): Status: RESOLVED | Noted: 2022-07-09 | Resolved: 2023-12-29

## 2023-12-29 PROBLEM — I50.9 HEART FAILURE (HCC): Status: RESOLVED | Noted: 2020-12-04 | Resolved: 2023-12-29

## 2023-12-29 PROBLEM — R06.02 SOB (SHORTNESS OF BREATH): Status: RESOLVED | Noted: 2022-03-24 | Resolved: 2023-12-29

## 2023-12-29 PROBLEM — I50.41 ACUTE COMBINED SYSTOLIC AND DIASTOLIC CHF, NYHA CLASS 1 (HCC): Status: RESOLVED | Noted: 2021-08-29 | Resolved: 2023-12-29

## 2023-12-29 PROBLEM — I50.9 NEW ONSET OF CONGESTIVE HEART FAILURE (HCC): Status: RESOLVED | Noted: 2020-11-26 | Resolved: 2023-12-29

## 2023-12-29 LAB
MYELOPEROXIDASE AB SER-ACNC: 0 AU/ML (ref 0–19)
NUCLEAR IGG SER QL IA: NORMAL
PROTEINASE3 AB SER-ACNC: 2 AU/ML (ref 0–19)

## 2023-12-29 PROCEDURE — 1200000000 HC SEMI PRIVATE

## 2023-12-29 PROCEDURE — 99231 SBSQ HOSP IP/OBS SF/LOW 25: CPT | Performed by: INTERNAL MEDICINE

## 2023-12-29 PROCEDURE — 6360000002 HC RX W HCPCS: Performed by: FAMILY MEDICINE

## 2023-12-29 PROCEDURE — 6360000002 HC RX W HCPCS: Performed by: STUDENT IN AN ORGANIZED HEALTH CARE EDUCATION/TRAINING PROGRAM

## 2023-12-29 PROCEDURE — 94761 N-INVAS EAR/PLS OXIMETRY MLT: CPT

## 2023-12-29 RX ORDER — MORPHINE SULFATE 2 MG/ML
2 INJECTION, SOLUTION INTRAMUSCULAR; INTRAVENOUS
Status: DISCONTINUED | OUTPATIENT
Start: 2023-12-29 | End: 2023-12-29

## 2023-12-29 RX ORDER — SODIUM CHLORIDE 9 MG/ML
INJECTION, SOLUTION INTRAVENOUS PRN
Status: CANCELLED | OUTPATIENT
Start: 2023-12-29

## 2023-12-29 RX ORDER — LORAZEPAM 2 MG/ML
0.5 INJECTION INTRAMUSCULAR
Status: CANCELLED | OUTPATIENT
Start: 2023-12-29

## 2023-12-29 RX ORDER — MORPHINE SULFATE 4 MG/ML
4 INJECTION, SOLUTION INTRAMUSCULAR; INTRAVENOUS
Status: DISCONTINUED | OUTPATIENT
Start: 2023-12-29 | End: 2023-12-29

## 2023-12-29 RX ORDER — SODIUM CHLORIDE 0.9 % (FLUSH) 0.9 %
5-40 SYRINGE (ML) INJECTION PRN
Status: CANCELLED | OUTPATIENT
Start: 2023-12-29

## 2023-12-29 RX ORDER — ACETAMINOPHEN 325 MG/1
650 TABLET ORAL EVERY 4 HOURS PRN
Status: CANCELLED | OUTPATIENT
Start: 2023-12-29

## 2023-12-29 RX ORDER — MORPHINE SULFATE 4 MG/ML
4 INJECTION, SOLUTION INTRAMUSCULAR; INTRAVENOUS EVERY 4 HOURS PRN
Status: DISCONTINUED | OUTPATIENT
Start: 2023-12-29 | End: 2023-12-30 | Stop reason: HOSPADM

## 2023-12-29 RX ORDER — MORPHINE SULFATE 2 MG/ML
2 INJECTION, SOLUTION INTRAMUSCULAR; INTRAVENOUS EVERY 4 HOURS PRN
Status: DISCONTINUED | OUTPATIENT
Start: 2023-12-29 | End: 2023-12-30 | Stop reason: HOSPADM

## 2023-12-29 RX ORDER — ONDANSETRON 4 MG/1
4 TABLET, ORALLY DISINTEGRATING ORAL EVERY 8 HOURS PRN
Status: CANCELLED | OUTPATIENT
Start: 2023-12-29

## 2023-12-29 RX ORDER — MORPHINE SULFATE 4 MG/ML
4 INJECTION, SOLUTION INTRAMUSCULAR; INTRAVENOUS
Status: CANCELLED | OUTPATIENT
Start: 2023-12-29

## 2023-12-29 RX ORDER — MORPHINE SULFATE 2 MG/ML
2 INJECTION, SOLUTION INTRAMUSCULAR; INTRAVENOUS
Status: CANCELLED | OUTPATIENT
Start: 2023-12-29

## 2023-12-29 RX ORDER — ONDANSETRON 2 MG/ML
4 INJECTION INTRAMUSCULAR; INTRAVENOUS EVERY 6 HOURS PRN
Status: CANCELLED | OUTPATIENT
Start: 2023-12-29

## 2023-12-29 RX ORDER — GLYCOPYRROLATE 0.2 MG/ML
0.2 INJECTION INTRAMUSCULAR; INTRAVENOUS EVERY 4 HOURS PRN
Status: CANCELLED | OUTPATIENT
Start: 2023-12-29

## 2023-12-29 RX ORDER — HALOPERIDOL 5 MG/ML
1 INJECTION INTRAMUSCULAR EVERY 4 HOURS PRN
Status: CANCELLED | OUTPATIENT
Start: 2023-12-29

## 2023-12-29 RX ADMIN — MORPHINE SULFATE 2 MG: 2 INJECTION, SOLUTION INTRAMUSCULAR; INTRAVENOUS at 17:14

## 2023-12-29 RX ADMIN — LORAZEPAM 1 MG: 2 INJECTION, SOLUTION INTRAMUSCULAR; INTRAVENOUS at 01:07

## 2023-12-29 RX ADMIN — LORAZEPAM 1 MG: 2 INJECTION, SOLUTION INTRAMUSCULAR; INTRAVENOUS at 06:13

## 2023-12-29 RX ADMIN — LORAZEPAM 1 MG: 2 INJECTION, SOLUTION INTRAMUSCULAR; INTRAVENOUS at 03:10

## 2023-12-29 RX ADMIN — MORPHINE SULFATE 4 MG: 4 INJECTION, SOLUTION INTRAMUSCULAR; INTRAVENOUS at 22:12

## 2023-12-29 ASSESSMENT — PAIN SCALES - GENERAL
PAINLEVEL_OUTOF10: 7
PAINLEVEL_OUTOF10: 0
PAINLEVEL_OUTOF10: 7
PAINLEVEL_OUTOF10: 0

## 2023-12-29 ASSESSMENT — PAIN DESCRIPTION - DESCRIPTORS: DESCRIPTORS: ACHING

## 2023-12-29 ASSESSMENT — PAIN SCALES - WONG BAKER: WONGBAKER_NUMERICALRESPONSE: 0

## 2023-12-29 ASSESSMENT — PAIN DESCRIPTION - LOCATION: LOCATION: GENERALIZED

## 2023-12-29 NOTE — PROGRESS NOTES
Cardiology Progress Note     Admit Date:  2023    Consult reason/ Seen today for :       Subjective and  Overnight Events : She got extubated overnight . Plan is for hospice to take over care currently on morphine drip    Chief complain on admission : 60 y. o.year old who is admitted for  Chief Complaint   Patient presents with    Shortness of Breath     SOB for past few days    Chest Pain     Occasional CP. States that it hurts to take a deep breath      Assessment / Plan:  Elevated Troponin : In setting of A-fib and decompensated heart failure cocaine use  Non Ischemic cardiomyopathy ejection fraction of 15%   CHF: Acute Systolic/ Diastolic decompensated heart failure. Appears to be volume overloaded . IV Lasix 40 mg BID. And  titrate diuretics accordingly. Echo concerning for possible pulmonary valve associated thrombus/ vegetation? Overall her prognosis is very poor . Agree with hospice   DVT prophylaxis if no contraindication  6. Dyslipidemia: continue statins   Will sign off call with questions    Past medical history:    has a past medical history of Acute on chronic systolic (congestive) heart failure ([address]), Anxiety, Chronic obstructive lung disease ([address]), Cocaine use disorder, severe, dependence ([address]), Delirium due to medical condition with behavioral disturbance, Environmental allergies, Non-healing surgical wound, Non-ischemic cardiomyopathy ([address]), RLS (restless legs syndrome), and Systolic CHF, acute on chronic ([address]). Past surgical history:   has a past surgical history that includes Cholecystectomy; Appendectomy;  section; and knee surgery (). Social History:   reports that she has been smoking cigarettes. She has never used smokeless tobacco. She reports current drug use. Frequency: 5.00 times per week. Drug: Cocaine. She reports that she does not drink alcohol.   Family history:  family history

## 2023-12-29 NOTE — CONSULTS
Axel Enrique MD   urea (URE-NA) 15 g PACK packet Take 15 g by mouth daily for 7 days 12/23/23 12/30/23  Axel Enrique MD   apixaban (ELIQUIS) 5 MG TABS tablet Take 1 tablet by mouth 2 times daily 12/6/23 1/5/24  Tammy Page MD   empagliflozin (JARDIANCE) 10 MG tablet Take 1 tablet by mouth daily 12/6/23   Tammy Page MD   risperiDONE (RISPERDAL) 0.5 MG tablet Take 1 tablet by mouth 2 times daily 12/6/23   Tammy Page MD   aspirin 81 MG chewable tablet Take 1 tablet by mouth daily 3/27/22   Barrington Luong MD   nitroGLYCERIN (NITROSTAT) 0.4 MG SL tablet up to max of 3 total doses. If no relief after 1 dose, call 911.  Patient taking differently: Place 1 tablet under the tongue every 5 minutes as needed up to max of 3 total doses. If no relief after 1 dose, call 911. 12/18/20   Lonnie Yoder MD   albuterol sulfate  (90 Base) MCG/ACT inhaler Inhale 2 puffs into the lungs every 6 hours as needed for Wheezing 12/18/20   Lonnie Yoder MD       ROS: As noted in [place], all other systems are unobtainable due to the patient's clinical condition    Weight:    Wt Readings from Last 5 Encounters:   12/28/23 77.1 kg (170 lb)   12/22/23 77.4 kg (170 lb 10.2 oz)   11/28/23 71.7 kg (158 lb)   07/18/22 78.9 kg (174 lb)   06/10/22 81.6 kg (179 lb 14.3 oz)       Data reviewed 12/29/2023:  Transthoracic Echocardiogram 12/27/23    Left Ventricle: Severely reduced left ventricular systolic function with a visually estimated EF of 15 - 20%. Left ventricle is severely dilated. Mildly increased wall thickness.    Left Atrium: Left atrium is severely dilated.    Right Ventricle: Right ventricle is dilated.    Right Atrium: Right atrium is dilated.    Aortic Valve: Mild to moderate regurgitation.    Pulmonary Arteries: Main pulmonary artery is severely dilated: pulmonic root 4.2cm, MPA: 3.7cm, RPA: 1.4cm, LPA: 1.9cm. Mobile echodensity noted  attached at pulmonic leaflet and  R41.82    Severe left ventricular systolic dysfunction W18.2    Delirium due to medical condition with behavioral disturbance F05    Cocaine use disorder, severe, dependence ([address]) F14.20    Atrial fibrillation with RVR ([facility]) I48.91    Nondependent cocaine abuse ([facility]) F14.10    Chronic hypoxemic respiratory failure ([facility]) J96.11    Anxiety F41.9    Chronic obstructive lung disease ([address]) Z19.7       Certification of Terminal Illness: I certify that this patient is eligible for General Inpatient Hospice services for a terminal diagnosis of heart failure with reduced ejection fraction and nonischemic cardiomyopathy  with a life expectancy predicted to be less than 6 months, if the illness follows its expected course.     Dorn Rubinstein, MD

## 2023-12-29 NOTE — ACP (ADVANCE CARE PLANNING)
Had discussion with patient's daughter over the phone about patient's current condition and CODE STATUS. Daughter had stated that patient had previously signed DNR DNI paperwork wishing to transition to comfort care, not wanting any heroic measures. She stated that she would like to honor her mother's wishes and transition to DNR CCA at this time with intent to transition to DNR CC upon the arrival for additional families. Patient's daughter stated that she will be coming tonight along with her brother, aunt and grandmother to visit the patient prior to transitioning to comfort care. I had spent more than 20 minutes performing advance care planning. Orders placed in epic. CODE STATUS adjusted.

## 2023-12-29 NOTE — CARE COORDINATION
Cm received order for hospice consult. Cm met with dgt, Twila, at bedside. Twila states that family decided to withdraw care last evening and to focus on comfort care. CM discussed hospice options for GIP hospice in the hospital setting and dgt elected [address] [place] [address] as they have had them with another family member. Cm contacted [address] [place] [address], spoke with Faby Orozco and completed referral.    2256 Received return call from j-Grab at [address] [place] [address] stating that hospice is scheduled to meet with family tomorrow 12/30 at 56.

## 2023-12-29 NOTE — PROGRESS NOTES
V2.0    [facility] Progress Note      Name:  Deanna Francisco /Age/Sex: 1965  (60 y.o. female)   MRN & CSN:  [id number] & [id number] Encounter Date/Time: 2023 2:20 PM EST   Location:  -A PCP: No primary care provider on file. Kaitlin Fischer MD       Hospital Day: 4    Assessment and Recommendations   Deanna Francisco is a 60 y.o. female  who presents with Atrial fibrillation with RVR ([address])      Plan:     Patient has been transitioned to Comfort care by ICU team after discussion with family. Hospice consulted. Now off vent and vasopressors. # Acute decompensated HFrEF / NICM  # Acute hypoxemic respiratory failure  - Endorsed worsening SOB and orthopnea. Questionable medication complaint. Continues to use cocaine. Last TTE in 2023 with LVEF of 20-25%. Negative LHC in 2020  - Clinically hypervolemic, requiring PAP in ED. Pro-BNP >23k. CXR with pulmonary congestion. Has over 12 lbs weight gain in 1 month per chart review. - on IV diuresis. Monitor renal function   CTA reviewed - no PE. Suggestive of PHTN  Cardiology consulted. Strict I/O's. Daily weights. Telemetry. Continue Coreg and Jardiance, consider Entresto if BP tolerates. Patient got intubated and upgraded to ICU for worsening respiratory status. On vanc and rocephin for PNA          # Atrial fibrillation with RVR - now rate controlled  - Recently admitted for similar presentation, questionable medication compliance. - Found to have HR in 140's in ED.   - Resume Digoxin with minimal response. Started on Amiodarone gtt in setting of HFrEF.  - Likely secondary to above. Continue Eliquis and Coreg. Monitor for bleeding. Digoxin level obtained shortly after giving IV Digoxin. # LV thrombus  - TTE in 2023 showed mobile thrombus in LV.   - Resume Eliquis.       # Illicit substance abuse  - Continue to actively use cocaine.   - UDS positive for cocaine.   - Monitor for sxs of withdrawal.      # Acute toxic bone or soft tissue abnormality.  Normal visualized thyroid gland.     1. No central pulmonary embolism is identified. 2. Cardiomegaly with reflux of contrast into the IVC/hepatic veins, which can be seen the setting of right heart dysfunction. 3. Dilated main pulmonary artery, concerning for pulmonary artery hypertension.     XR CHEST PORTABLE    Result Date: 12/26/2023  EXAMINATION: ONE XRAY VIEW OF THE CHEST 12/26/2023 4:22 pm COMPARISON: 12/21/2023 HISTORY: Reason for Exam: Sepsis FINDINGS: Stable cardiomegaly.  No focal consolidation in the lungs. No pleural effusion or pneumothorax.     No acute abnormality.     XR CHEST PORTABLE    Result Date: 12/21/2023  EXAMINATION: ONE XRAY VIEW OF THE CHEST 12/21/2023 2:26 pm COMPARISON: 12/18/2023 HISTORY: ORDERING SYSTEM PROVIDED HISTORY: chf TECHNOLOGIST PROVIDED HISTORY: Reason for exam:->chf Reason for Exam: chf Additional signs and symptoms: na Relevant Medical/Surgical History: na FINDINGS: Stable moderate cardiomegaly. Improved pulmonary vascular congestion. Small right pleural effusion noted on the prior study has resolved.  No pneumothorax.  No acute bone finding.     Improved pulmonary vascular congestion with resolution of the small right pleural effusion.  Stable cardiomegaly.       CBC:   Recent Labs     12/27/23  0516 12/27/23  2225 12/28/23  0600   WBC 6.3 10.5 13.2*   HGB 10.9* 11.9* 12.1*    221 227       BMP:    Recent Labs     12/27/23  0516 12/27/23  2225 12/28/23  0600    138 135   K 3.8 3.4* 3.5    99 98*   CO2 23 26 21   BUN 23 26* 26*   CREATININE 1.1 1.4* 1.3*   GLUCOSE 127* 151* 135*       Hepatic:   Recent Labs     12/26/23  1646 12/27/23  0516 12/27/23  2225   AST 35 17 19   ALT 30 23 27   BILITOT 0.8 0.8 0.7   ALKPHOS 83 70 82       Lipids:   Lab Results   Component Value Date/Time    CHOL 92 11/05/2023 06:02 AM    HDL 8 11/05/2023 06:02 AM    TRIG 133 11/05/2023 06:02 AM     Hemoglobin A1C: No results found for:

## 2023-12-29 NOTE — PROGRESS NOTES
Comprehensive Nutrition Assessment    Type and Reason for Visit:  Reassess    Nutrition Recommendations/Plan:   May offer oral diet if patient alert/able to take po  If aggressive means of nutrition intervention are desired, please re-consult RD for nutrition support recommendations   Otherwise, please provide adequate oral care for comfort  Will closely monitor POC     Malnutrition Assessment:  Malnutrition Status: At risk for malnutrition (Comment) (12/28/23 1413)    Context:  Acute Illness       Nutrition Assessment:    Pt now comfort cares measures only, extubated last night. Plan for hospice care. Currently NPO on room air. May offer oral diet if patient alert/able to take po. Otherwise, please provide adequate oral care for comfort. If aggressive means of nutrition intervention are desired, please re-consult RD. Will follow at high nutrition risk at this time. Nutrition Related Findings:    meds d/c'd, on morphine gtt; POC glucose 129-199, GFR 47, lactic acid 2.1 Wound Type: None       Current Nutrition Intake & Therapies:    Average Meal Intake: NPO  Average Supplements Intake: NPO  Diet NPO Exceptions are: Ice Chips, Sips of Water with Meds    Anthropometric Measures:  Height: 170.2 cm (5' 7\")  Ideal Body Weight (IBW): 135 lbs (61 kg)    Admission Body Weight: 77.1 kg (170 lb)  Current Body Weight: 77.1 kg (169 lb 15.6 oz),   IBW. Weight Source: Stated  Current BMI (kg/m2): 26.6  Usual Body Weight: 72.1 kg (158 lb 15.2 oz) (11/19/23)  % Weight Change (Calculated): 6.9  Weight Adjustment For: No Adjustment                 BMI Categories: Overweight (BMI 25.0-29. 9)    Estimated Daily Nutrient Needs:  Energy Requirements Based On: Formula  Weight Used for Energy Requirements: Current  Energy (kcal/day): 7982-1325 (MSJ)  Weight Used for Protein Requirements: Ideal  Protein (g/day): 68-80 (1.3-1.5g/kg IBW)  Method Used for Fluid Requirements: 1 ml/kcal  Fluid (ml/day): 1900    Nutrition Diagnosis:   Inadequate oral intake related to impaired respiratory function, acute injury/trauma as evidenced by NPO or clear liquid status due to medical condition    Nutrition Interventions:   Food and/or Nutrient Delivery: Continue NPO  Nutrition Education/Counseling: No recommendation at this time  Coordination of Nutrition Care: Continue to monitor while inpatient, Coordination of Care  Plan of Care discussed with: RN    Goals:  Previous Goal Met: No Progress toward Goal(s)  Goals: Initiate PO diet, Initiate nutrition support (if within POC)       Nutrition Monitoring and Evaluation:   Behavioral-Environmental Outcomes: None Identified  Food/Nutrient Intake Outcomes: Diet Advancement/Tolerance  Physical Signs/Symptoms Outcomes: Weight, Nutrition Focused Physical Findings, Biochemical Data, Hemodynamic Status, GI Status    Discharge Planning:    Too soon to determine     Dafne Cummins RD  Contact: [phone]

## 2023-12-29 NOTE — PROGRESS NOTES
Patient extubated per family request. Family at bedside. Extubated patient with Elin RT. Pt tolerated well. Vitals are stable, Sp02 at 95%. Pt alert and oriented talking to family at bedside. Morphine drip initiated with initial dose. Will observe RDOS.  Will monitor accordingly

## 2023-12-30 ENCOUNTER — HOSPITAL ENCOUNTER (INPATIENT)
Age: 58
LOS: 13 days | End: 2024-01-12
Attending: STUDENT IN AN ORGANIZED HEALTH CARE EDUCATION/TRAINING PROGRAM | Admitting: FAMILY MEDICINE
Payer: COMMERCIAL

## 2023-12-30 VITALS
HEART RATE: 90 BPM | HEIGHT: 67 IN | DIASTOLIC BLOOD PRESSURE: 86 MMHG | WEIGHT: 170 LBS | RESPIRATION RATE: 20 BRPM | TEMPERATURE: 97.5 F | BODY MASS INDEX: 26.68 KG/M2 | OXYGEN SATURATION: 92 % | SYSTOLIC BLOOD PRESSURE: 118 MMHG

## 2023-12-30 PROBLEM — I42.8 NONISCHEMIC CARDIOMYOPATHY (HCC): Status: ACTIVE | Noted: 2023-12-30

## 2023-12-30 PROCEDURE — 6360000002 HC RX W HCPCS: Performed by: FAMILY MEDICINE

## 2023-12-30 PROCEDURE — 94761 N-INVAS EAR/PLS OXIMETRY MLT: CPT

## 2023-12-30 PROCEDURE — 1250000000 HC SEMI PRIVATE HOSPICE R&B

## 2023-12-30 RX ORDER — LORAZEPAM 2 MG/ML
0.5 INJECTION INTRAMUSCULAR
Status: DISCONTINUED | OUTPATIENT
Start: 2023-12-30 | End: 2024-01-07

## 2023-12-30 RX ORDER — ONDANSETRON 4 MG/1
4 TABLET, ORALLY DISINTEGRATING ORAL EVERY 8 HOURS PRN
Status: DISCONTINUED | OUTPATIENT
Start: 2023-12-30 | End: 2024-01-12 | Stop reason: HOSPADM

## 2023-12-30 RX ORDER — HALOPERIDOL 5 MG/ML
1 INJECTION INTRAMUSCULAR EVERY 4 HOURS PRN
Status: DISCONTINUED | OUTPATIENT
Start: 2023-12-30 | End: 2024-01-02

## 2023-12-30 RX ORDER — ONDANSETRON 2 MG/ML
4 INJECTION INTRAMUSCULAR; INTRAVENOUS EVERY 6 HOURS PRN
Status: DISCONTINUED | OUTPATIENT
Start: 2023-12-30 | End: 2024-01-12 | Stop reason: HOSPADM

## 2023-12-30 RX ORDER — MORPHINE SULFATE 2 MG/ML
2 INJECTION, SOLUTION INTRAMUSCULAR; INTRAVENOUS
Status: DISCONTINUED | OUTPATIENT
Start: 2023-12-30 | End: 2024-01-12 | Stop reason: HOSPADM

## 2023-12-30 RX ORDER — GLYCOPYRROLATE 0.2 MG/ML
0.2 INJECTION INTRAMUSCULAR; INTRAVENOUS EVERY 4 HOURS PRN
Status: DISCONTINUED | OUTPATIENT
Start: 2023-12-30 | End: 2024-01-11

## 2023-12-30 RX ORDER — ACETAMINOPHEN 325 MG/1
650 TABLET ORAL EVERY 4 HOURS PRN
Status: DISCONTINUED | OUTPATIENT
Start: 2023-12-30 | End: 2024-01-12 | Stop reason: HOSPADM

## 2023-12-30 RX ORDER — SODIUM CHLORIDE 9 MG/ML
INJECTION, SOLUTION INTRAVENOUS PRN
Status: DISCONTINUED | OUTPATIENT
Start: 2023-12-30 | End: 2024-01-12 | Stop reason: HOSPADM

## 2023-12-30 RX ORDER — ACETAMINOPHEN 650 MG/1
650 SUPPOSITORY RECTAL EVERY 4 HOURS PRN
Status: DISCONTINUED | OUTPATIENT
Start: 2023-12-30 | End: 2024-01-12 | Stop reason: HOSPADM

## 2023-12-30 RX ORDER — SODIUM CHLORIDE 0.9 % (FLUSH) 0.9 %
5-40 SYRINGE (ML) INJECTION PRN
Status: DISCONTINUED | OUTPATIENT
Start: 2023-12-30 | End: 2024-01-12 | Stop reason: HOSPADM

## 2023-12-30 RX ORDER — MORPHINE SULFATE 4 MG/ML
4 INJECTION, SOLUTION INTRAMUSCULAR; INTRAVENOUS
Status: DISCONTINUED | OUTPATIENT
Start: 2023-12-30 | End: 2024-01-12 | Stop reason: HOSPADM

## 2023-12-30 RX ADMIN — LORAZEPAM 0.5 MG: 2 INJECTION, SOLUTION INTRAMUSCULAR; INTRAVENOUS at 16:01

## 2023-12-30 RX ADMIN — MORPHINE SULFATE 4 MG: 4 INJECTION, SOLUTION INTRAMUSCULAR; INTRAVENOUS at 02:55

## 2023-12-30 RX ADMIN — MORPHINE SULFATE 4 MG: 4 INJECTION, SOLUTION INTRAMUSCULAR; INTRAVENOUS at 06:51

## 2023-12-30 RX ADMIN — MORPHINE SULFATE 4 MG: 4 INJECTION, SOLUTION INTRAMUSCULAR; INTRAVENOUS at 11:29

## 2023-12-30 RX ADMIN — MORPHINE SULFATE 4 MG: 4 INJECTION, SOLUTION INTRAMUSCULAR; INTRAVENOUS at 21:26

## 2023-12-30 ASSESSMENT — PAIN DESCRIPTION - LOCATION
LOCATION: ABDOMEN
LOCATION: ABDOMEN
LOCATION: GENERALIZED;ABDOMEN

## 2023-12-30 ASSESSMENT — PAIN SCALES - GENERAL
PAINLEVEL_OUTOF10: 8
PAINLEVEL_OUTOF10: 10
PAINLEVEL_OUTOF10: 0
PAINLEVEL_OUTOF10: 8

## 2023-12-30 ASSESSMENT — PAIN DESCRIPTION - ORIENTATION
ORIENTATION: LOWER
ORIENTATION: MID
ORIENTATION: RIGHT

## 2023-12-30 ASSESSMENT — PAIN SCALES - WONG BAKER: WONGBAKER_NUMERICALRESPONSE: 0

## 2023-12-30 ASSESSMENT — PAIN - FUNCTIONAL ASSESSMENT
PAIN_FUNCTIONAL_ASSESSMENT: ACTIVITIES ARE NOT PREVENTED
PAIN_FUNCTIONAL_ASSESSMENT: PREVENTS OR INTERFERES SOME ACTIVE ACTIVITIES AND ADLS

## 2023-12-30 ASSESSMENT — PAIN DESCRIPTION - DESCRIPTORS
DESCRIPTORS: DISCOMFORT
DESCRIPTORS: ACHING;DISCOMFORT
DESCRIPTORS: ACHING

## 2023-12-30 NOTE — PROGRESS NOTES
V2.0    [facility] Progress Note      Name:  Shailesh Mon /Age/Sex: 1965  (60 y.o. female)   MRN & CSN:  [id number] & [id number] Encounter Date/Time: 2023 2:20 PM EST   Location:  [address] [place] [address]-A PCP: No primary care provider on file. Attending:Francoise [facility] Day: 5    Assessment and Recommendations   Shailesh Mon is a 60 y.o. female  who presents with Atrial fibrillation with RVR [facility])      Plan:     Patient has been transitioned to Comfort care by ICU team after discussion with family. Hospice consulted. Now off vent and vasopressors. Patient's vital remain stable. Waiting for hospice to take over    # Acute decompensated HFrEF / NICM  # Acute hypoxemic respiratory failure  - Endorsed worsening SOB and orthopnea. Questionable medication complaint. Continues to use cocaine. Last TTE in 2023 with LVEF of 20-25%. Negative LHC in 2020  - Clinically hypervolemic, requiring PAP in ED. Pro-BNP >23k. CXR with pulmonary congestion. Has over 12 lbs weight gain in 1 month per chart review. - on IV diuresis. Monitor renal function   CTA reviewed - no PE. Suggestive of PHTN  Cardiology consulted. Strict I/O's. Daily weights. Telemetry. Continue Coreg and Jardiance, consider Entresto if BP tolerates. Patient got intubated and upgraded to ICU for worsening respiratory status. On vanc and rocephin for PNA          # Atrial fibrillation with RVR - now rate controlled  - Recently admitted for similar presentation, questionable medication compliance. - Found to have HR in 140's in ED.   - Resume Digoxin with minimal response. Started on Amiodarone gtt in setting of HFrEF.  - Likely secondary to above. Continue Eliquis and Coreg. Monitor for bleeding. Digoxin level obtained shortly after giving IV Digoxin. # LV thrombus  - TTE in 2023 showed mobile thrombus in LV.   - Resume Eliquis.       # Illicit substance abuse  - Continue to actively use cocaine.   - UDS positive for    TROPONINT 0.046 06/07/2022 11:10 PM    TROPONINT 0.041 06/01/2022 05:30 AM     Lactic Acid: No results for input(s): \"LACTA\" in the last 72 hours.  BNP:   Recent Labs     12/27/23 2225   PROBNP 22,973*       UA:  Lab Results   Component Value Date/Time    NITRU NEGATIVE 12/26/2023 07:00 PM    NITRU NEGATIVE 08/18/2012 08:01 PM    COLORU YELLOW 12/26/2023 07:00 PM    WBCUA <1 12/26/2023 07:00 PM    RBCUA <1 12/26/2023 07:00 PM    MUCUS RARE 12/26/2023 07:00 PM    TRICHOMONAS NONE SEEN 12/26/2023 07:00 PM    BACTERIA RARE 12/26/2023 07:00 PM    CLARITYU CLEAR 12/26/2023 07:00 PM    SPECGRAV <1.005 12/26/2023 07:00 PM    LEUKOCYTESUR TRACE 12/26/2023 07:00 PM    UROBILINOGEN 0.2 12/26/2023 07:00 PM    BILIRUBINUR NEGATIVE 12/26/2023 07:00 PM    BLOODU TRACE 12/26/2023 07:00 PM    GLUCOSEU NEGATIVE 08/18/2012 08:01 PM    KETUA NEGATIVE 12/26/2023 07:00 PM     Urine Cultures: No results found for: \"LABURIN\"  Blood Cultures: No results found for: \"BC\"  No results found for: \"BLOODCULT2\"  Organism: No results found for: \"ORG\"      Electronically signed by Carly Beltre MD on 12/30/2023 at 1:08 PM

## 2023-12-30 NOTE — PROGRESS NOTES
4 Eyes Skin Assessment     NAME:  Carmela Connolly OF BIRTH:  1965  MEDICAL RECORD NUMBER:  [id number]    The patient is being assessed for  Transfer to New Unit    I agree that at least one RN has performed a thorough Head to Toe Skin Assessment on the patient. ALL assessment sites listed below have been assessed. Areas assessed by both nurses:    Head, Face, Ears, Shoulders, Back, Chest, Arms, Elbows, Hands, Sacrum. Buttock, Coccyx, Ischium, Legs. Feet and Heels, and Under Medical Devices         Does the Patient have a Wound?  No noted wound(s)       Gunnar Prevention initiated by RN: Yes  Wound Care Orders initiated by RN: No    Pressure Injury (Stage 3,4, Unstageable, DTI, NWPT, and Complex wounds) if present, place Wound referral order by RN under : No    New Ostomies, if present place, Ostomy referral order under : No     Nurse 1 eSignature: Electronically signed by Taylor Roe RN on 12/29/23 at 10:03 PM EST    **SHARE this note so that the co-signing nurse can place an eSignature**    Nurse 2 eSignature: Electronically signed by Linnea Franks RN on 12/29/23 at 10:05 PM EST Detail Level: Detailed

## 2023-12-30 NOTE — PLAN OF CARE
Problem: Discharge Planning  Goal: Discharge to home or other facility with appropriate resources  12/30/2023 1042 by Mayuri Humphrey RN  Outcome: Progressing  12/29/2023 2319 by Hernan Motta RN  Outcome: Progressing  12/29/2023 2318 by Hernan Motta RN  Outcome: Progressing     Problem: Safety - Adult  Goal: Free from fall injury  12/30/2023 1042 by Mayuri Humphrey RN  Outcome: Progressing  12/29/2023 2319 by Hernan Motta RN  Outcome: Progressing  12/29/2023 2318 by Hernan Motta RN  Outcome: Progressing     Problem: Skin/Tissue Integrity  Goal: Absence of new skin breakdown  Description: 1. Monitor for areas of redness and/or skin breakdown  2. Assess vascular access sites hourly  3. Every 4-6 hours minimum:  Change oxygen saturation probe site  4. Every 4-6 hours:  If on nasal continuous positive airway pressure, respiratory therapy assess nares and determine need for appliance change or resting period. 12/30/2023 1042 by Mayuri Humphrey RN  Outcome: Progressing  12/29/2023 2319 by Hernan Motta RN  Outcome: Progressing  12/29/2023 2318 by Hernan Motta RN  Outcome: Progressing     Problem: Chronic Conditions and Co-morbidities  Goal: Patient's chronic conditions and co-morbidity symptoms are monitored and maintained or improved  12/30/2023 1042 by Mayuri Humphrey RN  Outcome: Progressing  12/29/2023 2319 by Hernan Motta RN  Outcome: Progressing  12/29/2023 2318 by Hernan Motta RN  Outcome: Progressing     Problem: Safety - Medical Restraint  Goal: Remains free of injury from restraints (Restraint for Interference with Medical Device)  Description: INTERVENTIONS:  1. Determine that other, less restrictive measures have been tried or would not be effective before applying the restraint  2. Evaluate the patient's condition at the time of restraint application  3. Inform patient/family regarding the reason for restraint  4.  Q2H: Monitor safety, psychosocial status, comfort,

## 2023-12-30 NOTE — PLAN OF CARE
Problem: Discharge Planning  Goal: Discharge to home or other facility with appropriate resources  12/29/2023 2319 by Tiffany Christy RN  Outcome: Progressing  12/29/2023 2318 by Tiffany Christy RN  Outcome: Progressing     Problem: Safety - Adult  Goal: Free from fall injury  12/29/2023 2319 by Tiffany Christy RN  Outcome: Progressing  12/29/2023 2318 by Tiffany Christy RN  Outcome: Progressing     Problem: Skin/Tissue Integrity  Goal: Absence of new skin breakdown  Description: 1. Monitor for areas of redness and/or skin breakdown  2. Assess vascular access sites hourly  3. Every 4-6 hours minimum:  Change oxygen saturation probe site  4. Every 4-6 hours:  If on nasal continuous positive airway pressure, respiratory therapy assess nares and determine need for appliance change or resting period. 12/29/2023 2319 by Tiffany Christy RN  Outcome: Progressing  12/29/2023 2318 by Tiffany Christy RN  Outcome: Progressing     Problem: Chronic Conditions and Co-morbidities  Goal: Patient's chronic conditions and co-morbidity symptoms are monitored and maintained or improved  12/29/2023 2319 by Tiffany Christy RN  Outcome: Progressing  12/29/2023 2318 by Tiffany Christy RN  Outcome: Progressing     Problem: Safety - Medical Restraint  Goal: Remains free of injury from restraints (Restraint for Interference with Medical Device)  Description: INTERVENTIONS:  1. Determine that other, less restrictive measures have been tried or would not be effective before applying the restraint  2. Evaluate the patient's condition at the time of restraint application  3. Inform patient/family regarding the reason for restraint  4.  Q2H: Monitor safety, psychosocial status, comfort, nutrition and hydration  12/29/2023 2319 by Tiffany Christy RN  Outcome: Progressing  12/29/2023 2318 by Tiffany Christy RN  Outcome: Progressing     Problem: Pain  Goal: Verbalizes/displays adequate comfort level or baseline comfort

## 2023-12-30 NOTE — PROGRESS NOTES
Physician Progress Note      PATIENT:               Lynn Torres  CSN #:                  [id number]  :                       1965  ADMIT DATE:       2023 3:39 PM  [address] [facility] DATE:  Nikky Lopez  PROVIDER #:        Rosalind Aguirre MD          QUERY TEXT:    Internal Medicine,    Patient admitted with chest pain, CHF exacerbation and noted to have elevated   troponin and LV thrombus documented. If possible, please document in the   progress notes and discharge summary if you are evaluating and/or treating any   of the following: The medical record reflects the following:  Risk Factors: CHF, cardiomyopathy  Clinical Indicators: elevated troponin, chest pain, LV thrombus  Treatment: labs, imaging, Cardiology consult, Eliquis, medical management    Thank you,  Robert Ansari RN CDS  [phone]  Options provided:  -- NSTEMI  -- Type 2 MI  -- Demand Ischemia with MI  -- Demand Ischemia only, no MI  -- Other - I will add my own diagnosis  -- Disagree - Not applicable / Not valid  -- Disagree - Clinically unable to determine / Unknown  -- Refer to Clinical Documentation Reviewer    PROVIDER RESPONSE TEXT:    This patient has a Type 2 MI.     Query created by: Robert Ansari on 2023 1:22 PM      Electronically signed by:  Rosalind Aguirre MD 2023 1:29 PM

## 2023-12-31 LAB
CULTURE: NORMAL
CULTURE: NORMAL
Lab: NORMAL
Lab: NORMAL
SPECIMEN: NORMAL
SPECIMEN: NORMAL

## 2023-12-31 PROCEDURE — 1250000000 HC SEMI PRIVATE HOSPICE R&B

## 2023-12-31 PROCEDURE — 6360000002 HC RX W HCPCS: Performed by: STUDENT IN AN ORGANIZED HEALTH CARE EDUCATION/TRAINING PROGRAM

## 2023-12-31 PROCEDURE — 6370000000 HC RX 637 (ALT 250 FOR IP): Performed by: STUDENT IN AN ORGANIZED HEALTH CARE EDUCATION/TRAINING PROGRAM

## 2023-12-31 PROCEDURE — 2700000000 HC OXYGEN THERAPY PER DAY

## 2023-12-31 PROCEDURE — 6360000002 HC RX W HCPCS: Performed by: FAMILY MEDICINE

## 2023-12-31 PROCEDURE — 94761 N-INVAS EAR/PLS OXIMETRY MLT: CPT

## 2023-12-31 RX ORDER — BISACODYL 10 MG
10 SUPPOSITORY, RECTAL RECTAL DAILY PRN
Status: DISCONTINUED | OUTPATIENT
Start: 2023-12-31 | End: 2024-01-12 | Stop reason: HOSPADM

## 2023-12-31 RX ORDER — LACTULOSE 10 G/15ML
10 SOLUTION ORAL DAILY
Status: DISCONTINUED | OUTPATIENT
Start: 2023-12-31 | End: 2024-01-04

## 2023-12-31 RX ORDER — MORPHINE SULFATE 2 MG/ML
2 INJECTION, SOLUTION INTRAMUSCULAR; INTRAVENOUS EVERY 8 HOURS
Status: DISCONTINUED | OUTPATIENT
Start: 2023-12-31 | End: 2024-01-01

## 2023-12-31 RX ADMIN — LORAZEPAM 0.5 MG: 2 INJECTION, SOLUTION INTRAMUSCULAR; INTRAVENOUS at 04:09

## 2023-12-31 RX ADMIN — LORAZEPAM 0.5 MG: 2 INJECTION, SOLUTION INTRAMUSCULAR; INTRAVENOUS at 15:28

## 2023-12-31 RX ADMIN — LORAZEPAM 0.5 MG: 2 INJECTION, SOLUTION INTRAMUSCULAR; INTRAVENOUS at 10:03

## 2023-12-31 RX ADMIN — MORPHINE SULFATE 2 MG: 2 INJECTION, SOLUTION INTRAMUSCULAR; INTRAVENOUS at 10:02

## 2023-12-31 RX ADMIN — LORAZEPAM 0.5 MG: 2 INJECTION, SOLUTION INTRAMUSCULAR; INTRAVENOUS at 19:30

## 2023-12-31 RX ADMIN — MORPHINE SULFATE 2 MG: 2 INJECTION, SOLUTION INTRAMUSCULAR; INTRAVENOUS at 15:29

## 2023-12-31 RX ADMIN — LORAZEPAM 0.5 MG: 2 INJECTION, SOLUTION INTRAMUSCULAR; INTRAVENOUS at 23:35

## 2023-12-31 RX ADMIN — MORPHINE SULFATE 2 MG: 2 INJECTION, SOLUTION INTRAMUSCULAR; INTRAVENOUS at 17:07

## 2023-12-31 RX ADMIN — MORPHINE SULFATE 4 MG: 4 INJECTION, SOLUTION INTRAMUSCULAR; INTRAVENOUS at 04:01

## 2023-12-31 RX ADMIN — LACTULOSE 10 G: 20 SOLUTION ORAL at 13:46

## 2023-12-31 RX ADMIN — MORPHINE SULFATE 2 MG: 2 INJECTION, SOLUTION INTRAMUSCULAR; INTRAVENOUS at 23:35

## 2023-12-31 RX ADMIN — ONDANSETRON 4 MG: 2 INJECTION INTRAMUSCULAR; INTRAVENOUS at 00:50

## 2023-12-31 ASSESSMENT — PAIN SCALES - GENERAL
PAINLEVEL_OUTOF10: 8
PAINLEVEL_OUTOF10: 10

## 2023-12-31 ASSESSMENT — PAIN - FUNCTIONAL ASSESSMENT
PAIN_FUNCTIONAL_ASSESSMENT: PREVENTS OR INTERFERES SOME ACTIVE ACTIVITIES AND ADLS
PAIN_FUNCTIONAL_ASSESSMENT: PREVENTS OR INTERFERES WITH ALL ACTIVE AND SOME PASSIVE ACTIVITIES
PAIN_FUNCTIONAL_ASSESSMENT: PREVENTS OR INTERFERES SOME ACTIVE ACTIVITIES AND ADLS
PAIN_FUNCTIONAL_ASSESSMENT: PREVENTS OR INTERFERES WITH ALL ACTIVE AND SOME PASSIVE ACTIVITIES

## 2023-12-31 ASSESSMENT — PAIN DESCRIPTION - DESCRIPTORS
DESCRIPTORS: ACHING;CRAMPING;DISCOMFORT
DESCRIPTORS: DISCOMFORT;ACHING

## 2023-12-31 ASSESSMENT — PAIN SCALES - WONG BAKER
WONGBAKER_NUMERICALRESPONSE: 0

## 2023-12-31 ASSESSMENT — PAIN DESCRIPTION - ORIENTATION: ORIENTATION: LOWER;MID

## 2023-12-31 ASSESSMENT — PAIN DESCRIPTION - LOCATION
LOCATION: ABDOMEN
LOCATION: ABDOMEN

## 2023-12-31 NOTE — H&P
[facility]  General Inpatient History and Physical      Date: 12/31/2023  Name: Courtney Adorno  MRN: [id number]  YOB: 1965  Admit Date: 12/30/2023  Primary Care:  No primary care provider on file.  Acute care admit date: 12/26/2023 (third admission since 11/4/23)  Consultants during acute care: pulmonology, critical care, cardiology, nephrology  Intubation: 12/27/23  Withdrawal of life sustaining treatment (mechanical ventilation and vasopressors):12/28/2023    Informant: the patients family (dgtr in law) and the old records are reviewed.The informant is reliable.    Chief Complaint: low BP, less responsive, withdrawal of life sustaining treatment     Terminal diagnosis:  Heart failure with reduced ejection fraction (LVEF 15-20%)    History of Present Illness: This is a 58 year old patient with a history of nonischemic cardiomyopathy with heart failure with reduced ejection fraction (LVEF 15-20%), cocaine abuse, atrial fibrillation, CKD III, LV thrombus, COPD, tobacco use, non compliance who was readmitted on 12/26/2023 from home with shortness of breath. The patient was initially on BiPap, and later decompensated on 12/27/23. The patient agreed to intubation per Dr. Cobb's note, and she was moved to the ICU, and placed on mechanical ventilation.        The patient's daughter, Twila, is a nurse manager at an extended care facility in [place] and is the patient's surrogate decision maker. Twila provides excellent history and reports that the patient was at [facility] in [place] for about 8 months, was off drugs, and was doing fairly well. The patient did have a cardiac catheterization in [place] without significant coronary artery disease. The patient moved back to the [facility] about 6 months ago, and has relapsed with substance abuse. Twila reports that the patient has always stated that she did not want resuscitation or life support, and on 12/28 requested for  veins, which can  be seen the setting of right heart dysfunction.  3. Dilated main pulmonary artery, concerning for pulmonary artery  hypertension.     Urine toxicology screen was positive for cocaine  Pro-BNP 12/27/23: 22,973  High sensitivity Troponin: 41, 45           Lab Results   Component Value Date/Time     ALKPHOS 82 12/27/2023 10:25 PM     ALT 27 12/27/2023 10:25 PM     AST 19 12/27/2023 10:25 PM     PROT 6.3 12/27/2023 10:25 PM     PROT 6.8 08/18/2012 07:15 PM     BILITOT 0.7 12/27/2023 10:25 PM     BILIDIR 0.4 12/27/2023 10:25 PM     IBILI 0.3 12/27/2023 10:25 PM     LABALBU 3.6 12/27/2023 10:25 PM      CBC:         Recent Labs     12/27/23  0516 12/27/23  2225 12/28/23  0600   WBC 6.3 10.5 13.2*   HGB 10.9* 11.9* 12.1*   HCT 35.4* 37.9 39.6   MCV 93.2 91.3 93.8    221 227      BMP:         Recent Labs     12/27/23  0516 12/27/23  2225 12/28/23  0600    138 135   K 3.8 3.4* 3.5    99 98*   CO2 23 26 21   BUN 23 26* 26*   CREATININE 1.1 1.4* 1.3*   GLUCOSE 127* 151* 135*       Assesment/Plan:    Heart failure with reduced ejection fraction with nonischemic cardiomyopathy and recurrent decompensation with hypoxic respiratory failure. There was withdrawal of life sustaining treatment (mechanical ventilation and vasopressors) on 12/28/23 and the patient and daughter request comfort care. The patient is admitted to General Inpatient Hospice for acute management of pain, respiratory failure, heart failure, and probable end of life care. The patient is hypotensive, prognosis is guarded. Schedule routine morphine.  Nonischemic cardiomyopathy with LVEF 15%   LV thrombus per Transthoracic Echocardiogram 11/6/23, previously on anticoagulation, stopped given above comfort care plan  OIC - will schedule routine bowel regimen and suppository as needed  Cocaine abuse, last positive 12/27/2023  Atrial fib with RVR  Toxic/metabolic encephalopathy   COPD, tobacco use  DNR-comfort care   DVT prophylaxis:

## 2023-12-31 NOTE — PLAN OF CARE
Problem: Discharge Planning  Goal: Discharge to home or other facility with appropriate resources  Outcome: Adequate for Discharge     Problem: Safety - Adult  Goal: Free from fall injury  Outcome: Adequate for Discharge

## 2023-12-31 NOTE — DISCHARGE SUMMARY
the anthony.     Echo (TTE) limited (PRN contrast/bubble/strain/3D)    Result Date: 12/28/2023    Left Ventricle: Severely reduced left ventricular systolic function with a visually estimated EF of 15 - 20%. Left ventricle is severely dilated. Mildly increased wall thickness.   Left Atrium: Left atrium is severely dilated.   Right Ventricle: Right ventricle is dilated.   Right Atrium: Right atrium is dilated.   Aortic Valve: Mild to moderate regurgitation.   Pulmonary Arteries: Main pulmonary artery is severely dilated: pulmonic root 4.2cm, MPA: 3.7cm, RPA: 1.4cm, LPA: 1.9cm. Mobile echodensity noted  attached at pulmonic leaflet and proximal PA differential would be vegetation vs Thombus but there is modreta to severe Pulmonic valve regurgitation which was not noted on previous echo   Pulmonic Valve: Moderate to severe  regurgitation.   Extracardiac: Right pleural effusion.     XR CHEST PORTABLE    Result Date: 12/27/2023  EXAMINATION: ONE XRAY VIEW OF THE CHEST 12/27/2023 3:05 pm COMPARISON: Chest radiograph and CT chest angiogram 12/26/2023. HISTORY: ORDERING SYSTEM PROVIDED HISTORY: increased RR TECHNOLOGIST PROVIDED HISTORY: Reason for exam:->increased RR Reason for Exam: increased RR, ETT and NG placement Additional signs and symptoms: na Relevant Medical/Surgical History: na FINDINGS: The endotracheal tube terminates approximately 5 cm above the anthony.  The tip of the enteric tube is below the diaphragm but not included in the field of view.  The cardiac silhouette is enlarged but stable.  New left basilar opacities.  No pneumothorax or vascular congestion identified.  No acute osseous abnormality.     1. New left basilar opacities may represent atelectasis versus developing airspace disease. 2. Stable cardiomegaly. 3. Supportive devices as above.     CTA PULMONARY W CONTRAST    Result Date: 12/26/2023  EXAMINATION: CTA OF THE CHEST 12/26/2023 7:50 pm TECHNIQUE: CTA of the chest was performed after the

## 2024-01-01 PROCEDURE — 1250000000 HC SEMI PRIVATE HOSPICE R&B

## 2024-01-01 PROCEDURE — 6360000002 HC RX W HCPCS: Performed by: STUDENT IN AN ORGANIZED HEALTH CARE EDUCATION/TRAINING PROGRAM

## 2024-01-01 PROCEDURE — 6370000000 HC RX 637 (ALT 250 FOR IP): Performed by: STUDENT IN AN ORGANIZED HEALTH CARE EDUCATION/TRAINING PROGRAM

## 2024-01-01 PROCEDURE — 94761 N-INVAS EAR/PLS OXIMETRY MLT: CPT

## 2024-01-01 PROCEDURE — 6360000002 HC RX W HCPCS: Performed by: FAMILY MEDICINE

## 2024-01-01 RX ORDER — LORAZEPAM 2 MG/ML
0.5 INJECTION INTRAMUSCULAR EVERY 6 HOURS
Status: DISCONTINUED | OUTPATIENT
Start: 2024-01-01 | End: 2024-01-05

## 2024-01-01 RX ORDER — MORPHINE SULFATE 2 MG/ML
2 INJECTION, SOLUTION INTRAMUSCULAR; INTRAVENOUS EVERY 6 HOURS
Status: DISCONTINUED | OUTPATIENT
Start: 2024-01-01 | End: 2024-01-04

## 2024-01-01 RX ADMIN — LORAZEPAM 0.5 MG: 2 INJECTION, SOLUTION INTRAMUSCULAR; INTRAVENOUS at 11:47

## 2024-01-01 RX ADMIN — LORAZEPAM 0.5 MG: 2 INJECTION, SOLUTION INTRAMUSCULAR; INTRAVENOUS at 14:55

## 2024-01-01 RX ADMIN — MORPHINE SULFATE 2 MG: 2 INJECTION, SOLUTION INTRAMUSCULAR; INTRAVENOUS at 03:02

## 2024-01-01 RX ADMIN — MORPHINE SULFATE 2 MG: 2 INJECTION, SOLUTION INTRAMUSCULAR; INTRAVENOUS at 14:54

## 2024-01-01 RX ADMIN — MORPHINE SULFATE 2 MG: 2 INJECTION, SOLUTION INTRAMUSCULAR; INTRAVENOUS at 10:08

## 2024-01-01 RX ADMIN — LORAZEPAM 0.5 MG: 2 INJECTION, SOLUTION INTRAMUSCULAR; INTRAVENOUS at 05:37

## 2024-01-01 RX ADMIN — LORAZEPAM 0.5 MG: 2 INJECTION, SOLUTION INTRAMUSCULAR; INTRAVENOUS at 22:34

## 2024-01-01 RX ADMIN — MORPHINE SULFATE 2 MG: 2 INJECTION, SOLUTION INTRAMUSCULAR; INTRAVENOUS at 05:37

## 2024-01-01 RX ADMIN — MORPHINE SULFATE 2 MG: 2 INJECTION, SOLUTION INTRAMUSCULAR; INTRAVENOUS at 21:49

## 2024-01-01 RX ADMIN — LACTULOSE 10 G: 20 SOLUTION ORAL at 10:10

## 2024-01-01 RX ADMIN — LORAZEPAM 0.5 MG: 2 INJECTION, SOLUTION INTRAMUSCULAR; INTRAVENOUS at 19:33

## 2024-01-01 ASSESSMENT — PAIN DESCRIPTION - LOCATION
LOCATION: ABDOMEN
LOCATION: ABDOMEN
LOCATION: GENERALIZED
LOCATION: GENERALIZED
LOCATION: ABDOMEN
LOCATION: ABDOMEN

## 2024-01-01 ASSESSMENT — PAIN - FUNCTIONAL ASSESSMENT
PAIN_FUNCTIONAL_ASSESSMENT: ACTIVITIES ARE NOT PREVENTED
PAIN_FUNCTIONAL_ASSESSMENT: PREVENTS OR INTERFERES SOME ACTIVE ACTIVITIES AND ADLS
PAIN_FUNCTIONAL_ASSESSMENT: PREVENTS OR INTERFERES SOME ACTIVE ACTIVITIES AND ADLS

## 2024-01-01 ASSESSMENT — PAIN SCALES - GENERAL
PAINLEVEL_OUTOF10: 6
PAINLEVEL_OUTOF10: 10
PAINLEVEL_OUTOF10: 4
PAINLEVEL_OUTOF10: 7
PAINLEVEL_OUTOF10: 8
PAINLEVEL_OUTOF10: 5
PAINLEVEL_OUTOF10: 8
PAINLEVEL_OUTOF10: 8

## 2024-01-01 ASSESSMENT — PAIN SCALES - WONG BAKER
WONGBAKER_NUMERICALRESPONSE: 4

## 2024-01-01 ASSESSMENT — PAIN DESCRIPTION - DESCRIPTORS
DESCRIPTORS: ACHING;DISCOMFORT
DESCRIPTORS: ACHING
DESCRIPTORS: ACHING
DESCRIPTORS: ACHING;DISCOMFORT

## 2024-01-01 ASSESSMENT — PAIN DESCRIPTION - ORIENTATION
ORIENTATION: LOWER
ORIENTATION: LOWER

## 2024-01-02 PROCEDURE — 6360000002 HC RX W HCPCS: Performed by: FAMILY MEDICINE

## 2024-01-02 PROCEDURE — 6360000002 HC RX W HCPCS: Performed by: STUDENT IN AN ORGANIZED HEALTH CARE EDUCATION/TRAINING PROGRAM

## 2024-01-02 PROCEDURE — 94761 N-INVAS EAR/PLS OXIMETRY MLT: CPT

## 2024-01-02 PROCEDURE — 1250000000 HC SEMI PRIVATE HOSPICE R&B

## 2024-01-02 PROCEDURE — 2700000000 HC OXYGEN THERAPY PER DAY

## 2024-01-02 RX ORDER — HALOPERIDOL 5 MG/ML
1 INJECTION INTRAMUSCULAR
Status: DISCONTINUED | OUTPATIENT
Start: 2024-01-02 | End: 2024-01-12 | Stop reason: HOSPADM

## 2024-01-02 RX ORDER — HALOPERIDOL 5 MG/ML
1 INJECTION INTRAMUSCULAR EVERY 8 HOURS SCHEDULED
Status: DISCONTINUED | OUTPATIENT
Start: 2024-01-02 | End: 2024-01-03

## 2024-01-02 RX ADMIN — HALOPERIDOL LACTATE 1 MG: 5 INJECTION, SOLUTION INTRAMUSCULAR at 00:48

## 2024-01-02 RX ADMIN — MORPHINE SULFATE 2 MG: 2 INJECTION, SOLUTION INTRAMUSCULAR; INTRAVENOUS at 11:52

## 2024-01-02 RX ADMIN — HALOPERIDOL LACTATE 1 MG: 5 INJECTION, SOLUTION INTRAMUSCULAR at 21:16

## 2024-01-02 RX ADMIN — LORAZEPAM 0.5 MG: 2 INJECTION, SOLUTION INTRAMUSCULAR; INTRAVENOUS at 00:48

## 2024-01-02 RX ADMIN — HALOPERIDOL LACTATE 1 MG: 5 INJECTION, SOLUTION INTRAMUSCULAR at 07:26

## 2024-01-02 RX ADMIN — MORPHINE SULFATE 2 MG: 2 INJECTION, SOLUTION INTRAMUSCULAR; INTRAVENOUS at 16:27

## 2024-01-02 RX ADMIN — LORAZEPAM 0.5 MG: 2 INJECTION, SOLUTION INTRAMUSCULAR; INTRAVENOUS at 21:16

## 2024-01-02 RX ADMIN — HALOPERIDOL LACTATE 1 MG: 5 INJECTION, SOLUTION INTRAMUSCULAR at 14:17

## 2024-01-02 RX ADMIN — HALOPERIDOL LACTATE 1 MG: 5 INJECTION, SOLUTION INTRAMUSCULAR at 18:35

## 2024-01-02 RX ADMIN — LORAZEPAM 0.5 MG: 2 INJECTION, SOLUTION INTRAMUSCULAR; INTRAVENOUS at 18:36

## 2024-01-02 RX ADMIN — MORPHINE SULFATE 2 MG: 2 INJECTION, SOLUTION INTRAMUSCULAR; INTRAVENOUS at 22:11

## 2024-01-02 RX ADMIN — HALOPERIDOL LACTATE 1 MG: 5 INJECTION, SOLUTION INTRAMUSCULAR at 08:57

## 2024-01-02 RX ADMIN — MORPHINE SULFATE 4 MG: 4 INJECTION, SOLUTION INTRAMUSCULAR; INTRAVENOUS at 07:26

## 2024-01-02 RX ADMIN — MORPHINE SULFATE 2 MG: 2 INJECTION, SOLUTION INTRAMUSCULAR; INTRAVENOUS at 04:32

## 2024-01-02 RX ADMIN — LORAZEPAM 0.5 MG: 2 INJECTION, SOLUTION INTRAMUSCULAR; INTRAVENOUS at 14:17

## 2024-01-02 RX ADMIN — LORAZEPAM 0.5 MG: 2 INJECTION, SOLUTION INTRAMUSCULAR; INTRAVENOUS at 08:57

## 2024-01-02 ASSESSMENT — PAIN SCALES - GENERAL
PAINLEVEL_OUTOF10: 10
PAINLEVEL_OUTOF10: 8
PAINLEVEL_OUTOF10: 0
PAINLEVEL_OUTOF10: 10
PAINLEVEL_OUTOF10: 8

## 2024-01-02 ASSESSMENT — PAIN - FUNCTIONAL ASSESSMENT
PAIN_FUNCTIONAL_ASSESSMENT: ACTIVITIES ARE NOT PREVENTED
PAIN_FUNCTIONAL_ASSESSMENT: ACTIVITIES ARE NOT PREVENTED
PAIN_FUNCTIONAL_ASSESSMENT: PREVENTS OR INTERFERES SOME ACTIVE ACTIVITIES AND ADLS
PAIN_FUNCTIONAL_ASSESSMENT: ACTIVITIES ARE NOT PREVENTED
PAIN_FUNCTIONAL_ASSESSMENT: PREVENTS OR INTERFERES SOME ACTIVE ACTIVITIES AND ADLS

## 2024-01-02 ASSESSMENT — PAIN DESCRIPTION - DESCRIPTORS
DESCRIPTORS: ACHING

## 2024-01-02 ASSESSMENT — PAIN DESCRIPTION - LOCATION
LOCATION: GENERALIZED

## 2024-01-02 ASSESSMENT — PAIN SCALES - WONG BAKER
WONGBAKER_NUMERICALRESPONSE: 0

## 2024-01-02 ASSESSMENT — PAIN DESCRIPTION - ORIENTATION: ORIENTATION: POSTERIOR;ANTERIOR

## 2024-01-02 NOTE — PROGRESS NOTES
Per daughter, Patient is alert enough to turn herself and she would rather us not go in and do it for now. She will let us know if that needs changed.

## 2024-01-02 NOTE — PROGRESS NOTES
[facility]  General Inpatient Hospice Progress Note    Date: 1/2/2024  Name: Courtney Adorno  MRN: [id number]  YOB: 1965   Patient's PCP: No primary care provider on file.  Consultants during acute care: pulmonology, critical care, cardiology, nephrology  Acute care admit date: 12/26 to 12/30/2023 (third admission since 11/4/23)  Intubation: 12/27/23  Withdrawal of life sustaining treatment (mechanical ventilation and vasopressors):12/28/2023  Admit Date: 12/30/2023 to General Inpatient Hospice    Subjective:   The patient is drowsy but arousable. She will open her eyes with my exam but no speech and then back to sleep. Discussed with the patient's ./villaTwila, at the bedside. She reports that the Haloperidol with the other comfort medications have been beneficial, and the patient rested better overnight. She ate a few bites of food yesterday, and drank some Pepsi. I collaborated with the patient's nurse and the hospice nurse.    I collaborated with Dr. Arana regarding coverage and records are reviewed.     Objective:   Pain is managed with scheduled Morphine 2 mg IV every 6 hours plus prn Morphine with 0 prn doses in the past 24 hours, Glycopyrrolate IV is available for secretions, Haloperidol for delirium, and Lorazepam is scheduled and available prn for anxiety with 3 prn doses in the past 24 hours.      Data reviewed 1/2/2024:   Transthoracic Echocardiogram 12/27/23    Left Ventricle: Severely reduced left ventricular systolic function with a visually estimated EF of 15 - 20%. Left ventricle is severely dilated. Mildly increased wall thickness.    Left Atrium: Left atrium is severely dilated.    Right Ventricle: Right ventricle is dilated.    Right Atrium: Right atrium is dilated.    Aortic Valve: Mild to moderate regurgitation.    Pulmonary Arteries: Main pulmonary artery is severely dilated: pulmonic root 4.2cm, MPA: 3.7cm, RPA: 1.4cm, LPA: 1.9cm. Mobile echodensity noted  attached

## 2024-01-03 PROCEDURE — 6360000002 HC RX W HCPCS: Performed by: STUDENT IN AN ORGANIZED HEALTH CARE EDUCATION/TRAINING PROGRAM

## 2024-01-03 PROCEDURE — 1250000000 HC SEMI PRIVATE HOSPICE R&B

## 2024-01-03 PROCEDURE — 6360000002 HC RX W HCPCS: Performed by: FAMILY MEDICINE

## 2024-01-03 PROCEDURE — 2580000003 HC RX 258: Performed by: FAMILY MEDICINE

## 2024-01-03 PROCEDURE — 2700000000 HC OXYGEN THERAPY PER DAY

## 2024-01-03 PROCEDURE — 94761 N-INVAS EAR/PLS OXIMETRY MLT: CPT

## 2024-01-03 RX ORDER — HALOPERIDOL 5 MG/ML
1 INJECTION INTRAMUSCULAR EVERY 6 HOURS SCHEDULED
Status: DISCONTINUED | OUTPATIENT
Start: 2024-01-03 | End: 2024-01-05

## 2024-01-03 RX ADMIN — MORPHINE SULFATE 4 MG: 4 INJECTION, SOLUTION INTRAMUSCULAR; INTRAVENOUS at 09:22

## 2024-01-03 RX ADMIN — GLYCOPYRROLATE 0.2 MG: 0.2 INJECTION INTRAMUSCULAR; INTRAVENOUS at 21:57

## 2024-01-03 RX ADMIN — HALOPERIDOL LACTATE 1 MG: 5 INJECTION, SOLUTION INTRAMUSCULAR at 06:09

## 2024-01-03 RX ADMIN — LORAZEPAM 0.5 MG: 2 INJECTION, SOLUTION INTRAMUSCULAR; INTRAVENOUS at 14:49

## 2024-01-03 RX ADMIN — SODIUM CHLORIDE, PRESERVATIVE FREE 10 ML: 5 INJECTION INTRAVENOUS at 20:15

## 2024-01-03 RX ADMIN — MORPHINE SULFATE 2 MG: 2 INJECTION, SOLUTION INTRAMUSCULAR; INTRAVENOUS at 21:14

## 2024-01-03 RX ADMIN — LORAZEPAM 0.5 MG: 2 INJECTION, SOLUTION INTRAMUSCULAR; INTRAVENOUS at 09:21

## 2024-01-03 RX ADMIN — HALOPERIDOL LACTATE 1 MG: 5 INJECTION, SOLUTION INTRAMUSCULAR at 12:18

## 2024-01-03 RX ADMIN — HALOPERIDOL LACTATE 1 MG: 5 INJECTION, SOLUTION INTRAMUSCULAR at 21:14

## 2024-01-03 RX ADMIN — MORPHINE SULFATE 4 MG: 4 INJECTION, SOLUTION INTRAMUSCULAR; INTRAVENOUS at 15:49

## 2024-01-03 RX ADMIN — LORAZEPAM 0.5 MG: 2 INJECTION, SOLUTION INTRAMUSCULAR; INTRAVENOUS at 02:33

## 2024-01-03 RX ADMIN — HALOPERIDOL LACTATE 1 MG: 5 INJECTION, SOLUTION INTRAMUSCULAR at 02:33

## 2024-01-03 RX ADMIN — LORAZEPAM 0.5 MG: 2 INJECTION, SOLUTION INTRAMUSCULAR; INTRAVENOUS at 20:14

## 2024-01-03 RX ADMIN — HALOPERIDOL LACTATE 1 MG: 5 INJECTION, SOLUTION INTRAMUSCULAR at 18:41

## 2024-01-03 RX ADMIN — MORPHINE SULFATE 2 MG: 2 INJECTION, SOLUTION INTRAMUSCULAR; INTRAVENOUS at 18:42

## 2024-01-03 RX ADMIN — MORPHINE SULFATE 2 MG: 2 INJECTION, SOLUTION INTRAMUSCULAR; INTRAVENOUS at 04:16

## 2024-01-03 ASSESSMENT — PAIN SCALES - GENERAL
PAINLEVEL_OUTOF10: 10
PAINLEVEL_OUTOF10: 8
PAINLEVEL_OUTOF10: 8
PAINLEVEL_OUTOF10: 6

## 2024-01-03 ASSESSMENT — PAIN SCALES - WONG BAKER
WONGBAKER_NUMERICALRESPONSE: 0
WONGBAKER_NUMERICALRESPONSE: 2
WONGBAKER_NUMERICALRESPONSE: 0

## 2024-01-03 ASSESSMENT — PAIN - FUNCTIONAL ASSESSMENT
PAIN_FUNCTIONAL_ASSESSMENT: PREVENTS OR INTERFERES SOME ACTIVE ACTIVITIES AND ADLS
PAIN_FUNCTIONAL_ASSESSMENT: PREVENTS OR INTERFERES SOME ACTIVE ACTIVITIES AND ADLS
PAIN_FUNCTIONAL_ASSESSMENT: PREVENTS OR INTERFERES WITH MANY ACTIVE NOT PASSIVE ACTIVITIES

## 2024-01-03 ASSESSMENT — PAIN DESCRIPTION - DESCRIPTORS
DESCRIPTORS: ACHING;DISCOMFORT

## 2024-01-03 ASSESSMENT — PAIN DESCRIPTION - ORIENTATION
ORIENTATION: MID
ORIENTATION: MID;LOWER
ORIENTATION: LOWER
ORIENTATION: MID;LOWER

## 2024-01-03 ASSESSMENT — PAIN DESCRIPTION - LOCATION
LOCATION: GENERALIZED;ABDOMEN
LOCATION: ABDOMEN;GENERALIZED
LOCATION: ABDOMEN
LOCATION: ABDOMEN

## 2024-01-03 NOTE — PLAN OF CARE
Problem: Discharge Planning  Goal: Discharge to home or other facility with appropriate resources  Outcome: Not Progressing     Problem: Safety - Adult  Goal: Free from fall injury  Outcome: Progressing  Flowsheets (Taken 1/3/2024 1640)  Free From Fall Injury:   Instruct family/caregiver on patient safety   Based on caregiver fall risk screen, instruct family/caregiver to ask for assistance with transferring infant if caregiver noted to have fall risk factors     Problem: Pain  Goal: Verbalizes/displays adequate comfort level or baseline comfort level  Outcome: Progressing     Problem: Skin/Tissue Integrity  Goal: Absence of new skin breakdown  Description: 1.  Monitor for areas of redness and/or skin breakdown  2.  Assess vascular access sites hourly  3.  Every 4-6 hours minimum:  Change oxygen saturation probe site  4.  Every 4-6 hours:  If on nasal continuous positive airway pressure, respiratory therapy assess nares and determine need for appliance change or resting period.  Outcome: Progressing     Problem: Discharge Planning  Goal: Discharge to home or other facility with appropriate resources  Outcome: Not Progressing

## 2024-01-03 NOTE — PROGRESS NOTES
[facility]  General Inpatient Hospice Progress Note    Date: 1/3/2024  Name: Courtney Adorno  MRN: [id number]  YOB: 1965   Patient's PCP: No primary care provider on file.  Consultants during acute care: pulmonology, critical care, cardiology, nephrology  Acute care admit date: 12/26 to 12/30/2023 (third admission since 11/4/23)  Intubation: 12/27/23  Withdrawal of life sustaining treatment (mechanical ventilation and vasopressors):12/28/2023  Admit Date: 12/30/2023 to General Inpatient Hospice    Subjective:   The patient is less alert than yesterday, briefly opening eyes with exam and mumbled Good Morning. She was restless and delirious over the past 24 hours and needed prn doses of Haloperidol. Discussed with the patient's .daughter in law (Tiffany) at the bedside.She did not eat yesterday, and took a few sips of soda. I collaborated with the patient's nurse and the hospice nurse.    Objective:   Pain is managed with scheduled Morphine 2 mg IV every 6 hours plus prn Morphine with 2 prn doses in the past 24 hours, Glycopyrrolate IV is available for secretions, Haloperidol is scheduled and prn for delirium with 2 prn doses in the past 24 hours , and Lorazepam is scheduled and available prn for anxiety with 1 prn dose in the past 24 hours.      Data reviewed 1/3/2024:   Transthoracic Echocardiogram 12/27/23    Left Ventricle: Severely reduced left ventricular systolic function with a visually estimated EF of 15 - 20%. Left ventricle is severely dilated. Mildly increased wall thickness.    Left Atrium: Left atrium is severely dilated.    Right Ventricle: Right ventricle is dilated.    Right Atrium: Right atrium is dilated.    Aortic Valve: Mild to moderate regurgitation.    Pulmonary Arteries: Main pulmonary artery is severely dilated: pulmonic root 4.2cm, MPA: 3.7cm, RPA: 1.4cm, LPA: 1.9cm. Mobile echodensity noted  attached at pulmonic leaflet and proximal PA differential would be

## 2024-01-04 PROCEDURE — 6360000002 HC RX W HCPCS: Performed by: FAMILY MEDICINE

## 2024-01-04 PROCEDURE — 6360000002 HC RX W HCPCS: Performed by: STUDENT IN AN ORGANIZED HEALTH CARE EDUCATION/TRAINING PROGRAM

## 2024-01-04 PROCEDURE — 1250000000 HC SEMI PRIVATE HOSPICE R&B

## 2024-01-04 PROCEDURE — 2580000003 HC RX 258: Performed by: FAMILY MEDICINE

## 2024-01-04 RX ORDER — MORPHINE SULFATE 2 MG/ML
2 INJECTION, SOLUTION INTRAMUSCULAR; INTRAVENOUS
Status: DISCONTINUED | OUTPATIENT
Start: 2024-01-04 | End: 2024-01-12 | Stop reason: HOSPADM

## 2024-01-04 RX ADMIN — HALOPERIDOL LACTATE 1 MG: 5 INJECTION, SOLUTION INTRAMUSCULAR at 23:24

## 2024-01-04 RX ADMIN — MORPHINE SULFATE 2 MG: 2 INJECTION, SOLUTION INTRAMUSCULAR; INTRAVENOUS at 06:15

## 2024-01-04 RX ADMIN — LORAZEPAM 0.5 MG: 2 INJECTION, SOLUTION INTRAMUSCULAR; INTRAVENOUS at 19:37

## 2024-01-04 RX ADMIN — LORAZEPAM 0.5 MG: 2 INJECTION, SOLUTION INTRAMUSCULAR; INTRAVENOUS at 02:30

## 2024-01-04 RX ADMIN — MORPHINE SULFATE 2 MG: 2 INJECTION, SOLUTION INTRAMUSCULAR; INTRAVENOUS at 00:37

## 2024-01-04 RX ADMIN — LORAZEPAM 0.5 MG: 2 INJECTION, SOLUTION INTRAMUSCULAR; INTRAVENOUS at 22:33

## 2024-01-04 RX ADMIN — HALOPERIDOL LACTATE 1 MG: 5 INJECTION, SOLUTION INTRAMUSCULAR at 06:15

## 2024-01-04 RX ADMIN — MORPHINE SULFATE 2 MG: 2 INJECTION, SOLUTION INTRAMUSCULAR; INTRAVENOUS at 19:36

## 2024-01-04 RX ADMIN — MORPHINE SULFATE 2 MG: 2 INJECTION, SOLUTION INTRAMUSCULAR; INTRAVENOUS at 11:14

## 2024-01-04 RX ADMIN — HALOPERIDOL LACTATE 1 MG: 5 INJECTION, SOLUTION INTRAMUSCULAR at 00:37

## 2024-01-04 RX ADMIN — MORPHINE SULFATE 2 MG: 2 INJECTION, SOLUTION INTRAMUSCULAR; INTRAVENOUS at 04:17

## 2024-01-04 RX ADMIN — SODIUM CHLORIDE, PRESERVATIVE FREE 10 ML: 5 INJECTION INTRAVENOUS at 19:37

## 2024-01-04 RX ADMIN — LORAZEPAM 0.5 MG: 2 INJECTION, SOLUTION INTRAMUSCULAR; INTRAVENOUS at 14:04

## 2024-01-04 RX ADMIN — MORPHINE SULFATE 2 MG: 2 INJECTION, SOLUTION INTRAMUSCULAR; INTRAVENOUS at 22:32

## 2024-01-04 RX ADMIN — HALOPERIDOL LACTATE 1 MG: 5 INJECTION, SOLUTION INTRAMUSCULAR at 17:59

## 2024-01-04 RX ADMIN — HALOPERIDOL LACTATE 1 MG: 5 INJECTION, SOLUTION INTRAMUSCULAR at 11:13

## 2024-01-04 RX ADMIN — MORPHINE SULFATE 2 MG: 2 INJECTION, SOLUTION INTRAMUSCULAR; INTRAVENOUS at 16:39

## 2024-01-04 ASSESSMENT — PAIN SCALES - GENERAL
PAINLEVEL_OUTOF10: 7
PAINLEVEL_OUTOF10: 6
PAINLEVEL_OUTOF10: 8
PAINLEVEL_OUTOF10: 6
PAINLEVEL_OUTOF10: 8
PAINLEVEL_OUTOF10: 7
PAINLEVEL_OUTOF10: 4
PAINLEVEL_OUTOF10: 4

## 2024-01-04 ASSESSMENT — PAIN DESCRIPTION - DESCRIPTORS
DESCRIPTORS: DISCOMFORT
DESCRIPTORS: ACHING;DISCOMFORT
DESCRIPTORS: DISCOMFORT
DESCRIPTORS: DISCOMFORT
DESCRIPTORS: OTHER (COMMENT)
DESCRIPTORS: DISCOMFORT
DESCRIPTORS: ACHING;DISCOMFORT
DESCRIPTORS: ACHING;DISCOMFORT

## 2024-01-04 ASSESSMENT — PAIN DESCRIPTION - LOCATION
LOCATION: ABDOMEN
LOCATION: GENERALIZED;ABDOMEN
LOCATION: GENERALIZED
LOCATION: GENERALIZED;ABDOMEN
LOCATION: GENERALIZED
LOCATION: GENERALIZED;ABDOMEN
LOCATION: GENERALIZED;ABDOMEN
LOCATION: GENERALIZED
LOCATION: GENERALIZED;ABDOMEN
LOCATION: GENERALIZED

## 2024-01-04 ASSESSMENT — PAIN - FUNCTIONAL ASSESSMENT
PAIN_FUNCTIONAL_ASSESSMENT: PREVENTS OR INTERFERES SOME ACTIVE ACTIVITIES AND ADLS
PAIN_FUNCTIONAL_ASSESSMENT: PREVENTS OR INTERFERES WITH ALL ACTIVE AND SOME PASSIVE ACTIVITIES
PAIN_FUNCTIONAL_ASSESSMENT: PREVENTS OR INTERFERES SOME ACTIVE ACTIVITIES AND ADLS

## 2024-01-04 ASSESSMENT — PAIN SCALES - WONG BAKER
WONGBAKER_NUMERICALRESPONSE: 2
WONGBAKER_NUMERICALRESPONSE: 4
WONGBAKER_NUMERICALRESPONSE: 2
WONGBAKER_NUMERICALRESPONSE: 0
WONGBAKER_NUMERICALRESPONSE: 6
WONGBAKER_NUMERICALRESPONSE: 6
WONGBAKER_NUMERICALRESPONSE: 4
WONGBAKER_NUMERICALRESPONSE: 6
WONGBAKER_NUMERICALRESPONSE: 4
WONGBAKER_NUMERICALRESPONSE: 4
WONGBAKER_NUMERICALRESPONSE: 2

## 2024-01-04 ASSESSMENT — PAIN DESCRIPTION - ORIENTATION
ORIENTATION: LOWER
ORIENTATION: MID

## 2024-01-04 NOTE — PROGRESS NOTES
[facility]  General Inpatient Hospice Progress Note    Date: 1/4/2024  Name: Courtney Adorno  MRN: [id number]  YOB: 1965   Patient's PCP: No primary care provider on file.  Consultants during acute care: pulmonology, critical care, cardiology, nephrology  Acute care admit date: 12/26 to 12/30/2023 (third admission since 11/4/23)  Intubation: 12/27/23  Withdrawal of life sustaining treatment (mechanical ventilation and vasopressors):12/28/2023  Admit Date: 12/30/2023 to General Inpatient Hospice    Subjective:   The patient is minimally responsive, briefly opening eyes with exam. She is intermittently restless and needed a prn doses of Haloperidol. Discussed with the patient's .daughter in law (Tiffany) at the bedside, who describes facial grimacing at times, managed with prn Morphine. She is not eating, but took a few sips of soda. I collaborated with the patient's nurse and the hospice nurse.    Objective:   Pain is managed with scheduled Morphine 2 mg IV every 6 hours plus prn Morphine with 2 x 4mg plus 3 x 2 mg prn doses in the past 24 hours, Glycopyrrolate IV is available for secretions, Haloperidol is scheduled and prn for delirium with 1 prn dose in the past 24 hours , and Lorazepam is scheduled and available prn for anxiety with 0 prn dose in the past 24 hours.      Data reviewed 1/4/2024:   Transthoracic Echocardiogram 12/27/23    Left Ventricle: Severely reduced left ventricular systolic function with a visually estimated EF of 15 - 20%. Left ventricle is severely dilated. Mildly increased wall thickness.    Left Atrium: Left atrium is severely dilated.    Right Ventricle: Right ventricle is dilated.    Right Atrium: Right atrium is dilated.    Aortic Valve: Mild to moderate regurgitation.    Pulmonary Arteries: Main pulmonary artery is severely dilated: pulmonic root 4.2cm, MPA: 3.7cm, RPA: 1.4cm, LPA: 1.9cm. Mobile echodensity noted  attached at pulmonic leaflet and proximal  systolic dysfunction I51.9    Delirium due to medical condition with behavioral disturbance F05    Cocaine use disorder, severe, dependence ([facility]) F14.20    Atrial fibrillation with RVR ([facility]) I48.91    Nondependent cocaine abuse ([facility]) F14.10    Chronic hypoxemic respiratory failure ([facility]) J96.11    Anxiety F41.9    Chronic obstructive lung disease ([facility]) J44.9    Nonischemic cardiomyopathy ([facility]) I42.8       Electronically signed by YOBANY BLACK MD on 1/4/2024 at 7:48 AM

## 2024-01-04 NOTE — PLAN OF CARE
Problem: Discharge Planning  Goal: Discharge to home or other facility with appropriate resources  Outcome: Not Progressing     Problem: Safety - Adult  Goal: Free from fall injury  Outcome: Progressing  Flowsheets (Taken 1/4/2024 1613)  Free From Fall Injury:   Instruct family/caregiver on patient safety   Based on caregiver fall risk screen, instruct family/caregiver to ask for assistance with transferring infant if caregiver noted to have fall risk factors     Problem: Pain  Goal: Verbalizes/displays adequate comfort level or baseline comfort level  Outcome: Progressing     Problem: Skin/Tissue Integrity  Goal: Absence of new skin breakdown  Description: 1.  Monitor for areas of redness and/or skin breakdown  2.  Assess vascular access sites hourly  3.  Every 4-6 hours minimum:  Change oxygen saturation probe site  4.  Every 4-6 hours:  If on nasal continuous positive airway pressure, respiratory therapy assess nares and determine need for appliance change or resting period.  Outcome: Progressing     Problem: Discharge Planning  Goal: Discharge to home or other facility with appropriate resources  Outcome: Not Progressing

## 2024-01-04 NOTE — PROGRESS NOTES
01/04/24 1412   Encounter Summary   Encounter Overview/Reason  Follow-up   Service Provided For: Patient and family together   Referral/Consult From: [facility]   Support System Parent;Children   Last Encounter  01/04/24  (Patient did not express any needs at this time. Patient appreciated the visit.)   Complexity of Encounter Low   Begin Time 1400   End Time  1415   Total Time Calculated 15 min   Crisis   Type Follow up   Spiritual/Emotional needs   Type Spiritual Support   Grief, Loss, and Adjustments   Type Hospice   Assessment/Intervention/Outcome   Assessment Coping   Intervention Sustaining Presence/Ministry of presence;Active listening;Empowerment   Outcome Expressed Gratitude;Encouraged   Plan and Referrals   Plan/Referrals Continue Support (comment)  (as needed)

## 2024-01-04 NOTE — PLAN OF CARE
Problem: Safety - Adult  Goal: Free from fall injury  1/3/2024 2229 by Yesica Khan RN  Outcome: Progressing  1/3/2024 1642 by Denver Young LPN  Outcome: Progressing  Flowsheets (Taken 1/3/2024 1640)  Free From Fall Injury:   Instruct family/caregiver on patient safety   Based on caregiver fall risk screen, instruct family/caregiver to ask for assistance with transferring infant if caregiver noted to have fall risk factors     Problem: Pain  Goal: Verbalizes/displays adequate comfort level or baseline comfort level  1/3/2024 2229 by Yesica Khan RN  Outcome: Progressing  1/3/2024 1642 by Denver Young LPN  Outcome: Progressing     Problem: Skin/Tissue Integrity  Goal: Absence of new skin breakdown  Description: 1.  Monitor for areas of redness and/or skin breakdown  2.  Assess vascular access sites hourly  3.  Every 4-6 hours minimum:  Change oxygen saturation probe site  4.  Every 4-6 hours:  If on nasal continuous positive airway pressure, respiratory therapy assess nares and determine need for appliance change or resting period.  1/3/2024 2229 by Yesica Khan RN  Outcome: Progressing  1/3/2024 1642 by Denver Young LPN  Outcome: Progressing

## 2024-01-05 PROCEDURE — 1250000000 HC SEMI PRIVATE HOSPICE R&B

## 2024-01-05 PROCEDURE — 76937 US GUIDE VASCULAR ACCESS: CPT

## 2024-01-05 PROCEDURE — 6360000002 HC RX W HCPCS: Performed by: FAMILY MEDICINE

## 2024-01-05 PROCEDURE — 6360000002 HC RX W HCPCS: Performed by: STUDENT IN AN ORGANIZED HEALTH CARE EDUCATION/TRAINING PROGRAM

## 2024-01-05 RX ORDER — LORAZEPAM 2 MG/ML
1 INJECTION INTRAMUSCULAR EVERY 6 HOURS
Status: DISCONTINUED | OUTPATIENT
Start: 2024-01-05 | End: 2024-01-12 | Stop reason: HOSPADM

## 2024-01-05 RX ORDER — HALOPERIDOL 5 MG/ML
1 INJECTION INTRAMUSCULAR
Status: DISCONTINUED | OUTPATIENT
Start: 2024-01-05 | End: 2024-01-07

## 2024-01-05 RX ADMIN — GLYCOPYRROLATE 0.2 MG: 0.2 INJECTION INTRAMUSCULAR; INTRAVENOUS at 12:25

## 2024-01-05 RX ADMIN — LORAZEPAM 0.5 MG: 2 INJECTION, SOLUTION INTRAMUSCULAR; INTRAVENOUS at 11:02

## 2024-01-05 RX ADMIN — HALOPERIDOL LACTATE 1 MG: 5 INJECTION, SOLUTION INTRAMUSCULAR at 19:43

## 2024-01-05 RX ADMIN — HALOPERIDOL LACTATE 1 MG: 5 INJECTION, SOLUTION INTRAMUSCULAR at 16:12

## 2024-01-05 RX ADMIN — MORPHINE SULFATE 2 MG: 2 INJECTION, SOLUTION INTRAMUSCULAR; INTRAVENOUS at 23:52

## 2024-01-05 RX ADMIN — LORAZEPAM 0.5 MG: 2 INJECTION, SOLUTION INTRAMUSCULAR; INTRAVENOUS at 07:33

## 2024-01-05 RX ADMIN — LORAZEPAM 1 MG: 2 INJECTION, SOLUTION INTRAMUSCULAR; INTRAVENOUS at 19:42

## 2024-01-05 RX ADMIN — MORPHINE SULFATE 2 MG: 2 INJECTION, SOLUTION INTRAMUSCULAR; INTRAVENOUS at 04:01

## 2024-01-05 RX ADMIN — LORAZEPAM 0.5 MG: 2 INJECTION, SOLUTION INTRAMUSCULAR; INTRAVENOUS at 17:19

## 2024-01-05 RX ADMIN — LORAZEPAM 0.5 MG: 2 INJECTION, SOLUTION INTRAMUSCULAR; INTRAVENOUS at 00:43

## 2024-01-05 RX ADMIN — GLYCOPYRROLATE 0.2 MG: 0.2 INJECTION INTRAMUSCULAR; INTRAVENOUS at 06:17

## 2024-01-05 RX ADMIN — GLYCOPYRROLATE 0.2 MG: 0.2 INJECTION INTRAMUSCULAR; INTRAVENOUS at 17:19

## 2024-01-05 RX ADMIN — MORPHINE SULFATE 2 MG: 2 INJECTION, SOLUTION INTRAMUSCULAR; INTRAVENOUS at 16:13

## 2024-01-05 RX ADMIN — MORPHINE SULFATE 2 MG: 2 INJECTION, SOLUTION INTRAMUSCULAR; INTRAVENOUS at 00:44

## 2024-01-05 RX ADMIN — MORPHINE SULFATE 2 MG: 2 INJECTION, SOLUTION INTRAMUSCULAR; INTRAVENOUS at 07:33

## 2024-01-05 RX ADMIN — LORAZEPAM 0.5 MG: 2 INJECTION, SOLUTION INTRAMUSCULAR; INTRAVENOUS at 02:15

## 2024-01-05 RX ADMIN — MORPHINE SULFATE 2 MG: 2 INJECTION, SOLUTION INTRAMUSCULAR; INTRAVENOUS at 19:43

## 2024-01-05 RX ADMIN — MORPHINE SULFATE 4 MG: 4 INJECTION, SOLUTION INTRAMUSCULAR; INTRAVENOUS at 11:01

## 2024-01-05 RX ADMIN — HALOPERIDOL LACTATE 1 MG: 5 INJECTION, SOLUTION INTRAMUSCULAR at 04:00

## 2024-01-05 RX ADMIN — HALOPERIDOL LACTATE 1 MG: 5 INJECTION, SOLUTION INTRAMUSCULAR at 23:51

## 2024-01-05 RX ADMIN — HALOPERIDOL LACTATE 1 MG: 5 INJECTION, SOLUTION INTRAMUSCULAR at 12:19

## 2024-01-05 RX ADMIN — LORAZEPAM 1 MG: 2 INJECTION, SOLUTION INTRAMUSCULAR; INTRAVENOUS at 14:46

## 2024-01-05 RX ADMIN — HALOPERIDOL LACTATE 1 MG: 5 INJECTION, SOLUTION INTRAMUSCULAR at 21:31

## 2024-01-05 RX ADMIN — MORPHINE SULFATE 2 MG: 2 INJECTION, SOLUTION INTRAMUSCULAR; INTRAVENOUS at 12:27

## 2024-01-05 RX ADMIN — HALOPERIDOL LACTATE 1 MG: 5 INJECTION, SOLUTION INTRAMUSCULAR at 06:14

## 2024-01-05 RX ADMIN — MORPHINE SULFATE 2 MG: 2 INJECTION, SOLUTION INTRAMUSCULAR; INTRAVENOUS at 21:31

## 2024-01-05 RX ADMIN — GLYCOPYRROLATE 0.2 MG: 0.2 INJECTION INTRAMUSCULAR; INTRAVENOUS at 21:31

## 2024-01-05 ASSESSMENT — PAIN DESCRIPTION - LOCATION
LOCATION: GENERALIZED
LOCATION: GENERALIZED
LOCATION: ABDOMEN;GENERALIZED
LOCATION: OTHER (COMMENT)
LOCATION: OTHER (COMMENT)

## 2024-01-05 ASSESSMENT — PAIN DESCRIPTION - FREQUENCY
FREQUENCY: OTHER (COMMENT)
FREQUENCY: OTHER (COMMENT)

## 2024-01-05 ASSESSMENT — PAIN DESCRIPTION - DESCRIPTORS
DESCRIPTORS: OTHER (COMMENT)
DESCRIPTORS: DISCOMFORT
DESCRIPTORS: OTHER (COMMENT)
DESCRIPTORS: DISCOMFORT

## 2024-01-05 ASSESSMENT — PAIN DESCRIPTION - DIRECTION
RADIATING_TOWARDS: UTA
RADIATING_TOWARDS: UTA

## 2024-01-05 ASSESSMENT — PAIN SCALES - WONG BAKER
WONGBAKER_NUMERICALRESPONSE: 6

## 2024-01-05 ASSESSMENT — PAIN DESCRIPTION - ORIENTATION
ORIENTATION: OTHER (COMMENT)
ORIENTATION: OTHER (COMMENT)

## 2024-01-05 ASSESSMENT — PAIN SCALES - GENERAL
PAINLEVEL_OUTOF10: 8
PAINLEVEL_OUTOF10: 6
PAINLEVEL_OUTOF10: 6

## 2024-01-05 ASSESSMENT — PAIN - FUNCTIONAL ASSESSMENT
PAIN_FUNCTIONAL_ASSESSMENT: PREVENTS OR INTERFERES SOME ACTIVE ACTIVITIES AND ADLS
PAIN_FUNCTIONAL_ASSESSMENT: PREVENTS OR INTERFERES WITH MANY ACTIVE NOT PASSIVE ACTIVITIES

## 2024-01-05 ASSESSMENT — PAIN DESCRIPTION - PAIN TYPE
TYPE: OTHER (COMMENT)
TYPE: OTHER (COMMENT)

## 2024-01-05 ASSESSMENT — PAIN DESCRIPTION - ONSET
ONSET: OTHER (COMMENT)
ONSET: OTHER (COMMENT)

## 2024-01-05 NOTE — PLAN OF CARE
Problem: Discharge Planning  Goal: Discharge to home or other facility with appropriate resources  1/4/2024 2301 by Yesica Khan RN  Outcome: Progressing  1/4/2024 1615 by Denver Young LPN  Outcome: Not Progressing     Problem: Safety - Adult  Goal: Free from fall injury  1/4/2024 2301 by Yesica Khan RN  Outcome: Progressing  1/4/2024 1615 by Denver Young LPN  Outcome: Progressing  Flowsheets (Taken 1/4/2024 1613)  Free From Fall Injury:   Instruct family/caregiver on patient safety   Based on caregiver fall risk screen, instruct family/caregiver to ask for assistance with transferring infant if caregiver noted to have fall risk factors     Problem: Pain  Goal: Verbalizes/displays adequate comfort level or baseline comfort level  1/4/2024 2301 by Yesica Khan RN  Outcome: Progressing  1/4/2024 1615 by Denver Young LPN  Outcome: Progressing     Problem: Skin/Tissue Integrity  Goal: Absence of new skin breakdown  Description: 1.  Monitor for areas of redness and/or skin breakdown  2.  Assess vascular access sites hourly  3.  Every 4-6 hours minimum:  Change oxygen saturation probe site  4.  Every 4-6 hours:  If on nasal continuous positive airway pressure, respiratory therapy assess nares and determine need for appliance change or resting period.  1/4/2024 2301 by Yesica Khan RN  Outcome: Progressing  1/4/2024 1615 by Denver Young LPN  Outcome: Progressing     Problem: Discharge Planning  Goal: Discharge to home or other facility with appropriate resources  1/4/2024 2301 by Yesica Khan RN  Outcome: Progressing  1/4/2024 1615 by Denver Young LPN  Outcome: Not Progressing

## 2024-01-05 NOTE — PLAN OF CARE
Problem: Discharge Planning  Goal: Discharge to home or other facility with appropriate resources  Outcome: Progressing     Problem: Safety - Adult  Goal: Free from fall injury  Outcome: Progressing     Problem: Pain  Goal: Verbalizes/displays adequate comfort level or baseline comfort level  Outcome: Progressing     Problem: Skin/Tissue Integrity  Goal: Absence of new skin breakdown  Description: 1.  Monitor for areas of redness and/or skin breakdown  2.  Assess vascular access sites hourly  3.  Every 4-6 hours minimum:  Change oxygen saturation probe site  4.  Every 4-6 hours:  If on nasal continuous positive airway pressure, respiratory therapy assess nares and determine need for appliance change or resting period.  Outcome: Progressing     Problem: Discharge Planning  Goal: Discharge to home or other facility with appropriate resources  Outcome: Progressing     Problem: Safety - Adult  Goal: Free from fall injury  Outcome: Progressing     Problem: Pain  Goal: Verbalizes/displays adequate comfort level or baseline comfort level  Outcome: Progressing     Problem: Skin/Tissue Integrity  Goal: Absence of new skin breakdown  Description: 1.  Monitor for areas of redness and/or skin breakdown  2.  Assess vascular access sites hourly  3.  Every 4-6 hours minimum:  Change oxygen saturation probe site  4.  Every 4-6 hours:  If on nasal continuous positive airway pressure, respiratory therapy assess nares and determine need for appliance change or resting period.  Outcome: Progressing

## 2024-01-05 NOTE — CONSULTS
Consult completed. Nexiva 20g 1.75\" peripheral IV initiated into left forearm x 1 attempt using ultrasound guided technique. Brisk blood return, flushes without resistance. Patient tolerated well. Primary nurse Nena notified.     Consult the Vascular Access Team for questions, concerns, or change in patient's condition.

## 2024-01-05 NOTE — PROGRESS NOTES
1.4cm, LPA: 1.9cm. Mobile echodensity noted  attached at pulmonic leaflet and proximal PA differential would be vegetation vs Thombus but there is modreta to severe Pulmonic valve regurgitation which was not noted on previous echo    Pulmonic Valve: Moderate to severe  regurgitation.    Extracardiac: Right pleural effusion.     Chest X-ray  12/27/23  1. New left basilar opacities may represent atelectasis versus developing airspace disease.  2. Stable cardiomegaly.  3. Supportive devices as above.     CTA chest 12/26/23  1. No central pulmonary embolism is identified.  2. Cardiomegaly with reflux of contrast into the IVC/hepatic veins, which can  be seen the setting of right heart dysfunction.  3. Dilated main pulmonary artery, concerning for pulmonary artery  hypertension.     Urine toxicology screen was positive for cocaine  Pro-BNP 12/27/23: 22,973  High sensitivity Troponin: 41, 45           Lab Results   Component Value Date/Time     ALKPHOS 82 12/27/2023 10:25 PM     ALT 27 12/27/2023 10:25 PM     AST 19 12/27/2023 10:25 PM     PROT 6.3 12/27/2023 10:25 PM     PROT 6.8 08/18/2012 07:15 PM     BILITOT 0.7 12/27/2023 10:25 PM     BILIDIR 0.4 12/27/2023 10:25 PM     IBILI 0.3 12/27/2023 10:25 PM     LABALBU 3.6 12/27/2023 10:25 PM      CBC:         Recent Labs     12/27/23  0516 12/27/23  2225 12/28/23  0600   WBC 6.3 10.5 13.2*   HGB 10.9* 11.9* 12.1*   HCT 35.4* 37.9 39.6   MCV 93.2 91.3 93.8    221 227      BMP:         Recent Labs     12/27/23  0516 12/27/23  2225 12/28/23  0600    138 135   K 3.8 3.4* 3.5    99 98*   CO2 23 26 21   BUN 23 26* 26*   CREATININE 1.1 1.4* 1.3*   GLUCOSE 127* 151* 135*        Physical Exam:   /89   Pulse 56   Temp 98.1 °F (36.7 °C) (Axillary)   Resp 16   Ht 1.702 m (5' 7\")   Wt 77.1 kg (170 lb)   SpO2 94%   BMI 26.63 kg/m²   General: minimally responsive, briefly opening eyes with exam, no speech, facial expression is currently relaxed,  R06.03    Systolic CHF, acute on chronic ([facility]) I50.23    Acute pulmonary edema ([facility]) J81.0    Persistent atrial fibrillation ([facility]) I48.19    Altered mental status R41.82    Severe left ventricular systolic dysfunction I51.9    Delirium due to medical condition with behavioral disturbance F05    Cocaine use disorder, severe, dependence ([facility]) F14.20    Atrial fibrillation with RVR ([facility]) I48.91    Nondependent cocaine abuse ([facility]) F14.10    Chronic hypoxemic respiratory failure ([facility]) J96.11    Anxiety F41.9    Chronic obstructive lung disease ([facility]) J44.9    Nonischemic cardiomyopathy ([facility]) I42.8     MARY Barragan MD

## 2024-01-06 PROCEDURE — 1250000000 HC SEMI PRIVATE HOSPICE R&B

## 2024-01-06 PROCEDURE — 6360000002 HC RX W HCPCS: Performed by: FAMILY MEDICINE

## 2024-01-06 PROCEDURE — 36410 VNPNXR 3YR/> PHY/QHP DX/THER: CPT

## 2024-01-06 PROCEDURE — 94761 N-INVAS EAR/PLS OXIMETRY MLT: CPT

## 2024-01-06 PROCEDURE — 05HB33Z INSERTION OF INFUSION DEVICE INTO RIGHT BASILIC VEIN, PERCUTANEOUS APPROACH: ICD-10-PCS | Performed by: FAMILY MEDICINE

## 2024-01-06 PROCEDURE — 2580000003 HC RX 258: Performed by: FAMILY MEDICINE

## 2024-01-06 PROCEDURE — 76937 US GUIDE VASCULAR ACCESS: CPT

## 2024-01-06 RX ADMIN — GLYCOPYRROLATE 0.2 MG: 0.2 INJECTION INTRAMUSCULAR; INTRAVENOUS at 05:52

## 2024-01-06 RX ADMIN — LORAZEPAM 0.5 MG: 2 INJECTION, SOLUTION INTRAMUSCULAR; INTRAVENOUS at 18:11

## 2024-01-06 RX ADMIN — MORPHINE SULFATE 2 MG: 2 INJECTION, SOLUTION INTRAMUSCULAR; INTRAVENOUS at 18:11

## 2024-01-06 RX ADMIN — HALOPERIDOL LACTATE 1 MG: 5 INJECTION, SOLUTION INTRAMUSCULAR at 06:38

## 2024-01-06 RX ADMIN — GLYCOPYRROLATE 0.2 MG: 0.2 INJECTION INTRAMUSCULAR; INTRAVENOUS at 20:21

## 2024-01-06 RX ADMIN — HALOPERIDOL LACTATE 1 MG: 5 INJECTION, SOLUTION INTRAMUSCULAR at 08:06

## 2024-01-06 RX ADMIN — MORPHINE SULFATE 2 MG: 2 INJECTION, SOLUTION INTRAMUSCULAR; INTRAVENOUS at 08:05

## 2024-01-06 RX ADMIN — GLYCOPYRROLATE 0.2 MG: 0.2 INJECTION INTRAMUSCULAR; INTRAVENOUS at 16:15

## 2024-01-06 RX ADMIN — HALOPERIDOL LACTATE 1 MG: 5 INJECTION, SOLUTION INTRAMUSCULAR at 14:19

## 2024-01-06 RX ADMIN — MORPHINE SULFATE 2 MG: 2 INJECTION, SOLUTION INTRAMUSCULAR; INTRAVENOUS at 16:11

## 2024-01-06 RX ADMIN — HALOPERIDOL LACTATE 1 MG: 5 INJECTION, SOLUTION INTRAMUSCULAR at 21:58

## 2024-01-06 RX ADMIN — LORAZEPAM 1 MG: 2 INJECTION, SOLUTION INTRAMUSCULAR; INTRAVENOUS at 20:21

## 2024-01-06 RX ADMIN — HALOPERIDOL LACTATE 1 MG: 5 INJECTION, SOLUTION INTRAMUSCULAR at 12:13

## 2024-01-06 RX ADMIN — MORPHINE SULFATE 2 MG: 2 INJECTION, SOLUTION INTRAMUSCULAR; INTRAVENOUS at 03:21

## 2024-01-06 RX ADMIN — LORAZEPAM 1 MG: 2 INJECTION, SOLUTION INTRAMUSCULAR; INTRAVENOUS at 14:20

## 2024-01-06 RX ADMIN — LORAZEPAM 0.5 MG: 2 INJECTION, SOLUTION INTRAMUSCULAR; INTRAVENOUS at 16:11

## 2024-01-06 RX ADMIN — LORAZEPAM 1 MG: 2 INJECTION, SOLUTION INTRAMUSCULAR; INTRAVENOUS at 00:58

## 2024-01-06 RX ADMIN — HALOPERIDOL LACTATE 1 MG: 5 INJECTION, SOLUTION INTRAMUSCULAR at 03:20

## 2024-01-06 RX ADMIN — SODIUM CHLORIDE, PRESERVATIVE FREE 10 ML: 5 INJECTION INTRAVENOUS at 08:11

## 2024-01-06 RX ADMIN — GLYCOPYRROLATE 0.2 MG: 0.2 INJECTION INTRAMUSCULAR; INTRAVENOUS at 12:20

## 2024-01-06 RX ADMIN — MORPHINE SULFATE 2 MG: 2 INJECTION, SOLUTION INTRAMUSCULAR; INTRAVENOUS at 12:13

## 2024-01-06 RX ADMIN — MORPHINE SULFATE 2 MG: 2 INJECTION, SOLUTION INTRAMUSCULAR; INTRAVENOUS at 21:58

## 2024-01-06 RX ADMIN — LORAZEPAM 0.5 MG: 2 INJECTION, SOLUTION INTRAMUSCULAR; INTRAVENOUS at 05:52

## 2024-01-06 RX ADMIN — MORPHINE SULFATE 2 MG: 2 INJECTION, SOLUTION INTRAMUSCULAR; INTRAVENOUS at 05:52

## 2024-01-06 RX ADMIN — MORPHINE SULFATE 2 MG: 2 INJECTION, SOLUTION INTRAMUSCULAR; INTRAVENOUS at 20:21

## 2024-01-06 RX ADMIN — HALOPERIDOL LACTATE 1 MG: 5 INJECTION, SOLUTION INTRAMUSCULAR at 09:45

## 2024-01-06 RX ADMIN — HALOPERIDOL LACTATE 1 MG: 5 INJECTION, SOLUTION INTRAMUSCULAR at 18:11

## 2024-01-06 RX ADMIN — HALOPERIDOL LACTATE 1 MG: 5 INJECTION, SOLUTION INTRAMUSCULAR at 16:11

## 2024-01-06 RX ADMIN — LORAZEPAM 1 MG: 2 INJECTION, SOLUTION INTRAMUSCULAR; INTRAVENOUS at 08:05

## 2024-01-06 RX ADMIN — HALOPERIDOL LACTATE 1 MG: 5 INJECTION, SOLUTION INTRAMUSCULAR at 20:20

## 2024-01-06 RX ADMIN — MORPHINE SULFATE 2 MG: 2 INJECTION, SOLUTION INTRAMUSCULAR; INTRAVENOUS at 14:20

## 2024-01-06 RX ADMIN — LORAZEPAM 0.5 MG: 2 INJECTION, SOLUTION INTRAMUSCULAR; INTRAVENOUS at 12:13

## 2024-01-06 ASSESSMENT — PAIN SCALES - GENERAL: PAINLEVEL_OUTOF10: 0

## 2024-01-06 NOTE — PROGRESS NOTES
[facility]  General Inpatient Hospice Progress Note    Date: 1/6/2024  Name: Courtney Adorno  MRN: [id number]  YOB: 1965   Patient's PCP: No primary care provider on file.  Consultants during acute care: pulmonology, critical care, cardiology, nephrology  Acute care admit date: 12/26 to 12/30/2023 (third admission since 11/4/23)  Intubation: 12/27/23  Withdrawal of life sustaining treatment (mechanical ventilation and vasopressors):12/28/2023  Admit Date: 12/30/2023 to General Inpatient Hospice    Subjective:   The patient is minimally responsive, briefly moving her arms with exam, eyes are closed, and she is currently comfortable. The patient's daughter in law, Tfifany, states the patient is intermittently restless, and the medications help but wear off in about 2 hours. Discussed that the meds were adjusted 1/5/24 and prn's are available. She has some  tracheal congestion managed with Glycopyrrolate IV.  She is not eating, but takes occasional sip of soda. I collaborated with the patient's nurse and the hospice nurse.    Objective:   Pain is managed with scheduled Morphine 2 mg IV every 4 hours plus prn Morphine with 2 doses in the past 24 hours, Glycopyrrolate IV is available for secretions, Haloperidol is scheduled 1 mg IV every 4 hours plus prn for delirium with 2 prn dose in the past 24 hours, and Lorazepam is scheduled 1 mg IV every 6 hours plus prn for anxiety with 3 prn doses in the past 24 hours.      Data reviewed 1/6/2024:   Transthoracic Echocardiogram 12/27/23    Left Ventricle: Severely reduced left ventricular systolic function with a visually estimated EF of 15 - 20%. Left ventricle is severely dilated. Mildly increased wall thickness.    Left Atrium: Left atrium is severely dilated.    Right Ventricle: Right ventricle is dilated.    Right Atrium: Right atrium is dilated.    Aortic Valve: Mild to moderate regurgitation.    Pulmonary Arteries: Main pulmonary artery is  cardiomyopathy ([facility]) I42.8    Acute respiratory distress R06.03    Systolic CHF, acute on chronic ([facility]) I50.23    Acute pulmonary edema ([facility]) J81.0    Persistent atrial fibrillation ([facility]) I48.19    Altered mental status R41.82    Severe left ventricular systolic dysfunction I51.9    Delirium due to medical condition with behavioral disturbance F05    Cocaine use disorder, severe, dependence ([facility]) F14.20    Atrial fibrillation with RVR ([facility]) I48.91    Nondependent cocaine abuse ([facility]) F14.10    Chronic hypoxemic respiratory failure ([facility]) J96.11    Anxiety F41.9    Chronic obstructive lung disease ([facility]) J44.9    Nonischemic cardiomyopathy ([facility]) I42.8     MARY Barragan MD

## 2024-01-07 PROCEDURE — 1250000000 HC SEMI PRIVATE HOSPICE R&B

## 2024-01-07 PROCEDURE — 6360000002 HC RX W HCPCS: Performed by: FAMILY MEDICINE

## 2024-01-07 RX ORDER — HALOPERIDOL 5 MG/ML
2 INJECTION INTRAMUSCULAR
Status: DISCONTINUED | OUTPATIENT
Start: 2024-01-07 | End: 2024-01-12 | Stop reason: HOSPADM

## 2024-01-07 RX ORDER — HALOPERIDOL 5 MG/ML
2 INJECTION INTRAMUSCULAR ONCE
Status: COMPLETED | OUTPATIENT
Start: 2024-01-07 | End: 2024-01-07

## 2024-01-07 RX ORDER — LORAZEPAM 2 MG/ML
1 INJECTION INTRAMUSCULAR
Status: DISCONTINUED | OUTPATIENT
Start: 2024-01-07 | End: 2024-01-12 | Stop reason: HOSPADM

## 2024-01-07 RX ADMIN — HALOPERIDOL LACTATE 1 MG: 5 INJECTION, SOLUTION INTRAMUSCULAR at 14:37

## 2024-01-07 RX ADMIN — HALOPERIDOL LACTATE 2 MG: 5 INJECTION, SOLUTION INTRAMUSCULAR at 20:04

## 2024-01-07 RX ADMIN — HALOPERIDOL LACTATE 1 MG: 5 INJECTION, SOLUTION INTRAMUSCULAR at 04:18

## 2024-01-07 RX ADMIN — LORAZEPAM 1 MG: 2 INJECTION, SOLUTION INTRAMUSCULAR; INTRAVENOUS at 09:41

## 2024-01-07 RX ADMIN — HALOPERIDOL LACTATE 1 MG: 5 INJECTION, SOLUTION INTRAMUSCULAR at 07:55

## 2024-01-07 RX ADMIN — MORPHINE SULFATE 2 MG: 2 INJECTION, SOLUTION INTRAMUSCULAR; INTRAVENOUS at 02:47

## 2024-01-07 RX ADMIN — LORAZEPAM 1 MG: 2 INJECTION, SOLUTION INTRAMUSCULAR; INTRAVENOUS at 18:41

## 2024-01-07 RX ADMIN — MORPHINE SULFATE 4 MG: 4 INJECTION, SOLUTION INTRAMUSCULAR; INTRAVENOUS at 09:40

## 2024-01-07 RX ADMIN — GLYCOPYRROLATE 0.2 MG: 0.2 INJECTION INTRAMUSCULAR; INTRAVENOUS at 18:41

## 2024-01-07 RX ADMIN — LORAZEPAM 0.5 MG: 2 INJECTION, SOLUTION INTRAMUSCULAR; INTRAVENOUS at 06:29

## 2024-01-07 RX ADMIN — GLYCOPYRROLATE 0.2 MG: 0.2 INJECTION INTRAMUSCULAR; INTRAVENOUS at 14:37

## 2024-01-07 RX ADMIN — LORAZEPAM 1 MG: 2 INJECTION, SOLUTION INTRAMUSCULAR; INTRAVENOUS at 16:29

## 2024-01-07 RX ADMIN — HALOPERIDOL LACTATE 1 MG: 5 INJECTION, SOLUTION INTRAMUSCULAR at 18:41

## 2024-01-07 RX ADMIN — HALOPERIDOL LACTATE 2 MG: 5 INJECTION, SOLUTION INTRAMUSCULAR at 23:56

## 2024-01-07 RX ADMIN — HALOPERIDOL LACTATE 2 MG: 5 INJECTION, SOLUTION INTRAMUSCULAR at 08:04

## 2024-01-07 RX ADMIN — LORAZEPAM 1 MG: 2 INJECTION, SOLUTION INTRAMUSCULAR; INTRAVENOUS at 14:37

## 2024-01-07 RX ADMIN — GLYCOPYRROLATE 0.2 MG: 0.2 INJECTION INTRAMUSCULAR; INTRAVENOUS at 04:18

## 2024-01-07 RX ADMIN — HALOPERIDOL LACTATE 2 MG: 5 INJECTION, SOLUTION INTRAMUSCULAR at 16:28

## 2024-01-07 RX ADMIN — MORPHINE SULFATE 2 MG: 2 INJECTION, SOLUTION INTRAMUSCULAR; INTRAVENOUS at 20:02

## 2024-01-07 RX ADMIN — LORAZEPAM 1 MG: 2 INJECTION, SOLUTION INTRAMUSCULAR; INTRAVENOUS at 20:03

## 2024-01-07 RX ADMIN — LORAZEPAM 1 MG: 2 INJECTION, SOLUTION INTRAMUSCULAR; INTRAVENOUS at 12:19

## 2024-01-07 RX ADMIN — MORPHINE SULFATE 4 MG: 4 INJECTION, SOLUTION INTRAMUSCULAR; INTRAVENOUS at 18:42

## 2024-01-07 RX ADMIN — LORAZEPAM 1 MG: 2 INJECTION, SOLUTION INTRAMUSCULAR; INTRAVENOUS at 02:07

## 2024-01-07 RX ADMIN — GLYCOPYRROLATE 0.2 MG: 0.2 INJECTION INTRAMUSCULAR; INTRAVENOUS at 00:16

## 2024-01-07 RX ADMIN — HALOPERIDOL LACTATE 2 MG: 5 INJECTION, SOLUTION INTRAMUSCULAR at 12:19

## 2024-01-07 RX ADMIN — MORPHINE SULFATE 2 MG: 2 INJECTION, SOLUTION INTRAMUSCULAR; INTRAVENOUS at 12:19

## 2024-01-07 RX ADMIN — HALOPERIDOL LACTATE 2 MG: 5 INJECTION, SOLUTION INTRAMUSCULAR at 09:40

## 2024-01-07 RX ADMIN — MORPHINE SULFATE 2 MG: 2 INJECTION, SOLUTION INTRAMUSCULAR; INTRAVENOUS at 04:18

## 2024-01-07 RX ADMIN — HALOPERIDOL LACTATE 1 MG: 5 INJECTION, SOLUTION INTRAMUSCULAR at 00:11

## 2024-01-07 RX ADMIN — MORPHINE SULFATE 2 MG: 2 INJECTION, SOLUTION INTRAMUSCULAR; INTRAVENOUS at 23:57

## 2024-01-07 RX ADMIN — HALOPERIDOL LACTATE 1 MG: 5 INJECTION, SOLUTION INTRAMUSCULAR at 22:55

## 2024-01-07 RX ADMIN — MORPHINE SULFATE 2 MG: 2 INJECTION, SOLUTION INTRAMUSCULAR; INTRAVENOUS at 22:55

## 2024-01-07 RX ADMIN — HALOPERIDOL LACTATE 1 MG: 5 INJECTION, SOLUTION INTRAMUSCULAR at 02:47

## 2024-01-07 RX ADMIN — MORPHINE SULFATE 4 MG: 4 INJECTION, SOLUTION INTRAMUSCULAR; INTRAVENOUS at 14:37

## 2024-01-07 RX ADMIN — LORAZEPAM 1 MG: 2 INJECTION, SOLUTION INTRAMUSCULAR; INTRAVENOUS at 07:54

## 2024-01-07 RX ADMIN — MORPHINE SULFATE 2 MG: 2 INJECTION, SOLUTION INTRAMUSCULAR; INTRAVENOUS at 07:53

## 2024-01-07 RX ADMIN — MORPHINE SULFATE 2 MG: 2 INJECTION, SOLUTION INTRAMUSCULAR; INTRAVENOUS at 16:29

## 2024-01-07 RX ADMIN — GLYCOPYRROLATE 0.2 MG: 0.2 INJECTION INTRAMUSCULAR; INTRAVENOUS at 07:55

## 2024-01-07 RX ADMIN — MORPHINE SULFATE 2 MG: 2 INJECTION, SOLUTION INTRAMUSCULAR; INTRAVENOUS at 00:11

## 2024-01-07 RX ADMIN — LORAZEPAM 0.5 MG: 2 INJECTION, SOLUTION INTRAMUSCULAR; INTRAVENOUS at 00:11

## 2024-01-07 ASSESSMENT — PAIN SCALES - GENERAL
PAINLEVEL_OUTOF10: 4
PAINLEVEL_OUTOF10: 0
PAINLEVEL_OUTOF10: 4

## 2024-01-07 NOTE — CONSULTS
Consult completed. Procedure/rationale explained to patient & consent obtained. #20Ga Introcan Safety Deep Access EDPIV initiated to RUE Basilic Vein using sterile, UltraSound-guided technique without difficulty/complications. Positioning verified via UltraSound visualization of catheter within vessel lumen; site returns blood briskly and flushes without resistance/abnormalities. Sterile dressing with SkinPrep, CHG gel DSSG, SwabCap, and Limb Precautions band applied. Pt tolerated well & no other c/o or needs noted or reported. Primary RN notified.

## 2024-01-07 NOTE — PROGRESS NOTES
[facility]  General Inpatient Hospice Progress Note    Date: 1/7/2024  Name: Courtney Adorno  MRN: [id number]  YOB: 1965   Patient's PCP: No primary care provider on file.  Consultants during acute care: pulmonology, critical care, cardiology, nephrology  Acute care admit date: 12/26 to 12/30/2023 (third admission since 11/4/23)  Intubation: 12/27/23  Withdrawal of life sustaining treatment (mechanical ventilation and vasopressors):12/28/2023  Admit Date: 12/30/2023 to General Inpatient Hospice    Subjective:   The patient is minimally responsive, briefly moving her eyelids with exam, and she is currently comfortable. Per the patient's daughter in law, Tiffany, the Lorazepam doesn't help for long (\"20 minutes\"). The patient requires frequent prn comfort medications. There is intermittent tracheal congestion managed with Glycopyrrolate IV.  She is not eating, but takes occasional sip of soda. The patient's son visited on 1/6/23 and the patient woke up and was able to hug him. I collaborated with the patient's nurse and the hospice nurse.    Objective:   Pain is managed with scheduled Morphine 2 mg IV every 4 hours plus prn Morphine with 4 x 2 mg doses in the past 24 hours, Glycopyrrolate IV is available for secretions, Haloperidol is scheduled 1 mg IV every 4 hours plus prn for delirium with 5 prn doses in the past 24 hours, and Lorazepam is scheduled 1 mg IV every 6 hours plus prn for anxiety with 5 x 0.5 mg prn doses in the past 24 hours.      Data reviewed 1/7/2024:   Transthoracic Echocardiogram 12/27/23    Left Ventricle: Severely reduced left ventricular systolic function with a visually estimated EF of 15 - 20%. Left ventricle is severely dilated. Mildly increased wall thickness.    Left Atrium: Left atrium is severely dilated.    Right Ventricle: Right ventricle is dilated.    Right Atrium: Right atrium is dilated.    Aortic Valve: Mild to moderate regurgitation.    Pulmonary

## 2024-01-08 PROCEDURE — 94761 N-INVAS EAR/PLS OXIMETRY MLT: CPT

## 2024-01-08 PROCEDURE — 6360000002 HC RX W HCPCS: Performed by: FAMILY MEDICINE

## 2024-01-08 PROCEDURE — 2580000003 HC RX 258: Performed by: FAMILY MEDICINE

## 2024-01-08 PROCEDURE — 1250000000 HC SEMI PRIVATE HOSPICE R&B

## 2024-01-08 RX ADMIN — HALOPERIDOL LACTATE 2 MG: 5 INJECTION, SOLUTION INTRAMUSCULAR at 20:06

## 2024-01-08 RX ADMIN — MORPHINE SULFATE 4 MG: 4 INJECTION, SOLUTION INTRAMUSCULAR; INTRAVENOUS at 17:09

## 2024-01-08 RX ADMIN — GLYCOPYRROLATE 0.2 MG: 0.2 INJECTION INTRAMUSCULAR; INTRAVENOUS at 15:22

## 2024-01-08 RX ADMIN — GLYCOPYRROLATE 0.2 MG: 0.2 INJECTION INTRAMUSCULAR; INTRAVENOUS at 22:22

## 2024-01-08 RX ADMIN — MORPHINE SULFATE 2 MG: 2 INJECTION, SOLUTION INTRAMUSCULAR; INTRAVENOUS at 22:22

## 2024-01-08 RX ADMIN — HALOPERIDOL LACTATE 1 MG: 5 INJECTION, SOLUTION INTRAMUSCULAR at 22:23

## 2024-01-08 RX ADMIN — LORAZEPAM 1 MG: 2 INJECTION, SOLUTION INTRAMUSCULAR; INTRAVENOUS at 05:23

## 2024-01-08 RX ADMIN — MORPHINE SULFATE 2 MG: 2 INJECTION, SOLUTION INTRAMUSCULAR; INTRAVENOUS at 03:51

## 2024-01-08 RX ADMIN — LORAZEPAM 1 MG: 2 INJECTION, SOLUTION INTRAMUSCULAR; INTRAVENOUS at 11:28

## 2024-01-08 RX ADMIN — HALOPERIDOL LACTATE 2 MG: 5 INJECTION, SOLUTION INTRAMUSCULAR at 03:50

## 2024-01-08 RX ADMIN — LORAZEPAM 1 MG: 2 INJECTION, SOLUTION INTRAMUSCULAR; INTRAVENOUS at 17:08

## 2024-01-08 RX ADMIN — LORAZEPAM 1 MG: 2 INJECTION, SOLUTION INTRAMUSCULAR; INTRAVENOUS at 09:21

## 2024-01-08 RX ADMIN — GLYCOPYRROLATE 0.2 MG: 0.2 INJECTION INTRAMUSCULAR; INTRAVENOUS at 09:13

## 2024-01-08 RX ADMIN — LORAZEPAM 1 MG: 2 INJECTION, SOLUTION INTRAMUSCULAR; INTRAVENOUS at 01:45

## 2024-01-08 RX ADMIN — LORAZEPAM 1 MG: 2 INJECTION, SOLUTION INTRAMUSCULAR; INTRAVENOUS at 15:15

## 2024-01-08 RX ADMIN — LORAZEPAM 1 MG: 2 INJECTION, SOLUTION INTRAMUSCULAR; INTRAVENOUS at 22:22

## 2024-01-08 RX ADMIN — HALOPERIDOL LACTATE 2 MG: 5 INJECTION, SOLUTION INTRAMUSCULAR at 15:20

## 2024-01-08 RX ADMIN — LORAZEPAM 1 MG: 2 INJECTION, SOLUTION INTRAMUSCULAR; INTRAVENOUS at 20:06

## 2024-01-08 RX ADMIN — HALOPERIDOL LACTATE 2 MG: 5 INJECTION, SOLUTION INTRAMUSCULAR at 11:27

## 2024-01-08 RX ADMIN — SODIUM CHLORIDE, PRESERVATIVE FREE 10 ML: 5 INJECTION INTRAVENOUS at 20:10

## 2024-01-08 RX ADMIN — SODIUM CHLORIDE, PRESERVATIVE FREE 10 ML: 5 INJECTION INTRAVENOUS at 09:15

## 2024-01-08 RX ADMIN — MORPHINE SULFATE 2 MG: 2 INJECTION, SOLUTION INTRAMUSCULAR; INTRAVENOUS at 11:27

## 2024-01-08 RX ADMIN — GLYCOPYRROLATE 0.2 MG: 0.2 INJECTION INTRAMUSCULAR; INTRAVENOUS at 05:24

## 2024-01-08 RX ADMIN — MORPHINE SULFATE 2 MG: 2 INJECTION, SOLUTION INTRAMUSCULAR; INTRAVENOUS at 09:12

## 2024-01-08 RX ADMIN — HALOPERIDOL LACTATE 2 MG: 5 INJECTION, SOLUTION INTRAMUSCULAR at 09:13

## 2024-01-08 RX ADMIN — MORPHINE SULFATE 2 MG: 2 INJECTION, SOLUTION INTRAMUSCULAR; INTRAVENOUS at 15:18

## 2024-01-08 RX ADMIN — MORPHINE SULFATE 2 MG: 2 INJECTION, SOLUTION INTRAMUSCULAR; INTRAVENOUS at 20:06

## 2024-01-08 RX ADMIN — HALOPERIDOL LACTATE 1 MG: 5 INJECTION, SOLUTION INTRAMUSCULAR at 17:09

## 2024-01-08 ASSESSMENT — PAIN SCALES - GENERAL
PAINLEVEL_OUTOF10: 3
PAINLEVEL_OUTOF10: 5
PAINLEVEL_OUTOF10: 5

## 2024-01-08 ASSESSMENT — PAIN SCALES - WONG BAKER
WONGBAKER_NUMERICALRESPONSE: 0

## 2024-01-08 ASSESSMENT — PAIN - FUNCTIONAL ASSESSMENT
PAIN_FUNCTIONAL_ASSESSMENT: PREVENTS OR INTERFERES WITH ALL ACTIVE AND SOME PASSIVE ACTIVITIES
PAIN_FUNCTIONAL_ASSESSMENT: PREVENTS OR INTERFERES WITH ALL ACTIVE AND SOME PASSIVE ACTIVITIES

## 2024-01-08 NOTE — PROGRESS NOTES
[facility]  General Inpatient Hospice Progress Note    Date: 1/8/2024  Name: Courtney Adorno  MRN: [id number]  YOB: 1965   Patient's PCP: No primary care provider on file.  Consultants during acute care: pulmonology, critical care, cardiology, nephrology  Acute care admit date: 12/26 to 12/30/2023 (third admission since 11/4/23)  Intubation: 12/27/23  Withdrawal of life sustaining treatment (mechanical ventilation and vasopressors):12/28/2023  Admit Date: 12/30/2023 to General Inpatient Hospice    Subjective:   The patient is unresponsive with mild upper airway noise. She is hypotensive. She is currently comfortable, and still requires frequent prn comfort medications. There is intermittent tracheal congestion managed with frequent doses of Glycopyrrolate IV.  She is not eating or takin sips, and has mouth care only. Discussed with the patient's daughter, Twila, and the patient's sister at the bedside. I collaborated with the patient's nurse and the hospice nurse.    Objective:   Pain is managed with scheduled Morphine 2 mg IV every 4 hours plus prn Morphine with 5 prn doses in the past 24 hours, Glycopyrrolate IV is available for secretions, Haloperidol is scheduled 2 mg IV every 4 hours plus prn for delirium with 4 prn doses in the past 24 hours, and Lorazepam is scheduled 1 mg IV every 6 hours plus prn for anxiety with 5 x 1 mg prn doses in the past 24 hours.      Data reviewed 1/8/2024:   Transthoracic Echocardiogram 12/27/23    Left Ventricle: Severely reduced left ventricular systolic function with a visually estimated EF of 15 - 20%. Left ventricle is severely dilated. Mildly increased wall thickness.    Left Atrium: Left atrium is severely dilated.    Right Ventricle: Right ventricle is dilated.    Right Atrium: Right atrium is dilated.    Aortic Valve: Mild to moderate regurgitation.    Pulmonary Arteries: Main pulmonary artery is severely dilated: pulmonic root 4.2cm, MPA:

## 2024-01-09 PROCEDURE — 1250000000 HC SEMI PRIVATE HOSPICE R&B

## 2024-01-09 PROCEDURE — 2580000003 HC RX 258: Performed by: FAMILY MEDICINE

## 2024-01-09 PROCEDURE — 6370000000 HC RX 637 (ALT 250 FOR IP): Performed by: FAMILY MEDICINE

## 2024-01-09 PROCEDURE — 6360000002 HC RX W HCPCS: Performed by: FAMILY MEDICINE

## 2024-01-09 PROCEDURE — 94761 N-INVAS EAR/PLS OXIMETRY MLT: CPT

## 2024-01-09 PROCEDURE — 2700000000 HC OXYGEN THERAPY PER DAY

## 2024-01-09 RX ADMIN — HALOPERIDOL LACTATE 1 MG: 5 INJECTION, SOLUTION INTRAMUSCULAR at 18:07

## 2024-01-09 RX ADMIN — SODIUM CHLORIDE, PRESERVATIVE FREE 10 ML: 5 INJECTION INTRAVENOUS at 20:15

## 2024-01-09 RX ADMIN — ACETAMINOPHEN 650 MG: 650 SUPPOSITORY RECTAL at 14:11

## 2024-01-09 RX ADMIN — ACETAMINOPHEN 650 MG: 650 SUPPOSITORY RECTAL at 21:03

## 2024-01-09 RX ADMIN — HALOPERIDOL LACTATE 2 MG: 5 INJECTION, SOLUTION INTRAMUSCULAR at 07:37

## 2024-01-09 RX ADMIN — HALOPERIDOL LACTATE 2 MG: 5 INJECTION, SOLUTION INTRAMUSCULAR at 12:09

## 2024-01-09 RX ADMIN — GLYCOPYRROLATE 0.2 MG: 0.2 INJECTION INTRAMUSCULAR; INTRAVENOUS at 02:23

## 2024-01-09 RX ADMIN — MORPHINE SULFATE 2 MG: 2 INJECTION, SOLUTION INTRAMUSCULAR; INTRAVENOUS at 20:15

## 2024-01-09 RX ADMIN — LORAZEPAM 1 MG: 2 INJECTION, SOLUTION INTRAMUSCULAR; INTRAVENOUS at 00:22

## 2024-01-09 RX ADMIN — LORAZEPAM 1 MG: 2 INJECTION, SOLUTION INTRAMUSCULAR; INTRAVENOUS at 18:08

## 2024-01-09 RX ADMIN — HALOPERIDOL LACTATE 2 MG: 5 INJECTION, SOLUTION INTRAMUSCULAR at 16:04

## 2024-01-09 RX ADMIN — HALOPERIDOL LACTATE 2 MG: 5 INJECTION, SOLUTION INTRAMUSCULAR at 05:09

## 2024-01-09 RX ADMIN — LORAZEPAM 1 MG: 2 INJECTION, SOLUTION INTRAMUSCULAR; INTRAVENOUS at 20:14

## 2024-01-09 RX ADMIN — HALOPERIDOL LACTATE 2 MG: 5 INJECTION, SOLUTION INTRAMUSCULAR at 23:21

## 2024-01-09 RX ADMIN — MORPHINE SULFATE 2 MG: 2 INJECTION, SOLUTION INTRAMUSCULAR; INTRAVENOUS at 18:08

## 2024-01-09 RX ADMIN — HALOPERIDOL LACTATE 1 MG: 5 INJECTION, SOLUTION INTRAMUSCULAR at 02:18

## 2024-01-09 RX ADMIN — MORPHINE SULFATE 2 MG: 2 INJECTION, SOLUTION INTRAMUSCULAR; INTRAVENOUS at 07:37

## 2024-01-09 RX ADMIN — MORPHINE SULFATE 2 MG: 2 INJECTION, SOLUTION INTRAMUSCULAR; INTRAVENOUS at 12:09

## 2024-01-09 RX ADMIN — HALOPERIDOL LACTATE 2 MG: 5 INJECTION, SOLUTION INTRAMUSCULAR at 20:15

## 2024-01-09 RX ADMIN — LORAZEPAM 1 MG: 2 INJECTION, SOLUTION INTRAMUSCULAR; INTRAVENOUS at 02:19

## 2024-01-09 RX ADMIN — LORAZEPAM 1 MG: 2 INJECTION, SOLUTION INTRAMUSCULAR; INTRAVENOUS at 05:10

## 2024-01-09 RX ADMIN — LORAZEPAM 1 MG: 2 INJECTION, SOLUTION INTRAMUSCULAR; INTRAVENOUS at 14:10

## 2024-01-09 RX ADMIN — MORPHINE SULFATE 2 MG: 2 INJECTION, SOLUTION INTRAMUSCULAR; INTRAVENOUS at 02:19

## 2024-01-09 RX ADMIN — MORPHINE SULFATE 2 MG: 2 INJECTION, SOLUTION INTRAMUSCULAR; INTRAVENOUS at 05:09

## 2024-01-09 RX ADMIN — MORPHINE SULFATE 2 MG: 2 INJECTION, SOLUTION INTRAMUSCULAR; INTRAVENOUS at 16:04

## 2024-01-09 RX ADMIN — MORPHINE SULFATE 2 MG: 2 INJECTION, SOLUTION INTRAMUSCULAR; INTRAVENOUS at 23:20

## 2024-01-09 RX ADMIN — LORAZEPAM 1 MG: 2 INJECTION, SOLUTION INTRAMUSCULAR; INTRAVENOUS at 07:37

## 2024-01-09 RX ADMIN — HALOPERIDOL LACTATE 2 MG: 5 INJECTION, SOLUTION INTRAMUSCULAR at 00:13

## 2024-01-09 RX ADMIN — MORPHINE SULFATE 2 MG: 2 INJECTION, SOLUTION INTRAMUSCULAR; INTRAVENOUS at 00:15

## 2024-01-09 RX ADMIN — GLYCOPYRROLATE 0.2 MG: 0.2 INJECTION INTRAMUSCULAR; INTRAVENOUS at 06:29

## 2024-01-09 ASSESSMENT — PAIN SCALES - WONG BAKER
WONGBAKER_NUMERICALRESPONSE: 0
WONGBAKER_NUMERICALRESPONSE: 4
WONGBAKER_NUMERICALRESPONSE: 4
WONGBAKER_NUMERICALRESPONSE: 0

## 2024-01-09 ASSESSMENT — PAIN DESCRIPTION - LOCATION
LOCATION: GENERALIZED
LOCATION: GENERALIZED

## 2024-01-09 ASSESSMENT — PAIN SCALES - GENERAL
PAINLEVEL_OUTOF10: 0
PAINLEVEL_OUTOF10: 4
PAINLEVEL_OUTOF10: 4
PAINLEVEL_OUTOF10: 0

## 2024-01-09 ASSESSMENT — PAIN - FUNCTIONAL ASSESSMENT
PAIN_FUNCTIONAL_ASSESSMENT: PREVENTS OR INTERFERES WITH ALL ACTIVE AND SOME PASSIVE ACTIVITIES

## 2024-01-09 NOTE — PROGRESS NOTES
[facility]  General Inpatient Hospice Progress Note    Date: 1/9/2024  Name: Courtney Adorno  MRN: [id number]  YOB: 1965   Patient's PCP: No primary care provider on file.  Consultants during acute care: pulmonology, critical care, cardiology, nephrology  Acute care admit date: 12/26 to 12/30/2023 (third admission since 11/4/23)  Intubation: 12/27/23  Withdrawal of life sustaining treatment (mechanical ventilation and vasopressors):12/28/2023  Admit Date: 12/30/2023 to General Inpatient Hospice    Subjective:   The patient is unresponsive with mild upper airway noise. She is currently comfortable, and still requires frequent prn comfort medications. There is intermittent tracheal congestion managed with frequent doses of Glycopyrrolate IV.  She reportedly drank a cup of water for her daughter in law overnight, without coughing or choking. She is not eating. Discussed with the patient's daughter in law, Tiffany, at the bedside. I collaborated with the patient's nurse and the hospice nurse.    Objective:   Pain is managed with scheduled Morphine 2 mg IV every 4 hours plus prn Morphine with 3 prn doses in the past 24 hours, Glycopyrrolate IV is available for secretions, Haloperidol is scheduled 2 mg IV every 4 hours plus prn for delirium with 3 prn doses in the past 24 hours, and Lorazepam is scheduled 1 mg IV every 6 hours plus prn for anxiety with 5 x 1 mg prn doses in the past 24 hours.      Data reviewed 1/9/2024:   Transthoracic Echocardiogram 12/27/23    Left Ventricle: Severely reduced left ventricular systolic function with a visually estimated EF of 15 - 20%. Left ventricle is severely dilated. Mildly increased wall thickness.    Left Atrium: Left atrium is severely dilated.    Right Ventricle: Right ventricle is dilated.    Right Atrium: Right atrium is dilated.    Aortic Valve: Mild to moderate regurgitation.    Pulmonary Arteries: Main pulmonary artery is severely dilated:

## 2024-01-10 PROCEDURE — 1250000000 HC SEMI PRIVATE HOSPICE R&B

## 2024-01-10 PROCEDURE — 94761 N-INVAS EAR/PLS OXIMETRY MLT: CPT

## 2024-01-10 PROCEDURE — 2700000000 HC OXYGEN THERAPY PER DAY

## 2024-01-10 PROCEDURE — 6360000002 HC RX W HCPCS: Performed by: FAMILY MEDICINE

## 2024-01-10 PROCEDURE — 2580000003 HC RX 258: Performed by: FAMILY MEDICINE

## 2024-01-10 RX ADMIN — MORPHINE SULFATE 2 MG: 2 INJECTION, SOLUTION INTRAMUSCULAR; INTRAVENOUS at 20:11

## 2024-01-10 RX ADMIN — LORAZEPAM 1 MG: 2 INJECTION, SOLUTION INTRAMUSCULAR; INTRAVENOUS at 10:22

## 2024-01-10 RX ADMIN — LORAZEPAM 1 MG: 2 INJECTION, SOLUTION INTRAMUSCULAR; INTRAVENOUS at 20:46

## 2024-01-10 RX ADMIN — MORPHINE SULFATE 2 MG: 2 INJECTION, SOLUTION INTRAMUSCULAR; INTRAVENOUS at 22:33

## 2024-01-10 RX ADMIN — MORPHINE SULFATE 2 MG: 2 INJECTION, SOLUTION INTRAMUSCULAR; INTRAVENOUS at 10:21

## 2024-01-10 RX ADMIN — GLYCOPYRROLATE 0.2 MG: 0.2 INJECTION INTRAMUSCULAR; INTRAVENOUS at 12:06

## 2024-01-10 RX ADMIN — HALOPERIDOL LACTATE 1 MG: 5 INJECTION, SOLUTION INTRAMUSCULAR at 06:23

## 2024-01-10 RX ADMIN — HALOPERIDOL LACTATE 2 MG: 5 INJECTION, SOLUTION INTRAMUSCULAR at 04:09

## 2024-01-10 RX ADMIN — MORPHINE SULFATE 2 MG: 2 INJECTION, SOLUTION INTRAMUSCULAR; INTRAVENOUS at 08:56

## 2024-01-10 RX ADMIN — MORPHINE SULFATE 2 MG: 2 INJECTION, SOLUTION INTRAMUSCULAR; INTRAVENOUS at 06:23

## 2024-01-10 RX ADMIN — LORAZEPAM 1 MG: 2 INJECTION, SOLUTION INTRAMUSCULAR; INTRAVENOUS at 18:50

## 2024-01-10 RX ADMIN — MORPHINE SULFATE 2 MG: 2 INJECTION, SOLUTION INTRAMUSCULAR; INTRAVENOUS at 04:09

## 2024-01-10 RX ADMIN — HALOPERIDOL LACTATE 2 MG: 5 INJECTION, SOLUTION INTRAMUSCULAR at 08:57

## 2024-01-10 RX ADMIN — MORPHINE SULFATE 2 MG: 2 INJECTION, SOLUTION INTRAMUSCULAR; INTRAVENOUS at 15:35

## 2024-01-10 RX ADMIN — GLYCOPYRROLATE 0.2 MG: 0.2 INJECTION INTRAMUSCULAR; INTRAVENOUS at 06:24

## 2024-01-10 RX ADMIN — LORAZEPAM 1 MG: 2 INJECTION, SOLUTION INTRAMUSCULAR; INTRAVENOUS at 08:57

## 2024-01-10 RX ADMIN — MORPHINE SULFATE 2 MG: 2 INJECTION, SOLUTION INTRAMUSCULAR; INTRAVENOUS at 12:02

## 2024-01-10 RX ADMIN — LORAZEPAM 1 MG: 2 INJECTION, SOLUTION INTRAMUSCULAR; INTRAVENOUS at 15:35

## 2024-01-10 RX ADMIN — GLYCOPYRROLATE 0.2 MG: 0.2 INJECTION INTRAMUSCULAR; INTRAVENOUS at 22:33

## 2024-01-10 RX ADMIN — HALOPERIDOL LACTATE 2 MG: 5 INJECTION, SOLUTION INTRAMUSCULAR at 15:35

## 2024-01-10 RX ADMIN — MORPHINE SULFATE 2 MG: 2 INJECTION, SOLUTION INTRAMUSCULAR; INTRAVENOUS at 18:36

## 2024-01-10 RX ADMIN — HALOPERIDOL LACTATE 2 MG: 5 INJECTION, SOLUTION INTRAMUSCULAR at 20:11

## 2024-01-10 RX ADMIN — HALOPERIDOL LACTATE 1 MG: 5 INJECTION, SOLUTION INTRAMUSCULAR at 10:22

## 2024-01-10 RX ADMIN — HALOPERIDOL LACTATE 2 MG: 5 INJECTION, SOLUTION INTRAMUSCULAR at 12:02

## 2024-01-10 RX ADMIN — MORPHINE SULFATE 2 MG: 2 INJECTION, SOLUTION INTRAMUSCULAR; INTRAVENOUS at 02:18

## 2024-01-10 RX ADMIN — SODIUM CHLORIDE, PRESERVATIVE FREE 10 ML: 5 INJECTION INTRAVENOUS at 20:12

## 2024-01-10 RX ADMIN — GLYCOPYRROLATE 0.2 MG: 0.2 INJECTION INTRAMUSCULAR; INTRAVENOUS at 18:36

## 2024-01-10 RX ADMIN — LORAZEPAM 1 MG: 2 INJECTION, SOLUTION INTRAMUSCULAR; INTRAVENOUS at 02:18

## 2024-01-10 ASSESSMENT — PAIN - FUNCTIONAL ASSESSMENT: PAIN_FUNCTIONAL_ASSESSMENT: PREVENTS OR INTERFERES WITH ALL ACTIVE AND SOME PASSIVE ACTIVITIES

## 2024-01-10 ASSESSMENT — PAIN SCALES - GENERAL
PAINLEVEL_OUTOF10: 4
PAINLEVEL_OUTOF10: 6

## 2024-01-10 ASSESSMENT — PAIN DESCRIPTION - LOCATION
LOCATION: GENERALIZED
LOCATION: GENERALIZED

## 2024-01-10 ASSESSMENT — PAIN SCALES - WONG BAKER
WONGBAKER_NUMERICALRESPONSE: 4
WONGBAKER_NUMERICALRESPONSE: 6

## 2024-01-10 NOTE — PROGRESS NOTES
[facility]  General Inpatient Hospice Progress Note    Date: 1/10/2024  Name: Courtney Adorno  MRN: [id number]  YOB: 1965   Patient's PCP: No primary care provider on file.  Consultants during acute care: pulmonology, critical care, cardiology, nephrology  Acute care admit date: 12/26 to 12/30/2023 (third admission since 11/4/23)  Intubation: 12/27/23  Withdrawal of life sustaining treatment (mechanical ventilation and vasopressors):12/28/2023  Admit Date: 12/30/2023 to General Inpatient Hospice    Subjective:   The patient is unresponsive with mild upper airway noise. She is currently comfortable, and still requires frequent prn comfort medications. There is intermittent tracheal congestion managed with doses of Glycopyrrolate IV.  She took sips of Sprite overnight. She is not eating. Her breathing is more shallow. Discussed with the patient's daughter in law, Tiffany, at the bedside. I collaborated with the patient's nurse and the hospice nurse.    Objective:   Pain is managed with scheduled Morphine 2 mg IV every 4 hours plus prn Morphine with 3 prn doses in the past 24 hours, Glycopyrrolate IV is available for secretions, Haloperidol is scheduled 2 mg IV every 4 hours plus prn for delirium with 2 prn doses in the past 24 hours, and Lorazepam is scheduled 1 mg IV every 6 hours plus prn for anxiety with 1 prn dose in the past 24 hours.      Data reviewed 1/10/2024:   Transthoracic Echocardiogram 12/27/23    Left Ventricle: Severely reduced left ventricular systolic function with a visually estimated EF of 15 - 20%. Left ventricle is severely dilated. Mildly increased wall thickness.    Left Atrium: Left atrium is severely dilated.    Right Ventricle: Right ventricle is dilated.    Right Atrium: Right atrium is dilated.    Aortic Valve: Mild to moderate regurgitation.    Pulmonary Arteries: Main pulmonary artery is severely dilated: pulmonic root 4.2cm, MPA: 3.7cm, RPA: 1.4cm, LPA:

## 2024-01-11 PROCEDURE — 6360000002 HC RX W HCPCS: Performed by: FAMILY MEDICINE

## 2024-01-11 PROCEDURE — 6370000000 HC RX 637 (ALT 250 FOR IP): Performed by: FAMILY MEDICINE

## 2024-01-11 PROCEDURE — 1250000000 HC SEMI PRIVATE HOSPICE R&B

## 2024-01-11 PROCEDURE — 94761 N-INVAS EAR/PLS OXIMETRY MLT: CPT

## 2024-01-11 PROCEDURE — 2700000000 HC OXYGEN THERAPY PER DAY

## 2024-01-11 PROCEDURE — 2580000003 HC RX 258: Performed by: FAMILY MEDICINE

## 2024-01-11 RX ORDER — SCOLOPAMINE TRANSDERMAL SYSTEM 1 MG/1
1 PATCH, EXTENDED RELEASE TRANSDERMAL
Status: DISCONTINUED | OUTPATIENT
Start: 2024-01-11 | End: 2024-01-12 | Stop reason: HOSPADM

## 2024-01-11 RX ORDER — GLYCOPYRROLATE 0.2 MG/ML
0.2 INJECTION INTRAMUSCULAR; INTRAVENOUS
Status: DISCONTINUED | OUTPATIENT
Start: 2024-01-11 | End: 2024-01-12 | Stop reason: HOSPADM

## 2024-01-11 RX ADMIN — GLYCOPYRROLATE 0.2 MG: 0.2 INJECTION INTRAMUSCULAR; INTRAVENOUS at 15:55

## 2024-01-11 RX ADMIN — LORAZEPAM 1 MG: 2 INJECTION, SOLUTION INTRAMUSCULAR; INTRAVENOUS at 18:38

## 2024-01-11 RX ADMIN — MORPHINE SULFATE 2 MG: 2 INJECTION, SOLUTION INTRAMUSCULAR; INTRAVENOUS at 00:23

## 2024-01-11 RX ADMIN — MORPHINE SULFATE 2 MG: 2 INJECTION, SOLUTION INTRAMUSCULAR; INTRAVENOUS at 14:20

## 2024-01-11 RX ADMIN — GLYCOPYRROLATE 0.2 MG: 0.2 INJECTION INTRAMUSCULAR; INTRAVENOUS at 20:19

## 2024-01-11 RX ADMIN — LORAZEPAM 1 MG: 2 INJECTION, SOLUTION INTRAMUSCULAR; INTRAVENOUS at 22:21

## 2024-01-11 RX ADMIN — HALOPERIDOL LACTATE 2 MG: 5 INJECTION, SOLUTION INTRAMUSCULAR at 20:20

## 2024-01-11 RX ADMIN — HALOPERIDOL LACTATE 2 MG: 5 INJECTION, SOLUTION INTRAMUSCULAR at 00:23

## 2024-01-11 RX ADMIN — LORAZEPAM 1 MG: 2 INJECTION, SOLUTION INTRAMUSCULAR; INTRAVENOUS at 09:44

## 2024-01-11 RX ADMIN — GLYCOPYRROLATE 0.2 MG: 0.2 INJECTION INTRAMUSCULAR; INTRAVENOUS at 22:21

## 2024-01-11 RX ADMIN — HALOPERIDOL LACTATE 2 MG: 5 INJECTION, SOLUTION INTRAMUSCULAR at 07:37

## 2024-01-11 RX ADMIN — MORPHINE SULFATE 4 MG: 4 INJECTION, SOLUTION INTRAMUSCULAR; INTRAVENOUS at 09:45

## 2024-01-11 RX ADMIN — MORPHINE SULFATE 2 MG: 2 INJECTION, SOLUTION INTRAMUSCULAR; INTRAVENOUS at 15:55

## 2024-01-11 RX ADMIN — MORPHINE SULFATE 2 MG: 2 INJECTION, SOLUTION INTRAMUSCULAR; INTRAVENOUS at 04:17

## 2024-01-11 RX ADMIN — LORAZEPAM 1 MG: 2 INJECTION, SOLUTION INTRAMUSCULAR; INTRAVENOUS at 07:37

## 2024-01-11 RX ADMIN — GLYCOPYRROLATE 0.2 MG: 0.2 INJECTION INTRAMUSCULAR; INTRAVENOUS at 11:57

## 2024-01-11 RX ADMIN — GLYCOPYRROLATE 0.2 MG: 0.2 INJECTION INTRAMUSCULAR; INTRAVENOUS at 14:19

## 2024-01-11 RX ADMIN — MORPHINE SULFATE 2 MG: 2 INJECTION, SOLUTION INTRAMUSCULAR; INTRAVENOUS at 07:37

## 2024-01-11 RX ADMIN — MORPHINE SULFATE 2 MG: 2 INJECTION, SOLUTION INTRAMUSCULAR; INTRAVENOUS at 20:20

## 2024-01-11 RX ADMIN — MORPHINE SULFATE 2 MG: 2 INJECTION, SOLUTION INTRAMUSCULAR; INTRAVENOUS at 11:56

## 2024-01-11 RX ADMIN — SODIUM CHLORIDE, PRESERVATIVE FREE 10 ML: 5 INJECTION INTRAVENOUS at 07:38

## 2024-01-11 RX ADMIN — MORPHINE SULFATE 4 MG: 4 INJECTION, SOLUTION INTRAMUSCULAR; INTRAVENOUS at 18:38

## 2024-01-11 RX ADMIN — LORAZEPAM 1 MG: 2 INJECTION, SOLUTION INTRAMUSCULAR; INTRAVENOUS at 14:20

## 2024-01-11 RX ADMIN — HALOPERIDOL LACTATE 2 MG: 5 INJECTION, SOLUTION INTRAMUSCULAR at 11:55

## 2024-01-11 RX ADMIN — LORAZEPAM 1 MG: 2 INJECTION, SOLUTION INTRAMUSCULAR; INTRAVENOUS at 20:20

## 2024-01-11 RX ADMIN — HALOPERIDOL LACTATE 2 MG: 5 INJECTION, SOLUTION INTRAMUSCULAR at 15:57

## 2024-01-11 RX ADMIN — GLYCOPYRROLATE 0.2 MG: 0.2 INJECTION INTRAMUSCULAR; INTRAVENOUS at 08:58

## 2024-01-11 RX ADMIN — HALOPERIDOL LACTATE 1 MG: 5 INJECTION, SOLUTION INTRAMUSCULAR at 09:44

## 2024-01-11 RX ADMIN — HALOPERIDOL LACTATE 2 MG: 5 INJECTION, SOLUTION INTRAMUSCULAR at 04:17

## 2024-01-11 RX ADMIN — MORPHINE SULFATE 2 MG: 2 INJECTION, SOLUTION INTRAMUSCULAR; INTRAVENOUS at 22:20

## 2024-01-11 RX ADMIN — LORAZEPAM 1 MG: 2 INJECTION, SOLUTION INTRAMUSCULAR; INTRAVENOUS at 02:24

## 2024-01-11 RX ADMIN — GLYCOPYRROLATE 0.2 MG: 0.2 INJECTION INTRAMUSCULAR; INTRAVENOUS at 18:38

## 2024-01-11 RX ADMIN — LORAZEPAM 1 MG: 2 INJECTION, SOLUTION INTRAMUSCULAR; INTRAVENOUS at 11:56

## 2024-01-11 RX ADMIN — SODIUM CHLORIDE, PRESERVATIVE FREE 10 ML: 5 INJECTION INTRAVENOUS at 20:21

## 2024-01-11 RX ADMIN — GLYCOPYRROLATE 0.2 MG: 0.2 INJECTION INTRAMUSCULAR; INTRAVENOUS at 06:20

## 2024-01-11 RX ADMIN — ACETAMINOPHEN 650 MG: 650 SUPPOSITORY RECTAL at 07:37

## 2024-01-11 RX ADMIN — GLYCOPYRROLATE 0.2 MG: 0.2 INJECTION INTRAMUSCULAR; INTRAVENOUS at 02:24

## 2024-01-11 ASSESSMENT — PAIN SCALES - GENERAL
PAINLEVEL_OUTOF10: 6
PAINLEVEL_OUTOF10: 4

## 2024-01-11 ASSESSMENT — PAIN - FUNCTIONAL ASSESSMENT: PAIN_FUNCTIONAL_ASSESSMENT: PREVENTS OR INTERFERES WITH ALL ACTIVE AND SOME PASSIVE ACTIVITIES

## 2024-01-11 ASSESSMENT — PAIN SCALES - WONG BAKER
WONGBAKER_NUMERICALRESPONSE: 4
WONGBAKER_NUMERICALRESPONSE: 4

## 2024-01-11 ASSESSMENT — PAIN DESCRIPTION - LOCATION
LOCATION: GENERALIZED
LOCATION: FINGER (COMMENT WHICH ONE)

## 2024-01-11 NOTE — PROGRESS NOTES
[facility]  General Inpatient Hospice Progress Note    Date: 1/11/2024  Name: Courtney Adorno  MRN: [id number]  YOB: 1965   Patient's PCP: No primary care provider on file.  Consultants during acute care: pulmonology, critical care, cardiology, nephrology  Acute care admit date: 12/26 to 12/30/2023 (third admission since 11/4/23)  Intubation: 12/27/23  Withdrawal of life sustaining treatment (mechanical ventilation and vasopressors):12/28/2023  Admit Date: 12/30/2023 to General Inpatient Hospice    Subjective:   The patient is unresponsive with upper airway noise and mottling of toes. She is currently comfortable, and still requires frequent prn comfort medications. Her breathing is shallow. Discussed with the patient's daughter, Twila, at the bedside. I collaborated with the patient's nurse and the hospice nurse.    Objective:   Pain is managed with scheduled Morphine 2 mg IV every 4 hours plus prn Morphine with 3 prn doses in the past 24 hours, Glycopyrrolate IV is available for secretions, Haloperidol is scheduled 2 mg IV every 4 hours plus prn for delirium with 1 prn doses in the past 24 hours, and Lorazepam is scheduled 1 mg IV every 6 hours plus prn for anxiety with 2 prn dose in the past 24 hours.      Data reviewed 1/11/2024:   Transthoracic Echocardiogram 12/27/23    Left Ventricle: Severely reduced left ventricular systolic function with a visually estimated EF of 15 - 20%. Left ventricle is severely dilated. Mildly increased wall thickness.    Left Atrium: Left atrium is severely dilated.    Right Ventricle: Right ventricle is dilated.    Right Atrium: Right atrium is dilated.    Aortic Valve: Mild to moderate regurgitation.    Pulmonary Arteries: Main pulmonary artery is severely dilated: pulmonic root 4.2cm, MPA: 3.7cm, RPA: 1.4cm, LPA: 1.9cm. Mobile echodensity noted  attached at pulmonic leaflet and proximal PA differential would be vegetation vs Thombus but there is  systolic (congestive) heart failure ([facility]) I50.23    Non-ischemic cardiomyopathy ([facility]) I42.8    Acute respiratory distress R06.03    Systolic CHF, acute on chronic ([facility]) I50.23    Acute pulmonary edema ([facility]) J81.0    Persistent atrial fibrillation ([facility]) I48.19    Altered mental status R41.82    Severe left ventricular systolic dysfunction I51.9    Delirium due to medical condition with behavioral disturbance F05    Cocaine use disorder, severe, dependence ([facility]) F14.20    Atrial fibrillation with RVR ([facility]) I48.91    Nondependent cocaine abuse ([facility]) F14.10    Chronic hypoxemic respiratory failure ([facility]) J96.11    Anxiety F41.9    Chronic obstructive lung disease ([facility]) J44.9    Nonischemic cardiomyopathy ([facility]) I42.8     MARY Barragan MD

## 2024-01-12 VITALS
SYSTOLIC BLOOD PRESSURE: 122 MMHG | HEART RATE: 72 BPM | RESPIRATION RATE: 32 BRPM | HEIGHT: 67 IN | WEIGHT: 170 LBS | OXYGEN SATURATION: 84 % | TEMPERATURE: 102.5 F | DIASTOLIC BLOOD PRESSURE: 72 MMHG | BODY MASS INDEX: 26.68 KG/M2

## 2024-01-12 PROCEDURE — 6360000002 HC RX W HCPCS: Performed by: FAMILY MEDICINE

## 2024-01-12 PROCEDURE — 6370000000 HC RX 637 (ALT 250 FOR IP): Performed by: FAMILY MEDICINE

## 2024-01-12 RX ADMIN — ACETAMINOPHEN 650 MG: 650 SUPPOSITORY RECTAL at 05:11

## 2024-01-12 RX ADMIN — MORPHINE SULFATE 2 MG: 2 INJECTION, SOLUTION INTRAMUSCULAR; INTRAVENOUS at 02:16

## 2024-01-12 RX ADMIN — MORPHINE SULFATE 2 MG: 2 INJECTION, SOLUTION INTRAMUSCULAR; INTRAVENOUS at 00:19

## 2024-01-12 RX ADMIN — GLYCOPYRROLATE 0.2 MG: 0.2 INJECTION INTRAMUSCULAR; INTRAVENOUS at 00:20

## 2024-01-12 RX ADMIN — LORAZEPAM 1 MG: 2 INJECTION, SOLUTION INTRAMUSCULAR; INTRAVENOUS at 02:15

## 2024-01-12 RX ADMIN — GLYCOPYRROLATE 0.2 MG: 0.2 INJECTION INTRAMUSCULAR; INTRAVENOUS at 04:33

## 2024-01-12 RX ADMIN — HALOPERIDOL LACTATE 2 MG: 5 INJECTION, SOLUTION INTRAMUSCULAR at 04:33

## 2024-01-12 RX ADMIN — GLYCOPYRROLATE 0.2 MG: 0.2 INJECTION INTRAMUSCULAR; INTRAVENOUS at 06:15

## 2024-01-12 RX ADMIN — GLYCOPYRROLATE 0.2 MG: 0.2 INJECTION INTRAMUSCULAR; INTRAVENOUS at 02:15

## 2024-01-12 RX ADMIN — LORAZEPAM 1 MG: 2 INJECTION, SOLUTION INTRAMUSCULAR; INTRAVENOUS at 06:15

## 2024-01-12 RX ADMIN — HALOPERIDOL LACTATE 2 MG: 5 INJECTION, SOLUTION INTRAMUSCULAR at 00:20

## 2024-01-12 RX ADMIN — MORPHINE SULFATE 2 MG: 2 INJECTION, SOLUTION INTRAMUSCULAR; INTRAVENOUS at 04:33

## 2024-01-12 NOTE — PROGRESS NOTES
01/12/24 0744   Encounter Summary   Encounter Overview/Reason  Grief, Loss, and Adjustments   Service Provided For: Family   Referral/Consult From: Family   Support System Parent;Children   Last Encounter  01/12/24  (Spoke with Tiffany (SHAY), had meditation and prayer for Courtney.  Tiffany is waiting for her husb and his brother to arrive. I explained how she could call me, if needed, after rest of family arrives. No other needs.)   Complexity of Encounter Moderate   Begin Time 0715   End Time  0748   Total Time Calculated 33 min   Crisis   Type Family Care   Spiritual/Emotional needs   Type Spiritual Support   Grief, Loss, and Adjustments   Type Hospice;Death   Assessment/Intervention/Outcome   Assessment Coping   Intervention Active listening;Nurtured Hope;Prayer (assurance of)/[place];Sustaining Presence/Ministry of presence   Outcome Comfort;Expressed Gratitude   Plan and Referrals   Plan/Referrals Continue Support (comment)  ( gave info card to SHAY)

## 2024-01-12 NOTE — PROGRESS NOTES
This nurse walked into room at shift change to introduce self to family that was at bedside.  Noticed pt was not breathing.  This nurse and MINE Mattson verified no heart beat, no vital signs present. Dr. Barragan notified by phone.   Time of death documented at 7:15 am.

## 2024-01-12 NOTE — DISCHARGE SUMMARY
[facility]    Death Summary    Date: 1/12/2024  Name: Courtney Adorno  MRN: [id number]  YOB: 1965     Patient's PCP: No primary care provider on file.  Consultants during acute care: pulmonology, critical care, cardiology, nephrology  Acute care admit date: 12/26 to 12/30/2023 (third admission since 11/4/23)  Intubation: 12/27/23  Withdrawal of life sustaining treatment (mechanical ventilation and vasopressors):12/28/2023  Admit Date: 12/30/2023 to [facility]  Date of Death: 1/12/2024  Time: 0715  Admitting Physician: Evan Barragan MD to [facility]   Discharge Physician: MARY Barragan MD  Consultation: none during [facility]ospice stay    Invasive procedures: none during [facility] stay    Discharge Diagnoses:  Heart failure with reduced ejection fraction (LVEF 15%) with nonischemic cardiomyopathy and recurrent decompensation with hypoxic respiratory failure. There was withdrawal of life sustaining treatment (mechanical ventilation and vasopressors) on 12/28/23 and the patient and daughter request comfort care. The patient was admitted to [facility].  Terminal delirium  Terminal congestion  Nonischemic cardiomyopathy with LVEF 15%   LV thrombus per Transthoracic Echocardiogram 11/6/23, previously on anticoagulation, stopped given comfort care plan  OIC - prn Bisacodyl suppository   Cocaine abuse, last positive 12/27/2023  Atrial fib with RVR  Toxic/metabolic encephalopathy   COPD, tobacco use  DNR-comfort care       Patient Active Problem List   Diagnosis Code    Troponin I above reference range R79.89    Acute on chronic systolic (congestive) heart failure (HCC) I50.23    Non-ischemic cardiomyopathy (HCC) I42.8    Acute respiratory distress R06.03    Systolic CHF, acute on chronic (HCC) I50.23    Acute pulmonary edema (HCC) J81.0    Persistent atrial fibrillation (HCC) I48.19    Altered mental status R41.82

## 2024-02-07 NOTE — CARE COORDINATION
.CM has reviewed pt's chart for needs. CM screening shows that pt has insurance and is independent PTA. Pt does not have a PCP. CM met with pt and provided her with a PCP list.  Pt denies any other d/c needs at this time. Pt d If needs arise please contact SANDRA.   TE unknown